# Patient Record
Sex: FEMALE | Race: BLACK OR AFRICAN AMERICAN | Employment: UNEMPLOYED | URBAN - NONMETROPOLITAN AREA
[De-identification: names, ages, dates, MRNs, and addresses within clinical notes are randomized per-mention and may not be internally consistent; named-entity substitution may affect disease eponyms.]

---

## 2017-01-05 ENCOUNTER — HOSPITAL ENCOUNTER (OUTPATIENT)
Dept: ULTRASOUND IMAGING | Age: 27
Discharge: HOME | End: 2017-01-05
Payer: COMMERCIAL

## 2017-01-05 ENCOUNTER — OFFICE VISIT (OUTPATIENT)
Dept: OBGYN | Age: 27
End: 2017-01-05
Payer: COMMERCIAL

## 2017-01-05 VITALS
BODY MASS INDEX: 36.58 KG/M2 | HEIGHT: 67 IN | WEIGHT: 233.1 LBS | DIASTOLIC BLOOD PRESSURE: 60 MMHG | SYSTOLIC BLOOD PRESSURE: 120 MMHG

## 2017-01-05 DIAGNOSIS — N83.202 CYSTS OF BOTH OVARIES: Primary | ICD-10-CM

## 2017-01-05 DIAGNOSIS — N83.201 CYSTS OF BOTH OVARIES: ICD-10-CM

## 2017-01-05 DIAGNOSIS — N83.201 CYSTS OF BOTH OVARIES: Primary | ICD-10-CM

## 2017-01-05 DIAGNOSIS — N83.201 BILATERAL OVARIAN CYSTS: ICD-10-CM

## 2017-01-05 DIAGNOSIS — N97.9 INFERTILITY, FEMALE: ICD-10-CM

## 2017-01-05 DIAGNOSIS — N83.202 BILATERAL OVARIAN CYSTS: ICD-10-CM

## 2017-01-05 DIAGNOSIS — N83.202 CYSTS OF BOTH OVARIES: ICD-10-CM

## 2017-01-05 PROCEDURE — 76856 US EXAM PELVIC COMPLETE: CPT

## 2017-01-05 PROCEDURE — 76830 TRANSVAGINAL US NON-OB: CPT

## 2017-01-05 PROCEDURE — 99213 OFFICE O/P EST LOW 20 MIN: CPT | Performed by: NURSE PRACTITIONER

## 2017-01-05 ASSESSMENT — ENCOUNTER SYMPTOMS
SHORTNESS OF BREATH: 0
DIARRHEA: 0
ABDOMINAL PAIN: 0
NAUSEA: 0
TROUBLE SWALLOWING: 0
WHEEZING: 0
APNEA: 0
SORE THROAT: 0
CONSTIPATION: 0

## 2017-01-09 ENCOUNTER — TELEPHONE (OUTPATIENT)
Dept: OBGYN | Age: 27
End: 2017-01-09

## 2017-01-17 ENCOUNTER — TELEPHONE (OUTPATIENT)
Dept: OBGYN | Age: 27
End: 2017-01-17

## 2017-01-23 DIAGNOSIS — N83.202 LEFT OVARIAN CYST: Primary | ICD-10-CM

## 2017-03-13 ENCOUNTER — HOSPITAL ENCOUNTER (EMERGENCY)
Age: 27
Discharge: HOME | End: 2017-03-13
Payer: COMMERCIAL

## 2017-03-13 ENCOUNTER — APPOINTMENT (OUTPATIENT)
Dept: CT IMAGING | Age: 27
End: 2017-03-13
Payer: COMMERCIAL

## 2017-03-13 VITALS
BODY MASS INDEX: 36.1 KG/M2 | HEART RATE: 77 BPM | DIASTOLIC BLOOD PRESSURE: 79 MMHG | TEMPERATURE: 97 F | WEIGHT: 230 LBS | RESPIRATION RATE: 18 BRPM | OXYGEN SATURATION: 99 % | HEIGHT: 67 IN | SYSTOLIC BLOOD PRESSURE: 117 MMHG

## 2017-03-13 DIAGNOSIS — N83.201 CYST OF RIGHT OVARY: Primary | ICD-10-CM

## 2017-03-13 DIAGNOSIS — R82.71 BACTERIA IN URINE: ICD-10-CM

## 2017-03-13 DIAGNOSIS — R07.9 CHEST PAIN, UNSPECIFIED TYPE: ICD-10-CM

## 2017-03-13 LAB
ALBUMIN SERPL-MCNC: 4.7 G/DL (ref 3.5–5.2)
ALP BLD-CCNC: 68 U/L (ref 35–104)
ALT SERPL-CCNC: 25 U/L (ref 5–33)
ANION GAP SERPL CALCULATED.3IONS-SCNC: 21 MMOL/L (ref 7–19)
AST SERPL-CCNC: 22 U/L (ref 5–32)
BACTERIA: ABNORMAL /HPF
BILIRUB SERPL-MCNC: 0.3 MG/DL (ref 0.2–1.2)
BILIRUBIN URINE: NEGATIVE
BLOOD, URINE: NEGATIVE
BUN BLDV-MCNC: 9 MG/DL (ref 6–20)
CALCIUM SERPL-MCNC: 10.1 MG/DL (ref 8.6–10)
CHLORIDE BLD-SCNC: 99 MMOL/L (ref 98–111)
CLARITY: ABNORMAL
CO2: 19 MMOL/L (ref 22–29)
COLOR: YELLOW
CREAT SERPL-MCNC: 0.7 MG/DL (ref 0.5–0.9)
EPITHELIAL CELLS, UA: 19 /HPF (ref 0–5)
GFR NON-AFRICAN AMERICAN: >60
GLOBULIN: 4.1 G/DL
GLUCOSE BLD-MCNC: 140 MG/DL (ref 74–109)
GLUCOSE URINE: NEGATIVE MG/DL
HCG QUALITATIVE: NEGATIVE
HCT VFR BLD CALC: 43.3 % (ref 37–47)
HEMOGLOBIN: 15.5 G/DL (ref 12–16)
HYALINE CASTS: 16 /HPF (ref 0–8)
KETONES, URINE: 15 MG/DL
LEUKOCYTE ESTERASE, URINE: ABNORMAL
LIPASE: 25 U/L (ref 13–60)
MCH RBC QN AUTO: 31 PG (ref 27–31)
MCHC RBC AUTO-ENTMCNC: 35.8 G/DL (ref 33–37)
MCV RBC AUTO: 86.6 FL (ref 81–99)
NITRITE, URINE: NEGATIVE
PDW BLD-RTO: 14.5 % (ref 11.5–14.5)
PERFORMED ON: NORMAL
PH UA: 8
PLATELET # BLD: 371 K/UL (ref 130–400)
PMV BLD AUTO: 10.1 FL (ref 7.4–10.4)
POC TROPONIN I: 0 NG/ML (ref 0–0.08)
POTASSIUM SERPL-SCNC: 3.9 MMOL/L (ref 3.5–5)
PROTEIN UA: 100 MG/DL
RBC # BLD: 5 M/UL (ref 4.2–5.4)
RBC UA: 3 /HPF (ref 0–4)
SODIUM BLD-SCNC: 139 MMOL/L (ref 136–145)
SPECIFIC GRAVITY UA: 1.03
TOTAL PROTEIN: 8.8 G/DL (ref 6.6–8.7)
UROBILINOGEN, URINE: 0.2 E.U./DL
WBC # BLD: 16.1 K/UL (ref 4.8–10.8)
WBC UA: 9 /HPF (ref 0–5)

## 2017-03-13 PROCEDURE — 99283 EMERGENCY DEPT VISIT LOW MDM: CPT | Performed by: NURSE PRACTITIONER

## 2017-03-13 PROCEDURE — 87086 URINE CULTURE/COLONY COUNT: CPT

## 2017-03-13 PROCEDURE — 96374 THER/PROPH/DIAG INJ IV PUSH: CPT

## 2017-03-13 PROCEDURE — 93005 ELECTROCARDIOGRAM TRACING: CPT

## 2017-03-13 PROCEDURE — 84703 CHORIONIC GONADOTROPIN ASSAY: CPT

## 2017-03-13 PROCEDURE — 36415 COLL VENOUS BLD VENIPUNCTURE: CPT

## 2017-03-13 PROCEDURE — 2580000003 HC RX 258: Performed by: NURSE PRACTITIONER

## 2017-03-13 PROCEDURE — 6360000004 HC RX CONTRAST MEDICATION: Performed by: NURSE PRACTITIONER

## 2017-03-13 PROCEDURE — 81001 URINALYSIS AUTO W/SCOPE: CPT

## 2017-03-13 PROCEDURE — 74177 CT ABD & PELVIS W/CONTRAST: CPT

## 2017-03-13 PROCEDURE — 83690 ASSAY OF LIPASE: CPT

## 2017-03-13 PROCEDURE — 6360000002 HC RX W HCPCS: Performed by: NURSE PRACTITIONER

## 2017-03-13 PROCEDURE — 84484 ASSAY OF TROPONIN QUANT: CPT

## 2017-03-13 PROCEDURE — 96375 TX/PRO/DX INJ NEW DRUG ADDON: CPT

## 2017-03-13 PROCEDURE — 85027 COMPLETE CBC AUTOMATED: CPT

## 2017-03-13 PROCEDURE — 80053 COMPREHEN METABOLIC PANEL: CPT

## 2017-03-13 PROCEDURE — 99284 EMERGENCY DEPT VISIT MOD MDM: CPT

## 2017-03-13 RX ORDER — NAPROXEN 500 MG/1
500 TABLET ORAL 2 TIMES DAILY
Qty: 20 TABLET | Refills: 0 | Status: SHIPPED | OUTPATIENT
Start: 2017-03-13 | End: 2017-03-23

## 2017-03-13 RX ORDER — ONDANSETRON 2 MG/ML
4 INJECTION INTRAMUSCULAR; INTRAVENOUS ONCE
Status: COMPLETED | OUTPATIENT
Start: 2017-03-13 | End: 2017-03-13

## 2017-03-13 RX ORDER — HYDROCODONE BITARTRATE AND ACETAMINOPHEN 5; 325 MG/1; MG/1
1 TABLET ORAL EVERY 6 HOURS PRN
Qty: 15 TABLET | Refills: 0 | Status: SHIPPED | OUTPATIENT
Start: 2017-03-13 | End: 2017-04-10 | Stop reason: ALTCHOICE

## 2017-03-13 RX ORDER — CEPHALEXIN 500 MG/1
500 CAPSULE ORAL 3 TIMES DAILY
Qty: 21 CAPSULE | Refills: 0 | Status: SHIPPED | OUTPATIENT
Start: 2017-03-13 | End: 2017-03-20

## 2017-03-13 RX ORDER — 0.9 % SODIUM CHLORIDE 0.9 %
1000 INTRAVENOUS SOLUTION INTRAVENOUS ONCE
Status: COMPLETED | OUTPATIENT
Start: 2017-03-13 | End: 2017-03-13

## 2017-03-13 RX ADMIN — IOVERSOL 90 ML: 741 INJECTION INTRA-ARTERIAL; INTRAVENOUS at 17:25

## 2017-03-13 RX ADMIN — HYDROMORPHONE HYDROCHLORIDE 1 MG: 1 INJECTION, SOLUTION INTRAMUSCULAR; INTRAVENOUS; SUBCUTANEOUS at 16:32

## 2017-03-13 RX ADMIN — ONDANSETRON 4 MG: 2 INJECTION INTRAMUSCULAR; INTRAVENOUS at 16:32

## 2017-03-13 RX ADMIN — SODIUM CHLORIDE 1000 ML: 9 INJECTION, SOLUTION INTRAVENOUS at 16:32

## 2017-03-13 ASSESSMENT — PAIN SCALES - GENERAL
PAINLEVEL_OUTOF10: 10
PAINLEVEL_OUTOF10: 10
PAINLEVEL_OUTOF10: 7

## 2017-03-13 ASSESSMENT — PAIN DESCRIPTION - LOCATION: LOCATION: ABDOMEN;CHEST

## 2017-03-13 ASSESSMENT — ENCOUNTER SYMPTOMS
ALLERGIC/IMMUNOLOGIC NEGATIVE: 1
EYES NEGATIVE: 1
RESPIRATORY NEGATIVE: 1
GASTROINTESTINAL NEGATIVE: 1

## 2017-03-13 ASSESSMENT — PAIN DESCRIPTION - PAIN TYPE: TYPE: ACUTE PAIN

## 2017-03-14 LAB
EKG P AXIS: -10 DEGREES
EKG P-R INTERVAL: 176 MS
EKG Q-T INTERVAL: 440 MS
EKG QRS DURATION: 82 MS
EKG QTC CALCULATION (BAZETT): 426 MS
EKG T AXIS: 31 DEGREES

## 2017-03-15 LAB — URINE CULTURE, ROUTINE: NORMAL

## 2017-04-10 ENCOUNTER — HOSPITAL ENCOUNTER (EMERGENCY)
Age: 27
Discharge: LEFT BEFORE DISCHARGE | End: 2017-04-10
Attending: EMERGENCY MEDICINE
Payer: COMMERCIAL

## 2017-04-10 VITALS
RESPIRATION RATE: 24 BRPM | OXYGEN SATURATION: 98 % | SYSTOLIC BLOOD PRESSURE: 142 MMHG | BODY MASS INDEX: 36.1 KG/M2 | HEART RATE: 86 BPM | DIASTOLIC BLOOD PRESSURE: 86 MMHG | HEIGHT: 67 IN | TEMPERATURE: 98.4 F | WEIGHT: 230 LBS

## 2017-04-10 DIAGNOSIS — R10.9 ABDOMINAL PAIN, UNSPECIFIED LOCATION: Primary | ICD-10-CM

## 2017-04-10 LAB
ALBUMIN SERPL-MCNC: 4.6 G/DL (ref 3.5–5.2)
ALP BLD-CCNC: 68 U/L (ref 35–104)
ALT SERPL-CCNC: 18 U/L (ref 5–33)
AMORPHOUS: ABNORMAL /HPF
ANION GAP SERPL CALCULATED.3IONS-SCNC: 18 MMOL/L (ref 7–19)
AST SERPL-CCNC: 21 U/L (ref 5–32)
BASOPHILS ABSOLUTE: 0.1 K/UL (ref 0–0.2)
BASOPHILS RELATIVE PERCENT: 0.4 % (ref 0–1)
BILIRUB SERPL-MCNC: 0.3 MG/DL (ref 0.2–1.2)
BILIRUBIN URINE: NEGATIVE
BLOOD, URINE: NEGATIVE
BUN BLDV-MCNC: 10 MG/DL (ref 6–20)
CALCIUM SERPL-MCNC: 9.4 MG/DL (ref 8.6–10)
CHLORIDE BLD-SCNC: 102 MMOL/L (ref 98–111)
CLARITY: ABNORMAL
CO2: 18 MMOL/L (ref 22–29)
COLOR: YELLOW
CREAT SERPL-MCNC: 0.6 MG/DL (ref 0.5–0.9)
EOSINOPHILS ABSOLUTE: 0.1 K/UL (ref 0–0.6)
EOSINOPHILS RELATIVE PERCENT: 0.5 % (ref 0–5)
EPITHELIAL CELLS, UA: ABNORMAL /HPF
GFR NON-AFRICAN AMERICAN: >60
GLOBULIN: 3.7 G/DL
GLUCOSE BLD-MCNC: 134 MG/DL (ref 74–109)
GLUCOSE URINE: NEGATIVE MG/DL
HCG(URINE) PREGNANCY TEST: NEGATIVE
HCT VFR BLD CALC: 43.3 % (ref 37–47)
HEMOGLOBIN: 15.7 G/DL (ref 12–16)
KETONES, URINE: 15 MG/DL
LEUKOCYTE ESTERASE, URINE: ABNORMAL
LYMPHOCYTES ABSOLUTE: 3.8 K/UL (ref 1.1–4.5)
LYMPHOCYTES RELATIVE PERCENT: 28.3 % (ref 20–40)
MCH RBC QN AUTO: 31.3 PG (ref 27–31)
MCHC RBC AUTO-ENTMCNC: 36.3 G/DL (ref 33–37)
MCV RBC AUTO: 86.3 FL (ref 81–99)
MONOCYTES ABSOLUTE: 1.1 K/UL (ref 0–0.9)
MONOCYTES RELATIVE PERCENT: 7.9 % (ref 0–10)
NEUTROPHILS ABSOLUTE: 8.3 K/UL (ref 1.5–7.5)
NEUTROPHILS RELATIVE PERCENT: 62.3 % (ref 50–65)
NITRITE, URINE: NEGATIVE
PDW BLD-RTO: 14.1 % (ref 11.5–14.5)
PH UA: 5.5
PLATELET # BLD: 349 K/UL (ref 130–400)
PMV BLD AUTO: 10.8 FL (ref 7.4–10.4)
POTASSIUM SERPL-SCNC: 3.4 MMOL/L (ref 3.5–5)
PROTEIN UA: 30 MG/DL
RBC # BLD: 5.02 M/UL (ref 4.2–5.4)
SODIUM BLD-SCNC: 138 MMOL/L (ref 136–145)
SPECIFIC GRAVITY UA: 1.03
TOTAL PROTEIN: 8.3 G/DL (ref 6.6–8.7)
UROBILINOGEN, URINE: 0.2 E.U./DL
WBC # BLD: 13.4 K/UL (ref 4.8–10.8)
WBC UA: ABNORMAL /HPF (ref 0–5)

## 2017-04-10 PROCEDURE — 6360000002 HC RX W HCPCS

## 2017-04-10 PROCEDURE — 6360000002 HC RX W HCPCS: Performed by: EMERGENCY MEDICINE

## 2017-04-10 PROCEDURE — 80053 COMPREHEN METABOLIC PANEL: CPT

## 2017-04-10 PROCEDURE — 87086 URINE CULTURE/COLONY COUNT: CPT

## 2017-04-10 PROCEDURE — 99282 EMERGENCY DEPT VISIT SF MDM: CPT | Performed by: EMERGENCY MEDICINE

## 2017-04-10 PROCEDURE — 96374 THER/PROPH/DIAG INJ IV PUSH: CPT

## 2017-04-10 PROCEDURE — 81025 URINE PREGNANCY TEST: CPT

## 2017-04-10 PROCEDURE — 81001 URINALYSIS AUTO W/SCOPE: CPT

## 2017-04-10 PROCEDURE — 36415 COLL VENOUS BLD VENIPUNCTURE: CPT

## 2017-04-10 PROCEDURE — 96375 TX/PRO/DX INJ NEW DRUG ADDON: CPT

## 2017-04-10 PROCEDURE — 85025 COMPLETE CBC W/AUTO DIFF WBC: CPT

## 2017-04-10 PROCEDURE — 99283 EMERGENCY DEPT VISIT LOW MDM: CPT

## 2017-04-10 RX ORDER — KETOROLAC TROMETHAMINE 30 MG/ML
30 INJECTION, SOLUTION INTRAMUSCULAR; INTRAVENOUS ONCE
Status: COMPLETED | OUTPATIENT
Start: 2017-04-10 | End: 2017-04-10

## 2017-04-10 RX ORDER — ONDANSETRON 2 MG/ML
INJECTION INTRAMUSCULAR; INTRAVENOUS
Status: COMPLETED
Start: 2017-04-10 | End: 2017-04-10

## 2017-04-10 RX ORDER — KETOROLAC TROMETHAMINE 30 MG/ML
30 INJECTION, SOLUTION INTRAMUSCULAR; INTRAVENOUS ONCE
Status: DISCONTINUED | OUTPATIENT
Start: 2017-04-10 | End: 2017-04-10

## 2017-04-10 RX ORDER — PROMETHAZINE HYDROCHLORIDE 25 MG/ML
25 INJECTION, SOLUTION INTRAMUSCULAR; INTRAVENOUS ONCE
Status: COMPLETED | OUTPATIENT
Start: 2017-04-10 | End: 2017-04-10

## 2017-04-10 RX ADMIN — KETOROLAC TROMETHAMINE 30 MG: 30 INJECTION, SOLUTION INTRAMUSCULAR at 15:54

## 2017-04-10 RX ADMIN — PROMETHAZINE HYDROCHLORIDE 25 MG: 25 INJECTION, SOLUTION INTRAMUSCULAR; INTRAVENOUS at 16:18

## 2017-04-10 RX ADMIN — ONDANSETRON HYDROCHLORIDE: 2 INJECTION, SOLUTION INTRAVENOUS at 15:30

## 2017-04-10 ASSESSMENT — ENCOUNTER SYMPTOMS
VOMITING: 1
EYES NEGATIVE: 1
SORE THROAT: 0
COUGH: 0
FLATUS: 1
HEMATOCHEZIA: 0
SHORTNESS OF BREATH: 0
HEMATEMESIS: 0
RESPIRATORY NEGATIVE: 1
ALLERGIC/IMMUNOLOGIC NEGATIVE: 1
ABDOMINAL PAIN: 1
DIARRHEA: 0
CONSTIPATION: 0
NAUSEA: 1
BELCHING: 0

## 2017-04-10 ASSESSMENT — PAIN DESCRIPTION - FREQUENCY: FREQUENCY: CONTINUOUS

## 2017-04-10 ASSESSMENT — PAIN DESCRIPTION - PAIN TYPE: TYPE: ACUTE PAIN

## 2017-04-10 ASSESSMENT — PAIN DESCRIPTION - LOCATION: LOCATION: ABDOMEN

## 2017-04-10 ASSESSMENT — PAIN SCALES - GENERAL: PAINLEVEL_OUTOF10: 10

## 2017-04-12 ENCOUNTER — APPOINTMENT (OUTPATIENT)
Dept: CT IMAGING | Age: 27
End: 2017-04-12
Payer: COMMERCIAL

## 2017-04-12 ENCOUNTER — HOSPITAL ENCOUNTER (EMERGENCY)
Age: 27
Discharge: HOME | End: 2017-04-12
Attending: EMERGENCY MEDICINE
Payer: COMMERCIAL

## 2017-04-12 ENCOUNTER — APPOINTMENT (OUTPATIENT)
Dept: ULTRASOUND IMAGING | Facility: HOSPITAL | Age: 27
End: 2017-04-12

## 2017-04-12 ENCOUNTER — HOSPITAL ENCOUNTER (EMERGENCY)
Facility: HOSPITAL | Age: 27
Discharge: HOME | End: 2017-04-12
Admitting: FAMILY MEDICINE

## 2017-04-12 VITALS
WEIGHT: 230 LBS | BODY MASS INDEX: 36.1 KG/M2 | RESPIRATION RATE: 20 BRPM | HEIGHT: 67 IN | HEART RATE: 72 BPM | TEMPERATURE: 96.1 F | OXYGEN SATURATION: 95 %

## 2017-04-12 VITALS
HEIGHT: 67 IN | RESPIRATION RATE: 16 BRPM | TEMPERATURE: 97.8 F | SYSTOLIC BLOOD PRESSURE: 136 MMHG | WEIGHT: 230 LBS | OXYGEN SATURATION: 99 % | BODY MASS INDEX: 36.1 KG/M2 | HEART RATE: 79 BPM | DIASTOLIC BLOOD PRESSURE: 88 MMHG

## 2017-04-12 DIAGNOSIS — N30.00 ACUTE CYSTITIS WITHOUT HEMATURIA: ICD-10-CM

## 2017-04-12 DIAGNOSIS — N76.0 BACTERIAL VAGINOSIS: ICD-10-CM

## 2017-04-12 DIAGNOSIS — N83.201 CYST OF RIGHT OVARY: Primary | ICD-10-CM

## 2017-04-12 DIAGNOSIS — B96.89 BACTERIAL VAGINOSIS: ICD-10-CM

## 2017-04-12 LAB
ALBUMIN SERPL-MCNC: 4.3 G/DL (ref 3.5–5)
ALBUMIN SERPL-MCNC: 4.4 G/DL (ref 3.5–5.2)
ALBUMIN/GLOB SERPL: 1 G/DL (ref 1.1–2.5)
ALP BLD-CCNC: 66 U/L (ref 35–104)
ALP SERPL-CCNC: 70 U/L (ref 24–120)
ALT SERPL W P-5'-P-CCNC: 24 U/L (ref 0–54)
ALT SERPL-CCNC: 20 U/L (ref 5–33)
AMPHET+METHAMPHET UR QL: NEGATIVE
AMYLASE SERPL-CCNC: 79 U/L (ref 30–110)
ANION GAP SERPL CALCULATED.3IONS-SCNC: 13 MMOL/L (ref 4–13)
ANION GAP SERPL CALCULATED.3IONS-SCNC: 17 MMOL/L (ref 7–19)
AST SERPL-CCNC: 28 U/L (ref 5–32)
AST SERPL-CCNC: 33 U/L (ref 7–45)
B-HCG UR QL: NEGATIVE
BACTERIA UR QL AUTO: ABNORMAL /HPF
BARBITURATES UR QL SCN: NEGATIVE
BASOPHILS # BLD AUTO: 0.03 10*3/MM3 (ref 0–0.2)
BASOPHILS ABSOLUTE: 0 K/UL (ref 0–0.2)
BASOPHILS NFR BLD AUTO: 0.2 % (ref 0–2)
BASOPHILS RELATIVE PERCENT: 0.3 % (ref 0–1)
BENZODIAZ UR QL SCN: NEGATIVE
BILIRUB SERPL-MCNC: 0.5 MG/DL (ref 0.2–1.2)
BILIRUB SERPL-MCNC: 0.6 MG/DL (ref 0.1–1)
BILIRUB UR QL STRIP: NEGATIVE
BILIRUBIN URINE: NEGATIVE
BLOOD, URINE: NEGATIVE
BUN BLD-MCNC: 11 MG/DL (ref 5–21)
BUN BLDV-MCNC: 11 MG/DL (ref 6–20)
BUN/CREAT SERPL: 14.7 (ref 7–25)
CALCIUM SERPL-MCNC: 9.2 MG/DL (ref 8.6–10)
CALCIUM SPEC-SCNC: 9.4 MG/DL (ref 8.4–10.4)
CANNABINOIDS SERPL QL: NEGATIVE
CHLORIDE BLD-SCNC: 99 MMOL/L (ref 98–111)
CHLORIDE SERPL-SCNC: 100 MMOL/L (ref 98–110)
CLARITY UR: ABNORMAL
CLARITY: ABNORMAL
CLUE CELLS SPEC QL WET PREP: ABNORMAL
CO2 SERPL-SCNC: 26 MMOL/L (ref 24–31)
CO2: 19 MMOL/L (ref 22–29)
COCAINE UR QL: NEGATIVE
COLOR UR: YELLOW
COLOR: YELLOW
CREAT BLD-MCNC: 0.75 MG/DL (ref 0.5–1.4)
CREAT SERPL-MCNC: 0.7 MG/DL (ref 0.5–0.9)
CRP SERPL-MCNC: <0.5 MG/DL (ref 0–0.99)
D-LACTATE SERPL-SCNC: 1.6 MMOL/L (ref 0.5–2)
DEPRECATED RDW RBC AUTO: 43.7 FL (ref 40–54)
EOSINOPHIL # BLD AUTO: 0.03 10*3/MM3 (ref 0–0.7)
EOSINOPHIL NFR BLD AUTO: 0.2 % (ref 0–4)
EOSINOPHILS ABSOLUTE: 0.1 K/UL (ref 0–0.6)
EOSINOPHILS RELATIVE PERCENT: 0.3 % (ref 0–5)
ERYTHROCYTE [DISTWIDTH] IN BLOOD BY AUTOMATED COUNT: 14.2 % (ref 12–15)
GFR NON-AFRICAN AMERICAN: >60
GFR SERPL CREATININE-BSD FRML MDRD: 112 ML/MIN/1.73
GLOBULIN UR ELPH-MCNC: 4.1 GM/DL
GLOBULIN: 3.7 G/DL
GLUCOSE BLD-MCNC: 133 MG/DL (ref 74–109)
GLUCOSE BLD-MCNC: 93 MG/DL (ref 70–100)
GLUCOSE UR STRIP-MCNC: NEGATIVE MG/DL
GLUCOSE URINE: NEGATIVE MG/DL
HCG QUALITATIVE: NEGATIVE
HCT VFR BLD AUTO: 42 % (ref 37–47)
HCT VFR BLD CALC: 43 % (ref 37–47)
HEMOGLOBIN: 15.5 G/DL (ref 12–16)
HGB BLD-MCNC: 15.3 G/DL (ref 12–16)
HGB UR QL STRIP.AUTO: NEGATIVE
HYALINE CASTS UR QL AUTO: ABNORMAL /LPF
HYDATID CYST SPEC WET PREP: ABNORMAL
IMM GRANULOCYTES # BLD: 0.06 10*3/MM3 (ref 0–0.03)
IMM GRANULOCYTES NFR BLD: 0.4 % (ref 0–5)
INTERNAL NEGATIVE CONTROL: NEGATIVE
INTERNAL POSITIVE CONTROL: POSITIVE
KETONES UR QL STRIP: ABNORMAL
KETONES, URINE: NEGATIVE MG/DL
LACTIC ACID: 1.7 MG/DL (ref 0.5–1.9)
LEUKOCYTE ESTERASE UR QL STRIP.AUTO: ABNORMAL
LEUKOCYTE ESTERASE, URINE: ABNORMAL
LIPASE SERPL-CCNC: 67 U/L (ref 23–203)
LIPASE: 22 U/L (ref 13–60)
LYMPHOCYTES # BLD AUTO: 2.92 10*3/MM3 (ref 0.72–4.86)
LYMPHOCYTES ABSOLUTE: 2.8 K/UL (ref 1.1–4.5)
LYMPHOCYTES NFR BLD AUTO: 19.4 % (ref 15–45)
LYMPHOCYTES RELATIVE PERCENT: 18.4 % (ref 20–40)
Lab: NORMAL
MCH RBC QN AUTO: 30.8 PG (ref 28–32)
MCH RBC QN AUTO: 31 PG (ref 27–31)
MCHC RBC AUTO-ENTMCNC: 36 G/DL (ref 33–37)
MCHC RBC AUTO-ENTMCNC: 36.4 G/DL (ref 33–36)
MCV RBC AUTO: 84.5 FL (ref 82–98)
MCV RBC AUTO: 86 FL (ref 81–99)
METHADONE UR QL SCN: NEGATIVE
MONOCYTES # BLD AUTO: 1.2 10*3/MM3 (ref 0.19–1.3)
MONOCYTES ABSOLUTE: 1 K/UL (ref 0–0.9)
MONOCYTES NFR BLD AUTO: 8 % (ref 4–12)
MONOCYTES RELATIVE PERCENT: 6.4 % (ref 0–10)
NEUTROPHILS # BLD AUTO: 10.85 10*3/MM3 (ref 1.87–8.4)
NEUTROPHILS ABSOLUTE: 11.3 K/UL (ref 1.5–7.5)
NEUTROPHILS NFR BLD AUTO: 71.8 % (ref 39–78)
NEUTROPHILS RELATIVE PERCENT: 73.9 % (ref 50–65)
NITRITE UR QL STRIP: NEGATIVE
NITRITE, URINE: NEGATIVE
OPIATES UR QL: POSITIVE
PCP UR QL SCN: NEGATIVE
PDW BLD-RTO: 14.2 % (ref 11.5–14.5)
PH UA: 5.5
PH UR STRIP.AUTO: 5.5 [PH] (ref 5–8)
PLATELET # BLD AUTO: 313 10*3/MM3 (ref 130–400)
PLATELET # BLD: 331 K/UL (ref 130–400)
PMV BLD AUTO: 10.2 FL (ref 6–12)
PMV BLD AUTO: 10.3 FL (ref 7.4–10.4)
POTASSIUM BLD-SCNC: 3.5 MMOL/L (ref 3.5–5.3)
POTASSIUM SERPL-SCNC: 3.5 MMOL/L (ref 3.5–5)
PROT SERPL-MCNC: 8.4 G/DL (ref 6.3–8.7)
PROT UR QL STRIP: ABNORMAL
PROTEIN UA: 30 MG/DL
RBC # BLD AUTO: 4.97 10*6/MM3 (ref 4.2–5.4)
RBC # BLD: 5 M/UL (ref 4.2–5.4)
RBC # UR: ABNORMAL /HPF
RBC UA: ABNORMAL /HPF (ref 0–2)
REF LAB TEST METHOD: ABNORMAL
RENAL EPITHELIAL, UA: ABNORMAL /HPF
SODIUM BLD-SCNC: 135 MMOL/L (ref 136–145)
SODIUM BLD-SCNC: 139 MMOL/L (ref 135–145)
SP GR UR STRIP: >1.03 (ref 1–1.03)
SPECIFIC GRAVITY UA: 1.02
SQUAMOUS #/AREA URNS HPF: ABNORMAL /HPF
T VAGINALIS SPEC QL WET PREP: ABNORMAL
TOTAL PROTEIN: 8.1 G/DL (ref 6.6–8.7)
URINE CULTURE, ROUTINE: NORMAL
UROBILINOGEN UR QL STRIP: ABNORMAL
UROBILINOGEN, URINE: 0.2 E.U./DL
WBC # BLD: 15.3 K/UL (ref 4.8–10.8)
WBC NRBC COR # BLD: 15.09 10*3/MM3 (ref 4.8–10.8)
WBC SPEC QL WET PREP: ABNORMAL
WBC UA: ABNORMAL /HPF (ref 0–5)
WBC UR QL AUTO: ABNORMAL /HPF
YEAST GENITAL QL WET PREP: ABNORMAL

## 2017-04-12 PROCEDURE — 80053 COMPREHEN METABOLIC PANEL: CPT | Performed by: NURSE PRACTITIONER

## 2017-04-12 PROCEDURE — 81001 URINALYSIS AUTO W/SCOPE: CPT | Performed by: NURSE PRACTITIONER

## 2017-04-12 PROCEDURE — 99284 EMERGENCY DEPT VISIT MOD MDM: CPT

## 2017-04-12 PROCEDURE — 85025 COMPLETE CBC W/AUTO DIFF WBC: CPT

## 2017-04-12 PROCEDURE — 87210 SMEAR WET MOUNT SALINE/INK: CPT | Performed by: NURSE PRACTITIONER

## 2017-04-12 PROCEDURE — 87086 URINE CULTURE/COLONY COUNT: CPT | Performed by: NURSE PRACTITIONER

## 2017-04-12 PROCEDURE — 25010000002 CEFTRIAXONE PER 250 MG: Performed by: NURSE PRACTITIONER

## 2017-04-12 PROCEDURE — 25010000002 ONDANSETRON PER 1 MG: Performed by: NURSE PRACTITIONER

## 2017-04-12 PROCEDURE — 76856 US EXAM PELVIC COMPLETE: CPT

## 2017-04-12 PROCEDURE — 80307 DRUG TEST PRSMV CHEM ANLYZR: CPT | Performed by: NURSE PRACTITIONER

## 2017-04-12 PROCEDURE — 82150 ASSAY OF AMYLASE: CPT | Performed by: NURSE PRACTITIONER

## 2017-04-12 PROCEDURE — 86140 C-REACTIVE PROTEIN: CPT | Performed by: NURSE PRACTITIONER

## 2017-04-12 PROCEDURE — 84703 CHORIONIC GONADOTROPIN ASSAY: CPT

## 2017-04-12 PROCEDURE — 96361 HYDRATE IV INFUSION ADD-ON: CPT

## 2017-04-12 PROCEDURE — 83605 ASSAY OF LACTIC ACID: CPT | Performed by: NURSE PRACTITIONER

## 2017-04-12 PROCEDURE — 96374 THER/PROPH/DIAG INJ IV PUSH: CPT

## 2017-04-12 PROCEDURE — 6360000004 HC RX CONTRAST MEDICATION: Performed by: EMERGENCY MEDICINE

## 2017-04-12 PROCEDURE — 87591 N.GONORRHOEAE DNA AMP PROB: CPT | Performed by: NURSE PRACTITIONER

## 2017-04-12 PROCEDURE — 85025 COMPLETE CBC W/AUTO DIFF WBC: CPT | Performed by: NURSE PRACTITIONER

## 2017-04-12 PROCEDURE — 83690 ASSAY OF LIPASE: CPT | Performed by: NURSE PRACTITIONER

## 2017-04-12 PROCEDURE — 83690 ASSAY OF LIPASE: CPT

## 2017-04-12 PROCEDURE — 96372 THER/PROPH/DIAG INJ SC/IM: CPT

## 2017-04-12 PROCEDURE — 99283 EMERGENCY DEPT VISIT LOW MDM: CPT | Performed by: EMERGENCY MEDICINE

## 2017-04-12 PROCEDURE — 36415 COLL VENOUS BLD VENIPUNCTURE: CPT

## 2017-04-12 PROCEDURE — 2580000003 HC RX 258: Performed by: EMERGENCY MEDICINE

## 2017-04-12 PROCEDURE — 96375 TX/PRO/DX INJ NEW DRUG ADDON: CPT

## 2017-04-12 PROCEDURE — 87491 CHLMYD TRACH DNA AMP PROBE: CPT | Performed by: NURSE PRACTITIONER

## 2017-04-12 PROCEDURE — 25010000002 HYDROMORPHONE PER 4 MG: Performed by: NURSE PRACTITIONER

## 2017-04-12 PROCEDURE — 6360000002 HC RX W HCPCS: Performed by: EMERGENCY MEDICINE

## 2017-04-12 PROCEDURE — 80053 COMPREHEN METABOLIC PANEL: CPT

## 2017-04-12 PROCEDURE — 83605 ASSAY OF LACTIC ACID: CPT

## 2017-04-12 PROCEDURE — 74177 CT ABD & PELVIS W/CONTRAST: CPT

## 2017-04-12 PROCEDURE — 81001 URINALYSIS AUTO W/SCOPE: CPT

## 2017-04-12 PROCEDURE — 87086 URINE CULTURE/COLONY COUNT: CPT

## 2017-04-12 RX ORDER — ONDANSETRON 2 MG/ML
4 INJECTION INTRAMUSCULAR; INTRAVENOUS ONCE
Status: COMPLETED | OUTPATIENT
Start: 2017-04-12 | End: 2017-04-12

## 2017-04-12 RX ORDER — CEFTRIAXONE 500 MG/1
250 INJECTION, POWDER, FOR SOLUTION INTRAMUSCULAR; INTRAVENOUS ONCE
Status: COMPLETED | OUTPATIENT
Start: 2017-04-12 | End: 2017-04-12

## 2017-04-12 RX ORDER — AZITHROMYCIN 250 MG/1
1000 TABLET, FILM COATED ORAL ONCE
Status: COMPLETED | OUTPATIENT
Start: 2017-04-12 | End: 2017-04-12

## 2017-04-12 RX ORDER — SODIUM CHLORIDE 0.9 % (FLUSH) 0.9 %
10 SYRINGE (ML) INJECTION AS NEEDED
Status: DISCONTINUED | OUTPATIENT
Start: 2017-04-12 | End: 2017-04-12 | Stop reason: HOSPADM

## 2017-04-12 RX ORDER — HYDROCODONE BITARTRATE AND ACETAMINOPHEN 5; 325 MG/1; MG/1
2 TABLET ORAL EVERY 6 HOURS PRN
Qty: 10 TABLET | Refills: 0 | Status: SHIPPED | OUTPATIENT
Start: 2017-04-12 | End: 2017-04-19

## 2017-04-12 RX ORDER — METRONIDAZOLE 500 MG/1
500 TABLET ORAL 2 TIMES DAILY
Qty: 14 TABLET | Refills: 0 | Status: SHIPPED | OUTPATIENT
Start: 2017-04-12 | End: 2017-04-19

## 2017-04-12 RX ORDER — NITROFURANTOIN 25; 75 MG/1; MG/1
100 CAPSULE ORAL 2 TIMES DAILY
Qty: 14 CAPSULE | Refills: 0 | Status: SHIPPED | OUTPATIENT
Start: 2017-04-12 | End: 2017-04-19

## 2017-04-12 RX ORDER — LIDOCAINE HYDROCHLORIDE 10 MG/ML
0.9 INJECTION, SOLUTION EPIDURAL; INFILTRATION; INTRACAUDAL; PERINEURAL ONCE
Status: DISCONTINUED | OUTPATIENT
Start: 2017-04-12 | End: 2017-04-12 | Stop reason: HOSPADM

## 2017-04-12 RX ORDER — 0.9 % SODIUM CHLORIDE 0.9 %
1000 INTRAVENOUS SOLUTION INTRAVENOUS ONCE
Status: COMPLETED | OUTPATIENT
Start: 2017-04-12 | End: 2017-04-12

## 2017-04-12 RX ADMIN — HYDROMORPHONE HYDROCHLORIDE 1 MG: 1 INJECTION, SOLUTION INTRAMUSCULAR; INTRAVENOUS; SUBCUTANEOUS at 14:17

## 2017-04-12 RX ADMIN — HYDROMORPHONE HYDROCHLORIDE 1 MG: 1 INJECTION, SOLUTION INTRAMUSCULAR; INTRAVENOUS; SUBCUTANEOUS at 04:37

## 2017-04-12 RX ADMIN — AZITHROMYCIN 1000 MG: 250 TABLET, FILM COATED ORAL at 19:27

## 2017-04-12 RX ADMIN — IOVERSOL 90 ML: 741 INJECTION INTRA-ARTERIAL; INTRAVENOUS at 04:37

## 2017-04-12 RX ADMIN — SODIUM CHLORIDE 1000 ML: 9 INJECTION, SOLUTION INTRAVENOUS at 04:37

## 2017-04-12 RX ADMIN — ONDANSETRON 4 MG: 2 INJECTION INTRAMUSCULAR; INTRAVENOUS at 04:14

## 2017-04-12 RX ADMIN — SODIUM CHLORIDE 1000 ML: 9 INJECTION, SOLUTION INTRAVENOUS at 14:14

## 2017-04-12 RX ADMIN — CEFTRIAXONE 250 MG: 500 INJECTION, POWDER, FOR SOLUTION INTRAMUSCULAR; INTRAVENOUS at 19:28

## 2017-04-12 RX ADMIN — ONDANSETRON 4 MG: 2 INJECTION INTRAMUSCULAR; INTRAVENOUS at 14:16

## 2017-04-12 ASSESSMENT — PAIN DESCRIPTION - LOCATION: LOCATION: ABDOMEN

## 2017-04-12 ASSESSMENT — ENCOUNTER SYMPTOMS
VOMITING: 1
CHEST TIGHTNESS: 0
NAUSEA: 1
SHORTNESS OF BREATH: 0
ABDOMINAL PAIN: 1

## 2017-04-12 ASSESSMENT — PAIN SCALES - GENERAL
PAINLEVEL_OUTOF10: 10
PAINLEVEL_OUTOF10: 10

## 2017-04-12 ASSESSMENT — PAIN DESCRIPTION - PAIN TYPE: TYPE: ACUTE PAIN

## 2017-04-12 NOTE — DISCHARGE INSTRUCTIONS
Please f/u with your OBGYN in 1-2 days for a recheck  R/t to the ER as needed  Use medications as prescribed

## 2017-04-12 NOTE — ED PROVIDER NOTES
Subjective   HPI Comments: Is a 27-year-old -American female who presents ER today with complaint of abdominal pain.  Ports that pain began yesterday.  Patient states that she has felt nauseated however has not had any vomiting.  She reports that her last bowel movement was yesterday and this was normal.  Patient denies any fever, vaginal discharge, or vaginal bleeding.  The patient was seen at [facility] ER this morning.  Patient had a CT scan of the abdomen and pelvis which showed a right ovarian cyst.  Patient was discharged home with a prescription for Norco.  She reports that this is not helping her pain.  Patient appears to be uncomfortable in the room she is crying during the examination.  She presents ER today for further evaluation.    Patient is a 27 y.o. female presenting with abdominal pain.   History provided by:  Patient   used: No    Abdominal Pain   Pain location:  Generalized  Pain quality: sharp and stabbing    Pain radiates to:  Does not radiate  Pain severity:  Moderate  Onset quality:  Sudden  Duration:  1 day  Timing:  Constant  Progression:  Worsening  Chronicity:  New  Context: not alcohol use, not awakening from sleep, not diet changes, not eating, not laxative use, not medication withdrawal, not previous surgeries, not recent illness, not recent sexual activity, not recent travel, not retching, not sick contacts, not suspicious food intake and not trauma    Relieved by:  Nothing  Worsened by:  Nothing  Ineffective treatments:  None tried  Associated symptoms: nausea    Associated symptoms: no anorexia, no belching, no chest pain, no chills, no constipation, no cough, no diarrhea, no dysuria, no fatigue, no fever, no flatus, no hematemesis, no hematuria, no melena, no shortness of breath, no sore throat, no vaginal bleeding, no vaginal discharge and no vomiting    Risk factors: no alcohol abuse, no aspirin use, not elderly, has not had multiple surgeries, no NSAID  use, not obese, not pregnant and no recent hospitalization        Review of Systems   Constitutional: Negative for chills, fatigue and fever.   HENT: Negative for sore throat.    Respiratory: Negative for cough and shortness of breath.    Cardiovascular: Negative for chest pain.   Gastrointestinal: Positive for abdominal pain and nausea. Negative for anorexia, constipation, diarrhea, flatus, hematemesis, melena and vomiting.   Genitourinary: Negative for dysuria, hematuria, vaginal bleeding and vaginal discharge.   All other systems reviewed and are negative.      History reviewed. No pertinent past medical history.    No Known Allergies    History reviewed. No pertinent surgical history.    History reviewed. No pertinent family history.    Social History     Social History   • Marital status: Single     Spouse name: N/A   • Number of children: N/A   • Years of education: N/A     Social History Main Topics   • Smoking status: Current Every Day Smoker     Packs/day: 0.50   • Smokeless tobacco: None   • Alcohol use No   • Drug use: No   • Sexual activity: Defer     Other Topics Concern   • None     Social History Narrative           Objective   Physical Exam   Constitutional: She is oriented to person, place, and time. She appears well-developed and well-nourished.   HENT:   Head: Normocephalic and atraumatic.   Eyes: Conjunctivae are normal. Pupils are equal, round, and reactive to light.   Neck: Normal range of motion. Neck supple.   Cardiovascular: Normal rate, regular rhythm and normal heart sounds.    Pulmonary/Chest: Effort normal and breath sounds normal.   Abdominal: Soft. Normal appearance and bowel sounds are normal. There is generalized tenderness.   Genitourinary: No erythema, tenderness or bleeding in the vagina. No foreign body in the vagina. No signs of injury around the vagina. Vaginal discharge found.       Musculoskeletal: Normal range of motion.   Neurological: She is alert and oriented to person,  place, and time.   Skin: Skin is warm and dry.   Psychiatric: She has a normal mood and affect.   Nursing note and vitals reviewed.      Procedures         ED Course  ED Course   Comment By Time   Ultrasound and labs reviewed at this time. Patient blood cell count is 15.  Her urine shows trace amount leuk esterase, 0-2 RBCs, 13-20 WBCs, 3+ bacteria, 21-30 squamous epithelial cells, this time I have reevaluated the patient she is resting comfortably in the room denies any pain at this time.  She is taxing on her cell phone.  I discussed the results with the patient.  The patient reports to me that she has been having some pelvic pain.  I did discuss the patient having a pelvic exam done for wet prep and GC.  She states that she would like to have this done and.  She states that she is sexually active and has had unprotected intercourse with her partner who has slept with other partners recently. Pt is concerned that she could have an STD. DEIDRA Uriarte 04/12 1754   Patient's wet that showed 1+ WBC, 2+ clue cells.  At this time we will go ahead and treat the patient with Rocephin as well as Zithromax.  She reports that her significant other has been having unprotected intercourse with other women and the patient is concerned about possible STD.  We will go ahead and treat her for this.  We will discharge the patient home at this time in stable condition.  Give her prescription for Macrobid to treat her UTI as well as Flagyl to treat the bacterial vaginosis.  Vascular the patient follow-up with her OB/GYN for the ovarian cyst noted on this ultrasound.  At this time the patient will be discharged home in stable condition.  She was given a prescription for [place] to use for pain this morning [facility] from [facility] and I have asked her to use these as directed.  Agitated patient will be discharged home in stable condition.  She is advised to return to the ER if any new or worsening symptoms. Luzma Blevins, DEIDRA  04/12 1850                  [facility]  Number of Diagnoses or Management Options  Acute cystitis without hematuria: new and requires workup  Bacterial vaginosis: new and requires workup  Cyst of right ovary: new and requires workup     Amount and/or Complexity of Data Reviewed  Clinical lab tests: ordered and reviewed  Tests in the radiology section of CPT®: ordered and reviewed  Decide to obtain previous medical records or to obtain history from someone other than the patient: yes  Discuss the patient with other providers: yes    Patient Progress  Patient progress: stable      Final diagnoses:   Cyst of right ovary   Acute cystitis without hematuria   Bacterial vaginosis            Luzma Blevins, APRN  04/12/17 5827

## 2017-04-14 ENCOUNTER — HOSPITAL ENCOUNTER (EMERGENCY)
Facility: HOSPITAL | Age: 27
Discharge: HOME | End: 2017-04-15
Attending: EMERGENCY MEDICINE | Admitting: EMERGENCY MEDICINE

## 2017-04-14 ENCOUNTER — APPOINTMENT (OUTPATIENT)
Dept: ULTRASOUND IMAGING | Facility: HOSPITAL | Age: 27
End: 2017-04-14

## 2017-04-14 DIAGNOSIS — N83.201 CYST OF RIGHT OVARY: Primary | ICD-10-CM

## 2017-04-14 LAB
BACTERIA SPEC AEROBE CULT: ABNORMAL
C TRACH RRNA SPEC DONR QL NAA+PROBE: NEGATIVE
N GONORRHOEA DNA SPEC QL NAA+PROBE: NEGATIVE
URINE CULTURE, ROUTINE: NORMAL

## 2017-04-14 PROCEDURE — 96374 THER/PROPH/DIAG INJ IV PUSH: CPT

## 2017-04-14 PROCEDURE — 36415 COLL VENOUS BLD VENIPUNCTURE: CPT

## 2017-04-14 PROCEDURE — 96361 HYDRATE IV INFUSION ADD-ON: CPT

## 2017-04-14 PROCEDURE — 76830 TRANSVAGINAL US NON-OB: CPT

## 2017-04-14 PROCEDURE — 96375 TX/PRO/DX INJ NEW DRUG ADDON: CPT

## 2017-04-14 PROCEDURE — 99284 EMERGENCY DEPT VISIT MOD MDM: CPT

## 2017-04-14 RX ORDER — KETOROLAC TROMETHAMINE 30 MG/ML
30 INJECTION, SOLUTION INTRAMUSCULAR; INTRAVENOUS ONCE
Status: COMPLETED | OUTPATIENT
Start: 2017-04-14 | End: 2017-04-15

## 2017-04-14 RX ORDER — ONDANSETRON 2 MG/ML
4 INJECTION INTRAMUSCULAR; INTRAVENOUS ONCE
Status: COMPLETED | OUTPATIENT
Start: 2017-04-14 | End: 2017-04-15

## 2017-04-14 RX ORDER — SODIUM CHLORIDE 9 MG/ML
125 INJECTION, SOLUTION INTRAVENOUS CONTINUOUS
Status: DISCONTINUED | OUTPATIENT
Start: 2017-04-14 | End: 2017-04-15 | Stop reason: HOSPADM

## 2017-04-15 ENCOUNTER — APPOINTMENT (OUTPATIENT)
Dept: CT IMAGING | Facility: HOSPITAL | Age: 27
End: 2017-04-15

## 2017-04-15 VITALS
HEIGHT: 67 IN | WEIGHT: 230 LBS | OXYGEN SATURATION: 99 % | TEMPERATURE: 98.7 F | DIASTOLIC BLOOD PRESSURE: 90 MMHG | BODY MASS INDEX: 36.1 KG/M2 | SYSTOLIC BLOOD PRESSURE: 115 MMHG | RESPIRATION RATE: 22 BRPM | HEART RATE: 83 BPM

## 2017-04-15 LAB
ALBUMIN SERPL-MCNC: 4.2 G/DL (ref 3.5–5)
ALBUMIN/GLOB SERPL: 1.1 G/DL (ref 1.1–2.5)
ALP SERPL-CCNC: 70 U/L (ref 24–120)
ALT SERPL W P-5'-P-CCNC: 29 U/L (ref 0–54)
ANION GAP SERPL CALCULATED.3IONS-SCNC: 14 MMOL/L (ref 4–13)
AST SERPL-CCNC: 32 U/L (ref 7–45)
BASOPHILS # BLD AUTO: 0.05 10*3/MM3 (ref 0–0.2)
BASOPHILS NFR BLD AUTO: 0.3 % (ref 0–2)
BILIRUB SERPL-MCNC: 0.5 MG/DL (ref 0.1–1)
BUN BLD-MCNC: 8 MG/DL (ref 5–21)
BUN/CREAT SERPL: 11.6 (ref 7–25)
CALCIUM SPEC-SCNC: 9.1 MG/DL (ref 8.4–10.4)
CHLORIDE SERPL-SCNC: 103 MMOL/L (ref 98–110)
CO2 SERPL-SCNC: 22 MMOL/L (ref 24–31)
CREAT BLD-MCNC: 0.69 MG/DL (ref 0.5–1.4)
DEPRECATED RDW RBC AUTO: 42.7 FL (ref 40–54)
EOSINOPHIL # BLD AUTO: 0.12 10*3/MM3 (ref 0–0.7)
EOSINOPHIL NFR BLD AUTO: 0.8 % (ref 0–4)
ERYTHROCYTE [DISTWIDTH] IN BLOOD BY AUTOMATED COUNT: 14.1 % (ref 12–15)
GFR SERPL CREATININE-BSD FRML MDRD: 124 ML/MIN/1.73
GLOBULIN UR ELPH-MCNC: 3.7 GM/DL
GLUCOSE BLD-MCNC: 132 MG/DL (ref 70–100)
HCT VFR BLD AUTO: 40.3 % (ref 37–47)
HGB BLD-MCNC: 14.9 G/DL (ref 12–16)
IMM GRANULOCYTES # BLD: 0.09 10*3/MM3 (ref 0–0.03)
IMM GRANULOCYTES NFR BLD: 0.6 % (ref 0–5)
LYMPHOCYTES # BLD AUTO: 3.02 10*3/MM3 (ref 0.72–4.86)
LYMPHOCYTES NFR BLD AUTO: 20.6 % (ref 15–45)
MCH RBC QN AUTO: 30.8 PG (ref 28–32)
MCHC RBC AUTO-ENTMCNC: 37 G/DL (ref 33–36)
MCV RBC AUTO: 83.4 FL (ref 82–98)
MONOCYTES # BLD AUTO: 0.93 10*3/MM3 (ref 0.19–1.3)
MONOCYTES NFR BLD AUTO: 6.3 % (ref 4–12)
NEUTROPHILS # BLD AUTO: 10.46 10*3/MM3 (ref 1.87–8.4)
NEUTROPHILS NFR BLD AUTO: 71.4 % (ref 39–78)
NRBC BLD MANUAL-RTO: 0 /100 WBC (ref 0–0)
PLATELET # BLD AUTO: 298 10*3/MM3 (ref 130–400)
PMV BLD AUTO: 10.7 FL (ref 6–12)
POTASSIUM BLD-SCNC: 3.2 MMOL/L (ref 3.5–5.3)
PROT SERPL-MCNC: 7.9 G/DL (ref 6.3–8.7)
RBC # BLD AUTO: 4.83 10*6/MM3 (ref 4.2–5.4)
SODIUM BLD-SCNC: 139 MMOL/L (ref 135–145)
WBC NRBC COR # BLD: 14.67 10*3/MM3 (ref 4.8–10.8)

## 2017-04-15 PROCEDURE — 96361 HYDRATE IV INFUSION ADD-ON: CPT

## 2017-04-15 PROCEDURE — 25010000002 ONDANSETRON PER 1 MG: Performed by: EMERGENCY MEDICINE

## 2017-04-15 PROCEDURE — 25010000002 KETOROLAC TROMETHAMINE PER 15 MG: Performed by: EMERGENCY MEDICINE

## 2017-04-15 PROCEDURE — 0 IOPAMIDOL 61 % SOLUTION: Performed by: EMERGENCY MEDICINE

## 2017-04-15 PROCEDURE — 96374 THER/PROPH/DIAG INJ IV PUSH: CPT

## 2017-04-15 PROCEDURE — 80053 COMPREHEN METABOLIC PANEL: CPT | Performed by: EMERGENCY MEDICINE

## 2017-04-15 PROCEDURE — 74177 CT ABD & PELVIS W/CONTRAST: CPT

## 2017-04-15 PROCEDURE — 85025 COMPLETE CBC W/AUTO DIFF WBC: CPT | Performed by: EMERGENCY MEDICINE

## 2017-04-15 PROCEDURE — 96375 TX/PRO/DX INJ NEW DRUG ADDON: CPT

## 2017-04-15 RX ORDER — ONDANSETRON 4 MG/1
4 TABLET, ORALLY DISINTEGRATING ORAL 4 TIMES DAILY
Qty: 10 TABLET | Refills: 0 | Status: SHIPPED | OUTPATIENT
Start: 2017-04-15 | End: 2018-08-13

## 2017-04-15 RX ORDER — ONDANSETRON 2 MG/ML
4 INJECTION INTRAMUSCULAR; INTRAVENOUS ONCE
Status: DISCONTINUED | OUTPATIENT
Start: 2017-04-15 | End: 2017-04-15 | Stop reason: HOSPADM

## 2017-04-15 RX ADMIN — SODIUM CHLORIDE 125 ML/HR: 9 INJECTION, SOLUTION INTRAVENOUS at 00:06

## 2017-04-15 RX ADMIN — ONDANSETRON 4 MG: 2 INJECTION INTRAMUSCULAR; INTRAVENOUS at 00:06

## 2017-04-15 RX ADMIN — IOPAMIDOL 110 ML: 612 INJECTION, SOLUTION INTRAVENOUS at 03:47

## 2017-04-15 RX ADMIN — KETOROLAC TROMETHAMINE 30 MG: 30 INJECTION, SOLUTION INTRAMUSCULAR at 00:06

## 2017-04-15 NOTE — ED NOTES
DR GONZALEZ TOLD PATIENT IF SHE HAD A RIDE HOME SHE WOULD GIVE HER SOMETHING ELSE FOR PAIN,  PATIENT CALLING FOR A RIDE.      India Singh RN  04/15/17 3795

## 2017-04-15 NOTE — DISCHARGE INSTRUCTIONS
I recommend that you follow-up with Dr. Verde as soon as possible.  Please take the medications that you received at her last ER visit.  Physician transient help any nausea that you might be experiencing.  Of course if you are unable to follow-up with Dr. Verde please feel free to return to the emergency room at any time.

## 2017-04-15 NOTE — ED NOTES
PT AMBULATORY TO THE RESTROOM AT THIS TIME WITH FRIEND.  ADVISED PATIENT THAT SHE NEEDED TO STAY ON THE STRETCHER      Sonja Ji RN  04/14/17 8094

## 2017-04-15 NOTE — ED NOTES
"UPON DISCHARGE PATIENT UPSET BECAUSE SHE WAS BEING SENT HOME WITH A CYST ON HER OVARY PATIENT STATES \"THE DOCTOR TOLD ME THIS WAS SERIOUS THAT I NEEDED THIS REMOVED, SO WHY AM I NOT GETTING ANYTHING FOR PAIN, YA'LL AIN'T DONE NOTHING\"  I EXPLAINED SHE WOULD NEED TO FOLLOW UP WITH HER OB/GYN CONCERNING THE CYST THAT IT WAS NOT AN EMERGENCY SURGERY AND THAT WE HAD GIVEN HER TORADOL FOR PAIN AND DR GONZALEZ HAD OFFERED TO GIVE HER SOMETHING ELSE IF SHE HAD A RIDE HOME.  PATIENT CONTINUED TO BE UPSET TRYING TO PULL HER IV OUT.  DR GONZALEZ AWARE OF THE SITUATION, NO NEW ORDERS, PATIENT TO BE DISCHARGED HOME.      India Singh, RN  04/15/17 0500    "

## 2017-04-15 NOTE — ED NOTES
"FRIEND OF PATIENT ADVISED ME THAT \"THE PATIENT NEEDED OXYGEN OR SOMETHING\"  WENT BACK TO THE TRIAGE STRETCHER.  FOUND THE PATIENT LYING ON THE FLOOR.  TOLD THE PATIENT THAT SHE NEEDED TO RETURN TO THE STRETCHER.  ASSISTED PATIENT BACK UP INTO THE BED, ASSESSED HER VITALS AT THIS TIME.  PULSE WAS 75 BPM, PULSE % ON RA.  ADVISED CHARGE NURSE      Sonja Ji RN  04/14/17 9607    "

## 2017-04-15 NOTE — ED PROVIDER NOTES
Subjective  abdominal pain  History of Present Illness  Ms. Ibarra is a 27-year-old white female who comes in today with chief complaint of abdominal pain.  She was just seen here is ago and that at [facility] a day before that.  His had a CT scan as well as an ultrasound performed.  The ultrasound does reveal evidence of an 5-6 cm ovarian cyst.  The patient states that her pain is so severe she cannot needed.  When I arrived at the bedside she is rolling around on the bed stating that she is very uncomfortable.  The fevers chills diffuse arthralgias or myalgias.  She denies any lightheadedness blurred vision or double vision.  Review of Systems   Gastrointestinal: Positive for abdominal pain and nausea.       Past Medical History:   Diagnosis Date   • Ovarian cyst        No Known Allergies    History reviewed. No pertinent surgical history.    History reviewed. No pertinent family history.    Social History     Social History   • Marital status: Single     Spouse name: N/A   • Number of children: N/A   • Years of education: N/A     Social History Main Topics   • Smoking status: Current Every Day Smoker     Packs/day: 0.50   • Smokeless tobacco: None   • Alcohol use No   • Drug use: No   • Sexual activity: Defer     Other Topics Concern   • None     Social History Narrative   • None           Objective   Physical Exam   Constitutional: She is oriented to person, place, and time. She appears well-developed and well-nourished.   HENT:   Head: Normocephalic and atraumatic.   Right Ear: External ear normal.   Left Ear: External ear normal.   Nose: Nose normal.   Mouth/Throat: Oropharynx is clear and moist.   Eyes: Conjunctivae and EOM are normal. Pupils are equal, round, and reactive to light. Right eye exhibits no discharge. Left eye exhibits no discharge.   Neck: Normal range of motion. Neck supple. No thyromegaly present.   Cardiovascular: Normal rate, regular rhythm, normal heart sounds and intact distal pulses.  Exam  reveals no friction rub.    No murmur heard.  Pulmonary/Chest: Effort normal and breath sounds normal. No respiratory distress.   Abdominal: Soft. Bowel sounds are normal. She exhibits no distension and no mass. There is tenderness. There is no rebound and no guarding. No hernia.   Severe tenderness to palpation   Musculoskeletal: Normal range of motion. She exhibits no edema or deformity.   Neurological: She is alert and oriented to person, place, and time. She has normal reflexes. No cranial nerve deficit.   Skin: Skin is warm and dry. No rash noted.   Psychiatric: Judgment normal.   Nursing note and vitals reviewed.      Procedures         ED Course  ED Course   Comment By Time   Spoken with Mrs. Stacey Davidson.  I was more than willing to give her shot of Dilaudid however she does not have a ride or anyone to come up and get her to drive her home.  She became very irate and decided that she wanted to leave and became abusive to the staff.  She has subsequently been discharged with recommendations that she continue the antibiotics and pain medication that she was given at her last visit.  I did give her a prescription for Zofran. Tegan Jain MD 04/15 5421   Her ultrasound and CT are essentially the same as they were prior.  I have encouraged her to go and see Dr. Verde. Tegan Jain MD 04/15 5311                  MDM  Number of Diagnoses or Management Options  Cyst of right ovary: established and worsening     Amount and/or Complexity of Data Reviewed  Clinical lab tests: ordered and reviewed  Tests in the radiology section of CPT®: ordered and reviewed    Risk of Complications, Morbidity, and/or Mortality  Presenting problems: moderate  Diagnostic procedures: moderate  Management options: moderate        Final diagnoses:   Cyst of right ovary            Tegan Jain MD  04/15/17 6428

## 2017-04-15 NOTE — ED NOTES
PATIENT WALKING DOWN COOL PER SELF, STATES SHE IS GOING TO THE BATHROOM, GAIT STEADY.      India Singh RN  04/15/17 6098

## 2017-04-15 NOTE — ED NOTES
PATIENT STATES SHE IS STILL HURTING, DR GONZALEZ AWARE, NO NEW ORDERS AT THIS TIME.      India Singh, RN  04/15/17 023

## 2017-06-07 ENCOUNTER — OFFICE VISIT (OUTPATIENT)
Dept: OBGYN | Age: 27
End: 2017-06-07
Payer: COMMERCIAL

## 2017-06-07 VITALS
SYSTOLIC BLOOD PRESSURE: 140 MMHG | TEMPERATURE: 98.3 F | HEIGHT: 67 IN | WEIGHT: 232 LBS | BODY MASS INDEX: 36.41 KG/M2 | HEART RATE: 75 BPM | DIASTOLIC BLOOD PRESSURE: 74 MMHG

## 2017-06-07 DIAGNOSIS — R35.0 INCREASED URINARY FREQUENCY: ICD-10-CM

## 2017-06-07 DIAGNOSIS — N91.2 AMENORRHEA: Primary | ICD-10-CM

## 2017-06-07 DIAGNOSIS — R30.0 BURNING WITH URINATION: ICD-10-CM

## 2017-06-07 LAB
APPEARANCE FLUID: CLEAR
BILIRUBIN, POC: NEGATIVE
BLOOD URINE, POC: NEGATIVE
CLARITY, POC: CLEAR
COLOR, POC: YELLOW
CONTROL: NORMAL
GLUCOSE URINE, POC: NEGATIVE
KETONES, POC: NEGATIVE
LEUKOCYTE EST, POC: NEGATIVE
NITRITE, POC: NEGATIVE
PH, POC: 7
PREGNANCY TEST URINE, POC: NEGATIVE
PROTEIN, POC: NEGATIVE
SPECIFIC GRAVITY, POC: 1.01
UROBILINOGEN, POC: 0.2

## 2017-06-07 PROCEDURE — 99213 OFFICE O/P EST LOW 20 MIN: CPT | Performed by: OBSTETRICS & GYNECOLOGY

## 2017-06-07 PROCEDURE — 81025 URINE PREGNANCY TEST: CPT | Performed by: OBSTETRICS & GYNECOLOGY

## 2017-06-07 RX ORDER — PHENAZOPYRIDINE HYDROCHLORIDE 100 MG/1
100 TABLET, FILM COATED ORAL 3 TIMES DAILY PRN
Qty: 9 TABLET | Refills: 0 | Status: SHIPPED | OUTPATIENT
Start: 2017-06-07 | End: 2017-06-10

## 2017-06-13 ASSESSMENT — ENCOUNTER SYMPTOMS
GASTROINTESTINAL NEGATIVE: 1
RESPIRATORY NEGATIVE: 1
EYES NEGATIVE: 1
ALLERGIC/IMMUNOLOGIC NEGATIVE: 1

## 2017-07-28 ENCOUNTER — HOSPITAL ENCOUNTER (EMERGENCY)
Facility: HOSPITAL | Age: 27
Discharge: HOME | End: 2017-07-28
Admitting: EMERGENCY MEDICINE

## 2017-07-28 VITALS
TEMPERATURE: 97.5 F | DIASTOLIC BLOOD PRESSURE: 86 MMHG | HEART RATE: 90 BPM | SYSTOLIC BLOOD PRESSURE: 136 MMHG | BODY MASS INDEX: 34.41 KG/M2 | OXYGEN SATURATION: 98 % | HEIGHT: 67 IN | RESPIRATION RATE: 19 BRPM | WEIGHT: 219.25 LBS

## 2017-07-28 DIAGNOSIS — L73.2 HIDRADENITIS SUPPURATIVA: Primary | ICD-10-CM

## 2017-07-28 PROCEDURE — 99282 EMERGENCY DEPT VISIT SF MDM: CPT

## 2017-07-28 RX ORDER — CLINDAMYCIN HYDROCHLORIDE 300 MG/1
300 CAPSULE ORAL 4 TIMES DAILY
Qty: 40 CAPSULE | Refills: 0 | Status: SHIPPED | OUTPATIENT
Start: 2017-07-28 | End: 2017-08-07

## 2017-07-28 RX ORDER — OXYCODONE AND ACETAMINOPHEN 7.5; 325 MG/1; MG/1
1 TABLET ORAL EVERY 4 HOURS PRN
Qty: 12 TABLET | Refills: 0 | Status: SHIPPED | OUTPATIENT
Start: 2017-07-28 | End: 2018-08-13

## 2017-07-30 ENCOUNTER — HOSPITAL ENCOUNTER (EMERGENCY)
Facility: HOSPITAL | Age: 27
Discharge: HOME | End: 2017-07-30
Admitting: FAMILY MEDICINE

## 2017-07-30 VITALS
RESPIRATION RATE: 18 BRPM | TEMPERATURE: 98.3 F | HEIGHT: 67 IN | SYSTOLIC BLOOD PRESSURE: 126 MMHG | OXYGEN SATURATION: 100 % | DIASTOLIC BLOOD PRESSURE: 70 MMHG | WEIGHT: 219.6 LBS | HEART RATE: 78 BPM | BODY MASS INDEX: 34.47 KG/M2

## 2017-07-30 DIAGNOSIS — L02.419 AXILLARY ABSCESS: Primary | ICD-10-CM

## 2017-07-30 PROCEDURE — 99282 EMERGENCY DEPT VISIT SF MDM: CPT

## 2017-07-30 RX ORDER — HYDROCODONE BITARTRATE AND ACETAMINOPHEN 7.5; 325 MG/1; MG/1
1 TABLET ORAL EVERY 4 HOURS PRN
Qty: 18 TABLET | Refills: 0 | Status: SHIPPED | OUTPATIENT
Start: 2017-07-30 | End: 2018-08-13

## 2017-09-06 ENCOUNTER — HOSPITAL ENCOUNTER (EMERGENCY)
Facility: HOSPITAL | Age: 27
Discharge: HOME | End: 2017-09-06
Attending: EMERGENCY MEDICINE | Admitting: EMERGENCY MEDICINE

## 2017-09-06 VITALS
BODY MASS INDEX: 33.74 KG/M2 | HEIGHT: 67 IN | OXYGEN SATURATION: 99 % | SYSTOLIC BLOOD PRESSURE: 149 MMHG | RESPIRATION RATE: 20 BRPM | DIASTOLIC BLOOD PRESSURE: 88 MMHG | TEMPERATURE: 98.6 F | HEART RATE: 88 BPM | WEIGHT: 215 LBS

## 2017-09-06 DIAGNOSIS — L73.2 HIDRADENITIS AXILLARIS: Primary | ICD-10-CM

## 2017-09-06 PROCEDURE — 99282 EMERGENCY DEPT VISIT SF MDM: CPT

## 2017-09-06 RX ORDER — DOXYCYCLINE HYCLATE 100 MG/1
100 TABLET, DELAYED RELEASE ORAL 2 TIMES DAILY
Qty: 20 TABLET | Refills: 0 | Status: SHIPPED | OUTPATIENT
Start: 2017-09-06 | End: 2018-08-13

## 2017-09-06 NOTE — ED NOTES
"UPON DISCHARGE PT BECAME LOUD AND ARGUMENTATIVE WITH NURSE STATING \"WHAT IS HE GONNA DO ABOUT MY PAIN? IBUPROFEN DOES NOT WORK!\" NURSE INFORMED PT TO SPEAK WITH THE PHARMACIST WHEN SHE HAS HER ABX FILLED IN REGARDS TO PAIN REMEDIES. PT STATED \"I DON'T EVEN NEED ABX! I HAVE SOME ALREADY AT HOME! I'M JUST GONNA HAVE TO GO TO [facility]!\" PT REFUSED TO TAKE PRESCRIPTION FOR ABX WITH HER. NOTIFIED DR. ACEVEDO.      Vinita Hall RN  09/06/17 3850    "

## 2017-09-06 NOTE — ED PROVIDER NOTES
Subjective   Patient is a 27 y.o. female presenting with abscess.   Abscess   Location:  Shoulder/arm  Shoulder/arm abscess location:  R axilla and L axilla  Abscess quality: induration and painful    Abscess quality: not draining, no fluctuance, no redness, no warmth and not weeping    Red streaking: no    Progression:  Worsening  Pain details:     Quality:  Dull    Severity:  Mild    Timing:  Intermittent    Progression:  Unchanged  Chronicity:  Chronic  Context: not diabetes, not injected drug use and not skin injury    Relieved by:  Nothing  Worsened by:  Nothing  Ineffective treatments:  None tried  Associated symptoms: no anorexia, no fatigue, no headaches, no nausea and no vomiting    Risk factors: prior abscess    Risk factors: no hx of MRSA        Review of Systems   Constitutional: Negative.  Negative for fatigue.   HENT: Negative.    Eyes: Negative.    Respiratory: Negative.    Cardiovascular: Negative.    Gastrointestinal: Negative.  Negative for anorexia, nausea and vomiting.   Musculoskeletal: Negative.  Negative for back pain and neck pain.   Skin: Negative.    Neurological: Negative.  Negative for headaches.   All other systems reviewed and are negative.      Past Medical History:   Diagnosis Date   • Ovarian cyst        No Known Allergies    No past surgical history on file.    No family history on file.    Social History     Social History   • Marital status: Single     Spouse name: N/A   • Number of children: N/A   • Years of education: N/A     Social History Main Topics   • Smoking status: Current Every Day Smoker     Packs/day: 0.50   • Smokeless tobacco: Not on file   • Alcohol use No   • Drug use: No   • Sexual activity: Defer     Other Topics Concern   • Not on file     Social History Narrative           Objective   Physical Exam   Constitutional: She is oriented to person, place, and time. She appears well-developed and well-nourished.   HENT:   Head: Normocephalic and atraumatic.   Eyes:  Conjunctivae and EOM are normal. Pupils are equal, round, and reactive to light.   Neck: Normal range of motion. Neck supple.   Cardiovascular: Normal rate, regular rhythm, normal heart sounds and intact distal pulses.    Pulmonary/Chest: Effort normal and breath sounds normal.   Abdominal: Soft. Bowel sounds are normal.   Musculoskeletal: Normal range of motion.   Bilateral axillary hidradenitis no evidence of any obvious fluctuant abscess   Neurological: She is alert and oriented to person, place, and time. She has normal reflexes.   Skin: Skin is warm and dry.   Psychiatric: She has a normal mood and affect.   Nursing note and vitals reviewed.      Procedures         ED Course  ED Course   Comment By Time   Case discussed the patient is a follow-up with general  surgeon for evaluation of these multiple antibiotics for now Ignacio Gill MD 09/06 0930                  [facility]    Final diagnoses:   Hidradenitis axillaris            Ignacio Gill MD  09/06/17 6249

## 2017-09-08 ENCOUNTER — OFFICE VISIT (OUTPATIENT)
Dept: OBGYN | Age: 27
End: 2017-09-08
Payer: COMMERCIAL

## 2017-09-08 VITALS
BODY MASS INDEX: 36.73 KG/M2 | WEIGHT: 234 LBS | TEMPERATURE: 98.7 F | SYSTOLIC BLOOD PRESSURE: 139 MMHG | DIASTOLIC BLOOD PRESSURE: 83 MMHG | HEIGHT: 67 IN | HEART RATE: 84 BPM

## 2017-09-08 DIAGNOSIS — R10.2 PELVIC PAIN IN FEMALE: Primary | ICD-10-CM

## 2017-09-08 DIAGNOSIS — Z87.42 HISTORY OF OVARIAN CYST: ICD-10-CM

## 2017-09-08 PROCEDURE — 99213 OFFICE O/P EST LOW 20 MIN: CPT | Performed by: NURSE PRACTITIONER

## 2017-09-08 ASSESSMENT — ENCOUNTER SYMPTOMS
BLOOD IN STOOL: 0
COLOR CHANGE: 0
TROUBLE SWALLOWING: 0
CONSTIPATION: 0
DIARRHEA: 0
SHORTNESS OF BREATH: 0
COUGH: 0
BACK PAIN: 0

## 2017-09-12 ENCOUNTER — HOSPITAL ENCOUNTER (OUTPATIENT)
Dept: ULTRASOUND IMAGING | Age: 27
Discharge: HOME | End: 2017-09-12
Payer: COMMERCIAL

## 2017-09-12 DIAGNOSIS — R10.2 PELVIC PAIN IN FEMALE: ICD-10-CM

## 2017-09-12 PROCEDURE — 76830 TRANSVAGINAL US NON-OB: CPT

## 2017-09-13 ENCOUNTER — HOSPITAL ENCOUNTER (EMERGENCY)
Facility: HOSPITAL | Age: 27
Discharge: HOME | End: 2017-09-13
Attending: FAMILY MEDICINE | Admitting: FAMILY MEDICINE

## 2017-09-13 VITALS
OXYGEN SATURATION: 97 % | RESPIRATION RATE: 16 BRPM | DIASTOLIC BLOOD PRESSURE: 89 MMHG | BODY MASS INDEX: 35.16 KG/M2 | HEIGHT: 67 IN | TEMPERATURE: 97.9 F | WEIGHT: 224 LBS | HEART RATE: 88 BPM | SYSTOLIC BLOOD PRESSURE: 141 MMHG

## 2017-09-13 DIAGNOSIS — K08.89 PAIN, DENTAL: Primary | ICD-10-CM

## 2017-09-13 PROCEDURE — 99282 EMERGENCY DEPT VISIT SF MDM: CPT

## 2017-09-13 RX ORDER — PROMETHAZINE HYDROCHLORIDE 25 MG/1
25 TABLET ORAL EVERY 6 HOURS PRN
Qty: 10 TABLET | Refills: 0 | Status: SHIPPED | OUTPATIENT
Start: 2017-09-13 | End: 2018-08-13

## 2017-09-13 RX ORDER — NAPROXEN SODIUM 550 MG/1
550 TABLET ORAL 2 TIMES DAILY WITH MEALS
Qty: 10 TABLET | Refills: 0 | Status: SHIPPED | OUTPATIENT
Start: 2017-09-13 | End: 2018-08-13

## 2017-09-13 RX ORDER — PENICILLIN V POTASSIUM 500 MG/1
500 TABLET ORAL 4 TIMES DAILY
Qty: 28 TABLET | Refills: 0 | Status: SHIPPED | OUTPATIENT
Start: 2017-09-13 | End: 2017-09-20

## 2017-09-13 NOTE — ED PROVIDER NOTES
Subjective   Patient is a 27 y.o. female presenting with tooth pain.   Dental Pain   Location:  Lower  Quality:  Dull  Severity:  Moderate  Onset quality:  Gradual  Duration:  3 days  Timing:  Constant  Progression:  Worsening  Chronicity:  Recurrent  Relieved by:  Nothing  Worsened by:  Cold food/drink, hot food/drink, touching and pressure  Ineffective treatments:  NSAIDs  Associated symptoms: facial pain    Associated symptoms: no difficulty swallowing        Review of Systems    Past Medical History:   Diagnosis Date   • Ovarian cyst        No Known Allergies    History reviewed. No pertinent surgical history.    History reviewed. No pertinent family history.    Social History     Social History   • Marital status: Single     Spouse name: N/A   • Number of children: N/A   • Years of education: N/A     Social History Main Topics   • Smoking status: Current Every Day Smoker     Packs/day: 0.50   • Smokeless tobacco: None   • Alcohol use No   • Drug use: No   • Sexual activity: Defer     Other Topics Concern   • None     Social History Narrative           Objective   Physical Exam   Constitutional: She is oriented to person, place, and time. She appears well-developed and well-nourished.   HENT:   Head: Normocephalic.   Nose: Nose normal.   Mouth/Throat: Oropharynx is clear and moist. No trismus in the jaw. Dental caries present.       Eyes: Conjunctivae and EOM are normal. Pupils are equal, round, and reactive to light.   Neck: Normal range of motion. Neck supple. No JVD present. No thyromegaly present.   Cardiovascular: Normal rate, regular rhythm, normal heart sounds and intact distal pulses.    Pulmonary/Chest: Effort normal and breath sounds normal.   Abdominal: Soft. Bowel sounds are normal. She exhibits no distension and no mass. There is no tenderness. There is no rebound and no guarding.   Musculoskeletal: Normal range of motion.   Lymphadenopathy:     She has no cervical adenopathy.   Neurological: She is  alert and oriented to person, place, and time.   Skin: Skin is warm and dry. No rash noted. No erythema.   Psychiatric: She has a normal mood and affect. Her behavior is normal. Judgment and thought content normal.   Nursing note and vitals reviewed.      Procedures         ED Course  ED Course                  MDM  Number of Diagnoses or Management Options  Pain, dental: minor     Amount and/or Complexity of Data Reviewed  Review and summarize past medical records: yes    Risk of Complications, Morbidity, and/or Mortality  Presenting problems: minimal  Management options: minimal    Patient Progress  Patient progress: stable      Final diagnoses:   Pain, dental            Chad Mitchell MD  09/13/17 0348

## 2017-09-18 ENCOUNTER — TELEPHONE (OUTPATIENT)
Dept: OBGYN | Age: 27
End: 2017-09-18

## 2017-09-28 ENCOUNTER — HOSPITAL ENCOUNTER (EMERGENCY)
Age: 27
Discharge: HOME | End: 2017-09-28
Payer: COMMERCIAL

## 2017-09-28 VITALS
RESPIRATION RATE: 16 BRPM | HEIGHT: 67 IN | SYSTOLIC BLOOD PRESSURE: 135 MMHG | DIASTOLIC BLOOD PRESSURE: 78 MMHG | HEART RATE: 70 BPM | BODY MASS INDEX: 35.31 KG/M2 | TEMPERATURE: 97.8 F | OXYGEN SATURATION: 97 % | WEIGHT: 225 LBS

## 2017-09-28 DIAGNOSIS — L73.2 HIDRADENITIS SUPPURATIVA: Primary | ICD-10-CM

## 2017-09-28 DIAGNOSIS — N93.9 ABNORMAL UTERINE BLEEDING (AUB): ICD-10-CM

## 2017-09-28 LAB
BACTERIA: NEGATIVE /HPF
BASOPHILS ABSOLUTE: 0.1 K/UL (ref 0–0.2)
BASOPHILS RELATIVE PERCENT: 0.5 % (ref 0–1)
BILIRUBIN URINE: NEGATIVE
BLOOD, URINE: ABNORMAL
CLARITY: CLEAR
COLOR: ABNORMAL
EOSINOPHILS ABSOLUTE: 0.2 K/UL (ref 0–0.6)
EOSINOPHILS RELATIVE PERCENT: 2.2 % (ref 0–5)
EPITHELIAL CELLS, UA: 4 /HPF (ref 0–5)
GLUCOSE URINE: NEGATIVE MG/DL
HCG(URINE) PREGNANCY TEST: NEGATIVE
HCT VFR BLD CALC: 37.3 % (ref 37–47)
HEMOGLOBIN: 13 G/DL (ref 12–16)
HYALINE CASTS: 5 /HPF (ref 0–8)
KETONES, URINE: ABNORMAL MG/DL
LEUKOCYTE ESTERASE, URINE: NEGATIVE
LYMPHOCYTES ABSOLUTE: 3.7 K/UL (ref 1.1–4.5)
LYMPHOCYTES RELATIVE PERCENT: 36.4 % (ref 20–40)
MCH RBC QN AUTO: 31 PG (ref 27–31)
MCHC RBC AUTO-ENTMCNC: 34.9 G/DL (ref 33–37)
MCV RBC AUTO: 89 FL (ref 81–99)
MONOCYTES ABSOLUTE: 0.7 K/UL (ref 0–0.9)
MONOCYTES RELATIVE PERCENT: 6.6 % (ref 0–10)
NEUTROPHILS ABSOLUTE: 5.4 K/UL (ref 1.5–7.5)
NEUTROPHILS RELATIVE PERCENT: 53.8 % (ref 50–65)
NITRITE, URINE: NEGATIVE
PDW BLD-RTO: 14 % (ref 11.5–14.5)
PH UA: 6
PLATELET # BLD: 280 K/UL (ref 130–400)
PMV BLD AUTO: 9.4 FL (ref 9.4–12.3)
PROTEIN UA: NEGATIVE MG/DL
RBC # BLD: 4.19 M/UL (ref 4.2–5.4)
RBC UA: 172 /HPF (ref 0–4)
SPECIFIC GRAVITY UA: 1.03
T4 FREE: 1.3 NG/DL (ref 0.9–1.7)
TSH SERPL DL<=0.05 MIU/L-ACNC: 1.78 UIU/ML (ref 0.27–4.2)
UROBILINOGEN, URINE: 0.2 E.U./DL
WBC # BLD: 10.1 K/UL (ref 4.8–10.8)
WBC UA: 2 /HPF (ref 0–5)

## 2017-09-28 PROCEDURE — 99282 EMERGENCY DEPT VISIT SF MDM: CPT | Performed by: NURSE PRACTITIONER

## 2017-09-28 PROCEDURE — 36415 COLL VENOUS BLD VENIPUNCTURE: CPT

## 2017-09-28 PROCEDURE — 84439 ASSAY OF FREE THYROXINE: CPT

## 2017-09-28 PROCEDURE — 99282 EMERGENCY DEPT VISIT SF MDM: CPT

## 2017-09-28 PROCEDURE — 81025 URINE PREGNANCY TEST: CPT

## 2017-09-28 PROCEDURE — 81001 URINALYSIS AUTO W/SCOPE: CPT

## 2017-09-28 PROCEDURE — 85025 COMPLETE CBC W/AUTO DIFF WBC: CPT

## 2017-09-28 PROCEDURE — 84443 ASSAY THYROID STIM HORMONE: CPT

## 2017-09-28 RX ORDER — DOXYCYCLINE HYCLATE 100 MG
100 TABLET ORAL 2 TIMES DAILY
Qty: 20 TABLET | Refills: 0 | Status: SHIPPED | OUTPATIENT
Start: 2017-09-28 | End: 2017-10-08

## 2017-09-28 RX ORDER — CLINDAMYCIN PHOSPHATE 10 MG/G
GEL TOPICAL
Qty: 75 ML | Refills: 0 | Status: SHIPPED | OUTPATIENT
Start: 2017-09-28 | End: 2017-10-05

## 2017-09-28 ASSESSMENT — ENCOUNTER SYMPTOMS
SORE THROAT: 0
SHORTNESS OF BREATH: 0
COUGH: 0
ABDOMINAL PAIN: 0
NAUSEA: 0
DIARRHEA: 0
TROUBLE SWALLOWING: 0
SINUS PRESSURE: 0
RHINORRHEA: 0
VOMITING: 0
CONSTIPATION: 0

## 2017-09-28 NOTE — ED NOTES
Patient is alert and oriented x3  Respirations not labored  No apparent distress noted  Skin is WNL  Pt monitored in place  Pt co abscesses under both arms and vaginal bleeding 2 weeks after her period.      Martir Al RN  09/28/17 6317

## 2017-09-28 NOTE — ED AVS SNAPSHOT
Pneumococcal Vaccine - Pneumovax for adults aged 19-64 years with: chronic heart disease, chronic lung disease, diabetes mellitus, alcoholism, chronic liver disease, or cigarette smoking. (1 of 1 - PPSV23) 1/16/2009    Yearly Flu Vaccine (1) 9/1/2017    Pap Smear 2/10/2019                 Care Plan Once You Return Home    This section includes instructions you will need to follow once you leave the hospital.  Your care team will discuss these with you, so you and those caring for you know how to best care for your health needs at home. This section may also include educational information about certain health topics that may be of help to you. Important Information if you smoke or are exposed to smoking       SMOKING: QUIT SMOKING. THIS IS THE MOST IMPORTANT ACTION YOU CAN TAKE TO IMPROVE YOUR CURRENT AND FUTURE HEALTH. Call the [facility] at [place] NOW ([phone])    Smoking harms nonsmokers. When nonsmokers are around people who smoke, they absorb nicotine, carbon monoxide, and other ingredients of tobacco smoke. DO NOT SMOKE AROUND CHILDREN     Children exposed to secondhand smoke are at an increased risk of:  Sudden Infant Death Syndrome (SIDS), acute respiratory infections, inflammation of the middle ear, and severe asthma. Over a longer time, it causes heart disease and lung cancer. There is no safe level of exposure to secondhand smoke. Important information for a smoker       SMOKING: QUIT SMOKING. THIS IS THE MOST IMPORTANT ACTION YOU CAN TAKE TO IMPROVE YOUR CURRENT AND FUTURE HEALTH. [facility] ([phone])    Smoking harms nonsmokers. When nonsmokers are around people who smoke, they absorb nicotine, carbon monoxide, and other ingredients of tobacco smoke.      DO NOT SMOKE AROUND CHILDREN     Children exposed to secondhand smoke are at an increased risk of:  Sudden Infant Death Syndrome (SIDS), acute respiratory infections, inflammation of the middle ear, and severe asthma. Over a longer time, it causes heart disease and lung cancer. There is no safe level of exposure to secondhand smoke. MyChart Signup     Our records indicate that you have declined MyChart signup. View your information online  ? Review your current list of  medications, immunization, and allergies. ? Review your future test results online . ? Review your discharge instructions provided by your caregivers at discharge    Certain functionality such as prescription refills, scheduling appointments or sending messages to your provider are not activated if your provider does not use judo in his/her office    For questions regarding your MyChart account call [phone]. If you have a clinical question, please call your doctor's office. The information on all pages of the After Visit Summary has been reviewed with me, the patient and/or responsible adult, by my health care provider(s). I had the opportunity to ask questions regarding this information. I understand I should dispose of my armband safely at home to protect my health information. A complete copy of the After Visit Summary has been given to me, the patient and/or responsible adult.          Patient Signature/Responsible Adult: ___________________________________    Nurse Signature: ___________________________________  Resident/MLP Signature: ___________________________________  Attending Signature: ___________________________________    Date:____________Time:____________

## 2017-09-28 NOTE — ED PROVIDER NOTES
[address] [facility] EMERGENCY DEPT  eMERGENCY dEPARTMENT eNCOUnter      Pt Name: Nirmala Ariza  MRN: [id number]  [place] 1990  Date of evaluation: 9/28/2017  Provider: Andreas Boas, APRN    CHIEF COMPLAINT       Chief Complaint   Patient presents with    Abscess     under R arm    Female  Problem     period stopped 2 weeks ago, pt began bleeding today again. HISTORY OF PRESENT ILLNESS   (Location/Symptom, Timing/Onset, Context/Setting, Quality, Duration, Modifying Factors, Severity)  Note limiting factors. Nirmala Ariza is a 32 y.o. female who presents to the emergency department with complaints of bilateral axilla abscesses. Pt states they have been there for over a month. She has had to have them drained several times. She states that they are getting tender to touch. She denies fevers. Also, pt had menses 2 weeks ago, she has started vaginal bleeding again. She states she has hx of ovarian cysts. HPI    Nursing Notes were reviewed. REVIEW OF SYSTEMS    (2-9 systems for level 4, 10 or more for level 5)     Review of Systems   Constitutional: Negative for activity change, appetite change, fatigue, fever and unexpected weight change. HENT: Negative for congestion, hearing loss, rhinorrhea, sinus pressure, sore throat and trouble swallowing. Eyes: Negative for visual disturbance. Respiratory: Negative for cough and shortness of breath. Cardiovascular: Negative for chest pain, palpitations and leg swelling. Gastrointestinal: Negative for abdominal pain, constipation, diarrhea, nausea and vomiting. Endocrine: Negative for cold intolerance and heat intolerance. Genitourinary: Positive for vaginal bleeding. Negative for flank pain, menstrual problem, pelvic pain, urgency and vaginal discharge. Musculoskeletal: Negative for arthralgias. Skin: Negative for rash. Bilateral axilla painful knots   Neurological: Negative for headaches.    Psychiatric/Behavioral: Negative for dysphoric mood and sleep disturbance. The patient is not nervous/anxious. A complete review of systems was performed and is negative except as noted above in the HPI. PAST MEDICAL HISTORY     Past Medical History:   Diagnosis Date    Ovarian cyst          SURGICAL HISTORY     History reviewed. No pertinent surgical history. CURRENT MEDICATIONS       Previous Medications    No medications on file       ALLERGIES     Review of patient's allergies indicates no known allergies. FAMILY HISTORY       Family History   Problem Relation Age of Onset    Diabetes Mother     High Blood Pressure Mother     Diabetes Maternal Grandmother           SOCIAL HISTORY       Social History     Social History    Marital status: Single     Spouse name: N/A    Number of children: N/A    Years of education: N/A     Social History Main Topics    Smoking status: Current Every Day Smoker     Packs/day: 0.50     Years: 10.00     Types: Cigarettes    Smokeless tobacco: Never Used      Comment: weaning because trying to get pregnant    Alcohol use 0.0 oz/week     0 Standard drinks or equivalent per week      Comment: occasional    Drug use: No    Sexual activity: Yes     Partners: Male     Other Topics Concern    None     Social History Narrative       SCREENINGS             PHYSICAL EXAM    (up to 7 for level 4, 8 or more for level 5)   ED Triage Vitals   BP Temp Temp Source Pulse Resp SpO2 Height Weight   09/28/17 0138 09/28/17 0138 09/28/17 0138 09/28/17 0138 09/28/17 0138 09/28/17 0138 09/28/17 0138 09/28/17 0138   135/78 97.8 °F (36.6 °C) Oral 70 16 97 % 5' 7\" (1.702 m) 225 lb (102.1 kg)       Physical Exam   Constitutional: She is oriented to person, place, and time. She appears well-developed and well-nourished. HENT:   Head: Normocephalic and atraumatic. Neck: Normal range of motion. Neck supple. Cardiovascular: Normal rate, regular rhythm, normal heart sounds and intact distal pulses. Going to treat her with topical clindamycin and doxycycline have her follow up with her primary care provider for possible surgical referral.  Patient has history of ovarian cysts. She had an ultrasound done earlier this month which showed a left ovarian cyst. ObGyn discussed with patient possible treatment with birth control to prevent the cyst. Patient declined at this time. I'm going to discharge her home to follow-up with her ObGyn to discuss her abnormal uterine bleeding. CONSULTS:  None    PROCEDURES:  Unless otherwise noted below, none     Procedures    FINAL IMPRESSION      1. Hidradenitis suppurativa    2. Abnormal uterine bleeding (AUB)          DISPOSITION/PLAN   DISPOSITION     PATIENT REFERRED TO:  Maria E Mason MD  [id number] [address] [address]  [phone]            DISCHARGE MEDICATIONS:  New Prescriptions    CLINDAMYCIN (CLEOCIN-T) 1 % GEL    Apply topically 2 times daily.     DOXYCYCLINE HYCLATE (VIBRA-TABS) 100 MG TABLET    Take 1 tablet by mouth 2 times daily for 10 days          (Please note that portions of this note were completed with a voice recognition program.  Efforts were made to edit the dictations but occasionally words are mis-transcribed.)    ZACKERY Alvarado (electronically signed)  Attending Emergency Physician           ZACKERY Alvarado  09/28/17 9919

## 2017-11-15 ENCOUNTER — HOSPITAL ENCOUNTER (EMERGENCY)
Facility: HOSPITAL | Age: 27
Discharge: HOME | End: 2017-11-15
Admitting: EMERGENCY MEDICINE

## 2017-11-15 VITALS
SYSTOLIC BLOOD PRESSURE: 159 MMHG | DIASTOLIC BLOOD PRESSURE: 86 MMHG | RESPIRATION RATE: 16 BRPM | HEIGHT: 67 IN | TEMPERATURE: 97.2 F | HEART RATE: 76 BPM | BODY MASS INDEX: 37.67 KG/M2 | WEIGHT: 240 LBS | OXYGEN SATURATION: 100 %

## 2017-11-15 DIAGNOSIS — L02.91 ABSCESS: Primary | ICD-10-CM

## 2017-11-15 PROCEDURE — 99282 EMERGENCY DEPT VISIT SF MDM: CPT

## 2017-11-15 RX ORDER — NAPROXEN 500 MG/1
500 TABLET ORAL 2 TIMES DAILY PRN
Qty: 10 TABLET | Refills: 0 | Status: SHIPPED | OUTPATIENT
Start: 2017-11-15 | End: 2017-11-20

## 2017-11-15 RX ORDER — SULFAMETHOXAZOLE AND TRIMETHOPRIM 800; 160 MG/1; MG/1
1 TABLET ORAL 2 TIMES DAILY
Qty: 14 TABLET | Refills: 0 | Status: SHIPPED | OUTPATIENT
Start: 2017-11-15 | End: 2017-11-22

## 2017-11-15 RX ORDER — HYDROCODONE BITARTRATE AND ACETAMINOPHEN 5; 325 MG/1; MG/1
1 TABLET ORAL EVERY 6 HOURS PRN
Qty: 12 TABLET | Refills: 0 | Status: SHIPPED | OUTPATIENT
Start: 2017-11-15 | End: 2017-11-15 | Stop reason: HOSPADM

## 2017-11-15 NOTE — ED PROVIDER NOTES
Subjective   HPI Comments: Patient is a 27-year-old -American female presents ER today with complaint of bilateral abscesses under each axilla.  The patient reports this is been occurring for over one month.  She previously followed up with Dr. Yeung for this.  States she was placed on antibiotics and was told that he was not ready to be I&D just.  Patient states that she never followed up.  She states that for the past 3-4 days and Return.  She has not taking anything for this.  She has not seen her primary care provider for this.  She is up-to-date on her tetanus vaccination.  She presents here today for further evaluation.    Patient is a 27 y.o. female presenting with upper extremity pain.   History provided by:  Patient   used: No    Upper Extremity Issue   Location:  Arm  Arm location:  L arm and R arm  Pain details:     Quality:  Aching and dull    Radiates to:  Does not radiate    Severity:  Mild    Onset quality:  Sudden    Duration:  4 days    Timing:  Constant    Progression:  Worsening  Dislocation: no    Foreign body present:  No foreign bodies  Tetanus status:  Up to date  Prior injury to area:  No  Relieved by:  Nothing  Worsened by:  Nothing  Ineffective treatments:  None tried  Associated symptoms: swelling    Associated symptoms: no back pain, no decreased range of motion, no fatigue, no fever, no muscle weakness, no neck pain, no numbness, no stiffness and no tingling    Risk factors: no concern for non-accidental trauma, no known bone disorder, no frequent fractures and no recent illness        Review of Systems   Constitutional: Negative for fatigue and fever.   Musculoskeletal: Negative for back pain, neck pain and stiffness.   Skin: Positive for wound.   All other systems reviewed and are negative.      Past Medical History:   Diagnosis Date   • Ovarian cyst        No Known Allergies    History reviewed. No pertinent surgical history.    History reviewed. No  pertinent family history.    Social History     Social History   • Marital status: Single     Spouse name: N/A   • Number of children: N/A   • Years of education: N/A     Social History Main Topics   • Smoking status: Current Every Day Smoker     Packs/day: 0.50   • Smokeless tobacco: Never Used   • Alcohol use No   • Drug use: No   • Sexual activity: Defer     Other Topics Concern   • None     Social History Narrative   • None           Objective   Physical Exam   Constitutional: She is oriented to person, place, and time. She appears well-developed and well-nourished.   HENT:   Head: Normocephalic and atraumatic.   Cardiovascular: Normal rate.    Pulmonary/Chest: Effort normal.   Neurological: She is alert and oriented to person, place, and time.   Skin: Skin is warm and dry.        Psychiatric: She has a normal mood and affect.   Nursing note and vitals reviewed.      Procedures         ED Course  ED Course   Comment By Time   Alejandro report # [id number] reviewed; no suspicious activity noted Luzma Blevins, APRN 11/15 1415   Patient denies any abscesses were not ready to be I&D get.  At this time we will place her on some Bactrim.  I will give her short course of Norco.  Advised patient to use warm compresses to the area for 10-15 minutes 4 times daily.  Advised to please follow-up with her surgeon, Dr. Yeung, for further evaluation.  Patient will be discharged home at this time in stable condition and asked return to the ER for new or worsening symptoms. Luzma Blevins, APRN 11/15 1419   I have also advised the pt to avoid shaving and using deodorant to affected areas Luzma Blevins, APRN 11/15 1421                  MDM  Number of Diagnoses or Management Options  Abscess: new and requires workup  Patient Progress  Patient progress: stable      Final diagnoses:   Abscess            Luzma Blevins, APRN  11/15/17 1421

## 2017-11-15 NOTE — DISCHARGE INSTRUCTIONS
R/t to the ER as needed  Please f/u with surgery for further evaluation in 1-2 days  Use warm compresses to affected area for 10-15 mins 4x daily

## 2017-12-21 ENCOUNTER — HOSPITAL ENCOUNTER (EMERGENCY)
Facility: HOSPITAL | Age: 27
Discharge: HOME | End: 2017-12-21
Admitting: EMERGENCY MEDICINE

## 2017-12-21 VITALS
BODY MASS INDEX: 37.04 KG/M2 | HEART RATE: 84 BPM | SYSTOLIC BLOOD PRESSURE: 139 MMHG | HEIGHT: 67 IN | RESPIRATION RATE: 18 BRPM | DIASTOLIC BLOOD PRESSURE: 90 MMHG | OXYGEN SATURATION: 95 % | TEMPERATURE: 98.6 F | WEIGHT: 236 LBS

## 2017-12-21 DIAGNOSIS — L02.91 ABSCESS: Primary | ICD-10-CM

## 2017-12-21 PROCEDURE — 99282 EMERGENCY DEPT VISIT SF MDM: CPT

## 2017-12-21 RX ORDER — HYDROCODONE BITARTRATE AND ACETAMINOPHEN 5; 325 MG/1; MG/1
1 TABLET ORAL EVERY 4 HOURS PRN
Qty: 12 TABLET | Refills: 0 | Status: SHIPPED | OUTPATIENT
Start: 2017-12-21 | End: 2017-12-30

## 2017-12-21 RX ORDER — SULFAMETHOXAZOLE AND TRIMETHOPRIM 800; 160 MG/1; MG/1
1 TABLET ORAL 2 TIMES DAILY
Qty: 20 TABLET | Refills: 0 | Status: SHIPPED | OUTPATIENT
Start: 2017-12-21 | End: 2017-12-30

## 2017-12-21 NOTE — DISCHARGE INSTRUCTIONS
Warm moist heat to the area; avoid poking or squeezing of the area; return in 2 days for possible I&D if abscess fluctuant

## 2017-12-22 NOTE — ED PROVIDER NOTES
Subjective   Patient is a 27 y.o. female presenting with abscess.   Abscess   Location:  Shoulder/arm  Shoulder/arm abscess location:  L axilla and R axilla  Size:  1 cm  Abscess quality: induration, painful and redness    Abscess quality: not draining and no fluctuance    Red streaking: no    Progression:  Worsening  Chronicity:  Recurrent  Context: not diabetes, not immunosuppression, not injected drug use, not insect bite/sting and not skin injury    Relieved by:  None tried  Worsened by:  Nothing  Associated symptoms: no fatigue, no fever, no headaches, no nausea and no vomiting    Risk factors: prior abscess    Risk factors: no hx of MRSA        Review of Systems   Constitutional: Negative for appetite change, chills, fatigue and fever.   HENT: Negative for congestion and sore throat.    Respiratory: Negative for chest tightness and shortness of breath.    Cardiovascular: Negative for palpitations.   Gastrointestinal: Negative for abdominal distention, abdominal pain, nausea and vomiting.   Genitourinary: Negative for difficulty urinating and dysuria.   Musculoskeletal: Negative for back pain, joint swelling, neck pain and neck stiffness.   Skin: Negative for rash.        Positive for abscess   Neurological: Negative for dizziness and headaches.   All other systems reviewed and are negative.      Past Medical History:   Diagnosis Date   • Ovarian cyst        No Known Allergies    No past surgical history on file.    No family history on file.    Social History     Social History   • Marital status: Single     Spouse name: N/A   • Number of children: N/A   • Years of education: N/A     Social History Main Topics   • Smoking status: Current Every Day Smoker     Packs/day: 0.50   • Smokeless tobacco: Never Used   • Alcohol use No   • Drug use: No   • Sexual activity: Defer     Other Topics Concern   • Not on file     Social History Narrative   • No narrative on file       Prior to Admission medications   "  Medication Sig Start Date End Date Taking? Authorizing Provider   doxycycline (DORYX) 100 MG enteric coated tablet Take 1 tablet by mouth 2 (Two) Times a Day. 9/6/17   Ignacio Gill MD   HYDROcodone-acetaminophen (NORCO) 5-325 MG per tablet Take 1 tablet by mouth Every 4 (Four) Hours As Needed for Moderate Pain . 12/21/17   DEIDRA Medina   HYDROcodone-acetaminophen (NORCO) 7.5-325 MG per tablet Take 1 tablet by mouth Every 4 (Four) Hours As Needed for moderate pain (4-6). 11/4/16   DEIDRA Medina   HYDROcodone-acetaminophen (NORCO) 7.5-325 MG per tablet Take 1 tablet by mouth Every 4 (Four) Hours As Needed for Moderate Pain (4-6). 7/30/17   Sabi Spaulding DO   naproxen sodium (ANAPROX) 550 MG tablet Take 1 tablet by mouth 2 (Two) Times a Day With Meals. 9/13/17   Chad Mitchell MD   ondansetron ODT (ZOFRAN-ODT) 4 MG disintegrating tablet Take 1 tablet by mouth 4 (Four) Times a Day. 4/15/17   Tegan Jain MD   oxyCODONE-acetaminophen (PERCOCET) 7.5-325 MG per tablet Take 1 tablet by mouth Every 4 (Four) Hours As Needed for Moderate Pain (4-6). 7/28/17   DEIDRA Rodriges   promethazine (PHENERGAN) 25 MG tablet Take 1 tablet by mouth Every 6 (Six) Hours As Needed for Nausea or Vomiting for up to 10 doses. 9/13/17   Chad Mitchell MD   sulfamethoxazole-trimethoprim (BACTRIM DS,SEPTRA DS) 800-160 MG per tablet Take 1 tablet by mouth 2 (Two) Times a Day for 10 days. 12/21/17 12/31/17  DEIDRA Medina       Medications - No data to display    /90 (BP Location: Right arm, Patient Position: Sitting)  Pulse 84  Temp 98.6 °F (37 °C) (Oral)   Resp 18  Ht 170.2 cm (67\")  Wt 107 kg (236 lb)  SpO2 95%  BMI 36.96 kg/m2      Objective   Physical Exam   Constitutional: She is oriented to person, place, and time. She appears well-developed and well-nourished.   HENT:   Head: Normocephalic and atraumatic.   Right Ear: External ear normal.   Left Ear: External " ear normal.   Nose: Nose normal.   Mouth/Throat: Oropharynx is clear and moist.   Eyes: Conjunctivae and EOM are normal. Pupils are equal, round, and reactive to light.   Neck: Normal range of motion. Neck supple.   Cardiovascular: Normal rate, regular rhythm, normal heart sounds and intact distal pulses.    Pulmonary/Chest: Effort normal and breath sounds normal.   Abdominal: Soft. Bowel sounds are normal.   Musculoskeletal: Normal range of motion.   Neurological: She is alert and oriented to person, place, and time.   Skin: Skin is warm and dry.   1 cm indurated abscess to the right axilla; painful to touch; no fluctuance, drainage, or surrounding cellulitis noted; no streaking of skin noted  1 cm indurated abscess noted to the left axilla; painful to touch; no fluctuance, drainage, or surrounding cellulitis; no streaking of skin noted   Psychiatric: She has a normal mood and affect. Her behavior is normal. Judgment and thought content normal.   Nursing note and vitals reviewed.      Procedures         Lab Results (last 24 hours)     ** No results found for the last 24 hours. **          No orders to display         ED Course  ED Course          MDM  Number of Diagnoses or Management Options  Abscess: new and does not require workup     Amount and/or Complexity of Data Reviewed  Discuss the patient with other providers: yes    Risk of Complications, Morbidity, and/or Mortality  Presenting problems: low  Diagnostic procedures: low  Management options: low    Patient Progress  Patient progress: improved      Final diagnoses:   Abscess          Renetta Valadez, APRN  12/21/17 [id number]

## 2017-12-30 ENCOUNTER — HOSPITAL ENCOUNTER (EMERGENCY)
Facility: HOSPITAL | Age: 27
Discharge: HOME | End: 2017-12-30
Admitting: EMERGENCY MEDICINE

## 2017-12-30 VITALS
BODY MASS INDEX: 37.67 KG/M2 | HEART RATE: 75 BPM | HEIGHT: 67 IN | TEMPERATURE: 98.5 F | OXYGEN SATURATION: 93 % | WEIGHT: 240 LBS | SYSTOLIC BLOOD PRESSURE: 155 MMHG | RESPIRATION RATE: 18 BRPM | DIASTOLIC BLOOD PRESSURE: 87 MMHG

## 2017-12-30 DIAGNOSIS — L02.411 ABSCESS OF AXILLA, RIGHT: Primary | ICD-10-CM

## 2017-12-30 PROCEDURE — 99283 EMERGENCY DEPT VISIT LOW MDM: CPT

## 2017-12-30 RX ORDER — SULFAMETHOXAZOLE AND TRIMETHOPRIM 800; 160 MG/1; MG/1
1 TABLET ORAL 2 TIMES DAILY
Qty: 20 TABLET | Refills: 0 | Status: SHIPPED | OUTPATIENT
Start: 2017-12-30 | End: 2018-01-09

## 2017-12-30 RX ORDER — LIDOCAINE HYDROCHLORIDE AND EPINEPHRINE 10; 10 MG/ML; UG/ML
20 INJECTION, SOLUTION INFILTRATION; PERINEURAL ONCE
Status: COMPLETED | OUTPATIENT
Start: 2017-12-30 | End: 2017-12-30

## 2017-12-30 RX ORDER — CLINDAMYCIN PHOSPHATE 150 MG/ML
600 INJECTION, SOLUTION INTRAVENOUS ONCE
Status: DISCONTINUED | OUTPATIENT
Start: 2017-12-30 | End: 2017-12-30 | Stop reason: HOSPADM

## 2017-12-30 RX ORDER — HYDROCODONE BITARTRATE AND ACETAMINOPHEN 5; 325 MG/1; MG/1
1 TABLET ORAL EVERY 4 HOURS PRN
Qty: 12 TABLET | Refills: 0 | Status: SHIPPED | OUTPATIENT
Start: 2017-12-30 | End: 2018-08-13

## 2017-12-30 RX ADMIN — LIDOCAINE HYDROCHLORIDE,EPINEPHRINE BITARTRATE 20 ML: 10; .01 INJECTION, SOLUTION INFILTRATION; PERINEURAL at 12:06

## 2018-04-12 ENCOUNTER — OFFICE VISIT (OUTPATIENT)
Dept: OBGYN | Age: 28
End: 2018-04-12
Payer: COMMERCIAL

## 2018-04-12 ENCOUNTER — HOSPITAL ENCOUNTER (OUTPATIENT)
Dept: ULTRASOUND IMAGING | Age: 28
Discharge: HOME | End: 2018-04-12
Payer: COMMERCIAL

## 2018-04-12 VITALS
HEIGHT: 67 IN | HEART RATE: 90 BPM | WEIGHT: 236 LBS | SYSTOLIC BLOOD PRESSURE: 137 MMHG | BODY MASS INDEX: 37.04 KG/M2 | DIASTOLIC BLOOD PRESSURE: 83 MMHG

## 2018-04-12 DIAGNOSIS — R10.2 PELVIC PAIN IN FEMALE: ICD-10-CM

## 2018-04-12 DIAGNOSIS — N92.6 IRREGULAR MENSES: ICD-10-CM

## 2018-04-12 DIAGNOSIS — R10.2 PELVIC PAIN IN FEMALE: Primary | ICD-10-CM

## 2018-04-12 PROCEDURE — 99213 OFFICE O/P EST LOW 20 MIN: CPT | Performed by: NURSE PRACTITIONER

## 2018-04-12 PROCEDURE — 76830 TRANSVAGINAL US NON-OB: CPT

## 2018-04-12 RX ORDER — METRONIDAZOLE 500 MG/1
500 TABLET ORAL 2 TIMES DAILY
Qty: 14 TABLET | Refills: 0 | Status: SHIPPED | OUTPATIENT
Start: 2018-04-12 | End: 2018-04-19

## 2018-04-12 RX ORDER — MEGESTROL ACETATE 40 MG/1
40 TABLET ORAL 2 TIMES DAILY
Qty: 60 TABLET | Refills: 0 | Status: SHIPPED | OUTPATIENT
Start: 2018-04-12 | End: 2018-05-24 | Stop reason: ALTCHOICE

## 2018-04-12 RX ORDER — DOXYCYCLINE HYCLATE 100 MG
100 TABLET ORAL 2 TIMES DAILY
Qty: 20 TABLET | Refills: 0 | Status: SHIPPED | OUTPATIENT
Start: 2018-04-12 | End: 2018-04-22

## 2018-04-12 ASSESSMENT — ENCOUNTER SYMPTOMS
RESPIRATORY NEGATIVE: 1
GASTROINTESTINAL NEGATIVE: 1
EYES NEGATIVE: 1
CONSTIPATION: 0
ALLERGIC/IMMUNOLOGIC NEGATIVE: 1
DIARRHEA: 0

## 2018-04-13 DIAGNOSIS — N92.6 IRREGULAR MENSES: ICD-10-CM

## 2018-04-16 DIAGNOSIS — A74.9 CHLAMYDIA: Primary | ICD-10-CM

## 2018-04-16 RX ORDER — AZITHROMYCIN 500 MG/1
500 TABLET, FILM COATED ORAL DAILY
Qty: 2 TABLET | Refills: 0 | Status: SHIPPED | OUTPATIENT
Start: 2018-04-16 | End: 2018-04-17

## 2018-04-19 ENCOUNTER — OFFICE VISIT (OUTPATIENT)
Dept: OBGYN | Age: 28
End: 2018-04-19
Payer: COMMERCIAL

## 2018-04-19 ENCOUNTER — TELEPHONE (OUTPATIENT)
Dept: OBGYN | Age: 28
End: 2018-04-19

## 2018-04-19 VITALS
WEIGHT: 239 LBS | HEIGHT: 67 IN | HEART RATE: 79 BPM | SYSTOLIC BLOOD PRESSURE: 134 MMHG | BODY MASS INDEX: 37.51 KG/M2 | DIASTOLIC BLOOD PRESSURE: 84 MMHG

## 2018-04-19 DIAGNOSIS — N70.93 LEFT PYOSALPINX: Primary | ICD-10-CM

## 2018-04-19 DIAGNOSIS — A74.9 CHLAMYDIA: ICD-10-CM

## 2018-04-19 PROCEDURE — 99213 OFFICE O/P EST LOW 20 MIN: CPT | Performed by: NURSE PRACTITIONER

## 2018-04-19 ASSESSMENT — ENCOUNTER SYMPTOMS
DIARRHEA: 0
GASTROINTESTINAL NEGATIVE: 1
EYES NEGATIVE: 1
CONSTIPATION: 0
RESPIRATORY NEGATIVE: 1
ALLERGIC/IMMUNOLOGIC NEGATIVE: 1

## 2018-04-20 ENCOUNTER — TELEPHONE (OUTPATIENT)
Dept: OBGYN | Age: 28
End: 2018-04-20

## 2018-04-27 ENCOUNTER — APPOINTMENT (OUTPATIENT)
Dept: ULTRASOUND IMAGING | Facility: HOSPITAL | Age: 28
End: 2018-04-27

## 2018-04-27 ENCOUNTER — APPOINTMENT (OUTPATIENT)
Dept: CT IMAGING | Facility: HOSPITAL | Age: 28
End: 2018-04-27

## 2018-04-27 ENCOUNTER — HOSPITAL ENCOUNTER (EMERGENCY)
Facility: HOSPITAL | Age: 28
Discharge: HOME | End: 2018-04-27
Admitting: EMERGENCY MEDICINE

## 2018-04-27 VITALS
BODY MASS INDEX: 37.98 KG/M2 | WEIGHT: 242 LBS | OXYGEN SATURATION: 99 % | RESPIRATION RATE: 15 BRPM | SYSTOLIC BLOOD PRESSURE: 122 MMHG | TEMPERATURE: 97.7 F | HEIGHT: 67 IN | DIASTOLIC BLOOD PRESSURE: 88 MMHG | HEART RATE: 68 BPM

## 2018-04-27 DIAGNOSIS — N39.0 ACUTE UTI: ICD-10-CM

## 2018-04-27 DIAGNOSIS — N83.202 CYST OF LEFT OVARY: Primary | ICD-10-CM

## 2018-04-27 LAB
ALBUMIN SERPL-MCNC: 4.4 G/DL (ref 3.5–5)
ALBUMIN/GLOB SERPL: 1.2 G/DL (ref 1.1–2.5)
ALP SERPL-CCNC: 58 U/L (ref 24–120)
ALT SERPL W P-5'-P-CCNC: 33 U/L (ref 0–54)
AMYLASE SERPL-CCNC: 93 U/L (ref 30–110)
ANION GAP SERPL CALCULATED.3IONS-SCNC: 11 MMOL/L (ref 4–13)
APTT PPP: 35.9 SECONDS (ref 24.1–34.8)
AST SERPL-CCNC: 26 U/L (ref 7–45)
BACTERIA UR QL AUTO: ABNORMAL /HPF
BASOPHILS # BLD AUTO: 0.06 10*3/MM3 (ref 0–0.2)
BASOPHILS NFR BLD AUTO: 0.7 % (ref 0–2)
BILIRUB SERPL-MCNC: 0.3 MG/DL (ref 0.1–1)
BILIRUB UR QL STRIP: NEGATIVE
BUN BLD-MCNC: 12 MG/DL (ref 5–21)
BUN/CREAT SERPL: 18.2 (ref 7–25)
CALCIUM SPEC-SCNC: 9.5 MG/DL (ref 8.4–10.4)
CHLORIDE SERPL-SCNC: 104 MMOL/L (ref 98–110)
CLARITY UR: CLEAR
CO2 SERPL-SCNC: 25 MMOL/L (ref 24–31)
COLOR UR: YELLOW
CREAT BLD-MCNC: 0.66 MG/DL (ref 0.5–1.4)
DEPRECATED RDW RBC AUTO: 43 FL (ref 40–54)
EOSINOPHIL # BLD AUTO: 0.13 10*3/MM3 (ref 0–0.7)
EOSINOPHIL NFR BLD AUTO: 1.6 % (ref 0–4)
ERYTHROCYTE [DISTWIDTH] IN BLOOD BY AUTOMATED COUNT: 13.8 % (ref 12–15)
GFR SERPL CREATININE-BSD FRML MDRD: 129 ML/MIN/1.73
GLOBULIN UR ELPH-MCNC: 3.6 GM/DL
GLUCOSE BLD-MCNC: 112 MG/DL (ref 70–100)
GLUCOSE UR STRIP-MCNC: NEGATIVE MG/DL
HCG SERPL QL: NEGATIVE
HCT VFR BLD AUTO: 41.9 % (ref 37–47)
HGB BLD-MCNC: 14.8 G/DL (ref 12–16)
HGB UR QL STRIP.AUTO: ABNORMAL
HOLD SPECIMEN: NORMAL
HOLD SPECIMEN: NORMAL
HYALINE CASTS UR QL AUTO: ABNORMAL /LPF
IMM GRANULOCYTES # BLD: 0.04 10*3/MM3 (ref 0–0.03)
IMM GRANULOCYTES NFR BLD: 0.5 % (ref 0–5)
INR PPP: 1.02 (ref 0.91–1.09)
KETONES UR QL STRIP: NEGATIVE
LEUKOCYTE ESTERASE UR QL STRIP.AUTO: ABNORMAL
LIPASE SERPL-CCNC: 106 U/L (ref 23–203)
LYMPHOCYTES # BLD AUTO: 2.38 10*3/MM3 (ref 0.72–4.86)
LYMPHOCYTES NFR BLD AUTO: 29.7 % (ref 15–45)
MCH RBC QN AUTO: 30 PG (ref 28–32)
MCHC RBC AUTO-ENTMCNC: 35.3 G/DL (ref 33–36)
MCV RBC AUTO: 85 FL (ref 82–98)
MONOCYTES # BLD AUTO: 0.59 10*3/MM3 (ref 0.19–1.3)
MONOCYTES NFR BLD AUTO: 7.4 % (ref 4–12)
NEUTROPHILS # BLD AUTO: 4.81 10*3/MM3 (ref 1.87–8.4)
NEUTROPHILS NFR BLD AUTO: 60.1 % (ref 39–78)
NITRITE UR QL STRIP: NEGATIVE
NRBC BLD MANUAL-RTO: 0 /100 WBC (ref 0–0)
PH UR STRIP.AUTO: 6 [PH] (ref 5–8)
PLATELET # BLD AUTO: 325 10*3/MM3 (ref 130–400)
PMV BLD AUTO: 10 FL (ref 6–12)
POTASSIUM BLD-SCNC: 4.6 MMOL/L (ref 3.5–5.3)
PROT SERPL-MCNC: 8 G/DL (ref 6.3–8.7)
PROT UR QL STRIP: NEGATIVE
PROTHROMBIN TIME: 13.7 SECONDS (ref 11.9–14.6)
RBC # BLD AUTO: 4.93 10*6/MM3 (ref 4.2–5.4)
RBC # UR: ABNORMAL /HPF
REF LAB TEST METHOD: ABNORMAL
SODIUM BLD-SCNC: 140 MMOL/L (ref 135–145)
SP GR UR STRIP: 1.02 (ref 1–1.03)
SQUAMOUS #/AREA URNS HPF: ABNORMAL /HPF
UROBILINOGEN UR QL STRIP: ABNORMAL
WBC NRBC COR # BLD: 8.01 10*3/MM3 (ref 4.8–10.8)
WBC UR QL AUTO: ABNORMAL /HPF
WHOLE BLOOD HOLD SPECIMEN: NORMAL
WHOLE BLOOD HOLD SPECIMEN: NORMAL

## 2018-04-27 PROCEDURE — 25010000002 ONDANSETRON PER 1 MG: Performed by: NURSE PRACTITIONER

## 2018-04-27 PROCEDURE — 96375 TX/PRO/DX INJ NEW DRUG ADDON: CPT

## 2018-04-27 PROCEDURE — 87086 URINE CULTURE/COLONY COUNT: CPT | Performed by: NURSE PRACTITIONER

## 2018-04-27 PROCEDURE — 74177 CT ABD & PELVIS W/CONTRAST: CPT

## 2018-04-27 PROCEDURE — 82150 ASSAY OF AMYLASE: CPT | Performed by: NURSE PRACTITIONER

## 2018-04-27 PROCEDURE — 81001 URINALYSIS AUTO W/SCOPE: CPT | Performed by: NURSE PRACTITIONER

## 2018-04-27 PROCEDURE — 83690 ASSAY OF LIPASE: CPT | Performed by: NURSE PRACTITIONER

## 2018-04-27 PROCEDURE — 25010000002 CEFTRIAXONE PER 250 MG: Performed by: NURSE PRACTITIONER

## 2018-04-27 PROCEDURE — 80053 COMPREHEN METABOLIC PANEL: CPT | Performed by: NURSE PRACTITIONER

## 2018-04-27 PROCEDURE — 0 IOPAMIDOL PER 1 ML: Performed by: NURSE PRACTITIONER

## 2018-04-27 PROCEDURE — 85730 THROMBOPLASTIN TIME PARTIAL: CPT | Performed by: NURSE PRACTITIONER

## 2018-04-27 PROCEDURE — 99283 EMERGENCY DEPT VISIT LOW MDM: CPT

## 2018-04-27 PROCEDURE — 96374 THER/PROPH/DIAG INJ IV PUSH: CPT

## 2018-04-27 PROCEDURE — 84703 CHORIONIC GONADOTROPIN ASSAY: CPT | Performed by: NURSE PRACTITIONER

## 2018-04-27 PROCEDURE — 85025 COMPLETE CBC W/AUTO DIFF WBC: CPT | Performed by: NURSE PRACTITIONER

## 2018-04-27 PROCEDURE — 76856 US EXAM PELVIC COMPLETE: CPT

## 2018-04-27 PROCEDURE — 96361 HYDRATE IV INFUSION ADD-ON: CPT

## 2018-04-27 PROCEDURE — 85610 PROTHROMBIN TIME: CPT | Performed by: NURSE PRACTITIONER

## 2018-04-27 PROCEDURE — 96376 TX/PRO/DX INJ SAME DRUG ADON: CPT

## 2018-04-27 RX ORDER — TRAMADOL HYDROCHLORIDE 50 MG/1
50 TABLET ORAL EVERY 6 HOURS PRN
Qty: 15 TABLET | Refills: 0 | Status: SHIPPED | OUTPATIENT
Start: 2018-04-27 | End: 2018-08-13

## 2018-04-27 RX ORDER — ONDANSETRON 2 MG/ML
4 INJECTION INTRAMUSCULAR; INTRAVENOUS ONCE
Status: COMPLETED | OUTPATIENT
Start: 2018-04-27 | End: 2018-04-27

## 2018-04-27 RX ORDER — MEPERIDINE HYDROCHLORIDE 25 MG/ML
25 INJECTION INTRAMUSCULAR; INTRAVENOUS; SUBCUTANEOUS ONCE
Status: COMPLETED | OUTPATIENT
Start: 2018-04-27 | End: 2018-04-27

## 2018-04-27 RX ORDER — CEFDINIR 300 MG/1
300 CAPSULE ORAL 2 TIMES DAILY
Qty: 20 CAPSULE | Refills: 0 | Status: SHIPPED | OUTPATIENT
Start: 2018-04-27 | End: 2018-05-07

## 2018-04-27 RX ADMIN — IOPAMIDOL 100 ML: 755 INJECTION, SOLUTION INTRAVENOUS at 11:42

## 2018-04-27 RX ADMIN — ONDANSETRON 4 MG: 2 INJECTION, SOLUTION INTRAMUSCULAR; INTRAVENOUS at 10:10

## 2018-04-27 RX ADMIN — ONDANSETRON 4 MG: 2 INJECTION, SOLUTION INTRAMUSCULAR; INTRAVENOUS at 12:48

## 2018-04-27 RX ADMIN — SODIUM CHLORIDE 1000 ML: 9 INJECTION, SOLUTION INTRAVENOUS at 10:10

## 2018-04-27 RX ADMIN — MEPERIDINE HYDROCHLORIDE 25 MG: 25 INJECTION INTRAMUSCULAR; INTRAVENOUS; SUBCUTANEOUS at 10:10

## 2018-04-27 RX ADMIN — MEPERIDINE HYDROCHLORIDE 25 MG: 25 INJECTION INTRAMUSCULAR; INTRAVENOUS; SUBCUTANEOUS at 12:48

## 2018-04-27 RX ADMIN — CEFTRIAXONE SODIUM 1 G: 1 INJECTION, POWDER, FOR SOLUTION INTRAMUSCULAR; INTRAVENOUS at 12:10

## 2018-04-29 ENCOUNTER — HOSPITAL ENCOUNTER (EMERGENCY)
Facility: HOSPITAL | Age: 28
Discharge: HOME | End: 2018-04-29
Admitting: EMERGENCY MEDICINE

## 2018-04-29 VITALS
TEMPERATURE: 97.8 F | HEART RATE: 78 BPM | SYSTOLIC BLOOD PRESSURE: 134 MMHG | OXYGEN SATURATION: 99 % | RESPIRATION RATE: 16 BRPM | DIASTOLIC BLOOD PRESSURE: 77 MMHG | WEIGHT: 243 LBS | HEIGHT: 67 IN | BODY MASS INDEX: 38.14 KG/M2

## 2018-04-29 DIAGNOSIS — L73.2 HIDRADENITIS SUPPURATIVA: Primary | ICD-10-CM

## 2018-04-29 LAB
BACTERIA SPEC AEROBE CULT: ABNORMAL
BACTERIA SPEC AEROBE CULT: ABNORMAL

## 2018-04-29 PROCEDURE — 99282 EMERGENCY DEPT VISIT SF MDM: CPT

## 2018-04-30 ENCOUNTER — TELEPHONE (OUTPATIENT)
Dept: OBGYN | Age: 28
End: 2018-04-30

## 2018-04-30 NOTE — TELEPHONE ENCOUNTER
Pt needs dante for the phys and pap she missed today with Dr Marizol Sepulveda. I offered her the next [facility] which was 6-11-18. She stated she could not wait that long! Please call the patient to advise on the next date and time she can get in .

## 2018-05-24 ENCOUNTER — APPOINTMENT (OUTPATIENT)
Dept: CT IMAGING | Age: 28
End: 2018-05-24
Payer: COMMERCIAL

## 2018-05-24 ENCOUNTER — HOSPITAL ENCOUNTER (EMERGENCY)
Age: 28
Discharge: HOME | End: 2018-05-24
Attending: EMERGENCY MEDICINE
Payer: COMMERCIAL

## 2018-05-24 ENCOUNTER — APPOINTMENT (OUTPATIENT)
Dept: GENERAL RADIOLOGY | Age: 28
End: 2018-05-24
Payer: COMMERCIAL

## 2018-05-24 VITALS
HEIGHT: 67 IN | HEART RATE: 79 BPM | SYSTOLIC BLOOD PRESSURE: 105 MMHG | BODY MASS INDEX: 36.1 KG/M2 | WEIGHT: 230 LBS | RESPIRATION RATE: 16 BRPM | DIASTOLIC BLOOD PRESSURE: 68 MMHG | OXYGEN SATURATION: 94 % | TEMPERATURE: 98.6 F

## 2018-05-24 DIAGNOSIS — R10.9 ACUTE ABDOMINAL PAIN: Primary | ICD-10-CM

## 2018-05-24 DIAGNOSIS — R07.89 ACUTE CHEST WALL PAIN: ICD-10-CM

## 2018-05-24 DIAGNOSIS — K52.9 COLITIS: ICD-10-CM

## 2018-05-24 LAB
ALBUMIN SERPL-MCNC: 4.2 G/DL (ref 3.5–5.2)
ALP BLD-CCNC: 60 U/L (ref 35–104)
ALT SERPL-CCNC: 11 U/L (ref 5–33)
ANION GAP SERPL CALCULATED.3IONS-SCNC: 12 MMOL/L (ref 7–19)
AST SERPL-CCNC: 14 U/L (ref 5–32)
BASOPHILS ABSOLUTE: 0.1 K/UL (ref 0–0.2)
BASOPHILS RELATIVE PERCENT: 0.6 % (ref 0–1)
BILIRUB SERPL-MCNC: 0.4 MG/DL (ref 0.2–1.2)
BUN BLDV-MCNC: 10 MG/DL (ref 6–20)
CALCIUM SERPL-MCNC: 9.1 MG/DL (ref 8.6–10)
CHLORIDE BLD-SCNC: 102 MMOL/L (ref 98–111)
CO2: 25 MMOL/L (ref 22–29)
CREAT SERPL-MCNC: 0.5 MG/DL (ref 0.5–0.9)
D DIMER: 0.69 UG/ML FEU (ref 0–0.48)
EOSINOPHILS ABSOLUTE: 0.1 K/UL (ref 0–0.6)
EOSINOPHILS RELATIVE PERCENT: 1.7 % (ref 0–5)
GFR NON-AFRICAN AMERICAN: >60
GLUCOSE BLD-MCNC: 83 MG/DL (ref 74–109)
HCG QUALITATIVE: NEGATIVE
HCT VFR BLD CALC: 39.6 % (ref 37–47)
HEMOGLOBIN: 14 G/DL (ref 12–16)
LIPASE: 19 U/L (ref 13–60)
LYMPHOCYTES ABSOLUTE: 2.5 K/UL (ref 1.1–4.5)
LYMPHOCYTES RELATIVE PERCENT: 30.3 % (ref 20–40)
MCH RBC QN AUTO: 30.4 PG (ref 27–31)
MCHC RBC AUTO-ENTMCNC: 35.4 G/DL (ref 33–37)
MCV RBC AUTO: 86.1 FL (ref 81–99)
MONOCYTES ABSOLUTE: 0.7 K/UL (ref 0–0.9)
MONOCYTES RELATIVE PERCENT: 8.7 % (ref 0–10)
NEUTROPHILS ABSOLUTE: 4.9 K/UL (ref 1.5–7.5)
NEUTROPHILS RELATIVE PERCENT: 58.5 % (ref 50–65)
PDW BLD-RTO: 13.8 % (ref 11.5–14.5)
PLATELET # BLD: 274 K/UL (ref 130–400)
PMV BLD AUTO: 10 FL (ref 9.4–12.3)
POTASSIUM SERPL-SCNC: 3.7 MMOL/L (ref 3.5–5)
RBC # BLD: 4.6 M/UL (ref 4.2–5.4)
SODIUM BLD-SCNC: 139 MMOL/L (ref 136–145)
TOTAL PROTEIN: 7.8 G/DL (ref 6.6–8.7)
TROPONIN: <0.01 NG/ML (ref 0–0.03)
WBC # BLD: 8.3 K/UL (ref 4.8–10.8)

## 2018-05-24 PROCEDURE — 6360000002 HC RX W HCPCS: Performed by: EMERGENCY MEDICINE

## 2018-05-24 PROCEDURE — 85025 COMPLETE CBC W/AUTO DIFF WBC: CPT

## 2018-05-24 PROCEDURE — 71275 CT ANGIOGRAPHY CHEST: CPT

## 2018-05-24 PROCEDURE — 83690 ASSAY OF LIPASE: CPT

## 2018-05-24 PROCEDURE — 6360000004 HC RX CONTRAST MEDICATION: Performed by: EMERGENCY MEDICINE

## 2018-05-24 PROCEDURE — 96374 THER/PROPH/DIAG INJ IV PUSH: CPT

## 2018-05-24 PROCEDURE — 99284 EMERGENCY DEPT VISIT MOD MDM: CPT

## 2018-05-24 PROCEDURE — 85379 FIBRIN DEGRADATION QUANT: CPT

## 2018-05-24 PROCEDURE — 6370000000 HC RX 637 (ALT 250 FOR IP): Performed by: EMERGENCY MEDICINE

## 2018-05-24 PROCEDURE — 80053 COMPREHEN METABOLIC PANEL: CPT

## 2018-05-24 PROCEDURE — 84484 ASSAY OF TROPONIN QUANT: CPT

## 2018-05-24 PROCEDURE — 84703 CHORIONIC GONADOTROPIN ASSAY: CPT

## 2018-05-24 PROCEDURE — 99284 EMERGENCY DEPT VISIT MOD MDM: CPT | Performed by: EMERGENCY MEDICINE

## 2018-05-24 PROCEDURE — 36415 COLL VENOUS BLD VENIPUNCTURE: CPT

## 2018-05-24 PROCEDURE — 71045 X-RAY EXAM CHEST 1 VIEW: CPT

## 2018-05-24 PROCEDURE — 93005 ELECTROCARDIOGRAM TRACING: CPT

## 2018-05-24 RX ORDER — METRONIDAZOLE 500 MG/1
500 TABLET ORAL 2 TIMES DAILY
Qty: 14 TABLET | Refills: 0 | Status: SHIPPED | OUTPATIENT
Start: 2018-05-24 | End: 2018-05-31

## 2018-05-24 RX ORDER — HYDROCODONE BITARTRATE AND ACETAMINOPHEN 5; 325 MG/1; MG/1
1 TABLET ORAL EVERY 6 HOURS PRN
Qty: 10 TABLET | Refills: 0 | Status: SHIPPED | OUTPATIENT
Start: 2018-05-24 | End: 2018-05-27

## 2018-05-24 RX ORDER — HYDROCODONE BITARTRATE AND ACETAMINOPHEN 5; 325 MG/1; MG/1
1 TABLET ORAL ONCE
Status: COMPLETED | OUTPATIENT
Start: 2018-05-24 | End: 2018-05-24

## 2018-05-24 RX ORDER — KETOROLAC TROMETHAMINE 30 MG/ML
30 INJECTION, SOLUTION INTRAMUSCULAR; INTRAVENOUS ONCE
Status: COMPLETED | OUTPATIENT
Start: 2018-05-24 | End: 2018-05-24

## 2018-05-24 RX ORDER — CIPROFLOXACIN 500 MG/1
500 TABLET, FILM COATED ORAL 2 TIMES DAILY
Qty: 14 TABLET | Refills: 0 | Status: SHIPPED | OUTPATIENT
Start: 2018-05-24 | End: 2018-05-31

## 2018-05-24 RX ADMIN — KETOROLAC TROMETHAMINE 30 MG: 30 INJECTION, SOLUTION INTRAMUSCULAR; INTRAVENOUS at 16:40

## 2018-05-24 RX ADMIN — IOPAMIDOL 90 ML: 755 INJECTION, SOLUTION INTRAVENOUS at 16:20

## 2018-05-24 RX ADMIN — HYDROCODONE BITARTRATE AND ACETAMINOPHEN 1 TABLET: 5; 325 TABLET ORAL at 15:24

## 2018-05-24 ASSESSMENT — ENCOUNTER SYMPTOMS
COUGH: 0
VOMITING: 1
ABDOMINAL PAIN: 1
NAUSEA: 1
SHORTNESS OF BREATH: 1
SORE THROAT: 0
RHINORRHEA: 0
BACK PAIN: 0
DIARRHEA: 0

## 2018-05-24 ASSESSMENT — PAIN DESCRIPTION - PAIN TYPE: TYPE: ACUTE PAIN

## 2018-05-24 ASSESSMENT — PAIN DESCRIPTION - LOCATION: LOCATION: ABDOMEN

## 2018-05-24 ASSESSMENT — PAIN SCALES - GENERAL
PAINLEVEL_OUTOF10: 10

## 2018-05-24 ASSESSMENT — PAIN DESCRIPTION - ORIENTATION: ORIENTATION: LEFT;UPPER

## 2018-08-13 ENCOUNTER — OFFICE VISIT (OUTPATIENT)
Dept: OBSTETRICS AND GYNECOLOGY | Facility: CLINIC | Age: 28
End: 2018-08-13

## 2018-08-13 VITALS
TEMPERATURE: 98.3 F | SYSTOLIC BLOOD PRESSURE: 110 MMHG | HEIGHT: 67 IN | BODY MASS INDEX: 38.89 KG/M2 | RESPIRATION RATE: 18 BRPM | DIASTOLIC BLOOD PRESSURE: 70 MMHG | WEIGHT: 247.8 LBS | HEART RATE: 75 BPM

## 2018-08-13 DIAGNOSIS — A59.9 TRICHOMONIASIS: ICD-10-CM

## 2018-08-13 DIAGNOSIS — R10.2 PELVIC PAIN: Primary | ICD-10-CM

## 2018-08-13 DIAGNOSIS — Z11.3 ROUTINE SCREENING FOR STI (SEXUALLY TRANSMITTED INFECTION): ICD-10-CM

## 2018-08-13 LAB
BACTERIA UR QL AUTO: ABNORMAL /HPF
BILIRUB UR QL STRIP: NEGATIVE
CLARITY UR: ABNORMAL
COLOR UR: YELLOW
GLUCOSE UR STRIP-MCNC: NEGATIVE MG/DL
HGB UR QL STRIP.AUTO: NEGATIVE
HYALINE CASTS UR QL AUTO: ABNORMAL /LPF
KETONES UR QL STRIP: NEGATIVE
LEUKOCYTE ESTERASE UR QL STRIP.AUTO: ABNORMAL
NITRITE UR QL STRIP: NEGATIVE
PH UR STRIP.AUTO: 7 [PH] (ref 5.5–8)
PROT UR QL STRIP: ABNORMAL
RBC # UR: ABNORMAL /HPF
REF LAB TEST METHOD: ABNORMAL
SP GR UR STRIP: 1.02 (ref 1–1.03)
SQUAMOUS #/AREA URNS HPF: ABNORMAL /HPF
TRICHOMONAS #/AREA URNS HPF: ABNORMAL /HPF
UROBILINOGEN UR QL STRIP: ABNORMAL
WBC UR QL AUTO: ABNORMAL /HPF

## 2018-08-13 PROCEDURE — 81001 URINALYSIS AUTO W/SCOPE: CPT | Performed by: NURSE PRACTITIONER

## 2018-08-13 PROCEDURE — 87591 N.GONORRHOEAE DNA AMP PROB: CPT | Performed by: NURSE PRACTITIONER

## 2018-08-13 PROCEDURE — 99213 OFFICE O/P EST LOW 20 MIN: CPT | Performed by: NURSE PRACTITIONER

## 2018-08-13 PROCEDURE — 87210 SMEAR WET MOUNT SALINE/INK: CPT | Performed by: NURSE PRACTITIONER

## 2018-08-13 PROCEDURE — 87661 TRICHOMONAS VAGINALIS AMPLIF: CPT | Performed by: NURSE PRACTITIONER

## 2018-08-13 PROCEDURE — 87086 URINE CULTURE/COLONY COUNT: CPT | Performed by: NURSE PRACTITIONER

## 2018-08-13 PROCEDURE — 87491 CHLMYD TRACH DNA AMP PROBE: CPT | Performed by: NURSE PRACTITIONER

## 2018-08-13 RX ORDER — METRONIDAZOLE 500 MG/1
500 TABLET ORAL 2 TIMES DAILY
Qty: 14 TABLET | Refills: 0 | Status: SHIPPED | OUTPATIENT
Start: 2018-08-13 | End: 2018-08-20

## 2018-08-13 NOTE — PROGRESS NOTES
Subjective   Cherie Ibarra is a 28 y.o. female.     History of Present Illness   Pt presents with complaints of pelvic pain and low back pain x 2 weeks. Described as sharp, stabbing all the way across the pelvis and back. Rates pain 8/10. Denies vaginal discharge, itching, burning, odor. Denies urinary frequency, urgency, dysuria.     Patient's last menstrual period was 07/23/2018 (approximate). Periods are 7 days, reg, moderate to heavy flow, with moderate cramping, nausea and headaches. Pt has female sexual partners. Partner present at visit. PMH of ovarian cysts, PID with pyosalpinx, and chronic pelvic pain. There appears to be over 20 outpatient and ER visits since 2014 for these concerns.     The following portions of the patient's history were reviewed and updated as appropriate: allergies, current medications, past family history, past medical history, past social history, past surgical history and problem list.    Review of Systems   Constitutional: Negative.  Negative for appetite change, chills, diaphoresis, fatigue and fever.   Respiratory: Negative.    Cardiovascular: Negative.    Gastrointestinal: Positive for diarrhea (mild, occasional) and nausea. Negative for abdominal pain, blood in stool, constipation and vomiting.   Genitourinary: Positive for pelvic pain. Negative for urinary incontinence, decreased urine volume, difficulty urinating, dyspareunia, dysuria, flank pain, frequency, genital sores, hematuria, menstrual problem, urgency, vaginal bleeding, vaginal discharge and vaginal pain.   Musculoskeletal: Positive for back pain (low).   Neurological: Negative for dizziness, syncope and light-headedness.       Objective    Vitals:    08/13/18 1414   BP: 110/70   Pulse: 75   Resp: 18   Temp: 98.3 °F (36.8 °C)     1    08/13/18  1414   Weight: 112 kg (247 lb 12.8 oz)     Body mass index is 38.81 kg/m².    Physical Exam   Constitutional: She is oriented to person, place, and time. She appears  well-developed and well-nourished.   Cardiovascular: Normal rate, regular rhythm and normal heart sounds.    Pulmonary/Chest: Effort normal and breath sounds normal.   Abdominal: Soft. Bowel sounds are normal. She exhibits no distension. There is tenderness in the right lower quadrant, suprapubic area and left lower quadrant. There is no rigidity, no guarding and no CVA tenderness.   Genitourinary: There is no rash, tenderness or lesion on the right labia. There is no rash, tenderness or lesion on the left labia. Uterus is tender. Cervix exhibits discharge (yellow). Cervix does not exhibit motion tenderness, friability, lesion, polyp or eversion. Right adnexum displays tenderness. Right adnexum displays no mass and no fullness. Left adnexum displays tenderness. Left adnexum displays no mass and no fullness. There is erythema (mild) and tenderness in the vagina. No bleeding in the vagina. No foreign body in the vagina. Vaginal discharge (copious yellow, milky discharge, wet prep obtained) found.   Genitourinary Comments: Wet prep: positive for WBC and trichomonads TNTC, no yeast buds, hyphae or clue cells. Evaluated by AMY Gutierrez   Neurological: She is alert and oriented to person, place, and time.   Psychiatric: Her behavior is normal. She exhibits a depressed mood.   Vitals reviewed.    Brief Urine Lab Results  (Last result in the past 365 days)      Color   Clarity   Blood   Leuk Est   Nitrite   Protein   CREAT   Urine HCG        08/13/18 1429 Yellow Cloudy(A) Negative Small (1+)(A) Negative Trace(A)              RBC, UA None Seen /HPF 0-2   3-5   0-2   3-5R      WBC, UA None Seen /HPF 13-20   6-12   13-20   6-12R      Bacteria, UA None Seen /HPF 1+   1+   3+   1+R      Squamous Epithelial Cells, UA None Seen, 0-2 /HPF 7-12   13-20   21-30       Hyaline Casts, UA None Seen /LPF None Seen  None Seen  None Seen      Trichomonas, UA None Seen /HPF Small/1+         Methodology  Manual Light Microscopy   Manual Light Microscopy  Manual Light Microscopy         Assessment/Plan   Cherie was seen today for pelvic pain and back pain.    Diagnoses and all orders for this visit:    Pelvic pain  -     Urinalysis With Culture If Indicated - Urine, Clean Catch  -     Urinalysis, Microscopic Only - Urine, Clean Catch; Future  -     Urinalysis, Microscopic Only - Urine, Clean Catch  -     Urine Culture - Urine,; Future  -     Urine Culture - Urine,  -     Chlamydia trachomatis, Neisseria gonorrhoeae, Trichomonas vaginalis, PCR - Swab, Urine, Clean Catch    Trichomoniasis  -     metroNIDAZOLE (FLAGYL) 500 MG tablet; Take 1 tablet by mouth 2 (Two) Times a Day for 7 days. No alcohol up to 48 hours after last dose    Routine screening for STI (sexually transmitted infection)  -     Chlamydia trachomatis, Neisseria gonorrhoeae, Trichomonas vaginalis, PCR - Swab, Urine, Clean Catch      UA and wet prep positive for trichomoniasis. I discussed results with pt and partner that is present at visit, Therese Lebron. Treat with flagyl 500mg BID x 7 days. Instructed no alcohol up to 48 hours after last dose. Complete therapy must be taken to clear infection.  Therese agrees to treatment today too. I sent both scripts to [facility]. I recommend G/C testing off urine despite pt declining at beginning of visit. She voices agreement. I will call with G/C results and Rx PRN. Encouraged safe sex practices. If sexually active with males at any time, must use contraception.     If pain persists after treatment RTC. If high fever or worsening pain, proceed to ER.

## 2018-08-14 LAB
C TRACH RRNA CVX QL NAA+PROBE: NEGATIVE
N GONORRHOEA RRNA SPEC QL NAA+PROBE: NEGATIVE
T VAGINALIS DNA VAG QL PROBE+SIG AMP: POSITIVE

## 2018-08-15 LAB — BACTERIA SPEC AEROBE CULT: NORMAL

## 2018-08-22 ENCOUNTER — HOSPITAL ENCOUNTER (EMERGENCY)
Facility: HOSPITAL | Age: 28
Discharge: HOME | End: 2018-08-22
Admitting: EMERGENCY MEDICINE

## 2018-08-22 VITALS
RESPIRATION RATE: 18 BRPM | SYSTOLIC BLOOD PRESSURE: 146 MMHG | WEIGHT: 242 LBS | OXYGEN SATURATION: 97 % | BODY MASS INDEX: 37.98 KG/M2 | HEART RATE: 97 BPM | HEIGHT: 67 IN | DIASTOLIC BLOOD PRESSURE: 90 MMHG | TEMPERATURE: 98.6 F

## 2018-08-22 DIAGNOSIS — L73.2 HIDRADENITIS AXILLARIS: Primary | ICD-10-CM

## 2018-08-22 PROCEDURE — 99283 EMERGENCY DEPT VISIT LOW MDM: CPT

## 2018-08-22 RX ORDER — CLINDAMYCIN HYDROCHLORIDE 300 MG/1
300 CAPSULE ORAL 4 TIMES DAILY
Qty: 40 CAPSULE | Refills: 0 | Status: SHIPPED | OUTPATIENT
Start: 2018-08-22 | End: 2018-09-01

## 2018-08-22 RX ORDER — TRAMADOL HYDROCHLORIDE 50 MG/1
50 TABLET ORAL EVERY 6 HOURS PRN
Qty: 15 TABLET | Refills: 0 | OUTPATIENT
Start: 2018-08-22 | End: 2018-12-16

## 2018-08-24 ENCOUNTER — TELEPHONE (OUTPATIENT)
Dept: OBSTETRICS AND GYNECOLOGY | Facility: CLINIC | Age: 28
End: 2018-08-24

## 2018-08-24 NOTE — TELEPHONE ENCOUNTER
Patient was not reachable by phone so I mailed a letter asking her to call our office. She called 08/24/2018 and informed of STI results.

## 2018-08-27 ENCOUNTER — APPOINTMENT (OUTPATIENT)
Dept: GENERAL RADIOLOGY | Facility: HOSPITAL | Age: 28
End: 2018-08-27

## 2018-08-27 ENCOUNTER — HOSPITAL ENCOUNTER (EMERGENCY)
Facility: HOSPITAL | Age: 28
Discharge: HOME | End: 2018-08-27
Attending: EMERGENCY MEDICINE | Admitting: EMERGENCY MEDICINE

## 2018-08-27 VITALS
BODY MASS INDEX: 37.67 KG/M2 | RESPIRATION RATE: 18 BRPM | WEIGHT: 240 LBS | TEMPERATURE: 97.1 F | OXYGEN SATURATION: 94 % | SYSTOLIC BLOOD PRESSURE: 117 MMHG | HEART RATE: 71 BPM | DIASTOLIC BLOOD PRESSURE: 69 MMHG | HEIGHT: 67 IN

## 2018-08-27 DIAGNOSIS — J40 BRONCHITIS: Primary | ICD-10-CM

## 2018-08-27 LAB
ALBUMIN SERPL-MCNC: 4.2 G/DL (ref 3.5–5)
ALBUMIN/GLOB SERPL: 1.3 G/DL (ref 1.1–2.5)
ALP SERPL-CCNC: 50 U/L (ref 24–120)
ALT SERPL W P-5'-P-CCNC: 37 U/L (ref 0–54)
AMPHET+METHAMPHET UR QL: NEGATIVE
ANION GAP SERPL CALCULATED.3IONS-SCNC: 7 MMOL/L (ref 4–13)
AST SERPL-CCNC: 43 U/L (ref 7–45)
B-HCG UR QL: NEGATIVE
BACTERIA UR QL AUTO: ABNORMAL /HPF
BARBITURATES UR QL SCN: NEGATIVE
BASOPHILS # BLD MANUAL: 0.12 10*3/MM3 (ref 0–0.2)
BASOPHILS NFR BLD AUTO: 1 % (ref 0–2)
BENZODIAZ UR QL SCN: NEGATIVE
BILIRUB SERPL-MCNC: 0.4 MG/DL (ref 0.1–1)
BILIRUB UR QL STRIP: NEGATIVE
BUN BLD-MCNC: 11 MG/DL (ref 5–21)
BUN/CREAT SERPL: 14.1 (ref 7–25)
CALCIUM SPEC-SCNC: 8.8 MG/DL (ref 8.4–10.4)
CANNABINOIDS SERPL QL: NEGATIVE
CHLORIDE SERPL-SCNC: 106 MMOL/L (ref 98–110)
CK SERPL-CCNC: 235 U/L (ref 0–203)
CLARITY UR: ABNORMAL
CO2 SERPL-SCNC: 29 MMOL/L (ref 24–31)
COCAINE UR QL: NEGATIVE
COLOR UR: ABNORMAL
CREAT BLD-MCNC: 0.78 MG/DL (ref 0.5–1.4)
DEPRECATED RDW RBC AUTO: 42.6 FL (ref 40–54)
ERYTHROCYTE [DISTWIDTH] IN BLOOD BY AUTOMATED COUNT: 13.7 % (ref 12–15)
GFR SERPL CREATININE-BSD FRML MDRD: 107 ML/MIN/1.73
GIANT PLATELETS: ABNORMAL
GLOBULIN UR ELPH-MCNC: 3.2 GM/DL
GLUCOSE BLD-MCNC: 104 MG/DL (ref 70–100)
GLUCOSE UR STRIP-MCNC: NEGATIVE MG/DL
HCT VFR BLD AUTO: 38.3 % (ref 37–47)
HGB BLD-MCNC: 14 G/DL (ref 12–16)
HGB UR QL STRIP.AUTO: NEGATIVE
HYALINE CASTS UR QL AUTO: ABNORMAL /LPF
KETONES UR QL STRIP: ABNORMAL
LEUKOCYTE ESTERASE UR QL STRIP.AUTO: NEGATIVE
LIPASE SERPL-CCNC: 101 U/L (ref 23–203)
LYMPHOCYTES # BLD MANUAL: 3.57 10*3/MM3 (ref 0.72–4.86)
LYMPHOCYTES NFR BLD MANUAL: 30.3 % (ref 15–45)
LYMPHOCYTES NFR BLD MANUAL: 6.1 % (ref 4–12)
MCH RBC QN AUTO: 31.3 PG (ref 28–32)
MCHC RBC AUTO-ENTMCNC: 36.6 G/DL (ref 33–36)
MCV RBC AUTO: 85.5 FL (ref 82–98)
METHADONE UR QL SCN: NEGATIVE
MONOCYTES # BLD AUTO: 0.72 10*3/MM3 (ref 0.19–1.3)
MUCOUS THREADS URNS QL MICRO: ABNORMAL /HPF
NEUTROPHILS # BLD AUTO: 6.3 10*3/MM3 (ref 1.87–8.4)
NEUTROPHILS NFR BLD MANUAL: 53.5 % (ref 39–78)
NITRITE UR QL STRIP: NEGATIVE
NT-PROBNP SERPL-MCNC: 42.5 PG/ML (ref 0–450)
OPIATES UR QL: NEGATIVE
PCP UR QL SCN: NEGATIVE
PH UR STRIP.AUTO: 5.5 [PH] (ref 5–8)
PLATELET # BLD AUTO: 323 10*3/MM3 (ref 130–400)
PMV BLD AUTO: 9.8 FL (ref 6–12)
POTASSIUM BLD-SCNC: 4.1 MMOL/L (ref 3.5–5.3)
PROT SERPL-MCNC: 7.4 G/DL (ref 6.3–8.7)
PROT UR QL STRIP: ABNORMAL
RBC # BLD AUTO: 4.48 10*6/MM3 (ref 4.2–5.4)
RBC # UR: ABNORMAL /HPF
REF LAB TEST METHOD: ABNORMAL
SMUDGE CELLS BLD QL SMEAR: ABNORMAL
SODIUM BLD-SCNC: 142 MMOL/L (ref 135–145)
SP GR UR STRIP: >1.03 (ref 1–1.03)
SQUAMOUS #/AREA URNS HPF: ABNORMAL /HPF
TARGETS BLD QL SMEAR: ABNORMAL
TROPONIN I SERPL-MCNC: 0.04 NG/ML (ref 0–0.03)
TROPONIN I SERPL-MCNC: <0.012 NG/ML (ref 0–0.03)
UROBILINOGEN UR QL STRIP: ABNORMAL
VARIANT LYMPHS NFR BLD MANUAL: 9.1 % (ref 0–5)
WBC NRBC COR # BLD: 11.78 10*3/MM3 (ref 4.8–10.8)
WBC UR QL AUTO: ABNORMAL /HPF

## 2018-08-27 PROCEDURE — 80307 DRUG TEST PRSMV CHEM ANLYZR: CPT | Performed by: EMERGENCY MEDICINE

## 2018-08-27 PROCEDURE — 94640 AIRWAY INHALATION TREATMENT: CPT

## 2018-08-27 PROCEDURE — 85007 BL SMEAR W/DIFF WBC COUNT: CPT | Performed by: EMERGENCY MEDICINE

## 2018-08-27 PROCEDURE — 71046 X-RAY EXAM CHEST 2 VIEWS: CPT

## 2018-08-27 PROCEDURE — 25010000002 ONDANSETRON PER 1 MG: Performed by: EMERGENCY MEDICINE

## 2018-08-27 PROCEDURE — 82550 ASSAY OF CK (CPK): CPT | Performed by: EMERGENCY MEDICINE

## 2018-08-27 PROCEDURE — 96374 THER/PROPH/DIAG INJ IV PUSH: CPT

## 2018-08-27 PROCEDURE — 81001 URINALYSIS AUTO W/SCOPE: CPT | Performed by: EMERGENCY MEDICINE

## 2018-08-27 PROCEDURE — 96375 TX/PRO/DX INJ NEW DRUG ADDON: CPT

## 2018-08-27 PROCEDURE — 84484 ASSAY OF TROPONIN QUANT: CPT | Performed by: EMERGENCY MEDICINE

## 2018-08-27 PROCEDURE — 80053 COMPREHEN METABOLIC PANEL: CPT | Performed by: EMERGENCY MEDICINE

## 2018-08-27 PROCEDURE — 99284 EMERGENCY DEPT VISIT MOD MDM: CPT

## 2018-08-27 PROCEDURE — 25010000002 METHYLPREDNISOLONE PER 125 MG: Performed by: EMERGENCY MEDICINE

## 2018-08-27 PROCEDURE — 83690 ASSAY OF LIPASE: CPT | Performed by: EMERGENCY MEDICINE

## 2018-08-27 PROCEDURE — 93010 ELECTROCARDIOGRAM REPORT: CPT | Performed by: INTERNAL MEDICINE

## 2018-08-27 PROCEDURE — 93005 ELECTROCARDIOGRAM TRACING: CPT | Performed by: EMERGENCY MEDICINE

## 2018-08-27 PROCEDURE — 81025 URINE PREGNANCY TEST: CPT | Performed by: EMERGENCY MEDICINE

## 2018-08-27 PROCEDURE — 85025 COMPLETE CBC W/AUTO DIFF WBC: CPT | Performed by: EMERGENCY MEDICINE

## 2018-08-27 PROCEDURE — 83880 ASSAY OF NATRIURETIC PEPTIDE: CPT | Performed by: EMERGENCY MEDICINE

## 2018-08-27 RX ORDER — OXYCODONE HYDROCHLORIDE AND ACETAMINOPHEN 5; 325 MG/1; MG/1
1 TABLET ORAL ONCE
Status: COMPLETED | OUTPATIENT
Start: 2018-08-27 | End: 2018-08-27

## 2018-08-27 RX ORDER — ALBUTEROL SULFATE 90 UG/1
2 AEROSOL, METERED RESPIRATORY (INHALATION) EVERY 4 HOURS PRN
Qty: 1 INHALER | Refills: 0 | Status: SHIPPED | OUTPATIENT
Start: 2018-08-27 | End: 2019-08-10

## 2018-08-27 RX ORDER — CYCLOBENZAPRINE HCL 10 MG
10 TABLET ORAL 2 TIMES DAILY PRN
Qty: 15 TABLET | Refills: 0 | OUTPATIENT
Start: 2018-08-27 | End: 2018-12-16

## 2018-08-27 RX ORDER — BENZONATATE 100 MG/1
100 CAPSULE ORAL 3 TIMES DAILY PRN
Qty: 15 CAPSULE | Refills: 0 | OUTPATIENT
Start: 2018-08-27 | End: 2018-12-16

## 2018-08-27 RX ORDER — ALBUTEROL SULFATE 2.5 MG/3ML
2.5 SOLUTION RESPIRATORY (INHALATION) ONCE
Status: COMPLETED | OUTPATIENT
Start: 2018-08-27 | End: 2018-08-27

## 2018-08-27 RX ORDER — METHYLPREDNISOLONE SODIUM SUCCINATE 125 MG/2ML
125 INJECTION, POWDER, LYOPHILIZED, FOR SOLUTION INTRAMUSCULAR; INTRAVENOUS ONCE
Status: COMPLETED | OUTPATIENT
Start: 2018-08-27 | End: 2018-08-27

## 2018-08-27 RX ORDER — PREDNISONE 50 MG/1
50 TABLET ORAL DAILY
Qty: 5 TABLET | Refills: 0 | OUTPATIENT
Start: 2018-08-27 | End: 2018-12-16

## 2018-08-27 RX ORDER — BENZONATATE 100 MG/1
200 CAPSULE ORAL 3 TIMES DAILY PRN
Status: DISCONTINUED | OUTPATIENT
Start: 2018-08-27 | End: 2018-08-27 | Stop reason: HOSPADM

## 2018-08-27 RX ORDER — ONDANSETRON HCL IN 0.9 % NACL 8 MG/50 ML
8 INTRAVENOUS SOLUTION, PIGGYBACK (ML) INTRAVENOUS ONCE
Status: COMPLETED | OUTPATIENT
Start: 2018-08-27 | End: 2018-08-27

## 2018-08-27 RX ORDER — ALBUTEROL SULFATE 90 UG/1
2 AEROSOL, METERED RESPIRATORY (INHALATION) ONCE
Status: DISCONTINUED | OUTPATIENT
Start: 2018-08-27 | End: 2018-08-27 | Stop reason: CLARIF

## 2018-08-27 RX ADMIN — ALBUTEROL SULFATE 2.5 MG: 2.5 SOLUTION RESPIRATORY (INHALATION) at 05:57

## 2018-08-27 RX ADMIN — BENZONATATE 200 MG: 100 CAPSULE, LIQUID FILLED ORAL at 05:35

## 2018-08-27 RX ADMIN — OXYCODONE HYDROCHLORIDE AND ACETAMINOPHEN 1 TABLET: 5; 325 TABLET ORAL at 04:58

## 2018-08-27 RX ADMIN — ONDANSETRON 8 MG: 2 INJECTION INTRAMUSCULAR; INTRAVENOUS at 04:00

## 2018-08-27 RX ADMIN — METHYLPREDNISOLONE SODIUM SUCCINATE 125 MG: 125 INJECTION, POWDER, FOR SOLUTION INTRAMUSCULAR; INTRAVENOUS at 05:34

## 2018-08-27 RX ADMIN — SODIUM CHLORIDE 1000 ML: 9 INJECTION, SOLUTION INTRAVENOUS at 03:58

## 2018-09-04 ENCOUNTER — APPOINTMENT (OUTPATIENT)
Dept: GENERAL RADIOLOGY | Facility: HOSPITAL | Age: 28
End: 2018-09-04

## 2018-09-04 ENCOUNTER — HOSPITAL ENCOUNTER (EMERGENCY)
Age: 28
Discharge: HOME | End: 2018-09-04
Payer: COMMERCIAL

## 2018-09-04 ENCOUNTER — APPOINTMENT (OUTPATIENT)
Dept: GENERAL RADIOLOGY | Age: 28
End: 2018-09-04
Payer: COMMERCIAL

## 2018-09-04 ENCOUNTER — HOSPITAL ENCOUNTER (EMERGENCY)
Facility: HOSPITAL | Age: 28
Discharge: HOME | End: 2018-09-04
Admitting: EMERGENCY MEDICINE

## 2018-09-04 VITALS
DIASTOLIC BLOOD PRESSURE: 92 MMHG | RESPIRATION RATE: 16 BRPM | BODY MASS INDEX: 37.67 KG/M2 | HEART RATE: 99 BPM | TEMPERATURE: 96.7 F | HEIGHT: 67 IN | SYSTOLIC BLOOD PRESSURE: 147 MMHG | OXYGEN SATURATION: 96 % | WEIGHT: 240 LBS

## 2018-09-04 VITALS
WEIGHT: 240 LBS | TEMPERATURE: 98.5 F | HEIGHT: 67 IN | SYSTOLIC BLOOD PRESSURE: 150 MMHG | DIASTOLIC BLOOD PRESSURE: 94 MMHG | RESPIRATION RATE: 16 BRPM | HEART RATE: 82 BPM | OXYGEN SATURATION: 98 % | BODY MASS INDEX: 37.67 KG/M2

## 2018-09-04 DIAGNOSIS — S93.401A SPRAIN OF RIGHT ANKLE, UNSPECIFIED LIGAMENT, INITIAL ENCOUNTER: Primary | ICD-10-CM

## 2018-09-04 DIAGNOSIS — S99.921A INJURY OF RIGHT FOOT, INITIAL ENCOUNTER: ICD-10-CM

## 2018-09-04 DIAGNOSIS — L73.2 HIDRADENITIS AXILLARIS: ICD-10-CM

## 2018-09-04 PROCEDURE — 6360000002 HC RX W HCPCS: Performed by: PHYSICIAN ASSISTANT

## 2018-09-04 PROCEDURE — 73610 X-RAY EXAM OF ANKLE: CPT

## 2018-09-04 PROCEDURE — 96372 THER/PROPH/DIAG INJ SC/IM: CPT

## 2018-09-04 PROCEDURE — 99283 EMERGENCY DEPT VISIT LOW MDM: CPT

## 2018-09-04 PROCEDURE — 73630 X-RAY EXAM OF FOOT: CPT

## 2018-09-04 PROCEDURE — 99283 EMERGENCY DEPT VISIT LOW MDM: CPT | Performed by: PHYSICIAN ASSISTANT

## 2018-09-04 RX ORDER — KETOROLAC TROMETHAMINE 30 MG/ML
30 INJECTION, SOLUTION INTRAMUSCULAR; INTRAVENOUS ONCE
Status: COMPLETED | OUTPATIENT
Start: 2018-09-04 | End: 2018-09-04

## 2018-09-04 RX ORDER — ONDANSETRON 4 MG/1
4 TABLET, ORALLY DISINTEGRATING ORAL ONCE
Status: COMPLETED | OUTPATIENT
Start: 2018-09-04 | End: 2018-09-04

## 2018-09-04 RX ORDER — ONDANSETRON 4 MG/1
TABLET, ORALLY DISINTEGRATING ORAL
Status: DISCONTINUED
Start: 2018-09-04 | End: 2018-09-04 | Stop reason: HOSPADM

## 2018-09-04 RX ORDER — ACETAMINOPHEN AND CODEINE PHOSPHATE 300; 30 MG/1; MG/1
1 TABLET ORAL ONCE
Status: COMPLETED | OUTPATIENT
Start: 2018-09-04 | End: 2018-09-04

## 2018-09-04 RX ORDER — ACETAMINOPHEN AND CODEINE PHOSPHATE 300; 30 MG/1; MG/1
1 TABLET ORAL EVERY 4 HOURS PRN
Qty: 12 TABLET | Refills: 0 | OUTPATIENT
Start: 2018-09-04 | End: 2018-12-16

## 2018-09-04 RX ORDER — ONDANSETRON 4 MG/1
4 TABLET, ORALLY DISINTEGRATING ORAL ONCE
Status: DISCONTINUED | OUTPATIENT
Start: 2018-09-04 | End: 2018-09-04 | Stop reason: HOSPADM

## 2018-09-04 RX ADMIN — ONDANSETRON 4 MG: 4 TABLET, ORALLY DISINTEGRATING ORAL at 18:43

## 2018-09-04 RX ADMIN — KETOROLAC TROMETHAMINE 30 MG: 30 INJECTION, SOLUTION INTRAMUSCULAR at 15:32

## 2018-09-04 RX ADMIN — ACETAMINOPHEN AND CODEINE PHOSPHATE 1 TABLET: 300; 30 TABLET ORAL at 18:43

## 2018-09-04 ASSESSMENT — ENCOUNTER SYMPTOMS
WHEEZING: 0
ABDOMINAL PAIN: 0
ABDOMINAL DISTENTION: 0
CONSTIPATION: 0
SHORTNESS OF BREATH: 0
APNEA: 0
VOMITING: 0
EYE PAIN: 0
NAUSEA: 0
COUGH: 0
SINUS PRESSURE: 0
RHINORRHEA: 0
SORE THROAT: 0
CHEST TIGHTNESS: 0
DIARRHEA: 0

## 2018-09-04 ASSESSMENT — PAIN SCALES - GENERAL
PAINLEVEL_OUTOF10: 10
PAINLEVEL_OUTOF10: 10

## 2018-09-04 NOTE — ED PROVIDER NOTES
eMERGENCY dEPARTMENT eNCOUnter      Pt Name: Perry Stewart  MRN: [id number]  [place] 1990  Date of evaluation: 9/4/2018  Provider: Dayo Draper [facility]       Chief Complaint   Patient presents with    Abscess     under right axilla    Ankle Pain     right ankle after missing a step         HISTORY OF PRESENT ILLNESS  (Location/Symptom, Timing/Onset, Context/Setting, Quality, Duration, Modifying Factors, Severity.)   Perry Stewart is a 29 y.o. female who presents to the emergency department with swelling in her right axilla. Patient was recently treated with antibiotics for an abscess to her right axilla. States she continues to have swelling. Denies any fever at home. She also twisted her right ankle after missing a step. She did not hit her head or lose consciousness at any point. She has not taken anything for pain. Nursing Notes were reviewed and I agree. REVIEW OF SYSTEMS    (2-9 systems for level 4, 10 or more for level 5)     Review of Systems   Constitutional: Negative for activity change, chills, fatigue and fever. HENT: Negative for ear pain, hearing loss, postnasal drip, rhinorrhea, sinus pressure and sore throat. Eyes: Negative for pain and visual disturbance. Respiratory: Negative for apnea, cough, chest tightness, shortness of breath and wheezing. Cardiovascular: Negative for chest pain. Gastrointestinal: Negative for abdominal distention, abdominal pain, constipation, diarrhea, nausea and vomiting. Genitourinary: Negative for difficulty urinating, dysuria, flank pain and hematuria. Musculoskeletal: Positive for arthralgias. Negative for joint swelling. Skin: Positive for wound. Negative for rash. Neurological: Negative for dizziness, syncope, light-headedness and headaches. Psychiatric/Behavioral: Negative for agitation and confusion. Except as noted above the remainder of the review of systems was reviewed and negative. PAST MEDICAL HISTORY     Past Medical History:   Diagnosis Date    Ovarian cyst          SURGICAL HISTORY     History reviewed. No pertinent surgical history. CURRENT MEDICATIONS       There are no discharge medications for this patient. ALLERGIES     Tylenol [acetaminophen]    FAMILY HISTORY       Family History   Problem Relation Age of Onset    Diabetes Mother     High Blood Pressure Mother     Diabetes Maternal Grandmother           SOCIAL HISTORY       Social History     Social History    Marital status: Single     Spouse name: N/A    Number of children: N/A    Years of education: N/A     Social History Main Topics    Smoking status: Current Every Day Smoker     Packs/day: 0.50     Years: 10.00     Types: Cigarettes    Smokeless tobacco: Never Used    Alcohol use No    Drug use: No    Sexual activity: Yes     Partners: Male     Other Topics Concern    None     Social History Narrative    None       SCREENINGS           PHYSICAL EXAM    (up to 7 for level 4, 8 or more for level 5)     ED Triage Vitals [09/04/18 1429]   BP Temp Temp Source Pulse Resp SpO2 Height Weight   (!) 147/92 96.7 °F (35.9 °C) Temporal 99 16 96 % 5' 7\" (1.702 m) 240 lb (108.9 kg)       Physical Exam   Constitutional: She is oriented to person, place, and time. She appears well-developed and well-nourished. No distress. HENT:   Head: Normocephalic and atraumatic. Cardiovascular: Normal rate, regular rhythm and normal heart sounds. Exam reveals no gallop and no friction rub. No murmur heard. Pulmonary/Chest: Effort normal and breath sounds normal. No respiratory distress. She has no wheezes. She has no rales. Abdominal: Soft. Bowel sounds are normal. She exhibits no distension. There is no tenderness. There is no rebound and no guarding. Musculoskeletal:        Feet:    Neurological: She is alert and oriented to person, place, and time. Skin: Skin is warm and dry. No rash noted.  She is not diaphoretic. Psychiatric: She has a normal mood and affect. Nursing note and vitals reviewed. DIAGNOSTIC RESULTS     RADIOLOGY:   Non-plain film images such as CT, Ultrasound and MRI are read by the radiologist.   Interpretation per the Radiologist below, if available at the time of this note:    XR ANKLE RIGHT (MIN 3 VIEWS)   Final Result   Negative right ankle. Signed by Dr Patrizia Isidro on 9/4/2018 3:09 PM          LABS:  Labs Reviewed - No data to display    All other labs were within normal range or not returned as of this dictation. EMERGENCY DEPARTMENT COURSE and DIFFERENTIAL DIAGNOSIS/MDM:   Vitals:    Vitals:    09/04/18 1429   BP: (!) 147/92   Pulse: 99   Resp: 16   Temp: 96.7 °F (35.9 °C)   TempSrc: Temporal   SpO2: 96%   Weight: 240 lb (108.9 kg)   Height: 5' 7\" (1.702 m)           MDM    PROCEDURES:    Procedures      FINAL IMPRESSION      1. Sprain of right ankle, unspecified ligament, initial encounter    2. Hidradenitis axillaris          DISPOSITION/PLAN   DISPOSITION Decision To Discharge 09/04/2018 03:58:05 PM      Patient was told that if symptoms worsen or new symptoms develop they are to return to the emergency department immediately. Patient was educated on diagnosis and treatment plan. All of patient's questions were answered, and the patient understands the discharge plan. I do not feel the patient has a life-threatening condition at this time. Patient is to be discharged. PATIENT REFERRED TO:  [address] [place] [id number] [phone]  Schedule an appointment as soon as possible for a visit       Ivania Ramirez MD  [address] [place] [address]  [phone]    Schedule an appointment as soon as possible for a visit         DISCHARGE MEDICATIONS:  There are no discharge medications for this patient.       (Please note that portions of this note were completed with a voice recognition program.  Efforts were made to edit the dictations but occasionally words are mis-transcribed.)    MARC [place]  09/04/18 4415

## 2018-09-04 NOTE — ED PROVIDER NOTES
"Subjective     Fall     Patient is a 28-year-old female with chief complaint of right ankle and foot pain status post fall.  She describes that she had fallen off a porch about 2 feet high yesterday.  She is confident that she inverted her right ankle and felt immediate pain and swelling.  She denies any other injury.  She went to [facility] ER today and completed an ankle x-ray.  She was placed in an Ace wrap and provided crutches.  She was given a shot of Toradol and prescription for tramadol.  Patient reports a history of allergic reaction to ibuprofen which  \"causes me to swell.\"  She describes feeling lightheaded and nauseated after she received tramadol so she wanted to come to this ER for second opinion and different pain medication.  She also reports she is allergic to tylenol which \"makes me nauseated.\"  She admits it is not a true allergy but this often occurs when she does not eat prior to taking the medication.    Review of Systems   Constitutional: Positive for activity change.   HENT: Negative.    Eyes: Negative.    Respiratory: Negative.    Cardiovascular: Negative.    Gastrointestinal: Negative.    Genitourinary: Negative.    Musculoskeletal: Negative.    Skin: Negative.    Neurological: Negative.    Psychiatric/Behavioral: Negative.    All other systems reviewed and are negative.      Past Medical History:   Diagnosis Date   • Abscess of axilla    • Depression    • Hypertension    • Ovarian cyst        Allergies   Allergen Reactions   • Ibuprofen Swelling       No past surgical history on file.    No family history on file.    Social History     Social History   • Marital status: Single     Social History Main Topics   • Smoking status: Current Every Day Smoker     Packs/day: 0.50     Years: 12.00   • Smokeless tobacco: Never Used   • Alcohol use No   • Drug use: No   • Sexual activity: Defer     Other Topics Concern   • Not on file     Social History Narrative    single       Prior to Admission " "medications    Medication Sig Start Date End Date Taking? Authorizing Provider   albuterol (PROVENTIL HFA;VENTOLIN HFA) 108 (90 Base) MCG/ACT inhaler Inhale 2 puffs Every 4 (Four) Hours As Needed for Wheezing. 8/27/18   Trip Key MD   benzonatate (TESSALON) 100 MG capsule Take 1 capsule by mouth 3 (Three) Times a Day As Needed for Cough. 8/27/18   Trip Key MD   cyclobenzaprine (FLEXERIL) 10 MG tablet Take 1 tablet by mouth 2 (Two) Times a Day As Needed for Muscle Spasms. 8/27/18   Trip Key MD   predniSONE (DELTASONE) 50 MG tablet Take 1 tablet by mouth Daily. 8/27/18   Trip Key MD   traMADol (ULTRAM) 50 MG tablet Take 1 tablet by mouth Every 6 (Six) Hours As Needed for Moderate Pain . 8/22/18   Maria Teresa Mathews APRN       Medications   ondansetron ODT (ZOFRAN-ODT) disintegrating tablet 4 mg (4 mg Oral Given 9/4/18 1843)   acetaminophen-codeine (TYLENOL #3) 300-30 MG per tablet 1 tablet (1 tablet Oral Given 9/4/18 1843)       /94   Pulse 82   Temp 98.5 °F (36.9 °C)   Resp 16   Ht 170.2 cm (67\")   Wt 109 kg (240 lb)   LMP 08/17/2018 (Exact Date)   SpO2 98%   BMI 37.59 kg/m²       Objective   Physical Exam   Constitutional: She is oriented to person, place, and time. She appears well-developed and well-nourished.  Non-toxic appearance. No distress.   HENT:   Head: Normocephalic and atraumatic.   Right Ear: External ear normal.   Left Ear: External ear normal.   Nose: Nose normal.   Mouth/Throat: Uvula is midline, oropharynx is clear and moist and mucous membranes are normal.   Eyes: Pupils are equal, round, and reactive to light. Conjunctivae, EOM and lids are normal. Lids are everted and swept, no foreign bodies found.   Neck: Trachea normal, normal range of motion, full passive range of motion without pain and phonation normal. Neck supple. Normal carotid pulses and no JVD present. Carotid bruit is not present. No neck rigidity.   Cardiovascular: " Normal rate, regular rhythm, normal heart sounds, intact distal pulses and normal pulses.    Pulmonary/Chest: Effort normal and breath sounds normal. No stridor. No apnea and no tachypnea. No respiratory distress.   Abdominal: Normal appearance and normal aorta.   Musculoskeletal: She exhibits tenderness.        Right hip: Normal.        Left hip: Normal.        Right knee: Normal.        Left knee: Normal.        Right ankle: She exhibits decreased range of motion. She exhibits no swelling, no ecchymosis, no deformity, no laceration and normal pulse. Tenderness. Lateral malleolus, head of 5th metatarsal and proximal fibula tenderness found. Achilles tendon normal. Achilles tendon exhibits no pain, no defect and normal Marr's test results.        Left ankle: Normal.   Neurological: She is alert and oriented to person, place, and time. She has normal strength. No cranial nerve deficit or sensory deficit. Gait normal. GCS eye subscore is 4. GCS verbal subscore is 5. GCS motor subscore is 6.   Skin: Skin is warm, dry and intact. Capillary refill takes less than 2 seconds. She is not diaphoretic. No pallor.   Psychiatric: She has a normal mood and affect. Her speech is normal and behavior is normal.   Nursing note and vitals reviewed.      Procedures         Lab Results (last 24 hours)     ** No results found for the last 24 hours. **          Xr Chest 2 View    Result Date: 8/27/2018  Narrative: XR CHEST 2 VW- 8/27/2018 4:48 AM CDT  HISTORY: cp   COMPARISON: Exam dated 5/4/2013.  FINDINGS: Upright frontal and lateral radiographs of the chest were obtained.  The lungs are clear. The cardiomediastinal silhouette and pulmonary vascularity are within normal limits. The osseous structures and surrounding soft tissues demonstrate no acute abnormality.      Impression: 1. No radiographic evidence of acute cardiopulmonary process.   This report was finalized on 08/27/2018 07:01 by Dr Clya Paulson, .    Xr Ankle 3+ View  Right    Result Date: 9/4/2018  Narrative: History: 28-year-old with lateral pain after fall.  Reference: None.  FINDINGS: 3 views of the right ankle.  No fracture or dislocation of the right ankle. Tibiotalar alignment is normal. Talar dome is normal. No soft tissue injury is seen. Calcaneus appears intact.      Impression: Negative right ankle. This report was finalized on 09/04/2018 19:10 by Dr Joe Aguila, .    Xr Foot 3+ View Right    Result Date: 9/4/2018  Narrative: History: 28-year-old with lateral pain after fall.  Reference: None.  FINDINGS: 3 views of the right foot.  No fracture or dislocation of the right foot. Tarsometatarsal alignment is normal. No periosteal reaction. Base of the fifth metatarsal is intact. Hallux sesamoids are intact. No soft tissue injury.      Impression: No acute fracture/dislocation of the right foot.  This report was finalized on 09/04/2018 19:11 by Dr Joe Aguila, .      ED Course        Patient has been very impatient demanding while here in the ER.  I have repetitively informed her politely and professionally on plan of care with x-rays, educating her how to use the crutches, and possibly switching her to a Knee scooter for comfort.  She initially insisted that she is allergic to both tylenol and Ibuprofen and cannot tolerate tramadol prescribed from [facility].  I informed her we do not have any other medications we can give her for ankle pain control since she supposedly allergic to all 3 classes of medications.  The patient is willing to try tylenol with food.  That was given to her and she did not have an allergic reaction.  Therefore, I can prescribe her a Tylenol-based pain medication for ankle sprain.  She has been educated if her pain persists, to follow-up with the primary care provider and/or orthopedics surgeon for further evaluation.  Patient will be discharged in stable condition.  She does not present with a medical emergency.  MDM    Final diagnoses:   Sprain of  right ankle, unspecified ligament, initial encounter   Injury of right foot, initial encounter          Abdulkadir Spangler PA  09/04/18 1920

## 2018-09-04 NOTE — ED NOTES
Nausea after injection. Pt declined zofran. Pt refused to take discharge paperwork. Used crutches to leave department. No distress noted. Breathing unlabored.       Vipin French RN  09/04/18 4814

## 2018-09-04 NOTE — ED NOTES
Pt in rp cursing on phone/ pt walked to rp.  Pt carried her crutches.     Neha Christie RN  09/04/18 5261

## 2018-09-05 NOTE — DISCHARGE INSTRUCTIONS
Ankle Sprain  An ankle sprain is a stretch or tear in one of the tough tissues (ligaments) in your ankle.  Follow these instructions at home:  · Rest your ankle.  · Take over-the-counter and prescription medicines only as told by your doctor.  · For 2-3 days, keep your ankle higher than the level of your heart (elevated) as much as possible.  · If directed, put ice on the area:  ? Put ice in a plastic bag.  ? Place a towel between your skin and the bag.  ? Leave the ice on for 20 minutes, 2-3 times a day.  · If you were given a brace:  ? Wear it as told.  ? Take it off to shower or bathe.  ? Try not to move your ankle much, but wiggle your toes from time to time. This helps to prevent swelling.  · If you were given an elastic bandage (dressing):  ? Take it off when you shower or bathe.  ? Try not to move your ankle much, but wiggle your toes from time to time. This helps to prevent swelling.  ? Adjust the bandage to make it more comfortable if it feels too tight.  ? Loosen the bandage if you lose feeling in your foot, your foot tingles, or your foot gets cold and blue.  · If you have crutches, use them as told by your doctor. Continue to use them until you can walk without feeling pain in your ankle.  Contact a doctor if:  · Your bruises or swelling are quickly getting worse.  · Your pain does not get better after you take medicine.  Get help right away if:  · You cannot feel your toes or foot.  · Your toes or your foot looks blue.  · You have very bad pain that gets worse.  This information is not intended to replace advice given to you by your health care provider. Make sure you discuss any questions you have with your health care provider.  Document Released: 06/05/2009 Document Revised: 05/25/2017 Document Reviewed: 07/19/2016  Elsevier Interactive Patient Education © 2018 [employer]

## 2018-09-30 ENCOUNTER — APPOINTMENT (OUTPATIENT)
Dept: GENERAL RADIOLOGY | Facility: HOSPITAL | Age: 28
End: 2018-09-30

## 2018-09-30 ENCOUNTER — HOSPITAL ENCOUNTER (EMERGENCY)
Facility: HOSPITAL | Age: 28
Discharge: HOME | End: 2018-09-30
Admitting: EMERGENCY MEDICINE

## 2018-09-30 ENCOUNTER — APPOINTMENT (OUTPATIENT)
Dept: CT IMAGING | Facility: HOSPITAL | Age: 28
End: 2018-09-30

## 2018-09-30 VITALS
HEIGHT: 67 IN | RESPIRATION RATE: 16 BRPM | BODY MASS INDEX: 38.11 KG/M2 | HEART RATE: 67 BPM | OXYGEN SATURATION: 100 % | DIASTOLIC BLOOD PRESSURE: 94 MMHG | TEMPERATURE: 98.7 F | WEIGHT: 242.8 LBS | SYSTOLIC BLOOD PRESSURE: 149 MMHG

## 2018-09-30 DIAGNOSIS — M54.6 ACUTE THORACIC BACK PAIN, UNSPECIFIED BACK PAIN LATERALITY: ICD-10-CM

## 2018-09-30 DIAGNOSIS — S09.90XA INJURY OF HEAD, INITIAL ENCOUNTER: Primary | ICD-10-CM

## 2018-09-30 DIAGNOSIS — H60.01 ABSCESS OF RIGHT EARLOBE: ICD-10-CM

## 2018-09-30 LAB
B-HCG UR QL: NEGATIVE
INTERNAL NEGATIVE CONTROL: NEGATIVE
INTERNAL POSITIVE CONTROL: POSITIVE
Lab: NORMAL

## 2018-09-30 PROCEDURE — 81025 URINE PREGNANCY TEST: CPT | Performed by: NURSE PRACTITIONER

## 2018-09-30 PROCEDURE — 72125 CT NECK SPINE W/O DYE: CPT

## 2018-09-30 PROCEDURE — 99283 EMERGENCY DEPT VISIT LOW MDM: CPT

## 2018-09-30 PROCEDURE — 72072 X-RAY EXAM THORAC SPINE 3VWS: CPT

## 2018-09-30 PROCEDURE — 70450 CT HEAD/BRAIN W/O DYE: CPT

## 2018-09-30 RX ORDER — CIPROFLOXACIN 500 MG/1
500 TABLET, FILM COATED ORAL 2 TIMES DAILY
Qty: 14 TABLET | Refills: 0 | Status: SHIPPED | OUTPATIENT
Start: 2018-09-30 | End: 2018-10-07

## 2018-09-30 RX ORDER — KETOROLAC TROMETHAMINE 10 MG/1
10 TABLET, FILM COATED ORAL EVERY 6 HOURS PRN
Qty: 12 TABLET | Refills: 0 | Status: SHIPPED | OUTPATIENT
Start: 2018-09-30 | End: 2018-10-03

## 2018-10-20 ENCOUNTER — HOSPITAL ENCOUNTER (EMERGENCY)
Facility: HOSPITAL | Age: 28
Discharge: HOME | End: 2018-10-20
Admitting: EMERGENCY MEDICINE

## 2018-10-20 VITALS
DIASTOLIC BLOOD PRESSURE: 86 MMHG | HEIGHT: 67 IN | SYSTOLIC BLOOD PRESSURE: 139 MMHG | HEART RATE: 68 BPM | OXYGEN SATURATION: 98 % | BODY MASS INDEX: 38.67 KG/M2 | WEIGHT: 246.4 LBS | TEMPERATURE: 97.5 F | RESPIRATION RATE: 18 BRPM

## 2018-10-20 DIAGNOSIS — B34.9 VIRAL SYNDROME: Primary | ICD-10-CM

## 2018-10-20 LAB
ALBUMIN SERPL-MCNC: 4.4 G/DL (ref 3.5–5)
ALBUMIN/GLOB SERPL: 1.3 G/DL (ref 1.1–2.5)
ALP SERPL-CCNC: 57 U/L (ref 24–120)
ALT SERPL W P-5'-P-CCNC: 23 U/L (ref 0–54)
ANION GAP SERPL CALCULATED.3IONS-SCNC: 12 MMOL/L (ref 4–13)
AST SERPL-CCNC: 27 U/L (ref 7–45)
B-HCG UR QL: NEGATIVE
BASOPHILS # BLD AUTO: 0.01 10*3/MM3 (ref 0–0.2)
BASOPHILS NFR BLD AUTO: 0.2 % (ref 0–2)
BILIRUB SERPL-MCNC: 0.3 MG/DL (ref 0.1–1)
BILIRUB UR QL STRIP: NEGATIVE
BUN BLD-MCNC: 10 MG/DL (ref 5–21)
BUN/CREAT SERPL: 15.9 (ref 7–25)
CALCIUM SPEC-SCNC: 9.3 MG/DL (ref 8.4–10.4)
CHLORIDE SERPL-SCNC: 104 MMOL/L (ref 98–110)
CLARITY UR: CLEAR
CO2 SERPL-SCNC: 27 MMOL/L (ref 24–31)
COLOR UR: YELLOW
CREAT BLD-MCNC: 0.63 MG/DL (ref 0.5–1.4)
DEPRECATED RDW RBC AUTO: 41.7 FL (ref 40–54)
EOSINOPHIL # BLD AUTO: 0.15 10*3/MM3 (ref 0–0.7)
EOSINOPHIL NFR BLD AUTO: 2.4 % (ref 0–4)
ERYTHROCYTE [DISTWIDTH] IN BLOOD BY AUTOMATED COUNT: 13.6 % (ref 12–15)
GFR SERPL CREATININE-BSD FRML MDRD: 136 ML/MIN/1.73
GLOBULIN UR ELPH-MCNC: 3.5 GM/DL
GLUCOSE BLD-MCNC: 92 MG/DL (ref 70–100)
GLUCOSE UR STRIP-MCNC: NEGATIVE MG/DL
HCT VFR BLD AUTO: 41.4 % (ref 37–47)
HGB BLD-MCNC: 14.8 G/DL (ref 12–16)
HGB UR QL STRIP.AUTO: NEGATIVE
IMM GRANULOCYTES # BLD: 0.02 10*3/MM3 (ref 0–0.03)
IMM GRANULOCYTES NFR BLD: 0.3 % (ref 0–5)
INTERNAL NEGATIVE CONTROL: NEGATIVE
INTERNAL POSITIVE CONTROL: POSITIVE
KETONES UR QL STRIP: NEGATIVE
LEUKOCYTE ESTERASE UR QL STRIP.AUTO: NEGATIVE
LIPASE SERPL-CCNC: 103 U/L (ref 23–203)
LYMPHOCYTES # BLD AUTO: 1.2 10*3/MM3 (ref 0.72–4.86)
LYMPHOCYTES NFR BLD AUTO: 19.2 % (ref 15–45)
Lab: NORMAL
MCH RBC QN AUTO: 30.2 PG (ref 28–32)
MCHC RBC AUTO-ENTMCNC: 35.7 G/DL (ref 33–36)
MCV RBC AUTO: 84.5 FL (ref 82–98)
MONOCYTES # BLD AUTO: 0.41 10*3/MM3 (ref 0.19–1.3)
MONOCYTES NFR BLD AUTO: 6.6 % (ref 4–12)
NEUTROPHILS # BLD AUTO: 4.46 10*3/MM3 (ref 1.87–8.4)
NEUTROPHILS NFR BLD AUTO: 71.3 % (ref 39–78)
NITRITE UR QL STRIP: NEGATIVE
NRBC BLD MANUAL-RTO: 0 /100 WBC (ref 0–0)
PH UR STRIP.AUTO: 8 [PH] (ref 5–8)
PLATELET # BLD AUTO: 320 10*3/MM3 (ref 130–400)
PMV BLD AUTO: 9.9 FL (ref 6–12)
POTASSIUM BLD-SCNC: 4.2 MMOL/L (ref 3.5–5.3)
PROT SERPL-MCNC: 7.9 G/DL (ref 6.3–8.7)
PROT UR QL STRIP: NEGATIVE
RBC # BLD AUTO: 4.9 10*6/MM3 (ref 4.2–5.4)
SODIUM BLD-SCNC: 143 MMOL/L (ref 135–145)
SP GR UR STRIP: 1.02 (ref 1–1.03)
UROBILINOGEN UR QL STRIP: NORMAL
WBC NRBC COR # BLD: 6.25 10*3/MM3 (ref 4.8–10.8)

## 2018-10-20 PROCEDURE — 96374 THER/PROPH/DIAG INJ IV PUSH: CPT

## 2018-10-20 PROCEDURE — 25010000002 ONDANSETRON PER 1 MG: Performed by: NURSE PRACTITIONER

## 2018-10-20 PROCEDURE — 81025 URINE PREGNANCY TEST: CPT | Performed by: NURSE PRACTITIONER

## 2018-10-20 PROCEDURE — 36415 COLL VENOUS BLD VENIPUNCTURE: CPT | Performed by: NURSE PRACTITIONER

## 2018-10-20 PROCEDURE — 83690 ASSAY OF LIPASE: CPT | Performed by: NURSE PRACTITIONER

## 2018-10-20 PROCEDURE — 80053 COMPREHEN METABOLIC PANEL: CPT | Performed by: NURSE PRACTITIONER

## 2018-10-20 PROCEDURE — 99283 EMERGENCY DEPT VISIT LOW MDM: CPT

## 2018-10-20 PROCEDURE — 85025 COMPLETE CBC W/AUTO DIFF WBC: CPT | Performed by: NURSE PRACTITIONER

## 2018-10-20 PROCEDURE — 81003 URINALYSIS AUTO W/O SCOPE: CPT | Performed by: NURSE PRACTITIONER

## 2018-10-20 RX ORDER — ONDANSETRON 2 MG/ML
4 INJECTION INTRAMUSCULAR; INTRAVENOUS ONCE
Status: COMPLETED | OUTPATIENT
Start: 2018-10-20 | End: 2018-10-20

## 2018-10-20 RX ORDER — ONDANSETRON 4 MG/1
4 TABLET, ORALLY DISINTEGRATING ORAL EVERY 6 HOURS PRN
Qty: 12 TABLET | Refills: 0 | OUTPATIENT
Start: 2018-10-20 | End: 2018-12-16

## 2018-10-20 RX ADMIN — ONDANSETRON HYDROCHLORIDE 4 MG: 2 INJECTION, SOLUTION INTRAMUSCULAR; INTRAVENOUS at 11:12

## 2018-10-20 RX ADMIN — SODIUM CHLORIDE 500 ML: 9 INJECTION, SOLUTION INTRAVENOUS at 11:11

## 2018-10-20 NOTE — ED PROVIDER NOTES
Subjective     Illness   Severity:  Mild  Onset quality:  Gradual  Chronicity:  New  Context:  Patient reports vomiting with mild diarrhea and abdominal discomfort  Associated symptoms: diarrhea, nausea and vomiting    Associated symptoms: no abdominal pain, no congestion, no cough, no fever, no myalgias and no rash        Review of Systems   Constitutional: Negative for fever.   HENT: Negative for congestion.    Respiratory: Negative for cough.    Gastrointestinal: Positive for diarrhea, nausea and vomiting. Negative for abdominal pain.   Genitourinary: Negative for dysuria.   Musculoskeletal: Negative for arthralgias, back pain, gait problem, joint swelling, myalgias, neck pain and neck stiffness.   Skin: Negative for color change, pallor, rash and wound.       Past Medical History:   Diagnosis Date   • Abscess of axilla    • Depression    • Hypertension    • Ovarian cyst        Allergies   Allergen Reactions   • Tramadol Rash   • Tylenol [Acetaminophen] Rash       No past surgical history on file.    No family history on file.    Social History     Social History   • Marital status: Single     Social History Main Topics   • Smoking status: Current Every Day Smoker     Packs/day: 0.50     Years: 12.00   • Smokeless tobacco: Never Used   • Alcohol use No   • Drug use: No   • Sexual activity: Defer     Other Topics Concern   • Not on file     Social History Narrative    single           Objective   Physical Exam   Constitutional: She is oriented to person, place, and time. She appears well-developed and well-nourished.   HENT:   Head: Normocephalic.   Right Ear: External ear normal.   Left Ear: External ear normal.   Nose: Nose normal.   Eyes: Pupils are equal, round, and reactive to light. Conjunctivae are normal.   Neck: Normal range of motion. Neck supple.   Cardiovascular: Normal rate, regular rhythm, normal heart sounds and intact distal pulses.    Pulmonary/Chest: Effort normal and breath sounds normal.    Abdominal: Soft. Bowel sounds are normal. She exhibits no distension and no mass. There is no tenderness. There is no rebound and no guarding. No hernia.   Musculoskeletal: Normal range of motion.   Neurological: She is alert and oriented to person, place, and time.   Skin: Skin is warm and dry. Capillary refill takes less than 2 seconds.   Psychiatric: She has a normal mood and affect. Her behavior is normal. Judgment and thought content normal.   Nursing note and vitals reviewed.      Procedures           ED Course labs are all unremarkable. Patient reports improvement of sxs. She has had no vomiting while in the ER and tolerated PO challenge. We have seen numerous cases of GI viral syndrome. Explained patient will need to return with worsening sxs for further evaluation and f/u with pcp. Patient expressed understanding. Patient has been up walking around in the ER and her room in no distress. She is stable for discharge at this time.                   MDM  Number of Diagnoses or Management Options  Viral syndrome: new and requires workup     Amount and/or Complexity of Data Reviewed  Clinical lab tests: ordered and reviewed  Discuss the patient with other providers: yes    Risk of Complications, Morbidity, and/or Mortality  Presenting problems: moderate  Diagnostic procedures: moderate  Management options: moderate    Patient Progress  Patient progress: improved        Final diagnoses:   Viral syndrome            Renetta Valadez, APRN  10/20/18 9479

## 2018-12-05 ENCOUNTER — HOSPITAL ENCOUNTER (EMERGENCY)
Age: 28
Discharge: HOME | End: 2018-12-05
Payer: COMMERCIAL

## 2018-12-05 VITALS
WEIGHT: 230 LBS | BODY MASS INDEX: 36.1 KG/M2 | OXYGEN SATURATION: 98 % | SYSTOLIC BLOOD PRESSURE: 119 MMHG | TEMPERATURE: 98 F | DIASTOLIC BLOOD PRESSURE: 80 MMHG | HEART RATE: 71 BPM | RESPIRATION RATE: 16 BRPM | HEIGHT: 67 IN

## 2018-12-05 DIAGNOSIS — L02.419 AXILLARY ABSCESS: Primary | ICD-10-CM

## 2018-12-05 PROCEDURE — 96372 THER/PROPH/DIAG INJ SC/IM: CPT

## 2018-12-05 PROCEDURE — 6360000002 HC RX W HCPCS: Performed by: NURSE PRACTITIONER

## 2018-12-05 PROCEDURE — 10060 I&D ABSCESS SIMPLE/SINGLE: CPT

## 2018-12-05 PROCEDURE — 10061 I&D ABSCESS COMP/MULTIPLE: CPT | Performed by: NURSE PRACTITIONER

## 2018-12-05 PROCEDURE — 99283 EMERGENCY DEPT VISIT LOW MDM: CPT | Performed by: NURSE PRACTITIONER

## 2018-12-05 PROCEDURE — 99282 EMERGENCY DEPT VISIT SF MDM: CPT

## 2018-12-05 RX ORDER — SULFAMETHOXAZOLE AND TRIMETHOPRIM 800; 160 MG/1; MG/1
1 TABLET ORAL 2 TIMES DAILY
Qty: 20 TABLET | Refills: 0 | Status: SHIPPED | OUTPATIENT
Start: 2018-12-05 | End: 2018-12-15

## 2018-12-05 RX ORDER — PROMETHAZINE HYDROCHLORIDE 25 MG/ML
25 INJECTION, SOLUTION INTRAMUSCULAR; INTRAVENOUS ONCE
Status: COMPLETED | OUTPATIENT
Start: 2018-12-05 | End: 2018-12-05

## 2018-12-05 RX ORDER — HYDROMORPHONE HCL 110MG/55ML
1 PATIENT CONTROLLED ANALGESIA SYRINGE INTRAVENOUS ONCE
Status: COMPLETED | OUTPATIENT
Start: 2018-12-05 | End: 2018-12-05

## 2018-12-05 RX ORDER — LIDOCAINE HYDROCHLORIDE 10 MG/ML
5 INJECTION, SOLUTION EPIDURAL; INFILTRATION; INTRACAUDAL; PERINEURAL ONCE
Status: DISCONTINUED | OUTPATIENT
Start: 2018-12-05 | End: 2018-12-05 | Stop reason: HOSPADM

## 2018-12-05 RX ORDER — HYDROCODONE BITARTRATE AND ACETAMINOPHEN 5; 325 MG/1; MG/1
1 TABLET ORAL EVERY 6 HOURS PRN
Qty: 12 TABLET | Refills: 0 | Status: SHIPPED | OUTPATIENT
Start: 2018-12-05 | End: 2018-12-08

## 2018-12-05 RX ADMIN — HYDROMORPHONE HYDROCHLORIDE 1 MG: 2 INJECTION, SOLUTION INTRAMUSCULAR; INTRAVENOUS; SUBCUTANEOUS at 16:11

## 2018-12-05 RX ADMIN — PROMETHAZINE HYDROCHLORIDE 25 MG: 25 INJECTION INTRAMUSCULAR; INTRAVENOUS at 16:11

## 2018-12-05 ASSESSMENT — PAIN SCALES - GENERAL
PAINLEVEL_OUTOF10: 10
PAINLEVEL_OUTOF10: 10

## 2018-12-05 ASSESSMENT — ENCOUNTER SYMPTOMS: VOMITING: 0

## 2018-12-05 NOTE — ED PROVIDER NOTES
119/80   Pulse: 72 71   Resp: 16 16   Temp: 97.7 °F (36.5 °C) 98 °F (36.7 °C)   TempSrc: Temporal    SpO2: 99% 98%   Weight: 230 lb (104.3 kg)    Height: 5' 7\" (1.702 m)        MDM      CONSULTS:  None    PROCEDURES:  Unless otherwise notedbelow, none     Incision/Drainage  Date/Time: 12/5/2018 5:37 PM  Performed by: Malcom Funez  Authorized by: Malcom Funez     Consent:     Consent obtained:  Verbal    Consent given by:  Patient    Risks discussed:  Incomplete drainage  Location:     Type:  Abscess    Size:  2cm  Pre-procedure details:     Skin preparation:  Betadine  Anesthesia (see MAR for exact dosages): Anesthesia method:  Local infiltration    Local anesthetic:  Lidocaine 1% w/o epi  Procedure type:     Complexity:  Simple  Procedure details:     Incision types:  Stab incision    Incision depth:  Subcutaneous    Scalpel blade:  11    Wound management:  Probed and deloculated    Drainage:  Purulent    Drainage amount: Moderate    Wound treatment:  Drain placed    Packing materials:  1/4 in gauze  Post-procedure details:     Patient tolerance of procedure: Tolerated with difficulty        FINAL IMPRESSION     1. Axillary abscess          DISPOSITION/PLAN   DISPOSITION        PATIENT REFERRED TO:  [address]. [facility] [id number]  [phone]  Schedule an appointment as soon as possible for a visit in 2 days  For wound re-check      DISCHARGE MEDICATIONS:    Attestation: The Prescription Monitoring Report for this patient was reviewed today. ([id number]) ZACKERY Conley)  Documentation: No signs of potential drug abuse or diversion identified. ZACKERY Conley)  Discharge Medication List as of 12/5/2018  5:53 PM           Medication List      START taking these medications    HYDROcodone-acetaminophen 5-325 MG per tablet  Commonly known as:  NORCO  Take 1 tablet by mouth every 6 hours as needed for Pain for up to 3 days. Intended supply: 3 days.  Take lowest dose possible to manage pain.      sulfamethoxazole-trimethoprim 800-160 MG per tablet  Commonly known as:  BACTRIM DS  Take 1 tablet by mouth 2 times daily for 10 days           Where to Get Your Medications      You can get these medications from any pharmacy    Bring a paper prescription for each of these medications  · HYDROcodone-acetaminophen 5-325 MG per tablet  · sulfamethoxazole-trimethoprim 800-160 MG per tablet         (Pleasenote that portions of this note were completed with a voice recognition program.  Efforts were made to edit the dictations but occasionally words are mis-transcribed.)              Dusty Womack, APRN  12/05/18 4329

## 2018-12-08 ENCOUNTER — HOSPITAL ENCOUNTER (EMERGENCY)
Facility: HOSPITAL | Age: 28
Discharge: HOME | End: 2018-12-08
Admitting: EMERGENCY MEDICINE

## 2018-12-08 ENCOUNTER — APPOINTMENT (OUTPATIENT)
Dept: CT IMAGING | Facility: HOSPITAL | Age: 28
End: 2018-12-08

## 2018-12-08 VITALS
HEART RATE: 70 BPM | WEIGHT: 239 LBS | RESPIRATION RATE: 18 BRPM | OXYGEN SATURATION: 99 % | SYSTOLIC BLOOD PRESSURE: 126 MMHG | BODY MASS INDEX: 37.51 KG/M2 | TEMPERATURE: 97.9 F | DIASTOLIC BLOOD PRESSURE: 63 MMHG | HEIGHT: 67 IN

## 2018-12-08 DIAGNOSIS — L02.419 AXILLARY ABSCESS: ICD-10-CM

## 2018-12-08 DIAGNOSIS — R10.31 RLQ ABDOMINAL PAIN: ICD-10-CM

## 2018-12-08 DIAGNOSIS — L05.91 PILONIDAL CYST: Primary | ICD-10-CM

## 2018-12-08 LAB
ALBUMIN SERPL-MCNC: 4.4 G/DL (ref 3.5–5)
ALBUMIN/GLOB SERPL: 1 G/DL (ref 1.1–2.5)
ALP SERPL-CCNC: 72 U/L (ref 24–120)
ALT SERPL W P-5'-P-CCNC: 23 U/L (ref 0–54)
ANION GAP SERPL CALCULATED.3IONS-SCNC: 15 MMOL/L (ref 4–13)
AST SERPL-CCNC: 38 U/L (ref 7–45)
B-HCG UR QL: NEGATIVE
BACTERIA UR QL AUTO: ABNORMAL /HPF
BASOPHILS # BLD AUTO: 0.06 10*3/MM3 (ref 0–0.2)
BASOPHILS NFR BLD AUTO: 0.8 % (ref 0–2)
BILIRUB SERPL-MCNC: 0.4 MG/DL (ref 0.1–1)
BILIRUB UR QL STRIP: NEGATIVE
BUN BLD-MCNC: 12 MG/DL (ref 5–21)
BUN/CREAT SERPL: 15.8 (ref 7–25)
CALCIUM SPEC-SCNC: 9.2 MG/DL (ref 8.4–10.4)
CHLORIDE SERPL-SCNC: 104 MMOL/L (ref 98–110)
CLARITY UR: ABNORMAL
CO2 SERPL-SCNC: 24 MMOL/L (ref 24–31)
COLOR UR: ABNORMAL
CREAT BLD-MCNC: 0.76 MG/DL (ref 0.5–1.4)
DEPRECATED RDW RBC AUTO: 40.3 FL (ref 40–54)
EOSINOPHIL # BLD AUTO: 0.17 10*3/MM3 (ref 0–0.7)
EOSINOPHIL NFR BLD AUTO: 2.2 % (ref 0–4)
ERYTHROCYTE [DISTWIDTH] IN BLOOD BY AUTOMATED COUNT: 13.2 % (ref 12–15)
GFR SERPL CREATININE-BSD FRML MDRD: 110 ML/MIN/1.73
GLOBULIN UR ELPH-MCNC: 4.2 GM/DL
GLUCOSE BLD-MCNC: 91 MG/DL (ref 70–100)
GLUCOSE UR STRIP-MCNC: NEGATIVE MG/DL
HCT VFR BLD AUTO: 40.6 % (ref 37–47)
HGB BLD-MCNC: 14.6 G/DL (ref 12–16)
HGB UR QL STRIP.AUTO: ABNORMAL
HYALINE CASTS UR QL AUTO: ABNORMAL /LPF
IMM GRANULOCYTES # BLD: 0.03 10*3/MM3 (ref 0–0.03)
IMM GRANULOCYTES NFR BLD: 0.4 % (ref 0–5)
INTERNAL NEGATIVE CONTROL: NEGATIVE
INTERNAL POSITIVE CONTROL: POSITIVE
KETONES UR QL STRIP: ABNORMAL
LEUKOCYTE ESTERASE UR QL STRIP.AUTO: ABNORMAL
LIPASE SERPL-CCNC: 41 U/L (ref 23–203)
LYMPHOCYTES # BLD AUTO: 1.89 10*3/MM3 (ref 0.72–4.86)
LYMPHOCYTES NFR BLD AUTO: 24.1 % (ref 15–45)
Lab: NORMAL
MCH RBC QN AUTO: 29.9 PG (ref 28–32)
MCHC RBC AUTO-ENTMCNC: 36 G/DL (ref 33–36)
MCV RBC AUTO: 83 FL (ref 82–98)
MONOCYTES # BLD AUTO: 0.74 10*3/MM3 (ref 0.19–1.3)
MONOCYTES NFR BLD AUTO: 9.4 % (ref 4–12)
NEUTROPHILS # BLD AUTO: 4.96 10*3/MM3 (ref 1.87–8.4)
NEUTROPHILS NFR BLD AUTO: 63.1 % (ref 39–78)
NITRITE UR QL STRIP: NEGATIVE
NRBC BLD MANUAL-RTO: 0 /100 WBC (ref 0–0)
PH UR STRIP.AUTO: 6 [PH] (ref 5–8)
PLATELET # BLD AUTO: 319 10*3/MM3 (ref 130–400)
PMV BLD AUTO: 9.9 FL (ref 6–12)
POTASSIUM BLD-SCNC: 4.3 MMOL/L (ref 3.5–5.3)
PROT SERPL-MCNC: 8.6 G/DL (ref 6.3–8.7)
PROT UR QL STRIP: ABNORMAL
RBC # BLD AUTO: 4.89 10*6/MM3 (ref 4.2–5.4)
RBC # UR: ABNORMAL /HPF
REF LAB TEST METHOD: ABNORMAL
SODIUM BLD-SCNC: 143 MMOL/L (ref 135–145)
SP GR UR STRIP: >1.03 (ref 1–1.03)
SQUAMOUS #/AREA URNS HPF: ABNORMAL /HPF
UROBILINOGEN UR QL STRIP: ABNORMAL
WBC NRBC COR # BLD: 7.85 10*3/MM3 (ref 4.8–10.8)
WBC UR QL AUTO: ABNORMAL /HPF

## 2018-12-08 PROCEDURE — 87070 CULTURE OTHR SPECIMN AEROBIC: CPT | Performed by: PHYSICIAN ASSISTANT

## 2018-12-08 PROCEDURE — 87077 CULTURE AEROBIC IDENTIFY: CPT | Performed by: PHYSICIAN ASSISTANT

## 2018-12-08 PROCEDURE — 25010000002 MORPHINE PER 10 MG: Performed by: EMERGENCY MEDICINE

## 2018-12-08 PROCEDURE — 87185 SC STD ENZYME DETCJ PER NZM: CPT | Performed by: PHYSICIAN ASSISTANT

## 2018-12-08 PROCEDURE — 87147 CULTURE TYPE IMMUNOLOGIC: CPT | Performed by: PHYSICIAN ASSISTANT

## 2018-12-08 PROCEDURE — 81001 URINALYSIS AUTO W/SCOPE: CPT | Performed by: PHYSICIAN ASSISTANT

## 2018-12-08 PROCEDURE — 83690 ASSAY OF LIPASE: CPT | Performed by: PHYSICIAN ASSISTANT

## 2018-12-08 PROCEDURE — 85025 COMPLETE CBC W/AUTO DIFF WBC: CPT | Performed by: PHYSICIAN ASSISTANT

## 2018-12-08 PROCEDURE — 96375 TX/PRO/DX INJ NEW DRUG ADDON: CPT

## 2018-12-08 PROCEDURE — 25010000002 ONDANSETRON PER 1 MG: Performed by: EMERGENCY MEDICINE

## 2018-12-08 PROCEDURE — 80053 COMPREHEN METABOLIC PANEL: CPT | Performed by: PHYSICIAN ASSISTANT

## 2018-12-08 PROCEDURE — 96365 THER/PROPH/DIAG IV INF INIT: CPT

## 2018-12-08 PROCEDURE — 87186 SC STD MICRODIL/AGAR DIL: CPT | Performed by: PHYSICIAN ASSISTANT

## 2018-12-08 PROCEDURE — 87040 BLOOD CULTURE FOR BACTERIA: CPT | Performed by: PHYSICIAN ASSISTANT

## 2018-12-08 PROCEDURE — 25010000002 IOPAMIDOL 61 % SOLUTION: Performed by: PHYSICIAN ASSISTANT

## 2018-12-08 PROCEDURE — 74177 CT ABD & PELVIS W/CONTRAST: CPT

## 2018-12-08 PROCEDURE — 81025 URINE PREGNANCY TEST: CPT | Performed by: PHYSICIAN ASSISTANT

## 2018-12-08 PROCEDURE — 99284 EMERGENCY DEPT VISIT MOD MDM: CPT

## 2018-12-08 PROCEDURE — 87205 SMEAR GRAM STAIN: CPT | Performed by: PHYSICIAN ASSISTANT

## 2018-12-08 RX ORDER — CLINDAMYCIN PHOSPHATE 600 MG/50ML
600 INJECTION INTRAVENOUS ONCE
Status: COMPLETED | OUTPATIENT
Start: 2018-12-08 | End: 2018-12-08

## 2018-12-08 RX ORDER — ONDANSETRON 2 MG/ML
4 INJECTION INTRAMUSCULAR; INTRAVENOUS ONCE
Status: COMPLETED | OUTPATIENT
Start: 2018-12-08 | End: 2018-12-08

## 2018-12-08 RX ORDER — OXYCODONE HYDROCHLORIDE 15 MG/1
15 TABLET ORAL EVERY 8 HOURS PRN
Qty: 12 TABLET | Refills: 0 | OUTPATIENT
Start: 2018-12-08 | End: 2018-12-16

## 2018-12-08 RX ORDER — ONDANSETRON 4 MG/1
4 TABLET, FILM COATED ORAL EVERY 8 HOURS PRN
Qty: 12 TABLET | Refills: 0 | OUTPATIENT
Start: 2018-12-08 | End: 2018-12-16

## 2018-12-08 RX ADMIN — MORPHINE SULFATE 4 MG: 4 INJECTION INTRAVENOUS at 15:54

## 2018-12-08 RX ADMIN — SODIUM CHLORIDE 1000 ML: 9 INJECTION, SOLUTION INTRAVENOUS at 15:50

## 2018-12-08 RX ADMIN — IOPAMIDOL 150 ML: 612 INJECTION, SOLUTION INTRAVENOUS at 16:36

## 2018-12-08 RX ADMIN — CLINDAMYCIN IN 5 PERCENT DEXTROSE 600 MG: 12 INJECTION, SOLUTION INTRAVENOUS at 16:57

## 2018-12-08 RX ADMIN — ONDANSETRON HYDROCHLORIDE 4 MG: 2 INJECTION, SOLUTION INTRAMUSCULAR; INTRAVENOUS at 15:53

## 2018-12-09 NOTE — ED NOTES
Pt updated that labs are back and ct has been read.  Pt resting quietly.     Neah Christie RN  12/08/18 4377

## 2018-12-09 NOTE — ED PROVIDER NOTES
Subjective   History of Present Illness  28-year-old female presents with chief complaint of right axillary abscess that had been trained 5 days ago and is still causing pain, right lower quadrant abdominal pain and pain in her sacral region.  The patient reports all symptoms have been present for one week and progressively gotten worse.  Denies fevers chills nausea vomiting or diarrhea.  Denies pregnancy.  Review of Systems   All other systems reviewed and are negative.      Past Medical History:   Diagnosis Date   • Abscess of axilla    • Depression    • Hypertension    • Ovarian cyst        Allergies   Allergen Reactions   • Tramadol Rash   • Tylenol [Acetaminophen] Rash       History reviewed. No pertinent surgical history.    History reviewed. No pertinent family history.    Social History     Socioeconomic History   • Marital status: Single     Spouse name: Not on file   • Number of children: Not on file   • Years of education: Not on file   • Highest education level: Not on file   Tobacco Use   • Smoking status: Current Every Day Smoker     Packs/day: 0.50     Years: 12.00     Pack years: 6.00   • Smokeless tobacco: Never Used   Substance and Sexual Activity   • Alcohol use: No   • Drug use: No   • Sexual activity: Defer   Social History Narrative    single           Objective   Physical Exam   Constitutional: She is oriented to person, place, and time. She appears well-developed.   HENT:   Head: Normocephalic and atraumatic.   Eyes: EOM are normal. Pupils are equal, round, and reactive to light.   Cardiovascular: Normal rate and regular rhythm.   Pulmonary/Chest: Effort normal and breath sounds normal.   Abdominal: Soft. Normal appearance. There is tenderness in the right lower quadrant.   Neurological: She is alert and oriented to person, place, and time.   Skin: Skin is warm and dry.   Right axilla abscess, routine healing    Purulent drainage without notable edema from pilonidal region   Psychiatric: She  has a normal mood and affect. Her behavior is normal.   Nursing note and vitals reviewed.      Procedures           ED Course      CT Abdomen Pelvis With Contrast   Final Result   1. Tiny suspected pilonidal cyst below the level of the tip of the   coccyx. There is mild adjacent inflammation. No evidence of stranding   within the ischial rectal fossa. No evidence of soft tissue gas or   air-fluid level.   2. 2 cm left ovarian cyst.   3. The appendix is normal in appearance. There is no evidence of   mechanical obstruction or focal bowel wall thickening.   4. The soft tissue organs within the upper abdomen are unremarkable..            This report was finalized on 12/08/2018 17:03 by Dr. Deniz Caal MD.        Labs Reviewed   COMPREHENSIVE METABOLIC PANEL - Abnormal; Notable for the following components:       Result Value    A/G Ratio 1.0 (*)     Anion Gap 15.0 (*)     All other components within normal limits   URINALYSIS W/ CULTURE IF INDICATED - Abnormal; Notable for the following components:    Color, UA Orange (*)     Appearance, UA Cloudy (*)     Specific Gravity, UA >1.030 (*)     Ketones, UA 40 mg/dL (2+) (*)     Blood, UA Large (3+) (*)     Protein, UA Trace (*)     Leuk Esterase, UA Trace (*)     All other components within normal limits    Narrative:     Dipstick results may be inaccurate due to color interference.   URINALYSIS, MICROSCOPIC ONLY - Abnormal; Notable for the following components:    RBC, UA Too Numerous to Count (*)     WBC, UA 3-5 (*)     Squamous Epithelial Cells, UA 3-6 (*)     All other components within normal limits   LIPASE - Normal   CBC WITH AUTO DIFFERENTIAL - Normal   POCT PEFORM URINE PREGNANCY - Normal   BLOOD CULTURE   BLOOD CULTURE   WOUND CULTURE   WOUND CULTURE   CBC AND DIFFERENTIAL    Narrative:     The following orders were created for panel order CBC & Differential.  Procedure                               Abnormality         Status                     ---------                                -----------         ------                     CBC Auto Differential[[id number]]        Normal              Final result                 Please view results for these tests on the individual orders.                 MDM  Number of Diagnoses or Management Options  Axillary abscess: new and requires workup  Pilonidal cyst: new and requires workup  RLQ abdominal pain: new and requires workup  Diagnosis management comments: Pilonidal cyst, will continue abxs and general surgery referral        Amount and/or Complexity of Data Reviewed  Clinical lab tests: reviewed and ordered  Tests in the radiology section of CPT®: reviewed and ordered  Tests in the medicine section of CPT®: ordered and reviewed    Risk of Complications, Morbidity, and/or Mortality  Presenting problems: moderate  Diagnostic procedures: moderate  Management options: moderate    Patient Progress  Patient progress: stable        Final diagnoses:   Pilonidal cyst   Axillary abscess   RLQ abdominal pain            Karl Quintanilla PA-C  12/09/18 [id number]

## 2018-12-11 LAB
B-LACTAMASE USUAL SUSC ISLT: POSITIVE
BACTERIA SPEC AEROBE CULT: ABNORMAL
GRAM STN SPEC: ABNORMAL

## 2018-12-12 ENCOUNTER — TELEPHONE (OUTPATIENT)
Dept: EMERGENCY DEPT | Facility: HOSPITAL | Age: 28
End: 2018-12-12

## 2018-12-12 NOTE — TELEPHONE ENCOUNTER
----- Message from ARASELI Mar sent at 12/11/2018  2:45 PM CST -----  Pt apparently was on antibiotics and advised to continue them. dont know what she was on (cant find in chart). She did receive clindamycin IV. She was advised to followup with general surgeon as well.  Please call and update on test results, see what antibiotic she was on, and to continue followup with the general surgeon.

## 2018-12-13 ENCOUNTER — TELEPHONE (OUTPATIENT)
Dept: EMERGENCY DEPT | Facility: HOSPITAL | Age: 28
End: 2018-12-13

## 2018-12-13 LAB
BACTERIA SPEC AEROBE CULT: NORMAL
BACTERIA SPEC AEROBE CULT: NORMAL

## 2018-12-16 ENCOUNTER — APPOINTMENT (OUTPATIENT)
Dept: GENERAL RADIOLOGY | Facility: HOSPITAL | Age: 28
End: 2018-12-16

## 2018-12-16 ENCOUNTER — HOSPITAL ENCOUNTER (EMERGENCY)
Facility: HOSPITAL | Age: 28
Discharge: HOME | End: 2018-12-16
Admitting: EMERGENCY MEDICINE

## 2018-12-16 VITALS
BODY MASS INDEX: 36.91 KG/M2 | DIASTOLIC BLOOD PRESSURE: 76 MMHG | WEIGHT: 235.2 LBS | TEMPERATURE: 97.2 F | HEIGHT: 67 IN | SYSTOLIC BLOOD PRESSURE: 129 MMHG | HEART RATE: 86 BPM | RESPIRATION RATE: 17 BRPM | OXYGEN SATURATION: 99 %

## 2018-12-16 DIAGNOSIS — M25.561 ACUTE PAIN OF RIGHT KNEE: ICD-10-CM

## 2018-12-16 DIAGNOSIS — M79.641 PAIN OF RIGHT HAND: ICD-10-CM

## 2018-12-16 DIAGNOSIS — M25.511 ACUTE PAIN OF RIGHT SHOULDER: ICD-10-CM
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
## 2018-12-16 PROCEDURE — 81025 URINE PREGNANCY TEST: CPT | Performed by: NURSE PRACTITIONER

## 2018-12-16 PROCEDURE — 73560 X-RAY EXAM OF KNEE 1 OR 2: CPT

## 2018-12-16 PROCEDURE — 25010000002 KETOROLAC TROMETHAMINE PER 15 MG: Performed by: NURSE PRACTITIONER

## 2018-12-16 PROCEDURE — 99283 EMERGENCY DEPT VISIT LOW MDM: CPT

## 2018-12-16 PROCEDURE — 96372 THER/PROPH/DIAG INJ SC/IM: CPT

## 2018-12-16 PROCEDURE — 73120 X-RAY EXAM OF HAND: CPT

## 2018-12-16 PROCEDURE — 73030 X-RAY EXAM OF SHOULDER: CPT

## 2018-12-16 RX ORDER — CYCLOBENZAPRINE HCL 10 MG
10 TABLET ORAL 2 TIMES DAILY PRN
Qty: 20 TABLET | Refills: 0 | Status: SHIPPED | OUTPATIENT
Start: 2018-12-16 | End: 2018-12-26

## 2018-12-16 RX ORDER — NAPROXEN 500 MG/1
500 TABLET ORAL 2 TIMES DAILY WITH MEALS
Qty: 20 TABLET | Refills: 0 | Status: SHIPPED | OUTPATIENT
Start: 2018-12-16 | End: 2018-12-26

## 2018-12-16 RX ORDER — KETOROLAC TROMETHAMINE 30 MG/ML
60 INJECTION, SOLUTION INTRAMUSCULAR; INTRAVENOUS ONCE
Status: COMPLETED | OUTPATIENT
Start: 2018-12-16 | End: 2018-12-16

## 2018-12-16 RX ADMIN — KETOROLAC TROMETHAMINE 60 MG: 30 INJECTION, SOLUTION INTRAMUSCULAR at 20:03

## 2018-12-17 NOTE — ED PROVIDER NOTES
Subjective   28 yof who fell off her porch 2 days ago.  Is c/o R shoulder, hand and R knee pain.            Review of Systems   Constitutional: Negative for activity change, appetite change, fatigue and fever.   HENT: Negative for congestion, ear pain, facial swelling and sore throat.    Eyes: Negative for discharge and visual disturbance.   Respiratory: Negative for apnea, chest tightness, shortness of breath, wheezing and stridor.    Cardiovascular: Negative for chest pain and palpitations.   Gastrointestinal: Negative for abdominal distention, abdominal pain, diarrhea, nausea and vomiting.   Genitourinary: Negative for difficulty urinating and dysuria.   Musculoskeletal: Negative for arthralgias and myalgias.   Skin: Negative for rash and wound.   Neurological: Negative for dizziness and seizures.   Psychiatric/Behavioral: Negative for agitation and confusion.       Past Medical History:   Diagnosis Date   • Abscess of axilla    • Depression    • Hypertension    • Ovarian cyst        Allergies   Allergen Reactions   • Tramadol Rash   • Tylenol [Acetaminophen] Rash       History reviewed. No pertinent surgical history.    History reviewed. No pertinent family history.    Social History     Socioeconomic History   • Marital status: Single     Spouse name: Not on file   • Number of children: Not on file   • Years of education: Not on file   • Highest education level: Not on file   Tobacco Use   • Smoking status: Current Every Day Smoker     Packs/day: 0.50     Years: 12.00     Pack years: 6.00   • Smokeless tobacco: Never Used   Substance and Sexual Activity   • Alcohol use: No   • Drug use: No   • Sexual activity: Defer   Social History Narrative    single           Objective   Physical Exam   Constitutional: She is oriented to person, place, and time. She appears well-developed.   HENT:   Head: Normocephalic.   Eyes: EOM are normal. Pupils are equal, round, and reactive to light.   Neck: Normal range of motion.  "Neck supple.   Cardiovascular: Normal rate and regular rhythm.   No murmur heard.  Pulmonary/Chest: Effort normal and breath sounds normal.   Abdominal: Soft. Bowel sounds are normal.   Musculoskeletal: Normal range of motion.        Right shoulder: She exhibits pain. She exhibits normal range of motion, no tenderness, no bony tenderness, no swelling, no deformity, no spasm and normal pulse.        Right knee: She exhibits normal range of motion, no swelling, no effusion, no deformity, no laceration and no erythema. No tenderness found. No medial joint line and no lateral joint line tenderness noted.        Right hand: She exhibits normal range of motion, no tenderness, no bony tenderness, normal capillary refill, no deformity, no laceration and no swelling.   +PMS of RUE and RLE present   Neurological: She is alert and oriented to person, place, and time.   Skin: Skin is warm and dry.   Psychiatric: She has a normal mood and affect.       Procedures           ED Course        No current facility-administered medications for this encounter.     Current Outpatient Medications:   •  albuterol (PROVENTIL HFA;VENTOLIN HFA) 108 (90 Base) MCG/ACT inhaler, Inhale 2 puffs Every 4 (Four) Hours As Needed for Wheezing., Disp: 1 inhaler, Rfl: 0  •  cyclobenzaprine (FLEXERIL) 10 MG tablet, Take 1 tablet by mouth 2 (Two) Times a Day As Needed for Muscle Spasms for up to 10 days., Disp: 20 tablet, Rfl: 0  •  naproxen (NAPROSYN) 500 MG tablet, Take 1 tablet by mouth 2 (Two) Times a Day With Meals for 10 days., Disp: 20 tablet, Rfl: 0    Vital signs:  /76 (BP Location: Right arm, Patient Position: Standing)   Pulse 86   Temp 97.2 °F (36.2 °C) (Temporal)   Resp 17   Ht 170.2 cm (67\")   Wt 107 kg (235 lb 3.2 oz)   SpO2 99%   BMI 36.84 kg/m²        ED LAB RESULTS:   Lab Results (last 24 hours)     Procedure Component Value Units Date/Time    POCT Pregnancy, Urine [[id number]] Collected:  12/16/18 1838    Specimen:  Urine " Updated:  12/16/18 1839     HCG, Urine, QL Negative     Lot Number \[id number]\     Internal Positive Control Positive     Internal Negative Control Negative             IMAGING RESULTS  XR Hand 2 View Right   ED Interpretation   See results below      Final Result   No acute bony abnormality appreciated.   This report was finalized on 12/16/2018 19:02 by Dr. Joesph Mendoza MD.      XR Shoulder 2+ View Right   ED Interpretation   See results below      Final Result   No acute bony abnormality appreciated.   This report was finalized on 12/16/2018 18:59 by Dr. Joesph Mendoza MD.      XR Knee 1 or 2 View Right   ED Interpretation   See results below      Final Result   No acute bony abnormality.   This report was finalized on 12/16/2018 19:02 by Dr. Joesph Mendoza MD.                             MDM  Number of Diagnoses or Management Options  Acute pain of right knee: minor  Acute pain of right shoulder: minor  Fall, initial encounter: minor  Pain of right hand: minor     Amount and/or Complexity of Data Reviewed  Tests in the radiology section of CPT®: ordered and reviewed  Independent visualization of images, tracings, or specimens: yes    Risk of Complications, Morbidity, and/or Mortality  Presenting problems: minimal  Diagnostic procedures: minimal  Management options: minimal          Final diagnoses:   Fall, initial encounter   Pain of right hand   Acute pain of right shoulder   Acute pain of right knee            Shoulders, Heath Gonsales, DEIDRA  12/16/18 2020

## 2018-12-17 NOTE — DISCHARGE INSTRUCTIONS
Increase fluids. Medication as ordered.  Follow up with PCP tomorrow. Return to ED if condition does not improve or worsens

## 2018-12-19 ENCOUNTER — HOSPITAL ENCOUNTER (EMERGENCY)
Age: 28
Discharge: HOME | End: 2018-12-19
Payer: COMMERCIAL

## 2018-12-19 VITALS
DIASTOLIC BLOOD PRESSURE: 84 MMHG | RESPIRATION RATE: 20 BRPM | TEMPERATURE: 97.7 F | BODY MASS INDEX: 36.81 KG/M2 | SYSTOLIC BLOOD PRESSURE: 135 MMHG | WEIGHT: 235 LBS | HEART RATE: 73 BPM | OXYGEN SATURATION: 98 %

## 2018-12-19 DIAGNOSIS — L73.2 HIDRADENITIS: Primary | ICD-10-CM

## 2018-12-19 PROCEDURE — 87205 SMEAR GRAM STAIN: CPT

## 2018-12-19 PROCEDURE — 87186 SC STD MICRODIL/AGAR DIL: CPT

## 2018-12-19 PROCEDURE — 10061 I&D ABSCESS COMP/MULTIPLE: CPT | Performed by: NURSE PRACTITIONER

## 2018-12-19 PROCEDURE — 87077 CULTURE AEROBIC IDENTIFY: CPT

## 2018-12-19 PROCEDURE — 6360000002 HC RX W HCPCS: Performed by: PHYSICIAN ASSISTANT

## 2018-12-19 PROCEDURE — 99282 EMERGENCY DEPT VISIT SF MDM: CPT

## 2018-12-19 PROCEDURE — 87070 CULTURE OTHR SPECIMN AEROBIC: CPT

## 2018-12-19 PROCEDURE — 99283 EMERGENCY DEPT VISIT LOW MDM: CPT | Performed by: PHYSICIAN ASSISTANT

## 2018-12-19 PROCEDURE — 96372 THER/PROPH/DIAG INJ SC/IM: CPT

## 2018-12-19 PROCEDURE — 10060 I&D ABSCESS SIMPLE/SINGLE: CPT

## 2018-12-19 RX ORDER — SULFAMETHOXAZOLE AND TRIMETHOPRIM 800; 160 MG/1; MG/1
1 TABLET ORAL 2 TIMES DAILY
Qty: 20 TABLET | Refills: 0 | Status: SHIPPED | OUTPATIENT
Start: 2018-12-19 | End: 2018-12-29

## 2018-12-19 RX ORDER — HYDROCODONE BITARTRATE AND ACETAMINOPHEN 5; 325 MG/1; MG/1
1 TABLET ORAL EVERY 6 HOURS PRN
Qty: 10 TABLET | Refills: 0 | Status: SHIPPED | OUTPATIENT
Start: 2018-12-19 | End: 2018-12-22

## 2018-12-19 RX ORDER — HYDROCODONE BITARTRATE AND ACETAMINOPHEN 5; 325 MG/1; MG/1
1 TABLET ORAL EVERY 6 HOURS PRN
Qty: 10 TABLET | Refills: 0 | Status: SHIPPED | OUTPATIENT
Start: 2018-12-19 | End: 2018-12-19 | Stop reason: CLARIF

## 2018-12-19 RX ORDER — DEXAMETHASONE SODIUM PHOSPHATE 4 MG/ML
4 INJECTION, SOLUTION INTRA-ARTICULAR; INTRALESIONAL; INTRAMUSCULAR; INTRAVENOUS; SOFT TISSUE ONCE
Status: COMPLETED | OUTPATIENT
Start: 2018-12-19 | End: 2018-12-19

## 2018-12-19 RX ORDER — KETOROLAC TROMETHAMINE 30 MG/ML
30 INJECTION, SOLUTION INTRAMUSCULAR; INTRAVENOUS ONCE
Status: COMPLETED | OUTPATIENT
Start: 2018-12-19 | End: 2018-12-19

## 2018-12-19 RX ADMIN — KETOROLAC TROMETHAMINE 30 MG: 30 INJECTION, SOLUTION INTRAMUSCULAR at 21:57

## 2018-12-19 RX ADMIN — DEXAMETHASONE SODIUM PHOSPHATE 4 MG: 4 INJECTION, SOLUTION INTRAMUSCULAR; INTRAVENOUS at 21:57

## 2018-12-19 ASSESSMENT — ENCOUNTER SYMPTOMS
APNEA: 0
NAUSEA: 0
EYE PAIN: 0
SORE THROAT: 0
COUGH: 0
EYE DISCHARGE: 0
RHINORRHEA: 0
ABDOMINAL DISTENTION: 0
ABDOMINAL PAIN: 0
BACK PAIN: 0
PHOTOPHOBIA: 0
SHORTNESS OF BREATH: 0

## 2018-12-19 ASSESSMENT — PAIN DESCRIPTION - LOCATION: LOCATION: ARM

## 2018-12-19 ASSESSMENT — PAIN SCALES - GENERAL
PAINLEVEL_OUTOF10: 10
PAINLEVEL_OUTOF10: 10

## 2018-12-19 ASSESSMENT — PAIN DESCRIPTION - ORIENTATION: ORIENTATION: RIGHT

## 2018-12-23 LAB
GRAM STAIN RESULT: ABNORMAL
ORGANISM: ABNORMAL
WOUND/ABSCESS: ABNORMAL
WOUND/ABSCESS: ABNORMAL

## 2019-01-03 ENCOUNTER — OFFICE VISIT (OUTPATIENT)
Dept: SURGERY | Age: 29
End: 2019-01-03
Payer: COMMERCIAL

## 2019-01-03 VITALS
OXYGEN SATURATION: 98 % | WEIGHT: 235 LBS | BODY MASS INDEX: 36.88 KG/M2 | HEIGHT: 67 IN | HEART RATE: 85 BPM | TEMPERATURE: 97.9 F

## 2019-01-03 DIAGNOSIS — L73.2 HIDRADENITIS SUPPURATIVA OF RIGHT AXILLA: ICD-10-CM

## 2019-01-03 PROCEDURE — 99202 OFFICE O/P NEW SF 15 MIN: CPT | Performed by: SURGERY

## 2019-01-03 RX ORDER — DOXYCYCLINE HYCLATE 100 MG
100 TABLET ORAL 2 TIMES DAILY
Qty: 20 TABLET | Refills: 0 | Status: SHIPPED | OUTPATIENT
Start: 2019-01-03 | End: 2019-01-13

## 2019-01-03 ASSESSMENT — ENCOUNTER SYMPTOMS
NAUSEA: 0
COLOR CHANGE: 0
ABDOMINAL DISTENTION: 0
ABDOMINAL PAIN: 0
SORE THROAT: 0
DIARRHEA: 0
COUGH: 0
SHORTNESS OF BREATH: 0
EYE REDNESS: 0
CONSTIPATION: 0
VOMITING: 0
EYE PAIN: 0
CHEST TIGHTNESS: 0
BACK PAIN: 0

## 2019-01-04 ENCOUNTER — TELEPHONE (OUTPATIENT)
Dept: SURGERY | Age: 29
End: 2019-01-04

## 2019-01-07 ENCOUNTER — TELEPHONE (OUTPATIENT)
Dept: SURGERY | Age: 29
End: 2019-01-07

## 2019-01-08 ENCOUNTER — APPOINTMENT (OUTPATIENT)
Dept: GENERAL RADIOLOGY | Facility: HOSPITAL | Age: 29
End: 2019-01-08

## 2019-01-08 ENCOUNTER — APPOINTMENT (OUTPATIENT)
Dept: CT IMAGING | Facility: HOSPITAL | Age: 29
End: 2019-01-08

## 2019-01-08 ENCOUNTER — HOSPITAL ENCOUNTER (EMERGENCY)
Facility: HOSPITAL | Age: 29
Discharge: HOME | End: 2019-01-08
Admitting: EMERGENCY MEDICINE

## 2019-01-08 ENCOUNTER — TELEPHONE (OUTPATIENT)
Dept: SURGERY | Age: 29
End: 2019-01-08

## 2019-01-08 VITALS
TEMPERATURE: 97.3 F | WEIGHT: 230 LBS | SYSTOLIC BLOOD PRESSURE: 156 MMHG | RESPIRATION RATE: 20 BRPM | OXYGEN SATURATION: 100 % | HEIGHT: 67 IN | DIASTOLIC BLOOD PRESSURE: 93 MMHG | HEART RATE: 89 BPM | BODY MASS INDEX: 36.1 KG/M2

## 2019-01-08 DIAGNOSIS — N39.0 ACUTE UTI: Primary | ICD-10-CM

## 2019-01-08 DIAGNOSIS — R11.2 NON-INTRACTABLE VOMITING WITH NAUSEA, UNSPECIFIED VOMITING TYPE: ICD-10-CM

## 2019-01-08 DIAGNOSIS — R19.7 DIARRHEA, UNSPECIFIED TYPE: ICD-10-CM

## 2019-01-08 LAB
ALBUMIN SERPL-MCNC: 4.7 G/DL (ref 3.5–5)
ALBUMIN/GLOB SERPL: 1.2 G/DL (ref 1.1–2.5)
ALP SERPL-CCNC: 85 U/L (ref 24–120)
ALT SERPL W P-5'-P-CCNC: 24 U/L (ref 0–54)
AMPHET+METHAMPHET UR QL: NEGATIVE
AMYLASE SERPL-CCNC: 75 U/L (ref 30–110)
ANION GAP SERPL CALCULATED.3IONS-SCNC: 14 MMOL/L (ref 4–13)
AST SERPL-CCNC: 32 U/L (ref 7–45)
BACTERIA UR QL AUTO: ABNORMAL /HPF
BARBITURATES UR QL SCN: NEGATIVE
BASOPHILS # BLD AUTO: 0.04 10*3/MM3 (ref 0–0.2)
BASOPHILS NFR BLD AUTO: 0.5 % (ref 0–2)
BENZODIAZ UR QL SCN: POSITIVE
BILIRUB SERPL-MCNC: 0.4 MG/DL (ref 0.1–1)
BILIRUB UR QL STRIP: ABNORMAL
BUN BLD-MCNC: 9 MG/DL (ref 5–21)
BUN/CREAT SERPL: 12.9 (ref 7–25)
CALCIUM SPEC-SCNC: 10 MG/DL (ref 8.4–10.4)
CANNABINOIDS SERPL QL: POSITIVE
CHLORIDE SERPL-SCNC: 105 MMOL/L (ref 98–110)
CLARITY UR: ABNORMAL
CO2 SERPL-SCNC: 20 MMOL/L (ref 24–31)
COCAINE UR QL: NEGATIVE
COLOR UR: ABNORMAL
CREAT BLD-MCNC: 0.7 MG/DL (ref 0.5–1.4)
DEPRECATED RDW RBC AUTO: 40.8 FL (ref 40–54)
EOSINOPHIL # BLD AUTO: 0.1 10*3/MM3 (ref 0–0.7)
EOSINOPHIL NFR BLD AUTO: 1.2 % (ref 0–4)
ERYTHROCYTE [DISTWIDTH] IN BLOOD BY AUTOMATED COUNT: 13.9 % (ref 12–15)
GFR SERPL CREATININE-BSD FRML MDRD: 121 ML/MIN/1.73
GLOBULIN UR ELPH-MCNC: 4 GM/DL
GLUCOSE BLD-MCNC: 107 MG/DL (ref 70–100)
GLUCOSE UR STRIP-MCNC: NEGATIVE MG/DL
HCG SERPL QL: NEGATIVE
HCT VFR BLD AUTO: 40.8 % (ref 37–47)
HGB BLD-MCNC: 14.9 G/DL (ref 12–16)
HGB UR QL STRIP.AUTO: ABNORMAL
HOLD SPECIMEN: NORMAL
HOLD SPECIMEN: NORMAL
HYALINE CASTS UR QL AUTO: ABNORMAL /LPF
IMM GRANULOCYTES # BLD AUTO: 0.05 10*3/MM3 (ref 0–0.03)
IMM GRANULOCYTES NFR BLD AUTO: 0.6 % (ref 0–5)
KETONES UR QL STRIP: ABNORMAL
LEUKOCYTE ESTERASE UR QL STRIP.AUTO: ABNORMAL
LIPASE SERPL-CCNC: 48 U/L (ref 23–203)
LYMPHOCYTES # BLD AUTO: 2.76 10*3/MM3 (ref 0.72–4.86)
LYMPHOCYTES NFR BLD AUTO: 33.8 % (ref 15–45)
MCH RBC QN AUTO: 29.8 PG (ref 28–32)
MCHC RBC AUTO-ENTMCNC: 36.5 G/DL (ref 33–36)
MCV RBC AUTO: 81.6 FL (ref 82–98)
METHADONE UR QL SCN: NEGATIVE
MONOCYTES # BLD AUTO: 0.54 10*3/MM3 (ref 0.19–1.3)
MONOCYTES NFR BLD AUTO: 6.6 % (ref 4–12)
MUCOUS THREADS URNS QL MICRO: ABNORMAL /HPF
NEUTROPHILS # BLD AUTO: 4.68 10*3/MM3 (ref 1.87–8.4)
NEUTROPHILS NFR BLD AUTO: 57.3 % (ref 39–78)
NITRITE UR QL STRIP: NEGATIVE
NRBC BLD AUTO-RTO: 0 /100 WBC (ref 0–0)
OPIATES UR QL: NEGATIVE
PCP UR QL SCN: NEGATIVE
PH UR STRIP.AUTO: 5.5 [PH] (ref 5–8)
PLATELET # BLD AUTO: 382 10*3/MM3 (ref 130–400)
PMV BLD AUTO: 10.6 FL (ref 6–12)
POTASSIUM BLD-SCNC: 4.2 MMOL/L (ref 3.5–5.3)
PROT SERPL-MCNC: 8.7 G/DL (ref 6.3–8.7)
PROT UR QL STRIP: ABNORMAL
RBC # BLD AUTO: 5 10*6/MM3 (ref 4.2–5.4)
RBC # UR: ABNORMAL /HPF
REF LAB TEST METHOD: ABNORMAL
SODIUM BLD-SCNC: 139 MMOL/L (ref 135–145)
SP GR UR STRIP: >1.03 (ref 1–1.03)
SQUAMOUS #/AREA URNS HPF: ABNORMAL /HPF
UROBILINOGEN UR QL STRIP: ABNORMAL
WBC NRBC COR # BLD: 8.17 10*3/MM3 (ref 4.8–10.8)
WBC UR QL AUTO: ABNORMAL /HPF
WHOLE BLOOD HOLD SPECIMEN: NORMAL
WHOLE BLOOD HOLD SPECIMEN: NORMAL

## 2019-01-08 PROCEDURE — 80307 DRUG TEST PRSMV CHEM ANLYZR: CPT | Performed by: NURSE PRACTITIONER

## 2019-01-08 PROCEDURE — 74177 CT ABD & PELVIS W/CONTRAST: CPT

## 2019-01-08 PROCEDURE — 25010000002 PROCHLORPERAZINE EDISYLATE PER 10 MG: Performed by: NURSE PRACTITIONER

## 2019-01-08 PROCEDURE — 25010000002 DIPHENHYDRAMINE PER 50 MG: Performed by: NURSE PRACTITIONER

## 2019-01-08 PROCEDURE — 25010000002 PROMETHAZINE PER 50 MG: Performed by: NURSE PRACTITIONER

## 2019-01-08 PROCEDURE — 96374 THER/PROPH/DIAG INJ IV PUSH: CPT

## 2019-01-08 PROCEDURE — 74022 RADEX COMPL AQT ABD SERIES: CPT

## 2019-01-08 PROCEDURE — 80053 COMPREHEN METABOLIC PANEL: CPT | Performed by: NURSE PRACTITIONER

## 2019-01-08 PROCEDURE — 99284 EMERGENCY DEPT VISIT MOD MDM: CPT

## 2019-01-08 PROCEDURE — 25010000002 IOPAMIDOL 61 % SOLUTION: Performed by: NURSE PRACTITIONER

## 2019-01-08 PROCEDURE — 25010000002 ONDANSETRON PER 1 MG: Performed by: NURSE PRACTITIONER

## 2019-01-08 PROCEDURE — 96375 TX/PRO/DX INJ NEW DRUG ADDON: CPT

## 2019-01-08 PROCEDURE — 93010 ELECTROCARDIOGRAM REPORT: CPT | Performed by: INTERNAL MEDICINE

## 2019-01-08 PROCEDURE — 82150 ASSAY OF AMYLASE: CPT | Performed by: NURSE PRACTITIONER

## 2019-01-08 PROCEDURE — 93005 ELECTROCARDIOGRAM TRACING: CPT

## 2019-01-08 PROCEDURE — 25010000002 CEFTRIAXONE PER 250 MG: Performed by: NURSE PRACTITIONER

## 2019-01-08 PROCEDURE — 96361 HYDRATE IV INFUSION ADD-ON: CPT

## 2019-01-08 PROCEDURE — 83690 ASSAY OF LIPASE: CPT | Performed by: NURSE PRACTITIONER

## 2019-01-08 PROCEDURE — 81001 URINALYSIS AUTO W/SCOPE: CPT | Performed by: NURSE PRACTITIONER

## 2019-01-08 PROCEDURE — 84703 CHORIONIC GONADOTROPIN ASSAY: CPT | Performed by: NURSE PRACTITIONER

## 2019-01-08 PROCEDURE — 85025 COMPLETE CBC W/AUTO DIFF WBC: CPT | Performed by: NURSE PRACTITIONER

## 2019-01-08 PROCEDURE — 87086 URINE CULTURE/COLONY COUNT: CPT | Performed by: NURSE PRACTITIONER

## 2019-01-08 RX ORDER — CEFDINIR 300 MG/1
300 CAPSULE ORAL 2 TIMES DAILY
Qty: 20 CAPSULE | Refills: 0 | OUTPATIENT
Start: 2019-01-08 | End: 2019-01-18 | Stop reason: HOSPADM

## 2019-01-08 RX ORDER — MEPERIDINE HYDROCHLORIDE 25 MG/ML
25 INJECTION INTRAMUSCULAR; INTRAVENOUS; SUBCUTANEOUS ONCE
Status: COMPLETED | OUTPATIENT
Start: 2019-01-08 | End: 2019-01-08

## 2019-01-08 RX ORDER — ONDANSETRON 2 MG/ML
4 INJECTION INTRAMUSCULAR; INTRAVENOUS ONCE
Status: COMPLETED | OUTPATIENT
Start: 2019-01-08 | End: 2019-01-08

## 2019-01-08 RX ORDER — DIPHENHYDRAMINE HYDROCHLORIDE 50 MG/ML
25 INJECTION INTRAMUSCULAR; INTRAVENOUS ONCE
Status: COMPLETED | OUTPATIENT
Start: 2019-01-08 | End: 2019-01-08

## 2019-01-08 RX ORDER — FAMOTIDINE 10 MG/ML
20 INJECTION, SOLUTION INTRAVENOUS ONCE
Status: COMPLETED | OUTPATIENT
Start: 2019-01-08 | End: 2019-01-08

## 2019-01-08 RX ORDER — ONDANSETRON 4 MG/1
4 TABLET, ORALLY DISINTEGRATING ORAL EVERY 6 HOURS PRN
Qty: 10 TABLET | Refills: 0 | Status: SHIPPED | OUTPATIENT
Start: 2019-01-08 | End: 2019-08-10

## 2019-01-08 RX ORDER — PROMETHAZINE HYDROCHLORIDE 25 MG/ML
12.5 INJECTION, SOLUTION INTRAMUSCULAR; INTRAVENOUS ONCE
Status: COMPLETED | OUTPATIENT
Start: 2019-01-08 | End: 2019-01-08

## 2019-01-08 RX ADMIN — CEFTRIAXONE SODIUM 1 G: 1 INJECTION, POWDER, FOR SOLUTION INTRAMUSCULAR; INTRAVENOUS at 16:06

## 2019-01-08 RX ADMIN — PROMETHAZINE HYDROCHLORIDE 12.5 MG: 25 INJECTION INTRAMUSCULAR; INTRAVENOUS at 16:42

## 2019-01-08 RX ADMIN — MEPERIDINE HYDROCHLORIDE 25 MG: 25 INJECTION INTRAMUSCULAR; INTRAVENOUS; SUBCUTANEOUS at 17:08

## 2019-01-08 RX ADMIN — SODIUM CHLORIDE 1000 ML: 9 INJECTION, SOLUTION INTRAVENOUS at 15:05

## 2019-01-08 RX ADMIN — DIPHENHYDRAMINE HYDROCHLORIDE 25 MG: 50 INJECTION INTRAMUSCULAR; INTRAVENOUS at 16:41

## 2019-01-08 RX ADMIN — IOPAMIDOL 137 ML: 612 INJECTION, SOLUTION INTRAVENOUS at 17:41

## 2019-01-08 RX ADMIN — PROCHLORPERAZINE EDISYLATE 10 MG: 5 INJECTION INTRAMUSCULAR; INTRAVENOUS at 17:56

## 2019-01-08 RX ADMIN — FAMOTIDINE 20 MG: 10 INJECTION, SOLUTION INTRAVENOUS at 15:04

## 2019-01-08 RX ADMIN — ONDANSETRON 4 MG: 2 SOLUTION INTRAMUSCULAR; INTRAVENOUS at 15:04

## 2019-01-08 NOTE — ED PROVIDER NOTES
Subjective   Pt is 29 yo black female presents with nausea, vomiting, diarrhea, and abdominal cramping since last night. She states that the last time she vomited was about 30 min ago. Pt also states that she is very anxious and started having chest pressure and pain after she started crying. She denies cough, sore throat, fever, or chills. She denies vaginal discharge.         History provided by:  Patient   used: No        Review of Systems   Constitutional: Negative.    HENT: Negative.    Eyes: Negative.    Respiratory: Negative.    Cardiovascular: Negative.    Gastrointestinal:        Pt is 29 yo black female presents with nausea, vomiting, diarrhea, and abdominal cramping since last night. She states that the last time she vomited was about 30 min ago. Pt also states that she is very anxious and started having chest pressure and pain after she started crying. She denies cough, sore throat, fever, or chills. She denies vaginal discharge.      Endocrine: Negative.    Genitourinary: Negative.    Musculoskeletal: Negative.    Skin: Negative.    Allergic/Immunologic: Negative.    Neurological: Negative.    Hematological: Negative.    Psychiatric/Behavioral: Negative.    All other systems reviewed and are negative.      Past Medical History:   Diagnosis Date   • Abscess of axilla    • Depression    • Hypertension    • Ovarian cyst        Allergies   Allergen Reactions   • Tramadol Rash   • Tylenol [Acetaminophen] Rash       History reviewed. No pertinent surgical history.    History reviewed. No pertinent family history.    Social History     Socioeconomic History   • Marital status: Single     Spouse name: Not on file   • Number of children: Not on file   • Years of education: Not on file   • Highest education level: Not on file   Tobacco Use   • Smoking status: Current Every Day Smoker     Packs/day: 0.50     Years: 12.00     Pack years: 6.00   • Smokeless tobacco: Never Used   Substance and  "Sexual Activity   • Alcohol use: No   • Drug use: No   • Sexual activity: Defer   Social History Narrative    single       Prior to Admission medications    Medication Sig Start Date End Date Taking? Authorizing Provider   albuterol (PROVENTIL HFA;VENTOLIN HFA) 108 (90 Base) MCG/ACT inhaler Inhale 2 puffs Every 4 (Four) Hours As Needed for Wheezing. 8/27/18  Yes Trip Key MD       /93   Pulse 89   Temp 97.3 °F (36.3 °C)   Resp 20   Ht 170.2 cm (67\")   Wt 104 kg (230 lb)   SpO2 100%   BMI 36.02 kg/m²     Objective   Physical Exam   Constitutional: She is oriented to person, place, and time. She appears well-developed and well-nourished.   Very anxious   HENT:   Head: Normocephalic and atraumatic.   Eyes: Conjunctivae and EOM are normal. Pupils are equal, round, and reactive to light.   Neck: Normal range of motion. Neck supple. No tracheal deviation present. No thyromegaly present.   Cardiovascular: Normal rate, regular rhythm, normal heart sounds and intact distal pulses.   Pulmonary/Chest: Effort normal and breath sounds normal. No respiratory distress. She has no wheezes. She has no rales. She exhibits no tenderness.   Abdominal: Soft. Bowel sounds are normal.   Mild tenderness to lower abd. No guarding or rebound. bs x 4 quads.    Musculoskeletal: Normal range of motion.   Neurological: She is alert and oriented to person, place, and time. She has normal reflexes. No cranial nerve deficit.   Skin: Skin is warm and dry.   Psychiatric: She has a normal mood and affect. Her behavior is normal. Judgment and thought content normal.   Nursing note and vitals reviewed.      Procedures         Lab Results (last 24 hours)     Procedure Component Value Units Date/Time    Urinalysis With Culture If Indicated - Urine, Clean Catch [[id number]]  (Abnormal) Collected:  01/08/19 1457    Specimen:  Urine, Clean Catch Updated:  01/08/19 1531     Color, UA Dark Yellow     Appearance, UA Cloudy     pH, UA 5.5 "     Specific Gravity, UA >1.030     Glucose, UA Negative     Ketones, UA Trace     Bilirubin, UA Small (1+)     Blood, UA Large (3+)     Protein, UA 30 mg/dL (1+)     Leuk Esterase, UA Trace     Nitrite, UA Negative     Urobilinogen, UA 1.0 E.U./dL    Urinalysis, Microscopic Only - Urine, Clean Catch [[id number]]  (Abnormal) Collected:  01/08/19 1457    Specimen:  Urine, Clean Catch Updated:  01/08/19 1539     RBC, UA 13-20 /HPF      WBC, UA 21-30 /HPF      Bacteria, UA 1+ /HPF      Squamous Epithelial Cells, UA Too Numerous to Count /HPF      Hyaline Casts, UA 7-12 /LPF      Mucus, UA Moderate/2+ /HPF      Methodology Manual Light Microscopy    Urine Culture - Urine, Urine, Clean Catch [[id number]] Collected:  01/08/19 1457    Specimen:  Urine, Clean Catch Updated:  01/08/19 1539    Urine Drug Screen - Urine, Clean Catch [[id number]]  (Abnormal) Collected:  01/08/19 1457    Specimen:  Urine, Clean Catch Updated:  01/08/19 1709     Amphetamine Screen, Urine Negative     Barbiturates Screen, Urine Negative     Benzodiazepine Screen, Urine Positive     Cocaine Screen, Urine Negative     Methadone Screen, Urine Negative     Opiate Screen Negative     Phencyclidine (PCP), Urine Negative     THC, Screen, Urine Positive    Narrative:       Negative Thresholds For Drugs Screened in Urine:    Amphetamines          500 ng/ml  Barbiturates          200 ng/ml  Benzodiazepines       200 ng/ml  Cocaine               150 ng/ml  Methadone             150 ng/ml  Opiates               300 ng/ml  Phencyclidine         25 ng/ml  THC                      50 ng/ml    The normal value for all drugs tested is negative. This report includes final unconfirmed screening results.  A positive result by this assay can be, at your request, sent to the Reference Lab for confirmation by gas chromatography. Unconfirmed results must not be used for non-medical purposes, such as employment or legal testing. Clinical consideration should be applied to  any drug of abuse test result, particularly when unconfirmed results are used.    CBC & Differential [[id number]] Collected:  01/08/19 1503    Specimen:  Blood Updated:  01/08/19 1558    Narrative:       The following orders were created for panel order CBC & Differential.  Procedure                               Abnormality         Status                     ---------                               -----------         ------                     CBC Auto Differential[[id number]]        Abnormal            Final result                 Please view results for these tests on the individual orders.    Comprehensive Metabolic Panel [[id number]]  (Abnormal) Collected:  01/08/19 1503    Specimen:  Blood Updated:  01/08/19 1526     Glucose 107 mg/dL      BUN 9 mg/dL      Creatinine 0.70 mg/dL      Sodium 139 mmol/L      Potassium 4.2 mmol/L      Chloride 105 mmol/L      CO2 20.0 mmol/L      Calcium 10.0 mg/dL      Total Protein 8.7 g/dL      Albumin 4.70 g/dL      ALT (SGPT) 24 U/L      AST (SGOT) 32 U/L      Alkaline Phosphatase 85 U/L      Total Bilirubin 0.4 mg/dL      eGFR  African Amer 121 mL/min/1.73      Globulin 4.0 gm/dL      A/G Ratio 1.2 g/dL      BUN/Creatinine Ratio 12.9     Anion Gap 14.0 mmol/L     Lipase [[id number]]  (Normal) Collected:  01/08/19 1503    Specimen:  Blood Updated:  01/08/19 1526     Lipase 48 U/L     Amylase [[id number]]  (Normal) Collected:  01/08/19 1503    Specimen:  Blood Updated:  01/08/19 1526     Amylase 75 U/L     CBC Auto Differential [[id number]]  (Abnormal) Collected:  01/08/19 1503    Specimen:  Blood Updated:  01/08/19 1558     WBC 8.17 10*3/mm3      RBC 5.00 10*6/mm3      Hemoglobin 14.9 g/dL      Hematocrit 40.8 %      MCV 81.6 fL      MCH 29.8 pg      MCHC 36.5 g/dL      RDW 13.9 %      RDW-SD 40.8 fl      MPV 10.6 fL      Platelets 382 10*3/mm3      Neutrophil % 57.3 %      Lymphocyte % 33.8 %      Monocyte % 6.6 %      Eosinophil % 1.2 %      Basophil % 0.5 %      Immature Grans %  "0.6 %      Neutrophils, Absolute 4.68 10*3/mm3      Lymphocytes, Absolute 2.76 10*3/mm3      Monocytes, Absolute 0.54 10*3/mm3      Eosinophils, Absolute 0.10 10*3/mm3      Basophils, Absolute 0.04 10*3/mm3      Immature Grans, Absolute 0.05 10*3/mm3      nRBC 0.0 /100 WBC     hCG, Serum, Qualitative [[id number]]  (Normal) Collected:  01/08/19 1504    Specimen:  Blood Updated:  01/08/19 1525     HCG Qualitative Negative          CT Abdomen Pelvis With Contrast   ED Interpretation   1. No acute findings.   2. There is a 2.7 cm dominant right ovarian follicle.       The full report of this exam was immediately signed and available to the   emergency room. The patient is currently in the emergency room.   This report was finalized on 01/08/2019 18:02 by Dr. Tucker Lambert MD.      Final Result   1. No acute findings.   2. There is a 2.7 cm dominant right ovarian follicle.       The full report of this exam was immediately signed and available to the   emergency room. The patient is currently in the emergency room.   This report was finalized on 01/08/2019 18:02 by Dr. Tucker Lambert MD.      XR Abdomen 2 View With Chest 1 View   Final Result   1. No acute abnormality is seen within the chest or abdomen.   This report was finalized on 01/08/2019 15:51 by Dr. Shubham Mireles MD.          ED Course  ED Course as of Jan 08 2038   Tue Jan 08, 2019   1621 Reeval pt- states that she is feeling better. No further vomiting. Color improved. Vitals stable. Pt will be discharged home soon in stable condition to follow up with pcp this week. Advised to return before if symptoms worsen. Advised to increase water intake and decrease soda intake  [CW]   1631 Went to check on pt again and she is pacing around the room \" i'm just so nervous. I feel nauseated\" phenergan ordered   [CW]   1655 Pt crying and stating that she is having abd pain. Pt states that she wants to leave. Encouraged pt to stay and to obtain ct scan of abd/pelvis because " her pain is worse. Pt has agreed. She has called her mother at this time as well   [CW]   1759 Pt is still having nausea with vomiting- have ordered compazine for nausea at this time   [CW]   1821 Reviewed results with pt. States that she is feeling better at this time. Nausea and vomiting better at this time. Pt is drowsy from medication. Pt will be discharged home soon in stable condition with mother. Advised to follow up with pcp this week - return before if symptoms worsen   [CW]      ED Course User Index  [CW] Maria Teresa Mathews, DEIDRA          MDM  Number of Diagnoses or Management Options  Acute UTI: minor  Diarrhea, unspecified type: minor  Non-intractable vomiting with nausea, unspecified vomiting type: minor     Amount and/or Complexity of Data Reviewed  Clinical lab tests: ordered and reviewed  Tests in the radiology section of CPT®: ordered and reviewed  Independent visualization of images, tracings, or specimens: yes    Patient Progress  Patient progress: stable      Final diagnoses:   Acute UTI   Non-intractable vomiting with nausea, unspecified vomiting type   Diarrhea, unspecified type          Maria Teresa Mathews APRN  01/08/19 3853

## 2019-01-08 NOTE — DISCHARGE INSTRUCTIONS
Return to ER if symptoms worsen   Increase water intake, decrease soda intake      Antibiotic Medicine, Adult  Antibiotic medicines treat infections caused by a type of germ called bacteria. They work by killing the bacteria that make you sick.  When do I need to take antibiotics?  You often need these medicines to treat bacterial infections, such as:  · A urinary tract infection (UTI).  · Strep throat.  · Meningitis. This affects the spinal cord and brain.  · A bad lung infection.    You may start the medicines while your doctor waits for tests to come back. When the tests come back, your doctor may change or stop your medicine.  When are antibiotics not needed?  You do not need these medicines for most common illnesses, such as:  · A cold.  · The flu.  · A sore throat.    Antibiotics are not always needed for all infections caused by bacteria. Do not ask for these medicines, or take them, when they are not needed.  What are the risks of taking antibiotics?  Most antibiotics can cause an infection called Clostridium difficile.This causes watery poop (diarrhea). Let your doctor know right away if:  · You have watery poop while taking an antibiotic.  · You have watery poop after you stop taking an antibiotic. The illness can happen weeks after you stop the medicine.    You also have a risk of getting an infection in the future that antibiotics cannot treat (antibiotic-resistant infection). This type of infection can be dangerous.  What else should I know about taking antibiotics?  · You need to take the entire prescription.  ? Take the medicine for as long as told by your doctor.  ? Do not stop taking it even if you start to feel better.  · Try not to miss any doses. If you miss a dose, call your doctor.  · Birth control pills may not work. If you take birth control pills:  ? Keep on taking them.  ? Use a second form of birth control, such as a condom. Do this for as long as told by your doctor.  · Ask your  doctor:  ? How long to wait in between doses.  ? If you should take the medicine with food.  ? If there is anything you should stay away from while taking the antibiotic, such as:  ? Food.  ? Drinks.  ? Medicines.  ? If there are any side effects you should watch for.  · Only take the medicines that your doctor told you to take. Do not take medicines that were given to someone else.  · Drink a large glass of water with the medicine.  · Ask the pharmacist for a tool to measure the medicine, such as:  ? A syringe.  ? A cup.  ? A spoon.  · Throw away any extra medicine.  Contact a doctor if:  · You get worse.  · You have new joint pain or muscle aches after starting the medicine.  · You have side effects from the medicine, such as:  ? Stomach pain.  ? Watery poop.  ? Feeling sick to your stomach (nausea).  Get help right away if:  · You have signs of a very bad allergic reaction. If this happens, stop taking the medicine right away. Signs may include:  ? Hives. These are raised, itchy, red bumps on the skin.  ? Skin rash.  ? Trouble breathing.  ? Wheezing.  ? Swelling.  ? Feeling dizzy.  ? Throwing up (vomiting).  · Your pee (urine) is dark, or is the color of blood.  · Your skin turns yellow.  · You bruise easily.  · You bleed easily.  · You have very bad watery poop and cramps in your belly.  · You have a very bad headache.  Summary  · Antibiotics are often used to treat infections caused by bacteria.  · Only take these medicines when needed.  · Let your doctor know if you have watery poop while taking an antibiotic.  · You need to take the entire prescription.  This information is not intended to replace advice given to you by your health care provider. Make sure you discuss any questions you have with your health care provider.  Document Released: 09/26/2009 Document Revised: 12/20/2017 Document Reviewed: 12/20/2017  [employer] Interactive Patient Education © 2017 [employer]      Diarrhea, Adult  Diarrhea is  frequent loose and watery bowel movements. Diarrhea can make you feel weak and cause you to become dehydrated. Dehydration can make you tired and thirsty, cause you to have a dry mouth, and decrease how often you urinate. Diarrhea typically lasts 2-3 days. However, it can last longer if it is a sign of something more serious. It is important to treat your diarrhea as told by your health care provider.  Follow these instructions at home:  Eating and drinking    Follow these recommendations as told by your health care provider:  · Take an oral rehydration solution (ORS). This is a drink that is sold at pharmacies and retail stores.  · Drink clear fluids, such as water, ice chips, diluted fruit juice, and low-calorie sports drinks.  · Eat bland, easy-to-digest foods in small amounts as you are able. These foods include bananas, applesauce, rice, lean meats, toast, and crackers.  · Avoid drinking fluids that contain a lot of sugar or caffeine, such as energy drinks, sports drinks, and soda.  · Avoid alcohol.  · Avoid spicy or fatty foods.    General instructions  · Drink enough fluid to keep your urine clear or pale yellow.  · Wash your hands often. If soap and water are not available, use hand .  · Make sure that all people in your household wash their hands well and often.  · Take over-the-counter and prescription medicines only as told by your health care provider.  · Rest at home while you recover.  · Watch your condition for any changes.  · Take a warm bath to relieve any burning or pain from frequent diarrhea episodes.  · Keep all follow-up visits as told by your health care provider. This is important.  Contact a health care provider if:  · You have a fever.  · Your diarrhea gets worse.  · You have new symptoms.  · You cannot keep fluids down.  · You feel light-headed or dizzy.  · You have a headache  · You have muscle cramps.  Get help right away if:  · You have chest pain.  · You feel extremely weak or  you faint.  · You have bloody or black stools or stools that look like tar.  · You have severe pain, cramping, or bloating in your abdomen.  · You have trouble breathing or you are breathing very quickly.  · Your heart is beating very quickly.  · Your skin feels cold and clammy.  · You feel confused.  · You have signs of dehydration, such as:  ? Dark urine, very little urine, or no urine.  ? Cracked lips.  ? Dry mouth.  ? Sunken eyes.  ? Sleepiness.  ? Weakness.  This information is not intended to replace advice given to you by your health care provider. Make sure you discuss any questions you have with your health care provider.  Document Released: 12/08/2003 Document Revised: 04/27/2017 Document Reviewed: 08/23/2016  [employer] Interactive Patient Education © 2018 [employer]      Diarrhea, Adult  Diarrhea is when you have loose and water poop (stool) often. Diarrhea can make you feel weak and cause you to get dehydrated. Dehydration can make you tired and thirsty, make you have a dry mouth, and make it so you pee (urinate) less often. Diarrhea often lasts 2-3 days. However, it can last longer if it is a sign of something more serious. It is important to treat your diarrhea as told by your doctor.  Follow these instructions at home:  Eating and drinking    Follow these recommendations as told by your doctor:  · Take an oral rehydration solution (ORS). This is a drink that is sold at pharmacies and stores.  · Drink clear fluids, such as:  ? Water.  ? Ice chips.  ? Diluted fruit juice.  ? Low-calorie sports drinks.  · Eat bland, easy-to-digest foods in small amounts as you are able. These foods include:  ? Bananas.  ? Applesauce.  ? Rice.  ? Low-fat (lean) meats.  ? Toast.  ? Crackers.  · Avoid drinking fluids that have a lot of sugar or caffeine in them.  · Avoid alcohol.  · Avoid spicy or fatty foods.    General instructions    · Drink enough fluid to keep your pee (urine) clear or pale yellow.  · Wash your hands  often. If you cannot use soap and water, use hand .  · Make sure that all people in your home wash their hands well and often.  · Take over-the-counter and prescription medicines only as told by your doctor.  · Rest at home while you get better.  · Watch your condition for any changes.  · Take a warm bath to help with any burning or pain from having diarrhea.  · Keep all follow-up visits as told by your doctor. This is important.  Contact a doctor if:  · You have a fever.  · Your diarrhea gets worse.  · You have new symptoms.  · You cannot keep fluids down.  · You feel light-headed or dizzy.  · You have a headache.  · You have muscle cramps.  Get help right away if:  · You have chest pain.  · You feel very weak or you pass out (faint).  · You have bloody or black poop or poop that look like tar.  · You have very bad pain, cramping, or bloating in your belly (abdomen).  · You have trouble breathing or you are breathing very quickly.  · Your heart is beating very quickly.  · Your skin feels cold and clammy.  · You feel confused.  · You have signs of dehydration, such as:  ? Dark pee, hardly any pee, or no pee.  ? Cracked lips.  ? Dry mouth.  ? Sunken eyes.  ? Sleepiness.  ? Weakness.  This information is not intended to replace advice given to you by your health care provider. Make sure you discuss any questions you have with your health care provider.  Document Released: 06/05/2009 Document Revised: 07/07/2017 Document Reviewed: 08/23/2016  [employer] Interactive Patient Education © 2018 [employer]      Food Choices to Help Relieve Diarrhea, Adult  When you have diarrhea, the foods you eat and your eating habits are very important. Choosing the right foods and drinks can help:  · Relieve diarrhea.  · Replace lost fluids and nutrients.  · Prevent dehydration.    What general guidelines should I follow?  Relieving diarrhea  · Choose foods with less than 2 g or .07 oz. of fiber per serving.  · Limit fats to  less than 8 tsp (38 g or 1.34 oz.) a day.  · Avoid the following:  ? Foods and beverages sweetened with high-fructose corn syrup, honey, or sugar alcohols such as xylitol, sorbitol, and mannitol.  ? Foods that contain a lot of fat or sugar.  ? Fried, greasy, or spicy foods.  ? High-fiber grains, breads, and cereals.  ? Raw fruits and vegetables.  · Eat foods that are rich in probiotics. These foods include dairy products such as yogurt and fermented milk products. They help increase healthy bacteria in the stomach and intestines (gastrointestinal tract, or GI tract).  · If you have lactose intolerance, avoid dairy products. These may make your diarrhea worse.  · Take medicine to help stop diarrhea (antidiarrheal medicine) only as told by your health care provider.  Replacing nutrients  · Eat small meals or snacks every 3-4 hours.  · Eat bland foods, such as white rice, toast, or baked potato, until your diarrhea starts to get better. Gradually reintroduce nutrient-rich foods as tolerated or as told by your health care provider. This includes:  ? Well-cooked protein foods.  ? Peeled, seeded, and soft-cooked fruits and vegetables.  ? Low-fat dairy products.  · Take vitamin and mineral supplements as told by your health care provider.  Preventing dehydration    · Start by sipping water or a special solution to prevent dehydration (oral rehydration solution, ORS). Urine that is clear or pale yellow means that you are getting enough fluid.  · Try to drink at least 8-10 cups of fluid each day to help replace lost fluids.  · You may add other liquids in addition to water, such as clear juice or decaffeinated sports drinks, as tolerated or as told by your health care provider.  · Avoid drinks with caffeine, such as coffee, tea, or soft drinks.  · Avoid alcohol.  What foods are recommended?  The items listed may not be a complete list. Talk with your health care provider about what dietary choices are best for  you.  Grains  White rice. White, Pashto, or winston breads (fresh or toasted), including plain rolls, buns, or bagels. White pasta. Saltine, soda, or marcello crackers. Pretzels. Low-fiber cereal. Cooked cereals made with water (such as cornmeal, farina, or cream cereals). Plain muffins. Matzo. [place] toast. Zwieback.  Vegetables  Potatoes (without the skin). Most well-cooked and canned vegetables without skins or seeds. Tender lettuce.  Fruits  Apple sauce. Fruits canned in juice. Cooked apricots, cherries, grapefruit, peaches, pears, or plums. Fresh bananas and cantaloupe.  Meats and other protein foods  Baked or boiled chicken. Eggs. Tofu. Fish. Seafood. Smooth nut butters. Ground or well-cooked tender beef, ham, veal, lamb, pork, or poultry.  Dairy  Plain yogurt, kefir, and unsweetened liquid yogurt. Lactose-free milk, buttermilk, skim milk, or soy milk. Low-fat or nonfat hard cheese.  Beverages  Water. Low-calorie sports drinks. Fruit juices without pulp. Strained tomato and vegetable juices. Decaffeinated teas. Sugar-free beverages not sweetened with sugar alcohols. Oral rehydration solutions, if approved by your health care provider.  Seasoning and other foods  Bouillon, broth, or soups made from recommended foods.  What foods are not recommended?  The items listed may not be a complete list. Talk with your health care provider about what dietary choices are best for you.  Grains  Whole grain, whole wheat, bran, or rye breads, rolls, pastas, and crackers. Wild or brown rice. Whole grain or bran cereals. Barley. Oats and oatmeal. Corn tortillas or taco shells. Granola. Popcorn.  Vegetables  Raw vegetables. Fried vegetables. Cabbage, broccoli, [place] sprouts, artichokes, baked beans, beet greens, corn, kale, legumes, peas, sweet potatoes, and yams. Potato skins. Cooked spinach and cabbage.  Fruits  Dried fruit, including raisins and dates. Raw fruits. Stewed or dried prunes. Canned fruits with syrup.  Meat and  other protein foods  Fried or fatty meats. Deli meats. Philip nut butters. Nuts and seeds. Beans and lentils. Urias. Hot dogs. Sausage.  Dairy  High-fat cheeses. Whole milk, chocolate milk, and beverages made with milk, such as milk shakes. Half-and-half. Cream. sour cream. Ice cream.  Beverages  Caffeinated beverages (such as coffee, tea, soda, or energy drinks). Alcoholic beverages. Fruit juices with pulp. Prune juice. Soft drinks sweetened with high-fructose corn syrup or sugar alcohols. High-calorie sports drinks.  Fats and oils  Butter. Cream sauces. Margarine. Salad oils. Plain salad dressings. Olives. Avocados. Mayonnaise.  Sweets and desserts  Sweet rolls, doughnuts, and sweet breads. Sugar-free desserts sweetened with sugar alcohols such as xylitol and sorbitol.  Seasoning and other foods  Honey. Hot sauce. Chili powder. Gravy. Cream-based or milk-based soups. Pancakes and waffles.  Summary  · When you have diarrhea, the foods you eat and your eating habits are very important.  · Make sure you get at least 8-10 cups of fluid each day, or enough to keep your urine clear or pale yellow.  · Eat bland foods and gradually reintroduce healthy, nutrient-rich foods as tolerated, or as told by your health care provider.  · Avoid high-fiber, fried, greasy, or spicy foods.  This information is not intended to replace advice given to you by your health care provider. Make sure you discuss any questions you have with your health care provider.  Document Released: 03/09/2005 Document Revised: 12/15/2017 Document Reviewed: 12/15/2017  [employer] Interactive Patient Education © 2018 [employer]      Nausea and Vomiting, Adult  Nausea is the feeling that you have an upset stomach or have to vomit. As nausea gets worse, it can lead to vomiting. Vomiting occurs when stomach contents are thrown up and out of the mouth. Vomiting can make you feel weak and cause you to become dehydrated. Dehydration can make you tired and  thirsty, cause you to have a dry mouth, and decrease how often you urinate. Older adults and people with other diseases or a weak immune system are at higher risk for dehydration. It is important to treat your nausea and vomiting as told by your health care provider.  Follow these instructions at home:  Follow instructions from your health care provider about how to care for yourself at home.  Eating and drinking  Follow these recommendations as told by your health care provider:  · Take an oral rehydration solution (ORS). This is a drink that is sold at pharmacies and retail stores.  · Drink clear fluids in small amounts as you are able. Clear fluids include water, ice chips, diluted fruit juice, and low-calorie sports drinks.  · Eat bland, easy-to-digest foods in small amounts as you are able. These foods include bananas, applesauce, rice, lean meats, toast, and crackers.  · Avoid fluids that contain a lot of sugar or caffeine, such as energy drinks, sports drinks, and soda.  · Avoid alcohol.  · Avoid spicy or fatty foods.    General instructions  · Drink enough fluid to keep your urine clear or pale yellow.  · Wash your hands often. If soap and water are not available, use hand .  · Make sure that all people in your household wash their hands well and often.  · Take over-the-counter and prescription medicines only as told by your health care provider.  · Rest at home while you recover.  · Watch your condition for any changes.  · Breathe slowly and deeply when you feel nauseated.  · Keep all follow-up visits as told by your health care provider. This is important.  Contact a health care provider if:  · You have a fever.  · You cannot keep fluids down.  · Your symptoms get worse.  · You have new symptoms.  · Your nausea does not go away after two days.  · You feel light-headed or dizzy.  · You have a headache.  · You have muscle cramps.  Get help right away if:  · You have pain in your chest, neck, arm, or  jaw.  · You feel extremely weak or you faint.  · You have persistent vomiting.  · You see blood in your vomit.  · Your vomit looks like black coffee grounds.  · You have bloody or black stools or stools that look like tar.  · You have a severe headache, a stiff neck, or both.  · You have a rash.  · You have severe pain, cramping, or bloating in your abdomen.  · You have trouble breathing or you are breathing very quickly.  · Your heart is beating very quickly.  · Your skin feels cold and clammy.  · You feel confused.  · You have pain when you urinate.  · You have signs of dehydration, such as:  ? Dark urine, very little urine, or no urine.  ? Cracked lips.  ? Dry mouth.  ? Sunken eyes.  ? Sleepiness.  ? Weakness.  These symptoms may represent a serious problem that is an emergency. Do not wait to see if the symptoms will go away. Get medical help right away. Call your local emergency services (911 in the [place] Do not drive yourself to the hospital.  This information is not intended to replace advice given to you by your health care provider. Make sure you discuss any questions you have with your health care provider.  Document Released: 12/18/2006 Document Revised: 05/22/2017 Document Reviewed: 08/23/2016  [employer] Interactive Patient Education © 2018 [employer]      Urinary Tract Infection, Adult  A urinary tract infection (UTI) is an infection of any part of the urinary tract, which includes the kidneys, ureters, bladder, and urethra. These organs make, store, and get rid of urine in the body. UTI can be a bladder infection (cystitis) or kidney infection (pyelonephritis).  What are the causes?  This infection may be caused by fungi, viruses, or bacteria. Bacteria are the most common cause of UTIs. This condition can also be caused by repeated incomplete emptying of the bladder during urination.  What increases the risk?  This condition is more likely to develop if:  · You ignore your need to urinate or hold  urine for long periods of time.  · You do not empty your bladder completely during urination.  · You wipe back to front after urinating or having a bowel movement, if you are female.  · You are uncircumcised, if you are male.  · You are constipated.  · You have a urinary catheter that stays in place (indwelling).  · You have a weak defense (immune) system.  · You have a medical condition that affects your bowels, kidneys, or bladder.  · You have diabetes.  · You take antibiotic medicines frequently or for long periods of time, and the antibiotics no longer work well against certain types of infections (antibiotic resistance).  · You take medicines that irritate your urinary tract.  · You are exposed to chemicals that irritate your urinary tract.  · You are female.    What are the signs or symptoms?  Symptoms of this condition include:  · Fever.  · Frequent urination or passing small amounts of urine frequently.  · Needing to urinate urgently.  · Pain or burning with urination.  · Urine that smells bad or unusual.  · Cloudy urine.  · Pain in the lower abdomen or back.  · Trouble urinating.  · Blood in the urine.  · Vomiting or being less hungry than normal.  · Diarrhea or abdominal pain.  · Vaginal discharge, if you are female.    How is this diagnosed?  This condition is diagnosed with a medical history and physical exam. You will also need to provide a urine sample to test your urine. Other tests may be done, including:  · Blood tests.  · Sexually transmitted disease (STD) testing.    If you have had more than one UTI, a cystoscopy or imaging studies may be done to determine the cause of the infections.  How is this treated?  Treatment for this condition often includes a combination of two or more of the following:  · Antibiotic medicine.  · Other medicines to treat less common causes of UTI.  · Over-the-counter medicines to treat pain.  · Drinking enough water to stay hydrated.    Follow these instructions at  home:  · Take over-the-counter and prescription medicines only as told by your health care provider.  · If you were prescribed an antibiotic, take it as told by your health care provider. Do not stop taking the antibiotic even if you start to feel better.  · Avoid alcohol, caffeine, tea, and carbonated beverages. They can irritate your bladder.  · Drink enough fluid to keep your urine clear or pale yellow.  · Keep all follow-up visits as told by your health care provider. This is important.  · Make sure to:  ? Empty your bladder often and completely. Do not hold urine for long periods of time.  ? Empty your bladder before and after sex.  ? Wipe from front to back after a bowel movement if you are female. Use each tissue one time when you wipe.  Contact a health care provider if:  · You have back pain.  · You have a fever.  · You feel nauseous or vomit.  · Your symptoms do not get better after 3 days.  · Your symptoms go away and then return.  Get help right away if:  · You have severe back pain or lower abdominal pain.  · You are vomiting and cannot keep down any medicines or water.  This information is not intended to replace advice given to you by your health care provider. Make sure you discuss any questions you have with your health care provider.  Document Released: 09/27/2006 Document Revised: 05/31/2017 Document Reviewed: 11/07/2016  [employer] Interactive Patient Education © 2018 [employer]

## 2019-01-10 ENCOUNTER — APPOINTMENT (OUTPATIENT)
Dept: ULTRASOUND IMAGING | Facility: HOSPITAL | Age: 29
End: 2019-01-10

## 2019-01-10 ENCOUNTER — HOSPITAL ENCOUNTER (EMERGENCY)
Age: 29
Discharge: HOME | End: 2019-01-10
Payer: COMMERCIAL

## 2019-01-10 ENCOUNTER — HOSPITAL ENCOUNTER (EMERGENCY)
Facility: HOSPITAL | Age: 29
Discharge: LEFT BEFORE DISCHARGE | End: 2019-01-10
Admitting: EMERGENCY MEDICINE

## 2019-01-10 ENCOUNTER — APPOINTMENT (OUTPATIENT)
Dept: GENERAL RADIOLOGY | Facility: HOSPITAL | Age: 29
End: 2019-01-10

## 2019-01-10 VITALS
RESPIRATION RATE: 16 BRPM | DIASTOLIC BLOOD PRESSURE: 88 MMHG | TEMPERATURE: 97.8 F | HEART RATE: 70 BPM | WEIGHT: 230 LBS | SYSTOLIC BLOOD PRESSURE: 134 MMHG | BODY MASS INDEX: 36.02 KG/M2 | OXYGEN SATURATION: 98 %

## 2019-01-10 VITALS
TEMPERATURE: 97.5 F | HEART RATE: 72 BPM | BODY MASS INDEX: 36.1 KG/M2 | RESPIRATION RATE: 20 BRPM | DIASTOLIC BLOOD PRESSURE: 97 MMHG | HEIGHT: 67 IN | SYSTOLIC BLOOD PRESSURE: 161 MMHG | WEIGHT: 230 LBS | OXYGEN SATURATION: 100 %

## 2019-01-10 DIAGNOSIS — R10.9 CHRONIC ABDOMINAL PAIN: Primary | ICD-10-CM

## 2019-01-10 DIAGNOSIS — G89.29 CHRONIC ABDOMINAL PAIN: Primary | ICD-10-CM

## 2019-01-10 DIAGNOSIS — Z53.29 LEFT AGAINST MEDICAL ADVICE: ICD-10-CM

## 2019-01-10 DIAGNOSIS — J45.909 ASTHMA, UNSPECIFIED ASTHMA SEVERITY, UNSPECIFIED WHETHER COMPLICATED, UNSPECIFIED WHETHER PERSISTENT: Primary | ICD-10-CM

## 2019-01-10 DIAGNOSIS — R11.0 NAUSEA: ICD-10-CM

## 2019-01-10 LAB
ALBUMIN SERPL-MCNC: 4.8 G/DL (ref 3.5–5)
ALBUMIN/GLOB SERPL: 1.2 G/DL (ref 1.1–2.5)
ALP SERPL-CCNC: 72 U/L (ref 24–120)
ALT SERPL W P-5'-P-CCNC: 24 U/L (ref 0–54)
AMYLASE SERPL-CCNC: 79 U/L (ref 30–110)
ANION GAP SERPL CALCULATED.3IONS-SCNC: 15 MMOL/L (ref 4–13)
AST SERPL-CCNC: 38 U/L (ref 7–45)
BACTERIA SPEC AEROBE CULT: ABNORMAL
BACTERIA UR QL AUTO: ABNORMAL /HPF
BASOPHILS # BLD AUTO: 0.03 10*3/MM3 (ref 0–0.2)
BASOPHILS NFR BLD AUTO: 0.2 % (ref 0–2)
BILIRUB SERPL-MCNC: 0.4 MG/DL (ref 0.1–1)
BILIRUB UR QL STRIP: NEGATIVE
BUN BLD-MCNC: 10 MG/DL (ref 5–21)
BUN/CREAT SERPL: 14.5 (ref 7–25)
CALCIUM SPEC-SCNC: 9.6 MG/DL (ref 8.4–10.4)
CHLORIDE SERPL-SCNC: 101 MMOL/L (ref 98–110)
CLARITY UR: ABNORMAL
CO2 SERPL-SCNC: 26 MMOL/L (ref 24–31)
COLOR UR: YELLOW
CREAT BLD-MCNC: 0.69 MG/DL (ref 0.5–1.4)
DEPRECATED RDW RBC AUTO: 41.3 FL (ref 40–54)
EOSINOPHIL # BLD AUTO: 0.03 10*3/MM3 (ref 0–0.7)
EOSINOPHIL NFR BLD AUTO: 0.2 % (ref 0–4)
ERYTHROCYTE [DISTWIDTH] IN BLOOD BY AUTOMATED COUNT: 14 % (ref 12–15)
GFR SERPL CREATININE-BSD FRML MDRD: 123 ML/MIN/1.73
GLOBULIN UR ELPH-MCNC: 4 GM/DL
GLUCOSE BLD-MCNC: 117 MG/DL (ref 70–100)
GLUCOSE UR STRIP-MCNC: NEGATIVE MG/DL
HCT VFR BLD AUTO: 41.4 % (ref 37–47)
HGB BLD-MCNC: 15.1 G/DL (ref 12–16)
HGB UR QL STRIP.AUTO: ABNORMAL
HYALINE CASTS UR QL AUTO: ABNORMAL /LPF
IMM GRANULOCYTES # BLD AUTO: 0.05 10*3/MM3 (ref 0–0.03)
IMM GRANULOCYTES NFR BLD AUTO: 0.4 % (ref 0–5)
KETONES UR QL STRIP: ABNORMAL
LEUKOCYTE ESTERASE UR QL STRIP.AUTO: ABNORMAL
LIPASE SERPL-CCNC: 115 U/L (ref 23–203)
LYMPHOCYTES # BLD AUTO: 2.77 10*3/MM3 (ref 0.72–4.86)
LYMPHOCYTES NFR BLD AUTO: 21.8 % (ref 15–45)
MCH RBC QN AUTO: 30 PG (ref 28–32)
MCHC RBC AUTO-ENTMCNC: 36.5 G/DL (ref 33–36)
MCV RBC AUTO: 82.3 FL (ref 82–98)
MONOCYTES # BLD AUTO: 0.81 10*3/MM3 (ref 0.19–1.3)
MONOCYTES NFR BLD AUTO: 6.4 % (ref 4–12)
NEUTROPHILS # BLD AUTO: 9.03 10*3/MM3 (ref 1.87–8.4)
NEUTROPHILS NFR BLD AUTO: 71 % (ref 39–78)
NITRITE UR QL STRIP: NEGATIVE
NRBC BLD AUTO-RTO: 0 /100 WBC (ref 0–0)
PH UR STRIP.AUTO: 6 [PH] (ref 5–8)
PLATELET # BLD AUTO: 431 10*3/MM3 (ref 130–400)
PMV BLD AUTO: 10.5 FL (ref 6–12)
POTASSIUM BLD-SCNC: 3.6 MMOL/L (ref 3.5–5.3)
PROT SERPL-MCNC: 8.8 G/DL (ref 6.3–8.7)
PROT UR QL STRIP: ABNORMAL
RBC # BLD AUTO: 5.03 10*6/MM3 (ref 4.2–5.4)
RBC # UR: ABNORMAL /HPF
REF LAB TEST METHOD: ABNORMAL
SODIUM BLD-SCNC: 142 MMOL/L (ref 135–145)
SP GR UR STRIP: 1.02 (ref 1–1.03)
SQUAMOUS #/AREA URNS HPF: ABNORMAL /HPF
TRICHOMONAS #/AREA URNS HPF: ABNORMAL /HPF
UROBILINOGEN UR QL STRIP: ABNORMAL
WBC NRBC COR # BLD: 12.72 10*3/MM3 (ref 4.8–10.8)
WBC UR QL AUTO: ABNORMAL /HPF

## 2019-01-10 PROCEDURE — 96361 HYDRATE IV INFUSION ADD-ON: CPT

## 2019-01-10 PROCEDURE — 83690 ASSAY OF LIPASE: CPT | Performed by: PHYSICIAN ASSISTANT

## 2019-01-10 PROCEDURE — 99283 EMERGENCY DEPT VISIT LOW MDM: CPT | Performed by: NURSE PRACTITIONER

## 2019-01-10 PROCEDURE — 36415 COLL VENOUS BLD VENIPUNCTURE: CPT | Performed by: PHYSICIAN ASSISTANT

## 2019-01-10 PROCEDURE — 82150 ASSAY OF AMYLASE: CPT | Performed by: PHYSICIAN ASSISTANT

## 2019-01-10 PROCEDURE — 76830 TRANSVAGINAL US NON-OB: CPT

## 2019-01-10 PROCEDURE — 25010000002 PROMETHAZINE PER 50 MG: Performed by: PHYSICIAN ASSISTANT

## 2019-01-10 PROCEDURE — 85025 COMPLETE CBC W/AUTO DIFF WBC: CPT | Performed by: PHYSICIAN ASSISTANT

## 2019-01-10 PROCEDURE — 94664 DEMO&/EVAL PT USE INHALER: CPT

## 2019-01-10 PROCEDURE — 96372 THER/PROPH/DIAG INJ SC/IM: CPT

## 2019-01-10 PROCEDURE — 96375 TX/PRO/DX INJ NEW DRUG ADDON: CPT

## 2019-01-10 PROCEDURE — 6360000002 HC RX W HCPCS

## 2019-01-10 PROCEDURE — 94640 AIRWAY INHALATION TREATMENT: CPT

## 2019-01-10 PROCEDURE — 25010000002 KETOROLAC TROMETHAMINE PER 15 MG: Performed by: PHYSICIAN ASSISTANT

## 2019-01-10 PROCEDURE — 80053 COMPREHEN METABOLIC PANEL: CPT | Performed by: PHYSICIAN ASSISTANT

## 2019-01-10 PROCEDURE — 99283 EMERGENCY DEPT VISIT LOW MDM: CPT

## 2019-01-10 PROCEDURE — 25010000002 CEFTRIAXONE PER 250 MG: Performed by: PHYSICIAN ASSISTANT

## 2019-01-10 PROCEDURE — 99284 EMERGENCY DEPT VISIT MOD MDM: CPT

## 2019-01-10 PROCEDURE — 6370000000 HC RX 637 (ALT 250 FOR IP): Performed by: NURSE PRACTITIONER

## 2019-01-10 PROCEDURE — 74019 RADEX ABDOMEN 2 VIEWS: CPT

## 2019-01-10 PROCEDURE — 87086 URINE CULTURE/COLONY COUNT: CPT | Performed by: PHYSICIAN ASSISTANT

## 2019-01-10 PROCEDURE — 81001 URINALYSIS AUTO W/SCOPE: CPT | Performed by: PHYSICIAN ASSISTANT

## 2019-01-10 PROCEDURE — 96374 THER/PROPH/DIAG INJ IV PUSH: CPT

## 2019-01-10 RX ORDER — ALUMINA, MAGNESIA, AND SIMETHICONE 2400; 2400; 240 MG/30ML; MG/30ML; MG/30ML
10 SUSPENSION ORAL ONCE
Status: COMPLETED | OUTPATIENT
Start: 2019-01-10 | End: 2019-01-10

## 2019-01-10 RX ORDER — KETOROLAC TROMETHAMINE 30 MG/ML
30 INJECTION, SOLUTION INTRAMUSCULAR; INTRAVENOUS ONCE
Status: COMPLETED | OUTPATIENT
Start: 2019-01-10 | End: 2019-01-10

## 2019-01-10 RX ORDER — IPRATROPIUM BROMIDE AND ALBUTEROL SULFATE 2.5; .5 MG/3ML; MG/3ML
1 SOLUTION RESPIRATORY (INHALATION) ONCE
Status: COMPLETED | OUTPATIENT
Start: 2019-01-10 | End: 2019-01-10

## 2019-01-10 RX ORDER — PROMETHAZINE HYDROCHLORIDE 25 MG/1
25 TABLET ORAL EVERY 6 HOURS PRN
Qty: 10 TABLET | Refills: 0 | Status: SHIPPED | OUTPATIENT
Start: 2019-01-10 | End: 2019-08-07 | Stop reason: ALTCHOICE

## 2019-01-10 RX ORDER — PROMETHAZINE HYDROCHLORIDE 25 MG/ML
INJECTION, SOLUTION INTRAMUSCULAR; INTRAVENOUS
Status: COMPLETED
Start: 2019-01-10 | End: 2019-01-10

## 2019-01-10 RX ORDER — ALBUTEROL SULFATE 90 UG/1
2 AEROSOL, METERED RESPIRATORY (INHALATION) EVERY 4 HOURS PRN
Qty: 1 INHALER | Refills: 0 | Status: SHIPPED | OUTPATIENT
Start: 2019-01-10 | End: 2019-08-07 | Stop reason: ALTCHOICE

## 2019-01-10 RX ORDER — FAMOTIDINE 10 MG/ML
20 INJECTION, SOLUTION INTRAVENOUS ONCE
Status: COMPLETED | OUTPATIENT
Start: 2019-01-10 | End: 2019-01-10

## 2019-01-10 RX ORDER — AZITHROMYCIN 250 MG/1
1000 TABLET, FILM COATED ORAL ONCE
Status: COMPLETED | OUTPATIENT
Start: 2019-01-10 | End: 2019-01-10

## 2019-01-10 RX ORDER — PROMETHAZINE HYDROCHLORIDE 25 MG/ML
25 INJECTION, SOLUTION INTRAMUSCULAR; INTRAVENOUS ONCE
Status: COMPLETED | OUTPATIENT
Start: 2019-01-10 | End: 2019-01-10

## 2019-01-10 RX ORDER — PROMETHAZINE HYDROCHLORIDE 25 MG/ML
6.25 INJECTION, SOLUTION INTRAMUSCULAR; INTRAVENOUS ONCE
Status: COMPLETED | OUTPATIENT
Start: 2019-01-10 | End: 2019-01-10

## 2019-01-10 RX ADMIN — PROMETHAZINE HYDROCHLORIDE 25 MG: 25 INJECTION, SOLUTION INTRAMUSCULAR; INTRAVENOUS at 12:24

## 2019-01-10 RX ADMIN — ALUMINUM HYDROXIDE, MAGNESIUM HYDROXIDE, AND DIMETHICONE 10 ML: 400; 400; 40 SUSPENSION ORAL at 09:08

## 2019-01-10 RX ADMIN — KETOROLAC TROMETHAMINE 30 MG: 30 INJECTION, SOLUTION INTRAMUSCULAR at 09:40

## 2019-01-10 RX ADMIN — CEFTRIAXONE SODIUM 250 MG: 500 INJECTION, POWDER, FOR SOLUTION INTRAMUSCULAR; INTRAVENOUS at 10:31

## 2019-01-10 RX ADMIN — AZITHROMYCIN 1000 MG: 250 TABLET, FILM COATED ORAL at 10:28

## 2019-01-10 RX ADMIN — PROMETHAZINE HYDROCHLORIDE 6.25 MG: 25 INJECTION INTRAMUSCULAR; INTRAVENOUS at 09:50

## 2019-01-10 RX ADMIN — SODIUM CHLORIDE 1000 ML: 9 INJECTION, SOLUTION INTRAVENOUS at 08:46

## 2019-01-10 RX ADMIN — FAMOTIDINE 20 MG: 10 INJECTION, SOLUTION INTRAVENOUS at 09:08

## 2019-01-10 RX ADMIN — IPRATROPIUM BROMIDE AND ALBUTEROL SULFATE 1 AMPULE: .5; 3 SOLUTION RESPIRATORY (INHALATION) at 12:39

## 2019-01-10 ASSESSMENT — ENCOUNTER SYMPTOMS
SHORTNESS OF BREATH: 1
ABDOMINAL PAIN: 1
VOMITING: 1
NAUSEA: 1
DIARRHEA: 1
CONSTIPATION: 1
WHEEZING: 1

## 2019-01-10 ASSESSMENT — PAIN SCALES - GENERAL: PAINLEVEL_OUTOF10: 10

## 2019-01-10 NOTE — ED PROVIDER NOTES
"Subjective     Abdominal Pain   Associated symptoms: constipation, nausea and vomiting    Associated symptoms: no chills, no cough, no fatigue, no fever, no vaginal bleeding and no vaginal discharge      Patient is a 28-year-old female with chief complaint of recurrent abdominal pain.  The patient is somewhat difficult historian, because I was able to extrapolate that the patient is here for continued abdominal pain.  She describes that she has been experiencing abdominal discomfort off and on for the past several years.  She believes this could be related to eating.  She recalls some evidence of worsening pain after she eats.  She points to the midepigastric region and up into her chest where she experienced the most discomfort.  She has nausea and vomiting associated as well.  She describes her mother had her gallbladder removed.  Her mother given her \"something in a blue bottle that seemed to work.\"     The patient describes an exacerbation that occurred 2 days ago.  She was seen in this ED.  Patient had completed laboratory data completed with the urinalysis and CT scan of her abdomen/ pelvis.  She was diagnosed with acute UTI as well so she was prescribed cefdinir and Zofran.  The patient describes after she takes the antibiotic, her abdominal pain (points to the midepigastric region) seems to worsen.  She believes that she is vomiting more so after taking Zofran.    Patient denies associated fever.  She reports she is urinating whtout any dysuria or other urinary isseus.  She has noticed that she has not a bowel movement in quite some time; she has no idea when her last BM was.  Normally, she has daily bowel movements up to 3 times a day.    Patient denies any vaginal bleeding or discharge.  She currently has a girlfriend.    The patient did state she has an upcoming appointment with a general surgeon for her recurrent abscesses in the right axillary region. She denies drinking ETOH or taking NSAIDs on a " regular basis.    Review of Systems   Constitutional: Positive for activity change and appetite change. Negative for chills, fatigue, fever and unexpected weight change.   HENT: Negative.    Eyes: Negative.    Respiratory: Negative.  Negative for cough.    Cardiovascular: Negative.    Gastrointestinal: Positive for abdominal pain, constipation, nausea and vomiting.   Genitourinary: Negative.  Negative for decreased urine volume, vaginal bleeding, vaginal discharge and vaginal pain.   Musculoskeletal: Negative.    Skin: Negative.    Neurological: Negative.    Psychiatric/Behavioral: Negative.        Past Medical History:   Diagnosis Date   • Abscess of axilla    • Depression    • Hypertension    • Ovarian cyst        Allergies   Allergen Reactions   • Tramadol Rash   • Tylenol [Acetaminophen] Rash       History reviewed. No pertinent surgical history.    History reviewed. No pertinent family history.    Social History     Socioeconomic History   • Marital status: Single     Spouse name: Not on file   • Number of children: Not on file   • Years of education: Not on file   • Highest education level: Not on file   Tobacco Use   • Smoking status: Current Every Day Smoker     Packs/day: 0.50     Years: 12.00     Pack years: 6.00   • Smokeless tobacco: Never Used   Substance and Sexual Activity   • Alcohol use: No   • Drug use: No   • Sexual activity: Defer   Social History Narrative    single       Prior to Admission medications    Medication Sig Start Date End Date Taking? Authorizing Provider   albuterol (PROVENTIL HFA;VENTOLIN HFA) 108 (90 Base) MCG/ACT inhaler Inhale 2 puffs Every 4 (Four) Hours As Needed for Wheezing. 8/27/18   Trip Key MD   cefdinir (OMNICEF) 300 MG capsule Take 1 capsule by mouth 2 (Two) Times a Day for 10 days. 1/8/19 1/18/19  Maria Teresa Mathews APRN   ondansetron ODT (ZOFRAN-ODT) 4 MG disintegrating tablet Take 1 tablet by mouth Every 6 (Six) Hours As Needed for Nausea. 1/8/19    "Maria Teresa Mathews, APRN       Medications   sodium chloride 0.9 % bolus 1,000 mL (0 mL Intravenous Stopped 1/10/19 1116)   aluminum-magnesium hydroxide-simethicone (MAALOX MAX) 400-400-40 MG/5ML suspension 10 mL (10 mL Oral Given 1/10/19 0908)   famotidine (PEPCID) injection 20 mg (20 mg Intravenous Given 1/10/19 0908)   ketorolac (TORADOL) injection 30 mg (30 mg Intravenous Given 1/10/19 0940)   promethazine (PHENERGAN) injection 6.25 mg (6.25 mg Intravenous Given 1/10/19 0950)   cefTRIAXone (ROCEPHIN) 250 mg in lidocaine (XYLOCAINE) 1 % IM only syringe (250 mg Intramuscular Given 1/10/19 1031)   azithromycin (ZITHROMAX) tablet 1,000 mg (1,000 mg Oral Given 1/10/19 1028)       /97   Pulse 72   Temp 97.5 °F (36.4 °C)   Resp 20   Ht 170.2 cm (67\")   Wt 104 kg (230 lb)   SpO2 100%   BMI 36.02 kg/m²       Objective   Physical Exam   Constitutional: She is oriented to person, place, and time. She appears well-developed and well-nourished. No distress.   HENT:   Head: Normocephalic and atraumatic.   Eyes: Conjunctivae and EOM are normal. Pupils are equal, round, and reactive to light.   Neck: Normal range of motion. Neck supple. No tracheal deviation present.   Cardiovascular: Normal rate, regular rhythm, normal heart sounds and intact distal pulses.   No murmur heard.  Pulmonary/Chest: Effort normal and breath sounds normal.   Abdominal: Soft. Bowel sounds are normal. She exhibits no distension and no mass. There is generalized tenderness. There is no rigidity, no rebound, no guarding and no CVA tenderness.   Musculoskeletal: Normal range of motion. She exhibits no edema.   Neurological: She is alert and oriented to person, place, and time. She has normal reflexes.   Skin: Skin is warm and dry. Capillary refill takes less than 2 seconds. She is not diaphoretic.   Psychiatric: She has a normal mood and affect. Her behavior is normal. Judgment and thought content normal.   Nursing note and vitals " reviewed.      Procedures         Lab Results (last 24 hours)     Procedure Component Value Units Date/Time    Urinalysis With Culture If Indicated - Urine, Clean Catch [[id number]]  (Abnormal) Collected:  01/10/19 0841    Specimen:  Urine, Clean Catch Updated:  01/10/19 0916     Color, UA Yellow     Appearance, UA Slightly Cloudy     pH, UA 6.0     Specific Gravity, UA 1.025     Glucose, UA Negative     Ketones, UA 40 mg/dL (2+)     Bilirubin, UA Negative     Blood, UA Trace     Protein, UA 30 mg/dL (1+)     Leuk Esterase, UA Moderate (2+)     Nitrite, UA Negative     Urobilinogen, UA 0.2 E.U./dL    Urine Culture - Urine, Urine, Clean Catch [[id number]] Collected:  01/10/19 0841    Specimen:  Urine, Clean Catch Updated:  01/10/19 0853    Urinalysis, Microscopic Only - Urine, Clean Catch [[id number]]  (Abnormal) Collected:  01/10/19 0841    Specimen:  Urine, Clean Catch Updated:  01/10/19 0910     RBC, UA 0-2 /HPF      WBC, UA 6-12 /HPF      Bacteria, UA 2+ /HPF      Squamous Epithelial Cells, UA 21-30 /HPF      Hyaline Casts, UA None Seen /LPF      Trichomonas, UA Small/1+ /HPF      Methodology Automated Microscopy    CBC & Differential [[id number]] Collected:  01/10/19 0844    Specimen:  Blood Updated:  01/10/19 0900    Narrative:       The following orders were created for panel order CBC & Differential.  Procedure                               Abnormality         Status                     ---------                               -----------         ------                     CBC Auto Differential[[id number]]        Abnormal            Final result                 Please view results for these tests on the individual orders.    Comprehensive Metabolic Panel [[id number]]  (Abnormal) Collected:  01/10/19 0844    Specimen:  Blood Updated:  01/10/19 0911     Glucose 117 mg/dL      BUN 10 mg/dL      Creatinine 0.69 mg/dL      Sodium 142 mmol/L      Potassium 3.6 mmol/L      Chloride 101 mmol/L      CO2 26.0 mmol/L       Calcium 9.6 mg/dL      Total Protein 8.8 g/dL      Albumin 4.80 g/dL      ALT (SGPT) 24 U/L      AST (SGOT) 38 U/L      Alkaline Phosphatase 72 U/L      Total Bilirubin 0.4 mg/dL      eGFR   Amer 123 mL/min/1.73      Globulin 4.0 gm/dL      A/G Ratio 1.2 g/dL      BUN/Creatinine Ratio 14.5     Anion Gap 15.0 mmol/L     Lipase [[id number]]  (Normal) Collected:  01/10/19 0844    Specimen:  Blood Updated:  01/10/19 0911     Lipase 115 U/L     Amylase [[id number]]  (Normal) Collected:  01/10/19 0844    Specimen:  Blood Updated:  01/10/19 0911     Amylase 79 U/L     CBC Auto Differential [[id number]]  (Abnormal) Collected:  01/10/19 0844    Specimen:  Blood Updated:  01/10/19 0900     WBC 12.72 10*3/mm3      RBC 5.03 10*6/mm3      Hemoglobin 15.1 g/dL      Hematocrit 41.4 %      MCV 82.3 fL      MCH 30.0 pg      MCHC 36.5 g/dL      RDW 14.0 %      RDW-SD 41.3 fl      MPV 10.5 fL      Platelets 431 10*3/mm3      Neutrophil % 71.0 %      Lymphocyte % 21.8 %      Monocyte % 6.4 %      Eosinophil % 0.2 %      Basophil % 0.2 %      Immature Grans % 0.4 %      Neutrophils, Absolute 9.03 10*3/mm3      Lymphocytes, Absolute 2.77 10*3/mm3      Monocytes, Absolute 0.81 10*3/mm3      Eosinophils, Absolute 0.03 10*3/mm3      Basophils, Absolute 0.03 10*3/mm3      Immature Grans, Absolute 0.05 10*3/mm3      nRBC 0.0 /100 WBC           Xr Shoulder 2+ View Right    Result Date: 12/16/2018  Narrative: EXAMINATION: XR SHOULDER 2+ VW RIGHT- 12/16/2018 6:53 PM CST  HISTORY: Right shoulder pain after trauma  COMPARISON: None  FINDINGS: The humeral head appears appropriately located in the glenoid fossa without evidence of acute fracture or dislocation. No soft tissue abnormality is identified. No lytic or sclerotic lesions are appreciated.      Impression: No acute bony abnormality appreciated. This report was finalized on 12/16/2018 18:59 by Dr. Joesph Mendoza MD.    Xr Hand 2 View Right    Result Date: 12/16/2018  Narrative:  EXAMINATION: XR HAND 2 VW RIGHT- 12/16/2018 7:01 PM CST  HISTORY: Right hand pain after trauma  COMPARISON: None  FINDINGS: There is normal bony alignment throughout the hand without evidence of acute fracture or dislocation. No radiopaque foreign body is identified. No soft tissue abnormality is seen. An oblique view is not submitted.      Impression: No acute bony abnormality appreciated. This report was finalized on 12/16/2018 19:02 by Dr. Joesph Mendoza MD.    Xr Knee 1 Or 2 View Right    Result Date: 12/16/2018  Narrative: EXAMINATION: XR KNEE 1 OR 2 VW RIGHT- 12/16/2018 7:02 PM CST  HISTORY: Right knee pain after trauma  COMPARISON: None  FINDINGS: There is normal bony alignment at the knee without evidence of acute fracture or dislocation. No joint effusion is identified. No lytic or sclerotic lesions are seen. No radiopaque foreign body is seen.      Impression: No acute bony abnormality. This report was finalized on 12/16/2018 19:02 by Dr. Joesph Mendoza MD.    Xr Abdomen Flat & Upright    Result Date: 1/10/2019  Narrative: EXAMINATION: XR ABDOMEN FLAT AND UPRIGHT-  1/10/2019 8:49 AM CST  HISTORY: abdominal pain.  2 view abdomen series compared with 4/15/2016.  The bones and bowel gas pattern are within normal limits. No obstruction or free air.  Scattered calcified pelvic phleboliths are present.  The lung bases are clear, to the extent visualized.      Impression: 1. No acute abnormality is seen. This report was finalized on 01/10/2019 09:05 by Dr. Shubham Mireles MD.    Xr Abdomen 2 View With Chest 1 View    Result Date: 1/8/2019  Narrative: EXAMINATION: XR ABDOMEN 2 VW W CHEST 1 VW-  1/8/2019 3:40 PM CST  HISTORY: abd pain/ vomiting.  Complete Abdominal Series PCXR and 2-view abdomen study.  The heart and mediastinum are within normal limits.  The lungs are normally expanded with no pneumonia, pneumothorax, or active failure.   Bones and bowel gas pattern are within normal limits. No obstruction or free  air. No abnormal calcification is seen.      Impression: 1. No acute abnormality is seen within the chest or abdomen. This report was finalized on 01/08/2019 15:51 by Dr. Shubham Mireles MD.    Ct Abdomen Pelvis With Contrast    Result Date: 1/8/2019  Narrative: EXAMINATION:  CT ABDOMEN PELVIS W CONTRAST-  1/8/2019 5:34 PM CST  HISTORY: N39.0-Urinary tract infection, site not specified; R11.2-Nausea with vomiting, unspecified; R19.7-Diarrhea, unspecified  TECHNIQUE: Spiral CT was performed of the abdomen and pelvis with contrast. Multiplanar images were reconstructed.  DLP: 449 mGy-cm. Automated dosage control was utilized.  COMPARISON: 12/8/2018.  LUNG BASES: The lung bases are clear.  LIVER AND SPLEEN: There is fatty infiltration of the liver. There are no dense gallstones. The spleen is unremarkable.  PANCREAS: No pancreatic mass or inflammatory change.  KIDNEYS AND ADRENALS: There is some motion artifact. The adrenal glands and kidneys are unremarkable. The urinary bladder is not very well distended.  BOWEL: No oral contrast administered. There is some breathing motion artifact. Small bowel is nondilated. The appendix is normal. No visible acute bowel abnormality.  OTHER: There is a 2.7 cm dominant right ovarian follicle. The left ovary and uterus are unremarkable. No acute bony abnormality is seen.      Impression: 1. No acute findings. 2. There is a 2.7 cm dominant right ovarian follicle.  The full report of this exam was immediately signed and available to the emergency room. The patient is currently in the emergency room. This report was finalized on 01/08/2019 18:02 by Dr. Tucker Lambert MD.      ED Course         Patient has been reassessed on several occasions.  She did have slight improvement with Toradol.  The patient came back from pelvic ultrasound and was crying.  She reports that she needed to return her vehicle to her mom or otherwise she would be in trouble. Risks and benefits have been discussed  about leaving AMA.  Patient voiced understanding and has chosen to leave against medical advice.      Prior to leaving AMA, I did discuss possible differentials of her abdominal pain. She did cosent to a pelvic exam but left AMA before that could be completed or treatment for her trich.  She is aware to followup with her PCP and possibly general surgeon for her chronic upper abdominal pain worst post prandially.     MDM    Final diagnoses:   Chronic abdominal pain   Left against medical advice          Abdulkadir Spangler PA  01/10/19 1145

## 2019-01-12 LAB — BACTERIA SPEC AEROBE CULT: ABNORMAL

## 2019-01-16 ENCOUNTER — HOSPITAL ENCOUNTER (EMERGENCY)
Facility: HOSPITAL | Age: 29
Discharge: LEFT BEFORE DISCHARGE | End: 2019-01-16

## 2019-01-18 ENCOUNTER — APPOINTMENT (OUTPATIENT)
Dept: GENERAL RADIOLOGY | Facility: HOSPITAL | Age: 29
End: 2019-01-18

## 2019-01-18 ENCOUNTER — HOSPITAL ENCOUNTER (EMERGENCY)
Facility: HOSPITAL | Age: 29
Discharge: HOME | End: 2019-01-18
Attending: EMERGENCY MEDICINE | Admitting: EMERGENCY MEDICINE

## 2019-01-18 VITALS
RESPIRATION RATE: 16 BRPM | WEIGHT: 231 LBS | TEMPERATURE: 98.3 F | OXYGEN SATURATION: 97 % | SYSTOLIC BLOOD PRESSURE: 148 MMHG | DIASTOLIC BLOOD PRESSURE: 80 MMHG | BODY MASS INDEX: 36.26 KG/M2 | HEIGHT: 67 IN | HEART RATE: 95 BPM

## 2019-01-18 DIAGNOSIS — S80.02XA CONTUSION OF LEFT KNEE, INITIAL ENCOUNTER: ICD-10-CM

## 2019-01-18 DIAGNOSIS — N39.0 ACUTE UTI (URINARY TRACT INFECTION): ICD-10-CM

## 2019-01-18 DIAGNOSIS — A64 STD (FEMALE): Primary | ICD-10-CM

## 2019-01-18 LAB
AMORPH URATE CRY URNS QL MICRO: ABNORMAL /HPF
B-HCG UR QL: NEGATIVE
BACTERIA UR QL AUTO: ABNORMAL /HPF
BILIRUB UR QL STRIP: NEGATIVE
CLARITY UR: ABNORMAL
CLUE CELLS SPEC QL WET PREP: ABNORMAL
COD CRY URNS QL: ABNORMAL /HPF
COLOR UR: YELLOW
GLUCOSE UR STRIP-MCNC: NEGATIVE MG/DL
HGB UR QL STRIP.AUTO: NEGATIVE
HYALINE CASTS UR QL AUTO: ABNORMAL /LPF
HYDATID CYST SPEC WET PREP: ABNORMAL
INTERNAL NEGATIVE CONTROL: NEGATIVE
INTERNAL POSITIVE CONTROL: POSITIVE
KETONES UR QL STRIP: NEGATIVE
LEUKOCYTE ESTERASE UR QL STRIP.AUTO: ABNORMAL
Lab: NORMAL
NITRITE UR QL STRIP: NEGATIVE
PH UR STRIP.AUTO: 7 [PH] (ref 5–8)
PROT UR QL STRIP: ABNORMAL
RBC # UR: ABNORMAL /HPF
REF LAB TEST METHOD: ABNORMAL
SP GR UR STRIP: 1.02 (ref 1–1.03)
SQUAMOUS #/AREA URNS HPF: ABNORMAL /HPF
T VAGINALIS SPEC QL WET PREP: ABNORMAL
UROBILINOGEN UR QL STRIP: ABNORMAL
WBC SPEC QL WET PREP: ABNORMAL
WBC UR QL AUTO: ABNORMAL /HPF
YEAST GENITAL QL WET PREP: ABNORMAL

## 2019-01-18 PROCEDURE — 87081 CULTURE SCREEN ONLY: CPT | Performed by: EMERGENCY MEDICINE

## 2019-01-18 PROCEDURE — 87491 CHLMYD TRACH DNA AMP PROBE: CPT | Performed by: EMERGENCY MEDICINE

## 2019-01-18 PROCEDURE — 87086 URINE CULTURE/COLONY COUNT: CPT | Performed by: EMERGENCY MEDICINE

## 2019-01-18 PROCEDURE — 99283 EMERGENCY DEPT VISIT LOW MDM: CPT

## 2019-01-18 PROCEDURE — P9612 CATHETERIZE FOR URINE SPEC: HCPCS

## 2019-01-18 PROCEDURE — 87591 N.GONORRHOEAE DNA AMP PROB: CPT | Performed by: EMERGENCY MEDICINE

## 2019-01-18 PROCEDURE — 73562 X-RAY EXAM OF KNEE 3: CPT

## 2019-01-18 PROCEDURE — 81025 URINE PREGNANCY TEST: CPT | Performed by: EMERGENCY MEDICINE

## 2019-01-18 PROCEDURE — 81001 URINALYSIS AUTO W/SCOPE: CPT | Performed by: EMERGENCY MEDICINE

## 2019-01-18 PROCEDURE — 87210 SMEAR WET MOUNT SALINE/INK: CPT | Performed by: EMERGENCY MEDICINE

## 2019-01-18 RX ORDER — AZITHROMYCIN 1 G
1 PACKET (EA) ORAL ONCE
Qty: 1 PACKET | Refills: 0 | Status: SHIPPED | OUTPATIENT
Start: 2019-01-18 | End: 2019-01-18

## 2019-01-18 RX ORDER — DOXYCYCLINE HYCLATE 100 MG/1
100 TABLET, DELAYED RELEASE ORAL 2 TIMES DAILY
Qty: 14 TABLET | Refills: 0 | Status: SHIPPED | OUTPATIENT
Start: 2019-01-18 | End: 2019-08-10

## 2019-01-18 RX ORDER — METRONIDAZOLE 500 MG/1
500 TABLET ORAL 3 TIMES DAILY
Qty: 21 TABLET | Refills: 0 | Status: SHIPPED | OUTPATIENT
Start: 2019-01-18 | End: 2019-08-10

## 2019-01-18 NOTE — DISCHARGE INSTRUCTIONS
Follow up with one of the [facility] physician groups below to setup primary care. If you have trouble following up, please call the [facility] Nurse Line at [phone]    (Dr. Ketan Blair DO, Dr. Mike Callejas DO,  DEIDRA Acosta, and DEIDRA Tsai)  [facility], Primary Care   [address] [address], [address], [place], [place] [id number] [phone]     (Dr. Caro Rosenberg MD, DEIDRA Hanks, and DEIDRA Pierson)  [facility], Primary Care   [facility] [address], [place] [address] [phone]    (Dr. Carlos Baker MD and Dr. Audie Cherry MD)  [facility], Primary Care  [address], [address] [phone]    (Dr. Iglesia Alonso MD)  [facility], Primary Care  [address] [facility], [facility] [place], [address] [phone]  Sexually Transmitted Disease  A sexually transmitted disease (STD) is a disease or infection that may be passed (transmitted) from person to person, usually during sexual activity. This may happen by way of saliva, semen, blood, vaginal mucus, or urine. Common STDs include:  · Gonorrhea.  · Chlamydia.  · Syphilis.  · HIV and AIDS.  · Genital herpes.  · Hepatitis B and C.  · Trichomonas.  · Human papillomavirus (HPV).  · Pubic lice.  · Scabies.  · Mites.  · Bacterial vaginosis.    What are the causes?  An STD may be caused by bacteria, a virus, or parasites. STDs are often transmitted during sexual activity if one person is infected. However, they may also be transmitted through nonsexual means. STDs may be transmitted after:  · Sexual intercourse with an infected person.  · Sharing sex toys with an infected person.  · Sharing needles with an infected person or using unclean piercing or tattoo needles.  · Having intimate contact with the genitals, mouth, or rectal areas of an infected person.  · Exposure to infected fluids  during birth.    What are the signs or symptoms?  Different STDs have different symptoms. Some people may not have any symptoms. If symptoms are present, they may include:  · Painful or bloody urination.  · Pain in the pelvis, abdomen, vagina, anus, throat, or eyes.  · A skin rash, itching, or irritation.  · Growths, ulcerations, blisters, or sores in the genital and anal areas.  · Abnormal vaginal discharge with or without bad odor.  · Penile discharge in men.  · Fever.  · Pain or bleeding during sexual intercourse.  · Swollen glands in the groin area.  · Yellow skin and eyes (jaundice). This is seen with hepatitis.  · Swollen testicles.  · Infertility.  · Sores and blisters in the mouth.    How is this diagnosed?  To make a diagnosis, your health care provider may:  · Take a medical history.  · Perform a physical exam.  · Take a sample of any discharge to examine.  · Swab the throat, cervix, opening to the penis, rectum, or vagina for testing.  · Test a sample of your first morning urine.  · Perform blood tests.  · Perform a Pap test, if this applies.  · Perform a colposcopy.  · Perform a laparoscopy.    How is this treated?  Treatment depends on the STD. Some STDs may be treated but not cured.  · Chlamydia, gonorrhea, trichomonas, and syphilis can be cured with antibiotic medicine.  · Genital herpes, hepatitis, and HIV can be treated, but not cured, with prescribed medicines. The medicines lessen symptoms.  · Genital warts from HPV can be treated with medicine or by freezing, burning (electrocautery), or surgery. Warts may come back.  · HPV cannot be cured with medicine or surgery. However, abnormal areas may be removed from the cervix, vagina, or vulva.  · If your diagnosis is confirmed, your recent sexual partners need treatment. This is true even if they are symptom-free or have a negative culture or evaluation. They should not have sex until their health care providers say it is okay.  · Your health care  provider may test you for infection again 3 months after treatment.    How is this prevented?  Take these steps to reduce your risk of getting an STD:  · Use latex condoms, dental dams, and water-soluble lubricants during sexual activity. Do not use petroleum jelly or oils.  · Avoid having multiple sex partners.  · Do not have sex with someone who has other sex partners.  · Do not have sex with anyone you do not know or who is at high risk for an STD.  · Avoid risky sex practices that can break your skin.  · Do not have sex if you have open sores on your mouth or skin.  · Avoid drinking too much alcohol or taking illegal drugs. Alcohol and drugs can affect your judgment and put you in a vulnerable position.  · Avoid engaging in oral and anal sex acts.  · Get vaccinated for HPV and hepatitis. If you have not received these vaccines in the past, talk to your health care provider about whether one or both might be right for you.  · If you are at risk of being infected with HIV, it is recommended that you take a prescription medicine daily to prevent HIV infection. This is called pre-exposure prophylaxis (PrEP). You are considered at risk if:  ? You are a man who has sex with other men (MSM).  ? You are a heterosexual man or woman and are sexually active with more than one partner.  ? You take drugs by injection.  ? You are sexually active with a partner who has HIV.  · Talk with your health care provider about whether you are at high risk of being infected with HIV. If you choose to begin PrEP, you should first be tested for HIV. You should then be tested every 3 months for as long as you are taking PrEP.    Contact a health care provider if:  · See your health care provider.  · Tell your sexual partner(s). They should be tested and treated for any STDs.  · Do not have sex until your health care provider says it is okay.  Get help right away if:  Contact your health care provider right away if:  · You have severe  abdominal pain.  · You are a man and notice swelling or pain in your testicles.  · You are a woman and notice swelling or pain in your vagina.    This information is not intended to replace advice given to you by your health care provider. Make sure you discuss any questions you have with your health care provider.  Document Released: 03/09/2004 Document Revised: 07/07/2017 Document Reviewed: 07/08/2014  [employer] Interactive Patient Education © 2018 [employer]  Have self and sexual partner evelauated and treated for all stds

## 2019-01-18 NOTE — ED PROVIDER NOTES
Subjective     Fall   Mechanism of injury: fall    Injury location:  Leg  Leg injury location:  L knee  Incident location:  Street  Arrived directly from scene: no    Fall:     Fall occurred:  Walking    Impact surface:  Hard floor  Associated symptoms: no abdominal pain and no nausea    Vaginal Discharge   Quality:  Dark, malodorous, thick and mucoid  Severity:  Moderate  Onset quality:  Gradual  Timing:  Constant  Progression:  Worsening  Chronicity:  Recurrent  Context: after intercourse and at rest    Context: not during bowel movement, not during intercourse, not during urination, not genital trauma and not recent antibiotic use    Relieved by:  Nothing  Worsened by:  Nothing  Ineffective treatments:  OTC medications  Associated symptoms: genital lesions, urinary frequency and vaginal itching    Associated symptoms: no abdominal pain, no dysuria, no nausea, no rash, no urinary hesitancy and no urinary incontinence    Risk factors: new sexual partner, PID, STI, STI exposure and unprotected sex    Risk factors: no endometriosis, no foreign body, no gynecological surgery and no immunosuppression        Review of Systems   Constitutional: Negative.    HENT: Negative.    Eyes: Negative.    Respiratory: Negative.    Cardiovascular: Negative.    Gastrointestinal: Negative for abdominal pain and nausea.   Endocrine: Negative.    Genitourinary: Positive for vaginal discharge. Negative for bladder incontinence, dysuria and hesitancy.   Skin: Negative.    Neurological: Negative.    Hematological: Negative.    All other systems reviewed and are negative.      Past Medical History:   Diagnosis Date   • Abscess of axilla    • Depression    • Hypertension    • Ovarian cyst        Allergies   Allergen Reactions   • Tramadol Rash   • Tylenol [Acetaminophen] Rash       Past Surgical History:   Procedure Laterality Date   • NO PAST SURGERIES         History reviewed. No pertinent family history.    Social History      Socioeconomic History   • Marital status: Single     Spouse name: Not on file   • Number of children: Not on file   • Years of education: Not on file   • Highest education level: Not on file   Tobacco Use   • Smoking status: Current Every Day Smoker     Packs/day: 0.50     Years: 12.00     Pack years: 6.00   • Smokeless tobacco: Never Used   Substance and Sexual Activity   • Alcohol use: No   • Drug use: No   • Sexual activity: Defer   Social History Narrative    single           Objective   Physical Exam   Constitutional: She is oriented to person, place, and time. She appears well-developed and well-nourished.   HENT:   Head: Normocephalic and atraumatic.   Eyes: Conjunctivae and EOM are normal. Pupils are equal, round, and reactive to light.   Neck: Normal range of motion.   Cardiovascular: Normal rate, regular rhythm and normal heart sounds.   Pulmonary/Chest: Effort normal and breath sounds normal.   Abdominal: Soft. Bowel sounds are normal.   Genitourinary: There is tenderness on the right labia. There is no rash, lesion or injury on the right labia. There is tenderness on the left labia. There is no rash, lesion or injury on the left labia. Cervix exhibits motion tenderness. Right adnexum displays no mass and no tenderness. Left adnexum displays no mass and no tenderness. There is tenderness in the vagina. No erythema in the vagina. No foreign body in the vagina. No signs of injury around the vagina. Vaginal discharge found.   Musculoskeletal:        Left knee: She exhibits normal range of motion, no swelling, no effusion, no ecchymosis, no deformity, normal alignment and no LCL laxity. Tenderness found.   Neurological: She is alert and oriented to person, place, and time. She has normal reflexes.   Skin: Skin is warm and dry.   Psychiatric: She has a normal mood and affect.   Nursing note and vitals reviewed.      Procedures           ED Course  ED Course as of Jan 18 0636   Fri Jan 18, 2019   0610 Std  and their complications explained   [TS]      ED Course User Index  [TS] Ignacio Gill MD                  [facility]      Final diagnoses:   STD (female)   Contusion of left knee, initial encounter   Acute UTI (urinary tract infection)            Ignacio Gill MD  01/18/19 0557       Ignacio Gill MD  01/18/19 0615       Ignacio Gill MD  01/18/19 0636

## 2019-01-20 LAB — BACTERIA SPEC AEROBE CULT: ABNORMAL

## 2019-01-21 LAB
BACTERIA SPEC AEROBE CULT: NORMAL
C TRACH RRNA SPEC DONR QL NAA+PROBE: NEGATIVE
N GONORRHOEA DNA SPEC QL NAA+PROBE: NEGATIVE

## 2019-08-07 ENCOUNTER — HOSPITAL ENCOUNTER (EMERGENCY)
Age: 29
Discharge: HOME | End: 2019-08-07
Attending: EMERGENCY MEDICINE
Payer: COMMERCIAL

## 2019-08-07 VITALS
HEIGHT: 67 IN | DIASTOLIC BLOOD PRESSURE: 63 MMHG | OXYGEN SATURATION: 95 % | BODY MASS INDEX: 34.84 KG/M2 | RESPIRATION RATE: 16 BRPM | WEIGHT: 222 LBS | TEMPERATURE: 98.8 F | SYSTOLIC BLOOD PRESSURE: 118 MMHG | HEART RATE: 70 BPM

## 2019-08-07 DIAGNOSIS — R10.30 LOWER ABDOMINAL PAIN: Primary | ICD-10-CM

## 2019-08-07 DIAGNOSIS — R11.0 NAUSEA: ICD-10-CM

## 2019-08-07 LAB
ALBUMIN SERPL-MCNC: 3.9 G/DL (ref 3.5–5.2)
ALP BLD-CCNC: 58 U/L (ref 35–104)
ALT SERPL-CCNC: 10 U/L (ref 5–33)
ANION GAP SERPL CALCULATED.3IONS-SCNC: 9 MMOL/L (ref 7–19)
AST SERPL-CCNC: 17 U/L (ref 5–32)
BACTERIA: ABNORMAL /HPF
BILIRUB SERPL-MCNC: 0.4 MG/DL (ref 0.2–1.2)
BILIRUBIN DIRECT: 0.1 MG/DL (ref 0–0.3)
BILIRUBIN URINE: NEGATIVE
BILIRUBIN, INDIRECT: 0.3 MG/DL (ref 0.1–1)
BLOOD, URINE: NEGATIVE
BUN BLDV-MCNC: 10 MG/DL (ref 6–20)
CALCIUM SERPL-MCNC: 9 MG/DL (ref 8.6–10)
CHLORIDE BLD-SCNC: 103 MMOL/L (ref 98–111)
CLARITY: ABNORMAL
CO2: 23 MMOL/L (ref 22–29)
COLOR: YELLOW
CREAT SERPL-MCNC: 0.6 MG/DL (ref 0.5–0.9)
EPITHELIAL CELLS, UA: ABNORMAL /HPF
GFR NON-AFRICAN AMERICAN: >60
GLUCOSE BLD-MCNC: 99 MG/DL (ref 74–109)
GLUCOSE URINE: NEGATIVE MG/DL
HCG(URINE) PREGNANCY TEST: NEGATIVE
HCT VFR BLD CALC: 39.6 % (ref 37–47)
HEMOGLOBIN: 14.1 G/DL (ref 12–16)
KETONES, URINE: NEGATIVE MG/DL
LEUKOCYTE ESTERASE, URINE: ABNORMAL
LIPASE: 22 U/L (ref 13–60)
MCH RBC QN AUTO: 31.1 PG (ref 27–31)
MCHC RBC AUTO-ENTMCNC: 35.6 G/DL (ref 33–37)
MCV RBC AUTO: 87.4 FL (ref 81–99)
NITRITE, URINE: NEGATIVE
PDW BLD-RTO: 13.2 % (ref 11.5–14.5)
PH UA: 6 (ref 5–8)
PLATELET # BLD: 255 K/UL (ref 130–400)
PMV BLD AUTO: 10 FL (ref 9.4–12.3)
POTASSIUM REFLEX MAGNESIUM: 4.3 MMOL/L (ref 3.5–5)
PROTEIN UA: NEGATIVE MG/DL
RBC # BLD: 4.53 M/UL (ref 4.2–5.4)
RBC UA: ABNORMAL /HPF (ref 0–2)
SODIUM BLD-SCNC: 135 MMOL/L (ref 136–145)
SPECIFIC GRAVITY UA: 1.03 (ref 1–1.03)
TOTAL PROTEIN: 7.7 G/DL (ref 6.6–8.7)
UROBILINOGEN, URINE: 0.2 E.U./DL
WBC # BLD: 7 K/UL (ref 4.8–10.8)
WBC UA: ABNORMAL /HPF (ref 0–5)

## 2019-08-07 PROCEDURE — 80076 HEPATIC FUNCTION PANEL: CPT

## 2019-08-07 PROCEDURE — 6360000002 HC RX W HCPCS: Performed by: EMERGENCY MEDICINE

## 2019-08-07 PROCEDURE — 99283 EMERGENCY DEPT VISIT LOW MDM: CPT

## 2019-08-07 PROCEDURE — 85027 COMPLETE CBC AUTOMATED: CPT

## 2019-08-07 PROCEDURE — 83690 ASSAY OF LIPASE: CPT

## 2019-08-07 PROCEDURE — 36415 COLL VENOUS BLD VENIPUNCTURE: CPT

## 2019-08-07 PROCEDURE — 80048 BASIC METABOLIC PNL TOTAL CA: CPT

## 2019-08-07 PROCEDURE — 81001 URINALYSIS AUTO W/SCOPE: CPT

## 2019-08-07 PROCEDURE — 84703 CHORIONIC GONADOTROPIN ASSAY: CPT

## 2019-08-07 PROCEDURE — 6370000000 HC RX 637 (ALT 250 FOR IP): Performed by: EMERGENCY MEDICINE

## 2019-08-07 PROCEDURE — 96374 THER/PROPH/DIAG INJ IV PUSH: CPT

## 2019-08-07 PROCEDURE — 99283 EMERGENCY DEPT VISIT LOW MDM: CPT | Performed by: EMERGENCY MEDICINE

## 2019-08-07 RX ORDER — HYOSCYAMINE SULFATE 0.12 MG/1
0.12 TABLET SUBLINGUAL
Qty: 30 EACH | Refills: 0 | Status: SHIPPED | OUTPATIENT
Start: 2019-08-07 | End: 2019-12-04

## 2019-08-07 RX ORDER — ONDANSETRON 4 MG/1
4 TABLET, FILM COATED ORAL EVERY 8 HOURS PRN
Qty: 30 TABLET | Refills: 0 | Status: SHIPPED | OUTPATIENT
Start: 2019-08-07 | End: 2019-12-04 | Stop reason: ALTCHOICE

## 2019-08-07 RX ORDER — ONDANSETRON 2 MG/ML
4 INJECTION INTRAMUSCULAR; INTRAVENOUS ONCE
Status: COMPLETED | OUTPATIENT
Start: 2019-08-07 | End: 2019-08-07

## 2019-08-07 RX ADMIN — HYOSCYAMINE SULFATE 125 MCG: 0.12 TABLET ORAL; SUBLINGUAL at 14:36

## 2019-08-07 RX ADMIN — ONDANSETRON 4 MG: 2 INJECTION INTRAMUSCULAR; INTRAVENOUS at 14:35

## 2019-08-07 ASSESSMENT — ENCOUNTER SYMPTOMS
COUGH: 0
NAUSEA: 1
VOMITING: 0
DIARRHEA: 0
SHORTNESS OF BREATH: 0
VOICE CHANGE: 0
EYE REDNESS: 0
ABDOMINAL PAIN: 1
EYE PAIN: 0
RHINORRHEA: 0

## 2019-08-07 ASSESSMENT — PAIN SCALES - GENERAL: PAINLEVEL_OUTOF10: 9

## 2019-08-07 NOTE — ED PROVIDER NOTES
[facility] EMERGENCY DEPT  EMERGENCY DEPARTMENT ENCOUNTER      Pt Name: Elijah Wild  MRN: [id number]  [place] 1990  Date of evaluation: 8/7/2019  Provider: Mary Jane Martinez MD    CHIEF COMPLAINT       Chief Complaint   Patient presents with    Abdominal Pain         HISTORY OF PRESENT ILLNESS   (Location/Symptom, Timing/Onset,Context/Setting, Quality, Duration, Modifying Factors, Severity)  Note limiting factors. Elijah Wild is a 34 y.o. female who presents to the emergency department for evaluation of generalized abdominal pain, worse in lower abdomen and occasionally radiating to lateral back associated with nausea. States she has had dry heaves but no vomiting. Denies any diarrhea, fevers, or other associated symptoms. No previous abdominal surgeries. Denies any vaginal bleeding, vaginal discharge. No new sexual partners but states it would not be impossible for her to have contracted an STD, but does not believe that she has. HPI    NursingNotes were reviewed. REVIEW OF SYSTEMS    (2-9 systems for level 4, 10 or more for level 5)     Review of Systems   Constitutional: Negative for fatigue and fever. HENT: Negative for congestion, rhinorrhea and voice change. Eyes: Negative for pain and redness. Respiratory: Negative for cough and shortness of breath. Cardiovascular: Negative for chest pain. Gastrointestinal: Positive for abdominal pain and nausea. Negative for diarrhea and vomiting. Endocrine: Negative. Genitourinary: Negative. Musculoskeletal: Negative for arthralgias and gait problem. Skin: Negative for rash and wound. Neurological: Negative for weakness and headaches. Hematological: Negative. Psychiatric/Behavioral: Negative. All other systems reviewed and are negative. A complete review of systems was performed and is negative except as noted above in the HPI.        PAST MEDICAL HISTORY     Past Medical History:   Diagnosis Date    Ovarian

## 2019-08-10 ENCOUNTER — HOSPITAL ENCOUNTER (EMERGENCY)
Facility: HOSPITAL | Age: 29
Discharge: HOME | End: 2019-08-10
Admitting: EMERGENCY MEDICINE

## 2019-08-10 ENCOUNTER — APPOINTMENT (OUTPATIENT)
Dept: GENERAL RADIOLOGY | Facility: HOSPITAL | Age: 29
End: 2019-08-10

## 2019-08-10 VITALS
SYSTOLIC BLOOD PRESSURE: 158 MMHG | HEART RATE: 60 BPM | OXYGEN SATURATION: 100 % | TEMPERATURE: 97.8 F | RESPIRATION RATE: 16 BRPM | BODY MASS INDEX: 35.31 KG/M2 | WEIGHT: 225 LBS | DIASTOLIC BLOOD PRESSURE: 89 MMHG | HEIGHT: 67 IN

## 2019-08-10 VITALS
WEIGHT: 225 LBS | OXYGEN SATURATION: 95 % | HEART RATE: 78 BPM | RESPIRATION RATE: 16 BRPM | TEMPERATURE: 98.5 F | DIASTOLIC BLOOD PRESSURE: 92 MMHG | SYSTOLIC BLOOD PRESSURE: 159 MMHG | BODY MASS INDEX: 35.31 KG/M2 | HEIGHT: 67 IN

## 2019-08-10 DIAGNOSIS — S86.911A STRAIN OF RIGHT KNEE, INITIAL ENCOUNTER: Primary | ICD-10-CM

## 2019-08-10 DIAGNOSIS — S86.911A STRAIN OF KNEE, RIGHT, INITIAL ENCOUNTER: Primary | ICD-10-CM

## 2019-08-10 PROCEDURE — 25010000002 ONDANSETRON PER 1 MG: Performed by: NURSE PRACTITIONER

## 2019-08-10 PROCEDURE — 73562 X-RAY EXAM OF KNEE 3: CPT

## 2019-08-10 PROCEDURE — 96372 THER/PROPH/DIAG INJ SC/IM: CPT

## 2019-08-10 PROCEDURE — 99283 EMERGENCY DEPT VISIT LOW MDM: CPT

## 2019-08-10 RX ORDER — NAPROXEN 500 MG/1
500 TABLET ORAL 2 TIMES DAILY PRN
Qty: 10 TABLET | Refills: 0 | Status: SHIPPED | OUTPATIENT
Start: 2019-08-10 | End: 2019-08-15

## 2019-08-10 RX ORDER — ONDANSETRON 2 MG/ML
4 INJECTION INTRAMUSCULAR; INTRAVENOUS ONCE
Status: COMPLETED | OUTPATIENT
Start: 2019-08-10 | End: 2019-08-10

## 2019-08-10 RX ADMIN — ONDANSETRON 4 MG: 2 INJECTION INTRAMUSCULAR; INTRAVENOUS at 20:46

## 2019-08-10 RX ADMIN — HYDROMORPHONE HYDROCHLORIDE 1 MG: 1 INJECTION, SOLUTION INTRAMUSCULAR; INTRAVENOUS; SUBCUTANEOUS at 20:47

## 2019-08-11 NOTE — ED PROVIDER NOTES
Subjective     Leg Pain   Location:  Knee  Injury: no (denies any known pain; reports pain with bearing weight)    Knee location:  R knee  Pain details:     Quality:  Throbbing    Severity:  Moderate    Timing:  Constant  Chronicity:  New  Worsened by:  Bearing weight  Ineffective treatments:  NSAIDs  Associated symptoms: no decreased ROM and no swelling    Risk factors: no concern for non-accidental trauma        Review of Systems   Constitutional: Negative.    HENT: Negative.    Respiratory: Negative.    Cardiovascular: Negative.    Gastrointestinal: Negative.    Musculoskeletal:        Positive for right knee pain   All other systems reviewed and are negative.      Past Medical History:   Diagnosis Date   • Abscess of axilla    • Depression    • Hypertension    • Ovarian cyst        Allergies   Allergen Reactions   • Tramadol Rash       Past Surgical History:   Procedure Laterality Date   • NO PAST SURGERIES         No family history on file.    Social History     Socioeconomic History   • Marital status: Single     Spouse name: Not on file   • Number of children: Not on file   • Years of education: Not on file   • Highest education level: Not on file   Tobacco Use   • Smoking status: Current Every Day Smoker     Packs/day: 0.50     Years: 12.00     Pack years: 6.00   • Smokeless tobacco: Never Used   Substance and Sexual Activity   • Alcohol use: No   • Drug use: No   • Sexual activity: Defer   Social History Narrative    single           Objective   Physical Exam   Constitutional: She is oriented to person, place, and time. She appears well-developed and well-nourished.   HENT:   Head: Normocephalic and atraumatic.   Nose: Nose normal.   Mouth/Throat: Oropharynx is clear and moist.   Eyes: Conjunctivae and EOM are normal. Pupils are equal, round, and reactive to light.   Neck: Normal range of motion. Neck supple.   Cardiovascular: Normal rate, regular rhythm, normal heart sounds and intact distal pulses.    Pulmonary/Chest: Effort normal and breath sounds normal.   Abdominal: Soft. Bowel sounds are normal.   Musculoskeletal: Normal range of motion. She exhibits tenderness. She exhibits no edema or deformity.   Pain to palpation to the anterior and lateral aspect of the right knee; range of motion intact however with pain, neurovascular intact, no obvious deformity or swelling noted   Neurological: She is alert and oriented to person, place, and time.   Skin: Skin is warm and dry. Capillary refill takes less than 2 seconds.   Psychiatric: She has a normal mood and affect. Her behavior is normal. Judgment and thought content normal.   Nursing note and vitals reviewed.      Procedures           ED Course x-rays were performed earlier today and were negative for acute findings.  Patient was provided a knee brace and instructed to follow-up with orthopedics. We fitted patient for crutches and as instructed earlier will need to f/u with orthopedics for re-evaluation.                   MDM  Number of Diagnoses or Management Options  Strain of knee, right, initial encounter: new and requires workup     Amount and/or Complexity of Data Reviewed  Decide to obtain previous medical records or to obtain history from someone other than the patient: yes    Risk of Complications, Morbidity, and/or Mortality  Presenting problems: low  Diagnostic procedures: low  Management options: low    Patient Progress  Patient progress: improved        Final diagnoses:   Strain of knee, right, initial encounter            Renetta Valadez, APRN  08/10/19 2052

## 2019-12-04 ENCOUNTER — APPOINTMENT (OUTPATIENT)
Dept: CT IMAGING | Age: 29
End: 2019-12-04
Payer: MEDICAID

## 2019-12-04 ENCOUNTER — HOSPITAL ENCOUNTER (EMERGENCY)
Age: 29
Discharge: HOME | End: 2019-12-04
Attending: EMERGENCY MEDICINE
Payer: MEDICAID

## 2019-12-04 ENCOUNTER — APPOINTMENT (OUTPATIENT)
Dept: GENERAL RADIOLOGY | Age: 29
End: 2019-12-04
Payer: MEDICAID

## 2019-12-04 VITALS
OXYGEN SATURATION: 98 % | SYSTOLIC BLOOD PRESSURE: 134 MMHG | DIASTOLIC BLOOD PRESSURE: 84 MMHG | TEMPERATURE: 97.5 F | HEART RATE: 78 BPM | RESPIRATION RATE: 17 BRPM

## 2019-12-04 DIAGNOSIS — J45.21 MILD INTERMITTENT ASTHMA WITH ACUTE EXACERBATION: Primary | ICD-10-CM

## 2019-12-04 DIAGNOSIS — R06.00 DYSPNEA, UNSPECIFIED TYPE: ICD-10-CM

## 2019-12-04 DIAGNOSIS — F19.10 DRUG ABUSE (HCC): ICD-10-CM

## 2019-12-04 DIAGNOSIS — Z72.0 TOBACCO ABUSE: ICD-10-CM

## 2019-12-04 LAB
ALBUMIN SERPL-MCNC: 4.5 G/DL (ref 3.5–5.2)
ALP BLD-CCNC: 64 U/L (ref 35–104)
ALT SERPL-CCNC: 20 U/L (ref 5–33)
AMPHETAMINE SCREEN, URINE: NEGATIVE
ANION GAP SERPL CALCULATED.3IONS-SCNC: 17 MMOL/L (ref 7–19)
AST SERPL-CCNC: 24 U/L (ref 5–32)
BARBITURATE SCREEN URINE: NEGATIVE
BASOPHILS ABSOLUTE: 0.1 K/UL (ref 0–0.2)
BASOPHILS RELATIVE PERCENT: 0.5 % (ref 0–1)
BENZODIAZEPINE SCREEN, URINE: NEGATIVE
BILIRUB SERPL-MCNC: 0.3 MG/DL (ref 0.2–1.2)
BUN BLDV-MCNC: 13 MG/DL (ref 6–20)
CALCIUM SERPL-MCNC: 9.5 MG/DL (ref 8.6–10)
CANNABINOID SCREEN URINE: POSITIVE
CHLORIDE BLD-SCNC: 103 MMOL/L (ref 98–111)
CO2: 20 MMOL/L (ref 22–29)
COCAINE METABOLITE SCREEN URINE: NEGATIVE
CREAT SERPL-MCNC: 0.8 MG/DL (ref 0.5–0.9)
D DIMER: 0.6 UG/ML FEU (ref 0–0.48)
EOSINOPHILS ABSOLUTE: 0.3 K/UL (ref 0–0.6)
EOSINOPHILS RELATIVE PERCENT: 2.3 % (ref 0–5)
GFR NON-AFRICAN AMERICAN: >60
GLUCOSE BLD-MCNC: 120 MG/DL (ref 74–109)
HCG QUALITATIVE: NEGATIVE
HCT VFR BLD CALC: 43.5 % (ref 37–47)
HEMOGLOBIN: 15.8 G/DL (ref 12–16)
IMMATURE GRANULOCYTES #: 0 K/UL
INR BLD: 1.07 (ref 0.88–1.18)
LYMPHOCYTES ABSOLUTE: 4.8 K/UL (ref 1.1–4.5)
LYMPHOCYTES RELATIVE PERCENT: 44.6 % (ref 20–40)
Lab: ABNORMAL
MCH RBC QN AUTO: 31 PG (ref 27–31)
MCHC RBC AUTO-ENTMCNC: 36.3 G/DL (ref 33–37)
MCV RBC AUTO: 85.3 FL (ref 81–99)
MONOCYTES ABSOLUTE: 1.1 K/UL (ref 0–0.9)
MONOCYTES RELATIVE PERCENT: 9.8 % (ref 0–10)
NEUTROPHILS ABSOLUTE: 4.6 K/UL (ref 1.5–7.5)
NEUTROPHILS RELATIVE PERCENT: 42.5 % (ref 50–65)
OPIATE SCREEN URINE: NEGATIVE
PDW BLD-RTO: 13.4 % (ref 11.5–14.5)
PLATELET # BLD: 321 K/UL (ref 130–400)
PMV BLD AUTO: 10.1 FL (ref 9.4–12.3)
POTASSIUM SERPL-SCNC: 3.2 MMOL/L (ref 3.5–5)
PRO-BNP: 24 PG/ML (ref 0–450)
PROTHROMBIN TIME: 13.3 SEC (ref 12–14.6)
RAPID INFLUENZA  B AGN: NEGATIVE
RAPID INFLUENZA A AGN: NEGATIVE
RBC # BLD: 5.1 M/UL (ref 4.2–5.4)
SODIUM BLD-SCNC: 140 MMOL/L (ref 136–145)
TOTAL CK: 507 U/L (ref 26–192)
TOTAL PROTEIN: 8.2 G/DL (ref 6.6–8.7)
TROPONIN: <0.01 NG/ML (ref 0–0.03)
WBC # BLD: 10.7 K/UL (ref 4.8–10.8)

## 2019-12-04 PROCEDURE — 99285 EMERGENCY DEPT VISIT HI MDM: CPT

## 2019-12-04 PROCEDURE — 85025 COMPLETE CBC W/AUTO DIFF WBC: CPT

## 2019-12-04 PROCEDURE — 96374 THER/PROPH/DIAG INJ IV PUSH: CPT

## 2019-12-04 PROCEDURE — 6360000002 HC RX W HCPCS: Performed by: EMERGENCY MEDICINE

## 2019-12-04 PROCEDURE — 85610 PROTHROMBIN TIME: CPT

## 2019-12-04 PROCEDURE — 80307 DRUG TEST PRSMV CHEM ANLYZR: CPT

## 2019-12-04 PROCEDURE — 93005 ELECTROCARDIOGRAM TRACING: CPT | Performed by: EMERGENCY MEDICINE

## 2019-12-04 PROCEDURE — 84703 CHORIONIC GONADOTROPIN ASSAY: CPT

## 2019-12-04 PROCEDURE — 96375 TX/PRO/DX INJ NEW DRUG ADDON: CPT

## 2019-12-04 PROCEDURE — 82550 ASSAY OF CK (CPK): CPT

## 2019-12-04 PROCEDURE — 71275 CT ANGIOGRAPHY CHEST: CPT

## 2019-12-04 PROCEDURE — 6360000004 HC RX CONTRAST MEDICATION: Performed by: EMERGENCY MEDICINE

## 2019-12-04 PROCEDURE — 85379 FIBRIN DEGRADATION QUANT: CPT

## 2019-12-04 PROCEDURE — 6370000000 HC RX 637 (ALT 250 FOR IP): Performed by: EMERGENCY MEDICINE

## 2019-12-04 PROCEDURE — 94640 AIRWAY INHALATION TREATMENT: CPT

## 2019-12-04 PROCEDURE — 80053 COMPREHEN METABOLIC PANEL: CPT

## 2019-12-04 PROCEDURE — 84484 ASSAY OF TROPONIN QUANT: CPT

## 2019-12-04 PROCEDURE — 36415 COLL VENOUS BLD VENIPUNCTURE: CPT

## 2019-12-04 PROCEDURE — 2580000003 HC RX 258: Performed by: EMERGENCY MEDICINE

## 2019-12-04 PROCEDURE — 71045 X-RAY EXAM CHEST 1 VIEW: CPT

## 2019-12-04 PROCEDURE — 83880 ASSAY OF NATRIURETIC PEPTIDE: CPT

## 2019-12-04 PROCEDURE — 87804 INFLUENZA ASSAY W/OPTIC: CPT

## 2019-12-04 RX ORDER — ONDANSETRON 4 MG/1
4 TABLET, ORALLY DISINTEGRATING ORAL EVERY 8 HOURS PRN
Qty: 30 TABLET | Refills: 0 | Status: SHIPPED | OUTPATIENT
Start: 2019-12-04 | End: 2019-12-26 | Stop reason: SDUPTHER

## 2019-12-04 RX ORDER — ALBUTEROL SULFATE 90 UG/1
2 AEROSOL, METERED RESPIRATORY (INHALATION) 4 TIMES DAILY PRN
Qty: 1 INHALER | Refills: 0 | Status: SHIPPED | OUTPATIENT
Start: 2019-12-04 | End: 2021-05-26 | Stop reason: ALTCHOICE

## 2019-12-04 RX ORDER — IPRATROPIUM BROMIDE AND ALBUTEROL SULFATE 2.5; .5 MG/3ML; MG/3ML
1 SOLUTION RESPIRATORY (INHALATION) ONCE
Status: COMPLETED | OUTPATIENT
Start: 2019-12-04 | End: 2019-12-04

## 2019-12-04 RX ORDER — DEXAMETHASONE SODIUM PHOSPHATE 10 MG/ML
4 INJECTION, SOLUTION INTRAMUSCULAR; INTRAVENOUS ONCE
Status: COMPLETED | OUTPATIENT
Start: 2019-12-04 | End: 2019-12-04

## 2019-12-04 RX ORDER — ONDANSETRON 2 MG/ML
4 INJECTION INTRAMUSCULAR; INTRAVENOUS ONCE
Status: COMPLETED | OUTPATIENT
Start: 2019-12-04 | End: 2019-12-04

## 2019-12-04 RX ORDER — METHYLPREDNISOLONE 4 MG/1
TABLET ORAL
Qty: 1 KIT | Refills: 0 | Status: SHIPPED | OUTPATIENT
Start: 2019-12-04 | End: 2019-12-10

## 2019-12-04 RX ORDER — POTASSIUM CHLORIDE 20 MEQ/1
40 TABLET, EXTENDED RELEASE ORAL ONCE
Status: COMPLETED | OUTPATIENT
Start: 2019-12-04 | End: 2019-12-04

## 2019-12-04 RX ORDER — 0.9 % SODIUM CHLORIDE 0.9 %
1000 INTRAVENOUS SOLUTION INTRAVENOUS ONCE
Status: COMPLETED | OUTPATIENT
Start: 2019-12-04 | End: 2019-12-04

## 2019-12-04 RX ADMIN — POTASSIUM CHLORIDE 40 MEQ: 1500 TABLET, EXTENDED RELEASE ORAL at 07:37

## 2019-12-04 RX ADMIN — DEXAMETHASONE SODIUM PHOSPHATE 4 MG: 10 INJECTION, SOLUTION INTRAMUSCULAR; INTRAVENOUS at 08:44

## 2019-12-04 RX ADMIN — IPRATROPIUM BROMIDE AND ALBUTEROL SULFATE 1 AMPULE: .5; 3 SOLUTION RESPIRATORY (INHALATION) at 07:20

## 2019-12-04 RX ADMIN — IOPAMIDOL 90 ML: 755 INJECTION, SOLUTION INTRAVENOUS at 07:54

## 2019-12-04 RX ADMIN — ONDANSETRON 4 MG: 2 INJECTION INTRAMUSCULAR; INTRAVENOUS at 08:44

## 2019-12-04 RX ADMIN — SODIUM CHLORIDE 1000 ML: 9 INJECTION, SOLUTION INTRAVENOUS at 06:44

## 2019-12-04 ASSESSMENT — ENCOUNTER SYMPTOMS
BACK PAIN: 0
SHORTNESS OF BREATH: 1
COUGH: 1
ABDOMINAL PAIN: 0
RHINORRHEA: 1

## 2019-12-04 ASSESSMENT — PAIN DESCRIPTION - LOCATION: LOCATION: CHEST

## 2019-12-04 ASSESSMENT — PAIN SCALES - GENERAL: PAINLEVEL_OUTOF10: 8

## 2019-12-05 LAB
EKG P AXIS: 48 DEGREES
EKG P-R INTERVAL: 154 MS
EKG Q-T INTERVAL: 340 MS
EKG QRS DURATION: 80 MS
EKG QTC CALCULATION (BAZETT): 422 MS
EKG T AXIS: 29 DEGREES

## 2019-12-15 ENCOUNTER — APPOINTMENT (OUTPATIENT)
Dept: GENERAL RADIOLOGY | Age: 29
End: 2019-12-15
Payer: MEDICAID

## 2019-12-15 ENCOUNTER — HOSPITAL ENCOUNTER (EMERGENCY)
Age: 29
Discharge: HOME | End: 2019-12-15
Payer: MEDICAID

## 2019-12-15 VITALS
HEART RATE: 79 BPM | WEIGHT: 220 LBS | SYSTOLIC BLOOD PRESSURE: 121 MMHG | DIASTOLIC BLOOD PRESSURE: 70 MMHG | HEIGHT: 67 IN | RESPIRATION RATE: 18 BRPM | OXYGEN SATURATION: 95 % | BODY MASS INDEX: 34.53 KG/M2 | TEMPERATURE: 97.8 F

## 2019-12-15 DIAGNOSIS — S49.92XA INJURY OF LEFT UPPER EXTREMITY, INITIAL ENCOUNTER: Primary | ICD-10-CM

## 2019-12-15 PROCEDURE — 73030 X-RAY EXAM OF SHOULDER: CPT

## 2019-12-15 PROCEDURE — 99283 EMERGENCY DEPT VISIT LOW MDM: CPT

## 2019-12-15 PROCEDURE — 73060 X-RAY EXAM OF HUMERUS: CPT

## 2019-12-15 PROCEDURE — 73090 X-RAY EXAM OF FOREARM: CPT

## 2019-12-15 ASSESSMENT — PAIN DESCRIPTION - LOCATION: LOCATION: ARM

## 2019-12-15 ASSESSMENT — PAIN DESCRIPTION - ORIENTATION: ORIENTATION: LEFT

## 2019-12-15 ASSESSMENT — PAIN DESCRIPTION - DESCRIPTORS: DESCRIPTORS: SHARP

## 2019-12-15 ASSESSMENT — PAIN SCALES - GENERAL: PAINLEVEL_OUTOF10: 10

## 2019-12-16 ASSESSMENT — ENCOUNTER SYMPTOMS
CONSTIPATION: 0
BACK PAIN: 0
SHORTNESS OF BREATH: 0
ABDOMINAL DISTENTION: 0
COLOR CHANGE: 0
VOMITING: 0
EYE REDNESS: 0
CHEST TIGHTNESS: 0
SORE THROAT: 0
STRIDOR: 0
EYE PAIN: 0
EYE ITCHING: 0
WHEEZING: 0
ABDOMINAL PAIN: 0
DIARRHEA: 0
PHOTOPHOBIA: 0
TROUBLE SWALLOWING: 0
NAUSEA: 0
SINUS PAIN: 0
EYE DISCHARGE: 0
ALLERGIC/IMMUNOLOGIC NEGATIVE: 1
BLOOD IN STOOL: 0

## 2019-12-26 ENCOUNTER — APPOINTMENT (OUTPATIENT)
Dept: GENERAL RADIOLOGY | Age: 29
End: 2019-12-26

## 2019-12-26 ENCOUNTER — APPOINTMENT (OUTPATIENT)
Dept: CT IMAGING | Age: 29
End: 2019-12-26

## 2019-12-26 ENCOUNTER — HOSPITAL ENCOUNTER (EMERGENCY)
Age: 29
Discharge: HOME | End: 2019-12-26

## 2019-12-26 VITALS
BODY MASS INDEX: 34.53 KG/M2 | OXYGEN SATURATION: 95 % | SYSTOLIC BLOOD PRESSURE: 118 MMHG | HEIGHT: 67 IN | TEMPERATURE: 97.2 F | RESPIRATION RATE: 18 BRPM | DIASTOLIC BLOOD PRESSURE: 67 MMHG | HEART RATE: 68 BPM | WEIGHT: 220 LBS

## 2019-12-26 DIAGNOSIS — R10.84 GENERALIZED ABDOMINAL PAIN: Primary | ICD-10-CM

## 2019-12-26 DIAGNOSIS — R07.89 ATYPICAL CHEST PAIN: ICD-10-CM

## 2019-12-26 LAB
ALBUMIN SERPL-MCNC: 4.4 G/DL (ref 3.5–5.2)
ALP BLD-CCNC: 64 U/L (ref 35–104)
ALT SERPL-CCNC: 12 U/L (ref 5–33)
AMPHETAMINE SCREEN, URINE: NEGATIVE
ANION GAP SERPL CALCULATED.3IONS-SCNC: 16 MMOL/L (ref 7–19)
AST SERPL-CCNC: 18 U/L (ref 5–32)
BARBITURATE SCREEN URINE: NEGATIVE
BASOPHILS ABSOLUTE: 0 K/UL (ref 0–0.2)
BASOPHILS RELATIVE PERCENT: 0.5 % (ref 0–1)
BENZODIAZEPINE SCREEN, URINE: NEGATIVE
BILIRUB SERPL-MCNC: <0.2 MG/DL (ref 0.2–1.2)
BILIRUBIN URINE: NEGATIVE
BLOOD, URINE: NEGATIVE
BUN BLDV-MCNC: 10 MG/DL (ref 6–20)
CALCIUM SERPL-MCNC: 8.9 MG/DL (ref 8.6–10)
CANNABINOID SCREEN URINE: POSITIVE
CHLORIDE BLD-SCNC: 103 MMOL/L (ref 98–111)
CLARITY: ABNORMAL
CO2: 20 MMOL/L (ref 22–29)
COCAINE METABOLITE SCREEN URINE: NEGATIVE
COLOR: YELLOW
CREAT SERPL-MCNC: 0.6 MG/DL (ref 0.5–0.9)
D DIMER: 0.55 UG/ML FEU (ref 0–0.48)
EOSINOPHILS ABSOLUTE: 0.1 K/UL (ref 0–0.6)
EOSINOPHILS RELATIVE PERCENT: 0.7 % (ref 0–5)
GFR NON-AFRICAN AMERICAN: >60
GLUCOSE BLD-MCNC: 113 MG/DL (ref 74–109)
GLUCOSE URINE: NEGATIVE MG/DL
HCG QUALITATIVE: NEGATIVE
HCT VFR BLD CALC: 43.1 % (ref 37–47)
HEMOGLOBIN: 15.6 G/DL (ref 12–16)
IMMATURE GRANULOCYTES #: 0.1 K/UL
KETONES, URINE: 15 MG/DL
LEUKOCYTE ESTERASE, URINE: NEGATIVE
LIPASE: 20 U/L (ref 13–60)
LYMPHOCYTES ABSOLUTE: 1.5 K/UL (ref 1.1–4.5)
LYMPHOCYTES RELATIVE PERCENT: 16.8 % (ref 20–40)
Lab: ABNORMAL
MCH RBC QN AUTO: 31 PG (ref 27–31)
MCHC RBC AUTO-ENTMCNC: 36.2 G/DL (ref 33–37)
MCV RBC AUTO: 85.7 FL (ref 81–99)
MONOCYTES ABSOLUTE: 0.6 K/UL (ref 0–0.9)
MONOCYTES RELATIVE PERCENT: 6.7 % (ref 0–10)
NEUTROPHILS ABSOLUTE: 6.6 K/UL (ref 1.5–7.5)
NEUTROPHILS RELATIVE PERCENT: 74.7 % (ref 50–65)
NITRITE, URINE: NEGATIVE
OPIATE SCREEN URINE: POSITIVE
PDW BLD-RTO: 13.7 % (ref 11.5–14.5)
PH UA: 7.5 (ref 5–8)
PLATELET # BLD: 310 K/UL (ref 130–400)
PMV BLD AUTO: 10.1 FL (ref 9.4–12.3)
POTASSIUM REFLEX MAGNESIUM: 4 MMOL/L (ref 3.5–5)
PROTEIN UA: NEGATIVE MG/DL
RAPID INFLUENZA  B AGN: NEGATIVE
RAPID INFLUENZA A AGN: NEGATIVE
RBC # BLD: 5.03 M/UL (ref 4.2–5.4)
SODIUM BLD-SCNC: 139 MMOL/L (ref 136–145)
SPECIFIC GRAVITY UA: 1.04 (ref 1–1.03)
TOTAL PROTEIN: 7.7 G/DL (ref 6.6–8.7)
TROPONIN: <0.01 NG/ML (ref 0–0.03)
URINE REFLEX TO CULTURE: ABNORMAL
UROBILINOGEN, URINE: 0.2 E.U./DL
WBC # BLD: 8.8 K/UL (ref 4.8–10.8)

## 2019-12-26 PROCEDURE — 84484 ASSAY OF TROPONIN QUANT: CPT

## 2019-12-26 PROCEDURE — 84703 CHORIONIC GONADOTROPIN ASSAY: CPT

## 2019-12-26 PROCEDURE — 99999 PR OFFICE/OUTPT VISIT,PROCEDURE ONLY: CPT | Performed by: NURSE PRACTITIONER

## 2019-12-26 PROCEDURE — 74177 CT ABD & PELVIS W/CONTRAST: CPT

## 2019-12-26 PROCEDURE — 83690 ASSAY OF LIPASE: CPT

## 2019-12-26 PROCEDURE — 99285 EMERGENCY DEPT VISIT HI MDM: CPT

## 2019-12-26 PROCEDURE — 85025 COMPLETE CBC W/AUTO DIFF WBC: CPT

## 2019-12-26 PROCEDURE — 71275 CT ANGIOGRAPHY CHEST: CPT

## 2019-12-26 PROCEDURE — 81003 URINALYSIS AUTO W/O SCOPE: CPT

## 2019-12-26 PROCEDURE — 2580000003 HC RX 258: Performed by: NURSE PRACTITIONER

## 2019-12-26 PROCEDURE — 96374 THER/PROPH/DIAG INJ IV PUSH: CPT

## 2019-12-26 PROCEDURE — 85379 FIBRIN DEGRADATION QUANT: CPT

## 2019-12-26 PROCEDURE — 6360000002 HC RX W HCPCS: Performed by: NURSE PRACTITIONER

## 2019-12-26 PROCEDURE — 87804 INFLUENZA ASSAY W/OPTIC: CPT

## 2019-12-26 PROCEDURE — 6360000002 HC RX W HCPCS

## 2019-12-26 PROCEDURE — 36415 COLL VENOUS BLD VENIPUNCTURE: CPT

## 2019-12-26 PROCEDURE — 96375 TX/PRO/DX INJ NEW DRUG ADDON: CPT

## 2019-12-26 PROCEDURE — 71046 X-RAY EXAM CHEST 2 VIEWS: CPT

## 2019-12-26 PROCEDURE — 80307 DRUG TEST PRSMV CHEM ANLYZR: CPT

## 2019-12-26 PROCEDURE — 80053 COMPREHEN METABOLIC PANEL: CPT

## 2019-12-26 PROCEDURE — 93005 ELECTROCARDIOGRAM TRACING: CPT | Performed by: NURSE PRACTITIONER

## 2019-12-26 PROCEDURE — 6360000004 HC RX CONTRAST MEDICATION: Performed by: NURSE PRACTITIONER

## 2019-12-26 RX ORDER — 0.9 % SODIUM CHLORIDE 0.9 %
1000 INTRAVENOUS SOLUTION INTRAVENOUS ONCE
Status: COMPLETED | OUTPATIENT
Start: 2019-12-26 | End: 2019-12-26

## 2019-12-26 RX ORDER — PROMETHAZINE HYDROCHLORIDE 25 MG/ML
12.5 INJECTION, SOLUTION INTRAMUSCULAR; INTRAVENOUS ONCE
Status: COMPLETED | OUTPATIENT
Start: 2019-12-26 | End: 2019-12-26

## 2019-12-26 RX ORDER — MORPHINE SULFATE 4 MG/ML
INJECTION, SOLUTION INTRAMUSCULAR; INTRAVENOUS
Status: DISCONTINUED
Start: 2019-12-26 | End: 2019-12-26 | Stop reason: HOSPADM

## 2019-12-26 RX ORDER — ONDANSETRON 4 MG/1
4 TABLET, ORALLY DISINTEGRATING ORAL EVERY 8 HOURS PRN
Qty: 30 TABLET | Refills: 0 | Status: SHIPPED | OUTPATIENT
Start: 2019-12-26 | End: 2020-01-30

## 2019-12-26 RX ORDER — PROMETHAZINE HYDROCHLORIDE 25 MG/ML
INJECTION, SOLUTION INTRAMUSCULAR; INTRAVENOUS
Status: COMPLETED
Start: 2019-12-26 | End: 2019-12-26

## 2019-12-26 RX ORDER — MORPHINE SULFATE 4 MG/ML
4 INJECTION, SOLUTION INTRAMUSCULAR; INTRAVENOUS ONCE
Status: COMPLETED | OUTPATIENT
Start: 2019-12-26 | End: 2019-12-26

## 2019-12-26 RX ADMIN — PROMETHAZINE HYDROCHLORIDE 12.5 MG: 25 INJECTION, SOLUTION INTRAMUSCULAR; INTRAVENOUS at 15:34

## 2019-12-26 RX ADMIN — IOPAMIDOL 90 ML: 755 INJECTION, SOLUTION INTRAVENOUS at 17:31

## 2019-12-26 RX ADMIN — SODIUM CHLORIDE 1000 ML: 9 INJECTION, SOLUTION INTRAVENOUS at 15:15

## 2019-12-26 RX ADMIN — PROMETHAZINE HYDROCHLORIDE 12.5 MG: 25 INJECTION INTRAMUSCULAR; INTRAVENOUS at 15:34

## 2019-12-26 RX ADMIN — MORPHINE SULFATE 4 MG: 4 INJECTION, SOLUTION INTRAMUSCULAR; INTRAVENOUS at 15:15

## 2019-12-26 ASSESSMENT — PAIN SCALES - GENERAL
PAINLEVEL_OUTOF10: 10
PAINLEVEL_OUTOF10: 10
PAINLEVEL_OUTOF10: 0

## 2019-12-26 ASSESSMENT — PAIN DESCRIPTION - LOCATION: LOCATION: CHEST

## 2019-12-26 ASSESSMENT — ENCOUNTER SYMPTOMS
COUGH: 1
VOMITING: 1
ABDOMINAL PAIN: 1
NAUSEA: 1

## 2019-12-26 ASSESSMENT — PAIN DESCRIPTION - DESCRIPTORS: DESCRIPTORS: STABBING

## 2019-12-27 ENCOUNTER — HOSPITAL ENCOUNTER (EMERGENCY)
Age: 29
Discharge: HOME | End: 2019-12-27
Attending: EMERGENCY MEDICINE
Payer: MEDICAID

## 2019-12-27 VITALS
HEART RATE: 76 BPM | WEIGHT: 220 LBS | TEMPERATURE: 97.8 F | OXYGEN SATURATION: 92 % | BODY MASS INDEX: 34.53 KG/M2 | DIASTOLIC BLOOD PRESSURE: 94 MMHG | HEIGHT: 67 IN | SYSTOLIC BLOOD PRESSURE: 134 MMHG | RESPIRATION RATE: 14 BRPM

## 2019-12-27 DIAGNOSIS — R11.2 NAUSEA AND VOMITING, INTRACTABILITY OF VOMITING NOT SPECIFIED, UNSPECIFIED VOMITING TYPE: Primary | ICD-10-CM

## 2019-12-27 LAB
ALBUMIN SERPL-MCNC: 4.4 G/DL (ref 3.5–5.2)
ALP BLD-CCNC: 63 U/L (ref 35–104)
ALT SERPL-CCNC: 13 U/L (ref 5–33)
ANION GAP SERPL CALCULATED.3IONS-SCNC: 16 MMOL/L (ref 7–19)
AST SERPL-CCNC: 17 U/L (ref 5–32)
BASOPHILS ABSOLUTE: 0 K/UL (ref 0–0.2)
BASOPHILS RELATIVE PERCENT: 0.2 % (ref 0–1)
BILIRUB SERPL-MCNC: 0.4 MG/DL (ref 0.2–1.2)
BUN BLDV-MCNC: 10 MG/DL (ref 6–20)
CALCIUM SERPL-MCNC: 9.4 MG/DL (ref 8.6–10)
CHLORIDE BLD-SCNC: 101 MMOL/L (ref 98–111)
CO2: 19 MMOL/L (ref 22–29)
CREAT SERPL-MCNC: 0.5 MG/DL (ref 0.5–0.9)
EOSINOPHILS ABSOLUTE: 0 K/UL (ref 0–0.6)
EOSINOPHILS RELATIVE PERCENT: 0 % (ref 0–5)
GFR NON-AFRICAN AMERICAN: >60
GLUCOSE BLD-MCNC: 136 MG/DL (ref 74–109)
HCT VFR BLD CALC: 42.4 % (ref 37–47)
HEMOGLOBIN: 15.6 G/DL (ref 12–16)
IMMATURE GRANULOCYTES #: 0.1 K/UL
LYMPHOCYTES ABSOLUTE: 1.1 K/UL (ref 1.1–4.5)
LYMPHOCYTES RELATIVE PERCENT: 10.1 % (ref 20–40)
MCH RBC QN AUTO: 31.3 PG (ref 27–31)
MCHC RBC AUTO-ENTMCNC: 36.8 G/DL (ref 33–37)
MCV RBC AUTO: 85 FL (ref 81–99)
MONOCYTES ABSOLUTE: 0.5 K/UL (ref 0–0.9)
MONOCYTES RELATIVE PERCENT: 4 % (ref 0–10)
NEUTROPHILS ABSOLUTE: 9.6 K/UL (ref 1.5–7.5)
NEUTROPHILS RELATIVE PERCENT: 85.2 % (ref 50–65)
PDW BLD-RTO: 13.5 % (ref 11.5–14.5)
PLATELET # BLD: 322 K/UL (ref 130–400)
PMV BLD AUTO: 10.3 FL (ref 9.4–12.3)
POTASSIUM SERPL-SCNC: 3.8 MMOL/L (ref 3.5–5)
RBC # BLD: 4.99 M/UL (ref 4.2–5.4)
SODIUM BLD-SCNC: 136 MMOL/L (ref 136–145)
TOTAL PROTEIN: 7.9 G/DL (ref 6.6–8.7)
WBC # BLD: 11.3 K/UL (ref 4.8–10.8)

## 2019-12-27 PROCEDURE — 2580000003 HC RX 258: Performed by: EMERGENCY MEDICINE

## 2019-12-27 PROCEDURE — 6360000002 HC RX W HCPCS: Performed by: EMERGENCY MEDICINE

## 2019-12-27 PROCEDURE — 96361 HYDRATE IV INFUSION ADD-ON: CPT

## 2019-12-27 PROCEDURE — 36415 COLL VENOUS BLD VENIPUNCTURE: CPT

## 2019-12-27 PROCEDURE — 85025 COMPLETE CBC W/AUTO DIFF WBC: CPT

## 2019-12-27 PROCEDURE — 80053 COMPREHEN METABOLIC PANEL: CPT

## 2019-12-27 PROCEDURE — 99283 EMERGENCY DEPT VISIT LOW MDM: CPT

## 2019-12-27 PROCEDURE — 96374 THER/PROPH/DIAG INJ IV PUSH: CPT

## 2019-12-27 RX ORDER — SODIUM CHLORIDE, SODIUM LACTATE, POTASSIUM CHLORIDE, AND CALCIUM CHLORIDE .6; .31; .03; .02 G/100ML; G/100ML; G/100ML; G/100ML
1000 INJECTION, SOLUTION INTRAVENOUS ONCE
Status: COMPLETED | OUTPATIENT
Start: 2019-12-27 | End: 2019-12-27

## 2019-12-27 RX ORDER — ONDANSETRON 2 MG/ML
4 INJECTION INTRAMUSCULAR; INTRAVENOUS ONCE
Status: COMPLETED | OUTPATIENT
Start: 2019-12-27 | End: 2019-12-27

## 2019-12-27 RX ADMIN — SODIUM CHLORIDE, POTASSIUM CHLORIDE, SODIUM LACTATE AND CALCIUM CHLORIDE 1000 ML: 600; 310; 30; 20 INJECTION, SOLUTION INTRAVENOUS at 05:39

## 2019-12-27 RX ADMIN — ONDANSETRON 4 MG: 2 INJECTION INTRAMUSCULAR; INTRAVENOUS at 05:39

## 2019-12-27 ASSESSMENT — PAIN SCALES - GENERAL: PAINLEVEL_OUTOF10: 7

## 2019-12-27 ASSESSMENT — PAIN DESCRIPTION - PAIN TYPE: TYPE: ACUTE PAIN

## 2019-12-27 ASSESSMENT — PAIN DESCRIPTION - ORIENTATION: ORIENTATION: RIGHT;LEFT;MID

## 2019-12-27 ASSESSMENT — PAIN DESCRIPTION - DESCRIPTORS: DESCRIPTORS: SHARP

## 2019-12-27 ASSESSMENT — PAIN DESCRIPTION - FREQUENCY: FREQUENCY: INTERMITTENT

## 2019-12-27 ASSESSMENT — PAIN DESCRIPTION - LOCATION: LOCATION: ABDOMEN

## 2019-12-29 LAB
EKG P AXIS: 46 DEGREES
EKG P-R INTERVAL: 180 MS
EKG Q-T INTERVAL: 404 MS
EKG QRS DURATION: 86 MS
EKG QTC CALCULATION (BAZETT): 412 MS
EKG T AXIS: 21 DEGREES

## 2020-01-02 ENCOUNTER — APPOINTMENT (OUTPATIENT)
Dept: GENERAL RADIOLOGY | Facility: HOSPITAL | Age: 30
End: 2020-01-02

## 2020-01-02 ENCOUNTER — HOSPITAL ENCOUNTER (EMERGENCY)
Facility: HOSPITAL | Age: 30
Discharge: HOME | End: 2020-01-02
Attending: INTERNAL MEDICINE | Admitting: INTERNAL MEDICINE

## 2020-01-02 ENCOUNTER — APPOINTMENT (OUTPATIENT)
Dept: CT IMAGING | Facility: HOSPITAL | Age: 30
End: 2020-01-02

## 2020-01-02 VITALS
OXYGEN SATURATION: 96 % | HEART RATE: 84 BPM | WEIGHT: 229.4 LBS | RESPIRATION RATE: 18 BRPM | DIASTOLIC BLOOD PRESSURE: 94 MMHG | SYSTOLIC BLOOD PRESSURE: 160 MMHG | BODY MASS INDEX: 36 KG/M2 | TEMPERATURE: 97.5 F | HEIGHT: 67 IN

## 2020-01-02 DIAGNOSIS — N30.00 ACUTE CYSTITIS WITHOUT HEMATURIA: Primary | ICD-10-CM

## 2020-01-02 LAB
ALBUMIN SERPL-MCNC: 4.2 G/DL (ref 3.5–5.2)
ALBUMIN/GLOB SERPL: 1.3 G/DL
ALP SERPL-CCNC: 53 U/L (ref 39–117)
ALT SERPL W P-5'-P-CCNC: 17 U/L (ref 1–33)
AMORPH URATE CRY URNS QL MICRO: ABNORMAL /HPF
AMPHET+METHAMPHET UR QL: NEGATIVE
AMPHETAMINES UR QL: NEGATIVE
ANION GAP SERPL CALCULATED.3IONS-SCNC: 16 MMOL/L (ref 5–15)
AST SERPL-CCNC: 17 U/L (ref 1–32)
BACTERIA UR QL AUTO: ABNORMAL /HPF
BARBITURATES UR QL SCN: NEGATIVE
BASOPHILS # BLD AUTO: 0.06 10*3/MM3 (ref 0–0.2)
BASOPHILS NFR BLD AUTO: 0.5 % (ref 0–1.5)
BENZODIAZ UR QL SCN: NEGATIVE
BILIRUB SERPL-MCNC: 0.3 MG/DL (ref 0.2–1.2)
BILIRUB UR QL STRIP: NEGATIVE
BUN BLD-MCNC: 10 MG/DL (ref 6–20)
BUN/CREAT SERPL: 14.5 (ref 7–25)
BUPRENORPHINE SERPL-MCNC: NEGATIVE NG/ML
CALCIUM SPEC-SCNC: 8.8 MG/DL (ref 8.6–10.5)
CANNABINOIDS SERPL QL: POSITIVE
CHLORIDE SERPL-SCNC: 103 MMOL/L (ref 98–107)
CLARITY UR: ABNORMAL
CO2 SERPL-SCNC: 20 MMOL/L (ref 22–29)
COCAINE UR QL: NEGATIVE
COLOR UR: YELLOW
CREAT BLD-MCNC: 0.69 MG/DL (ref 0.57–1)
D-LACTATE SERPL-SCNC: 2.4 MMOL/L (ref 0.5–2)
DEPRECATED RDW RBC AUTO: 41.2 FL (ref 37–54)
EOSINOPHIL # BLD AUTO: 0.17 10*3/MM3 (ref 0–0.4)
EOSINOPHIL NFR BLD AUTO: 1.4 % (ref 0.3–6.2)
ERYTHROCYTE [DISTWIDTH] IN BLOOD BY AUTOMATED COUNT: 13.7 % (ref 12.3–15.4)
ETHANOL UR QL: <0.01 %
FLUAV AG NPH QL: NEGATIVE
FLUBV AG NPH QL IA: NEGATIVE
GFR SERPL CREATININE-BSD FRML MDRD: 122 ML/MIN/1.73
GLOBULIN UR ELPH-MCNC: 3.2 GM/DL
GLUCOSE BLD-MCNC: 164 MG/DL (ref 65–99)
GLUCOSE UR STRIP-MCNC: ABNORMAL MG/DL
HCG SERPL QL: NEGATIVE
HCT VFR BLD AUTO: 38.6 % (ref 34–46.6)
HGB BLD-MCNC: 14.4 G/DL (ref 12–15.9)
HGB UR QL STRIP.AUTO: NEGATIVE
HOLD SPECIMEN: NORMAL
HYALINE CASTS UR QL AUTO: ABNORMAL /LPF
IMM GRANULOCYTES # BLD AUTO: 0.05 10*3/MM3 (ref 0–0.05)
IMM GRANULOCYTES NFR BLD AUTO: 0.4 % (ref 0–0.5)
KETONES UR QL STRIP: ABNORMAL
LEUKOCYTE ESTERASE UR QL STRIP.AUTO: ABNORMAL
LIPASE SERPL-CCNC: 22 U/L (ref 13–60)
LYMPHOCYTES # BLD AUTO: 2.23 10*3/MM3 (ref 0.7–3.1)
LYMPHOCYTES NFR BLD AUTO: 18.8 % (ref 19.6–45.3)
MCH RBC QN AUTO: 31 PG (ref 26.6–33)
MCHC RBC AUTO-ENTMCNC: 37.3 G/DL (ref 31.5–35.7)
MCV RBC AUTO: 83 FL (ref 79–97)
METHADONE UR QL SCN: NEGATIVE
MONOCYTES # BLD AUTO: 0.87 10*3/MM3 (ref 0.1–0.9)
MONOCYTES NFR BLD AUTO: 7.3 % (ref 5–12)
NEUTROPHILS # BLD AUTO: 8.51 10*3/MM3 (ref 1.7–7)
NEUTROPHILS NFR BLD AUTO: 71.6 % (ref 42.7–76)
NITRITE UR QL STRIP: NEGATIVE
NRBC BLD AUTO-RTO: 0 /100 WBC (ref 0–0.2)
OPIATES UR QL: NEGATIVE
OXYCODONE UR QL SCN: NEGATIVE
PCP UR QL SCN: NEGATIVE
PH UR STRIP.AUTO: 8.5 [PH] (ref 5–8)
PLATELET # BLD AUTO: 325 10*3/MM3 (ref 140–450)
PMV BLD AUTO: 9.9 FL (ref 6–12)
POTASSIUM BLD-SCNC: 3.4 MMOL/L (ref 3.5–5.2)
PROPOXYPH UR QL: NEGATIVE
PROT SERPL-MCNC: 7.4 G/DL (ref 6–8.5)
PROT UR QL STRIP: NEGATIVE
RBC # BLD AUTO: 4.65 10*6/MM3 (ref 3.77–5.28)
RBC # UR: ABNORMAL /HPF
REF LAB TEST METHOD: ABNORMAL
SODIUM BLD-SCNC: 139 MMOL/L (ref 136–145)
SP GR UR STRIP: 1.02 (ref 1–1.03)
SQUAMOUS #/AREA URNS HPF: ABNORMAL /HPF
TRICYCLICS UR QL SCN: NEGATIVE
UROBILINOGEN UR QL STRIP: ABNORMAL
WBC NRBC COR # BLD: 11.89 10*3/MM3 (ref 3.4–10.8)
WBC UR QL AUTO: ABNORMAL /HPF

## 2020-01-02 PROCEDURE — 83690 ASSAY OF LIPASE: CPT | Performed by: NURSE PRACTITIONER

## 2020-01-02 PROCEDURE — 83605 ASSAY OF LACTIC ACID: CPT | Performed by: NURSE PRACTITIONER

## 2020-01-02 PROCEDURE — 80307 DRUG TEST PRSMV CHEM ANLYZR: CPT | Performed by: NURSE PRACTITIONER

## 2020-01-02 PROCEDURE — 80053 COMPREHEN METABOLIC PANEL: CPT | Performed by: NURSE PRACTITIONER

## 2020-01-02 PROCEDURE — 84703 CHORIONIC GONADOTROPIN ASSAY: CPT | Performed by: NURSE PRACTITIONER

## 2020-01-02 PROCEDURE — 96374 THER/PROPH/DIAG INJ IV PUSH: CPT

## 2020-01-02 PROCEDURE — 87804 INFLUENZA ASSAY W/OPTIC: CPT | Performed by: NURSE PRACTITIONER

## 2020-01-02 PROCEDURE — 25010000002 ONDANSETRON PER 1 MG: Performed by: NURSE PRACTITIONER

## 2020-01-02 PROCEDURE — 96376 TX/PRO/DX INJ SAME DRUG ADON: CPT

## 2020-01-02 PROCEDURE — 81001 URINALYSIS AUTO W/SCOPE: CPT | Performed by: NURSE PRACTITIONER

## 2020-01-02 PROCEDURE — 99284 EMERGENCY DEPT VISIT MOD MDM: CPT

## 2020-01-02 PROCEDURE — 74019 RADEX ABDOMEN 2 VIEWS: CPT

## 2020-01-02 PROCEDURE — 87086 URINE CULTURE/COLONY COUNT: CPT | Performed by: NURSE PRACTITIONER

## 2020-01-02 PROCEDURE — 85025 COMPLETE CBC W/AUTO DIFF WBC: CPT | Performed by: NURSE PRACTITIONER

## 2020-01-02 RX ORDER — CEPHALEXIN 500 MG/1
500 CAPSULE ORAL ONCE
Status: COMPLETED | OUTPATIENT
Start: 2020-01-02 | End: 2020-01-02

## 2020-01-02 RX ORDER — SODIUM CHLORIDE 0.9 % (FLUSH) 0.9 %
10 SYRINGE (ML) INJECTION AS NEEDED
Status: DISCONTINUED | OUTPATIENT
Start: 2020-01-02 | End: 2020-01-02 | Stop reason: HOSPADM

## 2020-01-02 RX ORDER — CEPHALEXIN 500 MG/1
500 CAPSULE ORAL 2 TIMES DAILY
Qty: 10 CAPSULE | Refills: 0 | OUTPATIENT
Start: 2020-01-02 | End: 2020-09-13

## 2020-01-02 RX ORDER — ONDANSETRON 2 MG/ML
4 INJECTION INTRAMUSCULAR; INTRAVENOUS ONCE
Status: COMPLETED | OUTPATIENT
Start: 2020-01-02 | End: 2020-01-02

## 2020-01-02 RX ADMIN — ONDANSETRON HYDROCHLORIDE 4 MG: 2 SOLUTION INTRAMUSCULAR; INTRAVENOUS at 04:12

## 2020-01-02 RX ADMIN — ONDANSETRON HYDROCHLORIDE 4 MG: 2 SOLUTION INTRAMUSCULAR; INTRAVENOUS at 02:31

## 2020-01-02 RX ADMIN — SODIUM CHLORIDE 1000 ML: 9 INJECTION, SOLUTION INTRAVENOUS at 02:31

## 2020-01-02 RX ADMIN — SODIUM CHLORIDE 500 ML: 0.9 INJECTION, SOLUTION INTRAVENOUS at 04:05

## 2020-01-02 RX ADMIN — CEPHALEXIN 500 MG: 500 CAPSULE ORAL at 04:46

## 2020-01-02 NOTE — ED PROVIDER NOTES
Subjective   Patient is a 29-year-old female that presents ER today per EMS with complaint of abdominal pain and vomiting.  Per EMS the patient's roommate found her lying on the floor and called 911.  Upon their arrival the patient was actively vomiting.  The patient would not talk to EMS and so they were therefore not able to get any further information.  When I go to talk with the patient she does not respond to most of my questions.  She does tell me that she is unsure if she passed out.  She points to her epigastric area when I ask her where her pain is at.  She was actively vomiting upon arrival to the ER.  The patient was seen at [facility] on 26 December for similar complaints.  At that time she had complained of chest pain.  She also complained of abdominal pain and vomiting as well.  She had reported that she had had a similar episode of this in the past when she used synthetic drugs.  She had denied any recent use of synthetic drugs to them.  The patient had a CT scan of the abdomen and pelvis that showed a right ovarian cyst, no other acute findings.  CT of the chest was negative.  Again the patient cannot provide me any further details.  She will open her eyes when I talk to her however she does not answer most of my questions. She tells me she is trying to answer the questions and does not provide any further answers.       History provided by:  Patient  History limited by:  Acuity of condition   used: No    Abdominal Pain   Pain location:  Epigastric  Pain quality comment:  Unable to specify  Pain severity:  Unable to specify  Onset quality:  Unable to specify      Review of Systems   Unable to perform ROS: Other   Gastrointestinal: Positive for abdominal pain.       Past Medical History:   Diagnosis Date   • Abscess of axilla    • Depression    • Hypertension    • Ovarian cyst        Allergies   Allergen Reactions   • Tramadol Rash       Past Surgical History:   Procedure  Laterality Date   • NO PAST SURGERIES         No family history on file.    Social History     Socioeconomic History   • Marital status: Single     Spouse name: Not on file   • Number of children: Not on file   • Years of education: Not on file   • Highest education level: Not on file   Tobacco Use   • Smoking status: Current Every Day Smoker     Packs/day: 0.50     Years: 12.00     Pack years: 6.00   • Smokeless tobacco: Never Used   Substance and Sexual Activity   • Alcohol use: No   • Drug use: No   • Sexual activity: Defer   Social History Narrative    single           Objective   Physical Exam   Constitutional: She is oriented to person, place, and time. She appears well-developed and well-nourished.   HENT:   Head: Normocephalic and atraumatic.   Eyes: Pupils are equal, round, and reactive to light. Conjunctivae are normal.   Cardiovascular: Normal rate, regular rhythm and normal heart sounds.   Pulmonary/Chest: Effort normal and breath sounds normal.   Abdominal: Soft. Bowel sounds are normal. There is tenderness in the epigastric area.   Neurological: She is alert and oriented to person, place, and time.   Skin: Skin is warm and dry. Capillary refill takes less than 2 seconds.   Psychiatric: She has a normal mood and affect.   Nursing note and vitals reviewed.      Procedures           ED Course  ED Course as of Jan 02 0522   Thu Jan 02, 2020   0352 Pt case discussed with Dr. Padilla who will assume pt case at this time.     [LF]   0521 Once results returned and reviewed.  Patient's results consistent with urinary tract infection so the patient was started on oral antibiotics and recommend follow-up with primary care physician.    [RW]      ED Course User Index  [LF] Luzma Blevins, APRN  [RW] Derrick Padilla MD                                               [facility]    Final diagnoses:   Acute cystitis without hematuria            Derrick Padilla MD  01/02/20 5590

## 2020-01-02 NOTE — DISCHARGE INSTRUCTIONS
Urinary Tract Infection, Adult    A urinary tract infection (UTI) is an infection of any part of the urinary tract. The urinary tract includes the kidneys, ureters, bladder, and urethra. These organs make, store, and get rid of urine in the body.  Your health care provider may use other names to describe the infection. An upper UTI affects the ureters and kidneys (pyelonephritis). A lower UTI affects the bladder (cystitis) and urethra (urethritis).  What are the causes?  Most urinary tract infections are caused by bacteria in your genital area, around the entrance to your urinary tract (urethra). These bacteria grow and cause inflammation of your urinary tract.  What increases the risk?  You are more likely to develop this condition if:  · You have a urinary catheter that stays in place (indwelling).  · You are not able to control when you urinate or have a bowel movement (you have incontinence).  · You are female and you:  ? Use a spermicide or diaphragm for birth control.  ? Have low estrogen levels.  ? Are pregnant.  · You have certain genes that increase your risk (genetics).  · You are sexually active.  · You take antibiotic medicines.  · You have a condition that causes your flow of urine to slow down, such as:  ? An enlarged prostate, if you are male.  ? Blockage in your urethra (stricture).  ? A kidney stone.  ? A nerve condition that affects your bladder control (neurogenic bladder).  ? Not getting enough to drink, or not urinating often.  · You have certain medical conditions, such as:  ? Diabetes.  ? A weak disease-fighting system (immunesystem).  ? Sickle cell disease.  ? Gout.  ? Spinal cord injury.  What are the signs or symptoms?  Symptoms of this condition include:  · Needing to urinate right away (urgently).  · Frequent urination or passing small amounts of urine frequently.  · Pain or burning with urination.  · Blood in the urine.  · Urine that smells bad or unusual.  · Trouble urinating.  · Cloudy  urine.  · Vaginal discharge, if you are female.  · Pain in the abdomen or the lower back.  You may also have:  · Vomiting or a decreased appetite.  · Confusion.  · Irritability or tiredness.  · A fever.  · Diarrhea.  The first symptom in older adults may be confusion. In some cases, they may not have any symptoms until the infection has worsened.  How is this diagnosed?  This condition is diagnosed based on your medical history and a physical exam. You may also have other tests, including:  · Urine tests.  · Blood tests.  · Tests for sexually transmitted infections (STIs).  If you have had more than one UTI, a cystoscopy or imaging studies may be done to determine the cause of the infections.  How is this treated?  Treatment for this condition includes:  · Antibiotic medicine.  · Over-the-counter medicines to treat discomfort.  · Drinking enough water to stay hydrated.  If you have frequent infections or have other conditions such as a kidney stone, you may need to see a health care provider who specializes in the urinary tract (urologist).  In rare cases, urinary tract infections can cause sepsis. Sepsis is a life-threatening condition that occurs when the body responds to an infection. Sepsis is treated in the hospital with IV antibiotics, fluids, and other medicines.  Follow these instructions at home:    Medicines  · Take over-the-counter and prescription medicines only as told by your health care provider.  · If you were prescribed an antibiotic medicine, take it as told by your health care provider. Do not stop using the antibiotic even if you start to feel better.  General instructions  · Make sure you:  ? Empty your bladder often and completely. Do not hold urine for long periods of time.  ? Empty your bladder after sex.  ? Wipe from front to back after a bowel movement if you are female. Use each tissue one time when you wipe.  · Drink enough fluid to keep your urine pale yellow.  · Keep all follow-up  visits as told by your health care provider. This is important.  Contact a health care provider if:  · Your symptoms do not get better after 1-2 days.  · Your symptoms go away and then return.  Get help right away if you have:  · Severe pain in your back or your lower abdomen.  · A fever.  · Nausea or vomiting.  Summary  · A urinary tract infection (UTI) is an infection of any part of the urinary tract, which includes the kidneys, ureters, bladder, and urethra.  · Most urinary tract infections are caused by bacteria in your genital area, around the entrance to your urinary tract (urethra).  · Treatment for this condition often includes antibiotic medicines.  · If you were prescribed an antibiotic medicine, take it as told by your health care provider. Do not stop using the antibiotic even if you start to feel better.  · Keep all follow-up visits as told by your health care provider. This is important.  This information is not intended to replace advice given to you by your health care provider. Make sure you discuss any questions you have with your health care provider.  Document Released: 09/27/2006 Document Revised: 06/27/2019 Document Reviewed: 06/27/2019  [employer] Patient Education © 2019 [employer]

## 2020-01-03 LAB — BACTERIA SPEC AEROBE CULT: NO GROWTH

## 2020-01-05 ENCOUNTER — HOSPITAL ENCOUNTER (EMERGENCY)
Facility: HOSPITAL | Age: 30
Discharge: LEFT BEFORE DISCHARGE | End: 2020-01-05

## 2020-01-05 VITALS
HEIGHT: 67 IN | RESPIRATION RATE: 14 BRPM | SYSTOLIC BLOOD PRESSURE: 171 MMHG | TEMPERATURE: 97.9 F | DIASTOLIC BLOOD PRESSURE: 114 MMHG | OXYGEN SATURATION: 99 % | WEIGHT: 230 LBS | BODY MASS INDEX: 36.1 KG/M2 | HEART RATE: 54 BPM

## 2020-01-05 NOTE — ED NOTES
Patient left with partner not wanting to be seen now;  Patient seemed mildly upset during assessment due to asking her to speak so I could assess her neuro status.      Stephanie Mohr, RN  01/05/20 5173

## 2020-01-07 ENCOUNTER — HOSPITAL ENCOUNTER (EMERGENCY)
Age: 30
Discharge: HOME | End: 2020-01-07
Attending: EMERGENCY MEDICINE
Payer: COMMERCIAL

## 2020-01-07 VITALS
WEIGHT: 220 LBS | TEMPERATURE: 97.4 F | OXYGEN SATURATION: 92 % | DIASTOLIC BLOOD PRESSURE: 88 MMHG | HEART RATE: 72 BPM | SYSTOLIC BLOOD PRESSURE: 144 MMHG | HEIGHT: 67 IN | RESPIRATION RATE: 16 BRPM | BODY MASS INDEX: 34.53 KG/M2

## 2020-01-07 LAB
ALBUMIN SERPL-MCNC: 4.3 G/DL (ref 3.5–5.2)
ALP BLD-CCNC: 57 U/L (ref 35–104)
ALT SERPL-CCNC: 12 U/L (ref 5–33)
ANION GAP SERPL CALCULATED.3IONS-SCNC: 15 MMOL/L (ref 7–19)
AST SERPL-CCNC: 16 U/L (ref 5–32)
BACTERIA: NEGATIVE /HPF
BASOPHILS ABSOLUTE: 0.1 K/UL (ref 0–0.2)
BASOPHILS RELATIVE PERCENT: 0.6 % (ref 0–1)
BILIRUB SERPL-MCNC: 0.5 MG/DL (ref 0.2–1.2)
BILIRUBIN URINE: NEGATIVE
BLOOD, URINE: ABNORMAL
BUN BLDV-MCNC: 10 MG/DL (ref 6–20)
CALCIUM SERPL-MCNC: 9.5 MG/DL (ref 8.6–10)
CHLORIDE BLD-SCNC: 99 MMOL/L (ref 98–111)
CLARITY: ABNORMAL
CO2: 24 MMOL/L (ref 22–29)
COLOR: YELLOW
CREAT SERPL-MCNC: 0.7 MG/DL (ref 0.5–0.9)
EOSINOPHILS ABSOLUTE: 0.1 K/UL (ref 0–0.6)
EOSINOPHILS RELATIVE PERCENT: 0.4 % (ref 0–5)
EPITHELIAL CELLS, UA: 10 /HPF (ref 0–5)
GFR NON-AFRICAN AMERICAN: >60
GLUCOSE BLD-MCNC: 113 MG/DL (ref 74–109)
GLUCOSE URINE: NEGATIVE MG/DL
HCG(URINE) PREGNANCY TEST: NEGATIVE
HCT VFR BLD CALC: 46 % (ref 37–47)
HEMOGLOBIN: 16.7 G/DL (ref 12–16)
HYALINE CASTS: 10 /HPF (ref 0–8)
IMMATURE GRANULOCYTES #: 0.1 K/UL
KETONES, URINE: 15 MG/DL
LEUKOCYTE ESTERASE, URINE: ABNORMAL
LYMPHOCYTES ABSOLUTE: 2.8 K/UL (ref 1.1–4.5)
LYMPHOCYTES RELATIVE PERCENT: 23.8 % (ref 20–40)
MAGNESIUM: 2 MG/DL (ref 1.6–2.6)
MCH RBC QN AUTO: 31.7 PG (ref 27–31)
MCHC RBC AUTO-ENTMCNC: 36.3 G/DL (ref 33–37)
MCV RBC AUTO: 87.5 FL (ref 81–99)
MONOCYTES ABSOLUTE: 0.8 K/UL (ref 0–0.9)
MONOCYTES RELATIVE PERCENT: 6.6 % (ref 0–10)
NEUTROPHILS ABSOLUTE: 7.9 K/UL (ref 1.5–7.5)
NEUTROPHILS RELATIVE PERCENT: 68.2 % (ref 50–65)
NITRITE, URINE: NEGATIVE
PDW BLD-RTO: 13.5 % (ref 11.5–14.5)
PH UA: 7 (ref 5–8)
PLATELET # BLD: 359 K/UL (ref 130–400)
PMV BLD AUTO: 9.9 FL (ref 9.4–12.3)
POTASSIUM REFLEX MAGNESIUM: 3.5 MMOL/L (ref 3.5–5)
PROTEIN UA: NEGATIVE MG/DL
RBC # BLD: 5.26 M/UL (ref 4.2–5.4)
RBC UA: 141 /HPF (ref 0–4)
SODIUM BLD-SCNC: 138 MMOL/L (ref 136–145)
SPECIFIC GRAVITY UA: 1.02 (ref 1–1.03)
TOTAL PROTEIN: 8 G/DL (ref 6.6–8.7)
UROBILINOGEN, URINE: 1 E.U./DL
WBC # BLD: 11.6 K/UL (ref 4.8–10.8)
WBC UA: 49 /HPF (ref 0–5)

## 2020-01-07 PROCEDURE — 83735 ASSAY OF MAGNESIUM: CPT

## 2020-01-07 PROCEDURE — 2580000003 HC RX 258: Performed by: EMERGENCY MEDICINE

## 2020-01-07 PROCEDURE — 81001 URINALYSIS AUTO W/SCOPE: CPT

## 2020-01-07 PROCEDURE — 99999 PR OFFICE/OUTPT VISIT,PROCEDURE ONLY: CPT | Performed by: EMERGENCY MEDICINE

## 2020-01-07 PROCEDURE — 2500000003 HC RX 250 WO HCPCS: Performed by: EMERGENCY MEDICINE

## 2020-01-07 PROCEDURE — 80053 COMPREHEN METABOLIC PANEL: CPT

## 2020-01-07 PROCEDURE — 96374 THER/PROPH/DIAG INJ IV PUSH: CPT

## 2020-01-07 PROCEDURE — 85025 COMPLETE CBC W/AUTO DIFF WBC: CPT

## 2020-01-07 PROCEDURE — 6360000002 HC RX W HCPCS: Performed by: EMERGENCY MEDICINE

## 2020-01-07 PROCEDURE — 99284 EMERGENCY DEPT VISIT MOD MDM: CPT

## 2020-01-07 PROCEDURE — 36415 COLL VENOUS BLD VENIPUNCTURE: CPT

## 2020-01-07 PROCEDURE — 84703 CHORIONIC GONADOTROPIN ASSAY: CPT

## 2020-01-07 PROCEDURE — 6370000000 HC RX 637 (ALT 250 FOR IP): Performed by: EMERGENCY MEDICINE

## 2020-01-07 PROCEDURE — 96375 TX/PRO/DX INJ NEW DRUG ADDON: CPT

## 2020-01-07 RX ORDER — SUCRALFATE 1 G/1
1 TABLET ORAL 4 TIMES DAILY
Qty: 120 TABLET | Refills: 0 | Status: SHIPPED | OUTPATIENT
Start: 2020-01-07 | End: 2020-01-30

## 2020-01-07 RX ORDER — METOCLOPRAMIDE HYDROCHLORIDE 5 MG/ML
10 INJECTION INTRAMUSCULAR; INTRAVENOUS ONCE
Status: COMPLETED | OUTPATIENT
Start: 2020-01-07 | End: 2020-01-07

## 2020-01-07 RX ORDER — SODIUM CHLORIDE, SODIUM LACTATE, POTASSIUM CHLORIDE, CALCIUM CHLORIDE 600; 310; 30; 20 MG/100ML; MG/100ML; MG/100ML; MG/100ML
1000 INJECTION, SOLUTION INTRAVENOUS ONCE
Status: COMPLETED | OUTPATIENT
Start: 2020-01-07 | End: 2020-01-07

## 2020-01-07 RX ORDER — FAMOTIDINE 20 MG/1
20 TABLET, FILM COATED ORAL 2 TIMES DAILY
Qty: 60 TABLET | Refills: 0 | Status: SHIPPED | OUTPATIENT
Start: 2020-01-07 | End: 2020-01-30

## 2020-01-07 RX ADMIN — HYOSCYAMINE SULFATE 125 MCG: 0.12 TABLET ORAL; SUBLINGUAL at 11:17

## 2020-01-07 RX ADMIN — SODIUM CHLORIDE, POTASSIUM CHLORIDE, SODIUM LACTATE AND CALCIUM CHLORIDE 1000 ML: 600; 310; 30; 20 INJECTION, SOLUTION INTRAVENOUS at 11:17

## 2020-01-07 RX ADMIN — LIDOCAINE HYDROCHLORIDE: 20 SOLUTION ORAL; TOPICAL at 11:17

## 2020-01-07 RX ADMIN — METOCLOPRAMIDE 10 MG: 5 INJECTION, SOLUTION INTRAMUSCULAR; INTRAVENOUS at 11:17

## 2020-01-07 RX ADMIN — FAMOTIDINE 20 MG: 10 INJECTION INTRAVENOUS at 11:17

## 2020-01-07 ASSESSMENT — ENCOUNTER SYMPTOMS
EYE REDNESS: 0
COUGH: 0
VOICE CHANGE: 0
RHINORRHEA: 0
SHORTNESS OF BREATH: 0
EYE PAIN: 0
VOMITING: 1
NAUSEA: 1
ABDOMINAL PAIN: 1
DIARRHEA: 0

## 2020-01-07 NOTE — ED NOTES
ASSESSMENT:  Patient complains of left sided abdominal pain that has been ongoing for 3 weeks. PT ALERT/ORIENTED X4. PUPILS EQUAL/REACTIVE    SKIN:  WARM/DRY PINK CAPILLARY REFILL < 2SECS    CARDIAC:  S1 S2 NOTED     LUNGS: CLEAR UPPER AND LOWER LOBES, RESPIRATIONS EVEN/UNLABORED     ABDOMEN: BOWEL SOUNDS NOTED UPPER AND LOWER QUADRANTS                     SOFT AND NONTENDER. Patient unsure of last bm and states she had emesis this am    EXTREMITIES:  BILATERAL DP AND PT AND NO EDEMA NOTED. NO DISTRESS NOTED. SIDE RAILS UP AND CALL LIGHT IN REACH.        Tanisha Patel RN  01/07/20 1112

## 2020-01-07 NOTE — ED PROVIDER NOTES
Diagnosis Date    Ovarian cyst          SURGICAL HISTORY     History reviewed. No pertinent surgical history.       CURRENT MEDICATIONS       Previous Medications    ALBUTEROL SULFATE  (90 BASE) MCG/ACT INHALER    Inhale 2 puffs into the lungs 4 times daily as needed for Wheezing    ONDANSETRON (ZOFRAN ODT) 4 MG DISINTEGRATING TABLET    Take 1 tablet by mouth every 8 hours as needed for Nausea or Vomiting       ALLERGIES     Other; Tylenol [acetaminophen]; and Tramadol    FAMILY HISTORY       Family History   Problem Relation Age of Onset    Diabetes Mother     High Blood Pressure Mother     Diabetes Maternal Grandmother           SOCIAL HISTORY       Social History     Socioeconomic History    Marital status: Single     Spouse name: None    Number of children: None    Years of education: None    Highest education level: None   Occupational History    None   Social Needs    Financial resource strain: None    Food insecurity:     Worry: None     Inability: None    Transportation needs:     Medical: None     Non-medical: None   Tobacco Use    Smoking status: Current Every Day Smoker     Packs/day: 0.50     Years: 10.00     Pack years: 5.00     Types: Cigarettes    Smokeless tobacco: Never Used   Substance and Sexual Activity    Alcohol use: No     Alcohol/week: 0.0 standard drinks    Drug use: No    Sexual activity: Yes     Partners: Male   Lifestyle    Physical activity:     Days per week: None     Minutes per session: None    Stress: None   Relationships    Social connections:     Talks on phone: None     Gets together: None     Attends Worship service: None     Active member of club or organization: None     Attends meetings of clubs or organizations: None     Relationship status: None    Intimate partner violence:     Fear of current or ex partner: None     Emotionally abused: None     Physically abused: None     Forced sexual activity: None   Other Topics Concern    None   Social History Narrative    None       SCREENINGS             PHYSICAL EXAM    (up to 7 for level 4, 8 or more for level 5)     ED Triage Vitals   BP Temp Temp Source Pulse Resp SpO2 Height Weight   01/07/20 1027 01/07/20 1024 01/07/20 1024 01/07/20 1027 01/07/20 1024 01/07/20 1024 01/07/20 1024 01/07/20 1024   (!) 178/107 97.4 °F (36.3 °C) Oral 84 16 94 % 5' 7\" (1.702 m) 220 lb (99.8 kg)       Physical Exam  Vitals signs and nursing note reviewed. Constitutional:       General: She is not in acute distress. Appearance: Normal appearance. She is well-developed. She is not diaphoretic. HENT:      Head: Normocephalic and atraumatic. Mouth/Throat:      Pharynx: No oropharyngeal exudate. Eyes:      General: No scleral icterus. Pupils: Pupils are equal, round, and reactive to light. Neck:      Musculoskeletal: Normal range of motion. Trachea: No tracheal deviation. Cardiovascular:      Rate and Rhythm: Normal rate. Pulses: Normal pulses. Heart sounds: Normal heart sounds. Pulmonary:      Effort: Pulmonary effort is normal.      Breath sounds: Normal breath sounds. No stridor. No wheezing or rhonchi. Abdominal:      General: There is no distension. Palpations: Abdomen is soft. Abdomen is not rigid. Tenderness: There is tenderness in the left upper quadrant. There is no guarding. Hernia: No hernia is present. Musculoskeletal:         General: No deformity. Skin:     General: Skin is warm and dry. Findings: No rash. Neurological:      Mental Status: She is alert and oriented to person, place, and time. Cranial Nerves: No cranial nerve deficit.       Coordination: Coordination normal.   Psychiatric:         Behavior: Behavior normal.         DIAGNOSTIC RESULTS     EKG: All EKG's are interpreted by the Emergency Department Physician who either signs or Co-signs this chart in the absence of a cardiologist.        RADIOLOGY:   Non-plain film images such as CT, daily          (Please note that portions of this note were completed with a voice recognition program.  Efforts were made to edit the dictations butoccasionally words are mis-transcribed.)    Robert Herman MD (electronically signed)  AttendingEmergency Physician          Robert Menjivar MD  01/07/20 [phone]

## 2020-01-11 ENCOUNTER — HOSPITAL ENCOUNTER (EMERGENCY)
Age: 30
Discharge: HOME | End: 2020-01-11
Attending: EMERGENCY MEDICINE

## 2020-01-11 VITALS
OXYGEN SATURATION: 94 % | HEIGHT: 67 IN | BODY MASS INDEX: 34.53 KG/M2 | WEIGHT: 220 LBS | TEMPERATURE: 98.5 F | RESPIRATION RATE: 18 BRPM | HEART RATE: 80 BPM | SYSTOLIC BLOOD PRESSURE: 158 MMHG | DIASTOLIC BLOOD PRESSURE: 101 MMHG

## 2020-01-11 LAB
ALBUMIN SERPL-MCNC: 4.2 G/DL (ref 3.5–5.2)
ALP BLD-CCNC: 52 U/L (ref 35–104)
ALT SERPL-CCNC: 21 U/L (ref 5–33)
ANION GAP SERPL CALCULATED.3IONS-SCNC: 13 MMOL/L (ref 7–19)
AST SERPL-CCNC: 18 U/L (ref 5–32)
BACTERIA: ABNORMAL /HPF
BASOPHILS ABSOLUTE: 0.1 K/UL (ref 0–0.2)
BASOPHILS RELATIVE PERCENT: 0.6 % (ref 0–1)
BILIRUB SERPL-MCNC: <0.2 MG/DL (ref 0.2–1.2)
BILIRUBIN URINE: NEGATIVE
BLOOD, URINE: ABNORMAL
BUN BLDV-MCNC: 10 MG/DL (ref 6–20)
CALCIUM SERPL-MCNC: 8.9 MG/DL (ref 8.6–10)
CHLORIDE BLD-SCNC: 102 MMOL/L (ref 98–111)
CLARITY: ABNORMAL
CO2: 27 MMOL/L (ref 22–29)
COLOR: YELLOW
CREAT SERPL-MCNC: 0.7 MG/DL (ref 0.5–0.9)
EOSINOPHILS ABSOLUTE: 0.4 K/UL (ref 0–0.6)
EOSINOPHILS RELATIVE PERCENT: 3.4 % (ref 0–5)
EPITHELIAL CELLS, UA: 10 /HPF (ref 0–5)
GFR NON-AFRICAN AMERICAN: >60
GLUCOSE BLD-MCNC: 92 MG/DL (ref 74–109)
GLUCOSE URINE: NEGATIVE MG/DL
HCT VFR BLD CALC: 41.8 % (ref 37–47)
HEMOGLOBIN: 14.8 G/DL (ref 12–16)
HYALINE CASTS: 10 /HPF (ref 0–8)
IMMATURE GRANULOCYTES #: 0.1 K/UL
KETONES, URINE: NEGATIVE MG/DL
LEUKOCYTE ESTERASE, URINE: ABNORMAL
LIPASE: 39 U/L (ref 13–60)
LYMPHOCYTES ABSOLUTE: 2.9 K/UL (ref 1.1–4.5)
LYMPHOCYTES RELATIVE PERCENT: 26.9 % (ref 20–40)
MCH RBC QN AUTO: 31.4 PG (ref 27–31)
MCHC RBC AUTO-ENTMCNC: 35.4 G/DL (ref 33–37)
MCV RBC AUTO: 88.6 FL (ref 81–99)
MONOCYTES ABSOLUTE: 0.9 K/UL (ref 0–0.9)
MONOCYTES RELATIVE PERCENT: 8 % (ref 0–10)
NEUTROPHILS ABSOLUTE: 6.5 K/UL (ref 1.5–7.5)
NEUTROPHILS RELATIVE PERCENT: 60.4 % (ref 50–65)
NITRITE, URINE: NEGATIVE
PDW BLD-RTO: 14.1 % (ref 11.5–14.5)
PH UA: 7.5 (ref 5–8)
PLATELET # BLD: 314 K/UL (ref 130–400)
PMV BLD AUTO: 9.8 FL (ref 9.4–12.3)
POTASSIUM REFLEX MAGNESIUM: 3.8 MMOL/L (ref 3.5–5)
PROTEIN UA: NEGATIVE MG/DL
RBC # BLD: 4.72 M/UL (ref 4.2–5.4)
RBC UA: 3 /HPF (ref 0–4)
SODIUM BLD-SCNC: 142 MMOL/L (ref 136–145)
SPECIFIC GRAVITY UA: 1.02 (ref 1–1.03)
TOTAL PROTEIN: 7.4 G/DL (ref 6.6–8.7)
UROBILINOGEN, URINE: 0.2 E.U./DL
WBC # BLD: 10.8 K/UL (ref 4.8–10.8)
WBC UA: 69 /HPF (ref 0–5)

## 2020-01-11 PROCEDURE — 83690 ASSAY OF LIPASE: CPT

## 2020-01-11 PROCEDURE — 81001 URINALYSIS AUTO W/SCOPE: CPT

## 2020-01-11 PROCEDURE — 99284 EMERGENCY DEPT VISIT MOD MDM: CPT

## 2020-01-11 PROCEDURE — 80053 COMPREHEN METABOLIC PANEL: CPT

## 2020-01-11 PROCEDURE — 2580000003 HC RX 258: Performed by: EMERGENCY MEDICINE

## 2020-01-11 PROCEDURE — 96375 TX/PRO/DX INJ NEW DRUG ADDON: CPT

## 2020-01-11 PROCEDURE — 6360000002 HC RX W HCPCS: Performed by: EMERGENCY MEDICINE

## 2020-01-11 PROCEDURE — 6370000000 HC RX 637 (ALT 250 FOR IP): Performed by: EMERGENCY MEDICINE

## 2020-01-11 PROCEDURE — 96361 HYDRATE IV INFUSION ADD-ON: CPT

## 2020-01-11 PROCEDURE — 99999 PR OFFICE/OUTPT VISIT,PROCEDURE ONLY: CPT | Performed by: EMERGENCY MEDICINE

## 2020-01-11 PROCEDURE — 96374 THER/PROPH/DIAG INJ IV PUSH: CPT

## 2020-01-11 PROCEDURE — 85025 COMPLETE CBC W/AUTO DIFF WBC: CPT

## 2020-01-11 PROCEDURE — 36415 COLL VENOUS BLD VENIPUNCTURE: CPT

## 2020-01-11 RX ORDER — METOCLOPRAMIDE 10 MG/1
10 TABLET ORAL 4 TIMES DAILY
Qty: 20 TABLET | Refills: 0 | Status: SHIPPED | OUTPATIENT
Start: 2020-01-11 | End: 2020-01-30

## 2020-01-11 RX ORDER — HALOPERIDOL 5 MG/ML
5 INJECTION INTRAMUSCULAR ONCE
Status: COMPLETED | OUTPATIENT
Start: 2020-01-11 | End: 2020-01-11

## 2020-01-11 RX ORDER — ONDANSETRON 2 MG/ML
4 INJECTION INTRAMUSCULAR; INTRAVENOUS ONCE
Status: COMPLETED | OUTPATIENT
Start: 2020-01-11 | End: 2020-01-11

## 2020-01-11 RX ORDER — DIPHENHYDRAMINE HYDROCHLORIDE 50 MG/ML
25 INJECTION INTRAMUSCULAR; INTRAVENOUS ONCE
Status: COMPLETED | OUTPATIENT
Start: 2020-01-11 | End: 2020-01-11

## 2020-01-11 RX ORDER — SODIUM CHLORIDE, SODIUM LACTATE, POTASSIUM CHLORIDE, CALCIUM CHLORIDE 600; 310; 30; 20 MG/100ML; MG/100ML; MG/100ML; MG/100ML
1000 INJECTION, SOLUTION INTRAVENOUS ONCE
Status: COMPLETED | OUTPATIENT
Start: 2020-01-11 | End: 2020-01-11

## 2020-01-11 RX ADMIN — ONDANSETRON 4 MG: 2 INJECTION INTRAMUSCULAR; INTRAVENOUS at 18:41

## 2020-01-11 RX ADMIN — LIDOCAINE HYDROCHLORIDE: 20 SOLUTION ORAL; TOPICAL at 18:40

## 2020-01-11 RX ADMIN — DIPHENHYDRAMINE HYDROCHLORIDE 25 MG: 50 INJECTION, SOLUTION INTRAMUSCULAR; INTRAVENOUS at 18:41

## 2020-01-11 RX ADMIN — SODIUM CHLORIDE, POTASSIUM CHLORIDE, SODIUM LACTATE AND CALCIUM CHLORIDE 1000 ML: 600; 310; 30; 20 INJECTION, SOLUTION INTRAVENOUS at 18:56

## 2020-01-11 RX ADMIN — HALOPERIDOL LACTATE 5 MG: 5 INJECTION INTRAMUSCULAR at 18:41

## 2020-01-11 ASSESSMENT — ENCOUNTER SYMPTOMS
NAUSEA: 1
VOICE CHANGE: 0
DIARRHEA: 0
SHORTNESS OF BREATH: 0
RHINORRHEA: 0
EYE PAIN: 0
COUGH: 0
ABDOMINAL PAIN: 1
VOMITING: 1
EYE REDNESS: 0

## 2020-01-11 ASSESSMENT — PAIN DESCRIPTION - PAIN TYPE: TYPE: ACUTE PAIN

## 2020-01-11 ASSESSMENT — PAIN DESCRIPTION - ORIENTATION: ORIENTATION: LEFT

## 2020-01-11 ASSESSMENT — PAIN DESCRIPTION - LOCATION: LOCATION: ABDOMEN

## 2020-01-11 ASSESSMENT — PAIN DESCRIPTION - DESCRIPTORS: DESCRIPTORS: SHARP

## 2020-01-11 ASSESSMENT — PAIN SCALES - GENERAL: PAINLEVEL_OUTOF10: 10

## 2020-01-11 NOTE — ED NOTES
Bed: 03  Expected date:   Expected time:   Means of arrival:   Comments:  PAYTON Astudillo  01/11/20 2217

## 2020-01-12 NOTE — ED PROVIDER NOTES
[facility] EMERGENCY DEPT  EMERGENCY DEPARTMENT ENCOUNTER      Pt Name: Yari Vieyra  MRN: [id number]  [place] 1990  Date of evaluation: 1/11/2020  Provider: Josue Marino MD    CHIEF COMPLAINT       Chief Complaint   Patient presents with    Anxiety     c/o anxiety with possible panic attack at home    Abdominal Pain     c/o rebound tenderness LUQ         HISTORY OF PRESENT ILLNESS   (Location/Symptom, Timing/Onset,Context/Setting, Quality, Duration, Modifying Factors, Severity)  Note limiting factors. Yari Vieyra is a 34 y.o. female who presents to the emergency department with complaint of left upper quadrant pain which is chronic but worsened today. Patient was seen here recently for similar pain and states that she did have some improvement after she went home from here but states that she is still not been able to eat or drink much that she has nausea and vomiting constantly. Today, states the pain got worse. EMS notes that when they arrived to patient's home, there were several family members all standing around yelling and the patient was laying in the floor crying and holding her abdomen. They state that patient's demeanor and appearance completely changed when she was taken out to the ambulance away from the home environment. Patient has had multiple visits here for similar abdominal pain and has had a negative work-up each time. HPI    NursingNotes were reviewed. REVIEW OF SYSTEMS    (2-9 systems for level 4, 10 or more for level 5)     Review of Systems   Constitutional: Negative for fatigue and fever. HENT: Negative for congestion, rhinorrhea and voice change. Eyes: Negative for pain and redness. Respiratory: Negative for cough and shortness of breath. Cardiovascular: Negative for chest pain. Gastrointestinal: Positive for abdominal pain, nausea and vomiting. Negative for diarrhea. Endocrine: Negative. Genitourinary: Negative.     Musculoskeletal: Negative for arthralgias and gait problem. Skin: Negative for rash and wound. Neurological: Negative for weakness and headaches. Hematological: Negative. Psychiatric/Behavioral: Negative. All other systems reviewed and are negative. A complete review of systems was performed and is negative except as noted above in the HPI. PAST MEDICAL HISTORY     Past Medical History:   Diagnosis Date    Ovarian cyst          SURGICAL HISTORY     History reviewed. No pertinent surgical history.       CURRENT MEDICATIONS       Discharge Medication List as of 1/11/2020  7:14 PM      CONTINUE these medications which have NOT CHANGED    Details   famotidine (PEPCID) 20 MG tablet Take 1 tablet by mouth 2 times daily, Disp-60 tablet, R-0Print      sucralfate (CARAFATE) 1 GM tablet Take 1 tablet by mouth 4 times daily, Disp-120 tablet, R-0Print      ondansetron (ZOFRAN ODT) 4 MG disintegrating tablet Take 1 tablet by mouth every 8 hours as needed for Nausea or Vomiting, Disp-30 tablet, R-0Print      albuterol sulfate  (90 Base) MCG/ACT inhaler Inhale 2 puffs into the lungs 4 times daily as needed for Wheezing, Disp-1 Inhaler, R-0Print             ALLERGIES     Other; Tylenol [acetaminophen]; and Tramadol    FAMILY HISTORY       Family History   Problem Relation Age of Onset    Diabetes Mother     High Blood Pressure Mother     Diabetes Maternal Grandmother           SOCIAL HISTORY       Social History     Socioeconomic History    Marital status: Single     Spouse name: None    Number of children: None    Years of education: None    Highest education level: None   Occupational History    None   Social Needs    Financial resource strain: None    Food insecurity:     Worry: None     Inability: None    Transportation needs:     Medical: None     Non-medical: None   Tobacco Use    Smoking status: Current Every Day Smoker     Packs/day: 0.50     Years: 10.00     Pack years: 5.00     Types: Cigarettes    Smokeless tobacco: Never Used   Substance and Sexual Activity    Alcohol use: No     Alcohol/week: 0.0 standard drinks    Drug use: No    Sexual activity: Yes     Partners: Male   Lifestyle    Physical activity:     Days per week: None     Minutes per session: None    Stress: None   Relationships    Social connections:     Talks on phone: None     Gets together: None     Attends Lutheran service: None     Active member of club or organization: None     Attends meetings of clubs or organizations: None     Relationship status: None    Intimate partner violence:     Fear of current or ex partner: None     Emotionally abused: None     Physically abused: None     Forced sexual activity: None   Other Topics Concern    None   Social History Narrative    None       SCREENINGS    [place] Coma Scale  Eye Opening: Spontaneous  Best Verbal Response: Oriented  Best Motor Response: Obeys commands  Rob Coma Scale Score: 15        PHYSICAL EXAM    (up to 7 for level 4, 8 or more for level 5)     ED Triage Vitals   BP Temp Temp Source Pulse Resp SpO2 Height Weight   01/11/20 1755 01/11/20 1759 01/11/20 1759 01/11/20 1755 01/11/20 1755 01/11/20 1755 01/11/20 1753 01/11/20 1753   (!) 158/101 98.5 °F (36.9 °C) Oral 80 18 94 % 5' 7\" (1.702 m) 220 lb (99.8 kg)       Physical Exam  Vitals signs and nursing note reviewed. Constitutional:       General: She is not in acute distress. Appearance: Normal appearance. She is well-developed. She is not diaphoretic. HENT:      Head: Normocephalic and atraumatic. Mouth/Throat:      Pharynx: No oropharyngeal exudate. Eyes:      General: No scleral icterus. Pupils: Pupils are equal, round, and reactive to light. Neck:      Musculoskeletal: Normal range of motion. Trachea: No tracheal deviation. Cardiovascular:      Rate and Rhythm: Normal rate. Pulses: Normal pulses. Heart sounds: Normal heart sounds.    Pulmonary:      Effort: Pulmonary effort is normal.      Breath sounds: Normal breath sounds. No stridor. No wheezing or rhonchi. Abdominal:      General: There is no distension. Palpations: Abdomen is soft. Abdomen is not rigid. Tenderness: There is no tenderness. There is no guarding. Hernia: No hernia is present. Musculoskeletal:         General: No deformity. Skin:     General: Skin is warm and dry. Findings: No rash. Neurological:      Mental Status: She is alert and oriented to person, place, and time. Cranial Nerves: No cranial nerve deficit. Coordination: Coordination normal.   Psychiatric:         Behavior: Behavior normal.         DIAGNOSTIC RESULTS     EKG: All EKG's are interpreted by the Emergency Department Physician who either signs or Co-signs this chart in the absence of a cardiologist.        RADIOLOGY:   Non-plain film images such as CT, Ultrasound and MRI are read by the radiologist. Plainradiographic images are visualized and preliminarily interpreted by the emergency physician with the below findings:        Interpretation per the Radiologist below, if available at the time of this note:    No orders to display         ED BEDSIDE ULTRASOUND:   Performed by ED Physician - none    LABS:  Labs Reviewed   CBC WITH AUTO DIFFERENTIAL - Abnormal; Notable for the following components:       Result Value    MCH 31.4 (*)     All other components within normal limits   URINALYSIS - Abnormal; Notable for the following components:    Clarity, UA TURBID (*)     Blood, Urine SMALL (*)     Leukocyte Esterase, Urine MODERATE (*)     All other components within normal limits   MICROSCOPIC URINALYSIS - Abnormal; Notable for the following components:    Hyaline Casts, UA 10 (*)     WBC, UA 69 (*)     All other components within normal limits   COMPREHENSIVE METABOLIC PANEL W/ REFLEX TO MG FOR LOW K   LIPASE       All other labs were within normal range or not returned as of this dictation.     Medications   lactated

## 2020-01-12 NOTE — ED NOTES
RN has explained Narcotic Policy to patient. Patient understands that they are not allowed to operate a motor vehicle for a minimum of 4 hours after administration. Patient is aware and understands that law enforcement will be contacted if the patient attempts to operate a motor vehicle prior to the stated 4 hours.       Jennifer Cui, RN  01/11/20 2004

## 2020-01-30 ENCOUNTER — HOSPITAL ENCOUNTER (EMERGENCY)
Facility: HOSPITAL | Age: 30
Discharge: HOME | End: 2020-01-31
Admitting: EMERGENCY MEDICINE

## 2020-01-30 ENCOUNTER — APPOINTMENT (OUTPATIENT)
Dept: GENERAL RADIOLOGY | Age: 30
End: 2020-01-30
Payer: COMMERCIAL

## 2020-01-30 ENCOUNTER — HOSPITAL ENCOUNTER (EMERGENCY)
Age: 30
Discharge: HOME | End: 2020-01-30
Payer: COMMERCIAL

## 2020-01-30 VITALS
OXYGEN SATURATION: 95 % | TEMPERATURE: 98 F | RESPIRATION RATE: 20 BRPM | SYSTOLIC BLOOD PRESSURE: 152 MMHG | DIASTOLIC BLOOD PRESSURE: 94 MMHG | BODY MASS INDEX: 34.53 KG/M2 | WEIGHT: 220 LBS | HEART RATE: 82 BPM | HEIGHT: 67 IN

## 2020-01-30 DIAGNOSIS — R10.9 CHRONIC ABDOMINAL PAIN: Primary | ICD-10-CM

## 2020-01-30 DIAGNOSIS — G89.29 CHRONIC ABDOMINAL PAIN: Primary | ICD-10-CM

## 2020-01-30 DIAGNOSIS — F41.9 ANXIETY: ICD-10-CM

## 2020-01-30 LAB
ALBUMIN SERPL-MCNC: 4.2 G/DL (ref 3.5–5.2)
ALP BLD-CCNC: 62 U/L (ref 35–104)
ALT SERPL-CCNC: 24 U/L (ref 5–33)
ANION GAP SERPL CALCULATED.3IONS-SCNC: 12 MMOL/L (ref 7–19)
AST SERPL-CCNC: 21 U/L (ref 5–32)
BASOPHILS ABSOLUTE: 0 K/UL (ref 0–0.2)
BASOPHILS RELATIVE PERCENT: 0.2 % (ref 0–1)
BILIRUB SERPL-MCNC: <0.2 MG/DL (ref 0.2–1.2)
BILIRUBIN URINE: NEGATIVE
BLOOD, URINE: NEGATIVE
BUN BLDV-MCNC: 9 MG/DL (ref 6–20)
CALCIUM SERPL-MCNC: 9.2 MG/DL (ref 8.6–10)
CHLORIDE BLD-SCNC: 101 MMOL/L (ref 98–111)
CLARITY: CLEAR
CO2: 23 MMOL/L (ref 22–29)
COLOR: YELLOW
CREAT SERPL-MCNC: 0.5 MG/DL (ref 0.5–0.9)
EOSINOPHILS ABSOLUTE: 0.1 K/UL (ref 0–0.6)
EOSINOPHILS RELATIVE PERCENT: 0.7 % (ref 0–5)
EPITHELIAL CELLS, UA: 76 /HPF (ref 0–5)
GFR NON-AFRICAN AMERICAN: >60
GLUCOSE BLD-MCNC: 121 MG/DL (ref 74–109)
GLUCOSE URINE: NEGATIVE MG/DL
HCG QUALITATIVE: NEGATIVE
HCT VFR BLD CALC: 43 % (ref 37–47)
HEMOGLOBIN: 15.1 G/DL (ref 12–16)
HYALINE CASTS: 3 /HPF (ref 0–8)
IMMATURE GRANULOCYTES #: 0.1 K/UL
KETONES, URINE: NEGATIVE MG/DL
LEUKOCYTE ESTERASE, URINE: ABNORMAL
LIPASE: 22 U/L (ref 13–60)
LYMPHOCYTES ABSOLUTE: 2.1 K/UL (ref 1.1–4.5)
LYMPHOCYTES RELATIVE PERCENT: 18.5 % (ref 20–40)
MCH RBC QN AUTO: 31 PG (ref 27–31)
MCHC RBC AUTO-ENTMCNC: 35.1 G/DL (ref 33–37)
MCV RBC AUTO: 88.3 FL (ref 81–99)
MONOCYTES ABSOLUTE: 0.6 K/UL (ref 0–0.9)
MONOCYTES RELATIVE PERCENT: 5.1 % (ref 0–10)
NEUTROPHILS ABSOLUTE: 8.4 K/UL (ref 1.5–7.5)
NEUTROPHILS RELATIVE PERCENT: 74.7 % (ref 50–65)
NITRITE, URINE: NEGATIVE
PDW BLD-RTO: 14 % (ref 11.5–14.5)
PH UA: 7.5 (ref 5–8)
PLATELET # BLD: 359 K/UL (ref 130–400)
PMV BLD AUTO: 9.9 FL (ref 9.4–12.3)
POTASSIUM SERPL-SCNC: 4 MMOL/L (ref 3.5–5)
PRO-BNP: 65 PG/ML (ref 0–450)
PROTEIN UA: NEGATIVE MG/DL
RBC # BLD: 4.87 M/UL (ref 4.2–5.4)
RBC UA: 3 /HPF (ref 0–4)
SODIUM BLD-SCNC: 136 MMOL/L (ref 136–145)
SPECIFIC GRAVITY UA: 1.02 (ref 1–1.03)
TOTAL PROTEIN: 7.7 G/DL (ref 6.6–8.7)
TROPONIN: <0.01 NG/ML (ref 0–0.03)
URINE REFLEX TO CULTURE: YES
UROBILINOGEN, URINE: 0.2 E.U./DL
WBC # BLD: 11.2 K/UL (ref 4.8–10.8)
WBC UA: 166 /HPF (ref 0–5)

## 2020-01-30 PROCEDURE — 85025 COMPLETE CBC W/AUTO DIFF WBC: CPT

## 2020-01-30 PROCEDURE — 71046 X-RAY EXAM CHEST 2 VIEWS: CPT

## 2020-01-30 PROCEDURE — 6370000000 HC RX 637 (ALT 250 FOR IP): Performed by: PHYSICIAN ASSISTANT

## 2020-01-30 PROCEDURE — 36415 COLL VENOUS BLD VENIPUNCTURE: CPT

## 2020-01-30 PROCEDURE — 84484 ASSAY OF TROPONIN QUANT: CPT

## 2020-01-30 PROCEDURE — 99283 EMERGENCY DEPT VISIT LOW MDM: CPT

## 2020-01-30 PROCEDURE — 81001 URINALYSIS AUTO W/SCOPE: CPT

## 2020-01-30 PROCEDURE — 99285 EMERGENCY DEPT VISIT HI MDM: CPT

## 2020-01-30 PROCEDURE — 96374 THER/PROPH/DIAG INJ IV PUSH: CPT

## 2020-01-30 PROCEDURE — 93005 ELECTROCARDIOGRAM TRACING: CPT | Performed by: PHYSICIAN ASSISTANT

## 2020-01-30 PROCEDURE — 83690 ASSAY OF LIPASE: CPT

## 2020-01-30 PROCEDURE — 80053 COMPREHEN METABOLIC PANEL: CPT

## 2020-01-30 PROCEDURE — 96375 TX/PRO/DX INJ NEW DRUG ADDON: CPT

## 2020-01-30 PROCEDURE — 6360000002 HC RX W HCPCS: Performed by: PHYSICIAN ASSISTANT

## 2020-01-30 PROCEDURE — 87086 URINE CULTURE/COLONY COUNT: CPT

## 2020-01-30 PROCEDURE — 2580000003 HC RX 258: Performed by: PHYSICIAN ASSISTANT

## 2020-01-30 PROCEDURE — 83880 ASSAY OF NATRIURETIC PEPTIDE: CPT

## 2020-01-30 PROCEDURE — 94640 AIRWAY INHALATION TREATMENT: CPT

## 2020-01-30 PROCEDURE — 84703 CHORIONIC GONADOTROPIN ASSAY: CPT

## 2020-01-30 RX ORDER — 0.9 % SODIUM CHLORIDE 0.9 %
500 INTRAVENOUS SOLUTION INTRAVENOUS ONCE
Status: COMPLETED | OUTPATIENT
Start: 2020-01-30 | End: 2020-01-30

## 2020-01-30 RX ORDER — ONDANSETRON 2 MG/ML
4 INJECTION INTRAMUSCULAR; INTRAVENOUS ONCE
Status: COMPLETED | OUTPATIENT
Start: 2020-01-30 | End: 2020-01-30

## 2020-01-30 RX ORDER — IPRATROPIUM BROMIDE AND ALBUTEROL SULFATE 2.5; .5 MG/3ML; MG/3ML
1 SOLUTION RESPIRATORY (INHALATION) ONCE
Status: COMPLETED | OUTPATIENT
Start: 2020-01-30 | End: 2020-01-30

## 2020-01-30 RX ORDER — PROMETHAZINE HYDROCHLORIDE 25 MG/ML
25 INJECTION, SOLUTION INTRAMUSCULAR; INTRAVENOUS ONCE
Status: DISCONTINUED | OUTPATIENT
Start: 2020-01-30 | End: 2020-01-30 | Stop reason: HOSPADM

## 2020-01-30 RX ADMIN — SODIUM CHLORIDE 500 ML: 9 INJECTION, SOLUTION INTRAVENOUS at 15:54

## 2020-01-30 RX ADMIN — ONDANSETRON 4 MG: 2 INJECTION INTRAMUSCULAR; INTRAVENOUS at 15:54

## 2020-01-30 RX ADMIN — IPRATROPIUM BROMIDE AND ALBUTEROL SULFATE 1 AMPULE: .5; 3 SOLUTION RESPIRATORY (INHALATION) at 16:32

## 2020-01-30 RX ADMIN — LIDOCAINE HYDROCHLORIDE: 20 SOLUTION ORAL; TOPICAL at 16:30

## 2020-01-30 ASSESSMENT — ENCOUNTER SYMPTOMS
DIARRHEA: 0
EYE DISCHARGE: 0
SORE THROAT: 0
SHORTNESS OF BREATH: 0
VOMITING: 1
SHORTNESS OF BREATH: 0
VOMITING: 1
NAUSEA: 1
APNEA: 0
EYE PAIN: 0
PHOTOPHOBIA: 0
COUGH: 0
ABDOMINAL PAIN: 1
BLOOD IN STOOL: 0
BACK PAIN: 0
CONSTIPATION: 0
RHINORRHEA: 0
ABDOMINAL DISTENTION: 0
COLOR CHANGE: 0
NAUSEA: 1
DIARRHEA: 0
ABDOMINAL PAIN: 1

## 2020-01-30 NOTE — ED PROVIDER NOTES
session: None    Stress: None   Relationships    Social connections:     Talks on phone: None     Gets together: None     Attends Mu-ism service: None     Active member of club or organization: None     Attends meetings of clubs or organizations: None     Relationship status: None    Intimate partner violence:     Fear of current or ex partner: None     Emotionally abused: None     Physically abused: None     Forced sexual activity: None   Other Topics Concern    None   Social History Narrative    None       SCREENINGS    [place] Coma Scale  Eye Opening: Spontaneous  Best Verbal Response: Oriented  Best Motor Response: Obeys commands  Rob Coma Scale Score: 15      PHYSICAL EXAM    (up to 7 forlevel 4, 8 or more for level 5)     ED Triage Vitals [01/30/20 1527]   BP Temp Temp src Pulse Resp SpO2 Height Weight   (!) 152/94 98 °F (36.7 °C) -- 82 20 95 % 5' 7\" (1.702 m) 220 lb (99.8 kg)       Physical Exam  Vitals signs and nursing note reviewed. Constitutional:       General: She is not in acute distress. Appearance: She is well-developed. She is not diaphoretic. HENT:      Head: Normocephalic and atraumatic. Right Ear: Tympanic membrane, ear canal and external ear normal.      Left Ear: Tympanic membrane, ear canal and external ear normal.      Nose: Nose normal.      Mouth/Throat:      Mouth: Mucous membranes are moist.      Pharynx: No oropharyngeal exudate. Eyes:      General:         Right eye: No discharge. Left eye: No discharge. Pupils: Pupils are equal, round, and reactive to light. Neck:      Musculoskeletal: Normal range of motion and neck supple. Thyroid: No thyromegaly. Cardiovascular:      Rate and Rhythm: Normal rate and regular rhythm. Pulses: Normal pulses. Heart sounds: Normal heart sounds. No murmur. No friction rub. Pulmonary:      Effort: Pulmonary effort is normal. No respiratory distress. Breath sounds: Normal breath sounds.  No stridor. No wheezing. Abdominal:      General: Bowel sounds are normal. There is no distension. Palpations: Abdomen is soft. There is no mass. Tenderness: There is abdominal tenderness. There is no right CVA tenderness, left CVA tenderness, guarding or rebound. Hernia: No hernia is present. Comments: Epigastric pain. Musculoskeletal: Normal range of motion. Skin:     General: Skin is warm and dry. Capillary Refill: Capillary refill takes less than 2 seconds. Findings: No rash. Neurological:      General: No focal deficit present. Mental Status: She is alert and oriented to person, place, and time. Mental status is at baseline. Cranial Nerves: No cranial nerve deficit. Sensory: No sensory deficit. Coordination: Coordination normal.   Psychiatric:         Mood and Affect: Mood normal.         Behavior: Behavior normal.         Thought Content: Thought content normal.         Judgment: Judgment normal.           DIAGNOSTIC RESULTS     RADIOLOGY:   Non-plain film images such as CT, Ultrasound and MRI are read by the radiologist. Plain radiographic images are visualized and preliminarilyinterpreted by No att. providers found with the below findings:      Interpretation per the Radiologist below, if available at the time of this note:    XR CHEST STANDARD (2 VW)   Final Result   Impression:   1. No acute cardiopulmonary process.    Signed by Dr Bertha Ledesma on 1/30/2020 4:12 PM          LABS:  Labs Reviewed   CBC WITH AUTO DIFFERENTIAL - Abnormal; Notable for the following components:       Result Value    WBC 11.2 (*)     Neutrophils % 74.7 (*)     Lymphocytes % 18.5 (*)     Neutrophils Absolute 8.4 (*)     All other components within normal limits   COMPREHENSIVE METABOLIC PANEL - Abnormal; Notable for the following components:    Glucose 121 (*)     All other components within normal limits   URINE RT REFLEX TO CULTURE - Abnormal; Notable for the following

## 2020-01-31 VITALS
DIASTOLIC BLOOD PRESSURE: 97 MMHG | TEMPERATURE: 97.5 F | HEIGHT: 67 IN | OXYGEN SATURATION: 99 % | HEART RATE: 71 BPM | WEIGHT: 238 LBS | RESPIRATION RATE: 17 BRPM | SYSTOLIC BLOOD PRESSURE: 141 MMHG | BODY MASS INDEX: 37.35 KG/M2

## 2020-01-31 LAB
FLUAV AG NPH QL: NEGATIVE
FLUBV AG NPH QL IA: NEGATIVE

## 2020-01-31 PROCEDURE — 63710000001 PROCHLORPERAZINE MALEATE PER 10 MG: Performed by: PHYSICIAN ASSISTANT

## 2020-01-31 PROCEDURE — 87804 INFLUENZA ASSAY W/OPTIC: CPT | Performed by: PHYSICIAN ASSISTANT

## 2020-01-31 RX ORDER — PROCHLORPERAZINE MALEATE 10 MG
10 TABLET ORAL EVERY 6 HOURS PRN
Qty: 12 TABLET | Refills: 0 | Status: SHIPPED | OUTPATIENT
Start: 2020-01-31 | End: 2020-01-31 | Stop reason: SDUPTHER

## 2020-01-31 RX ORDER — PROCHLORPERAZINE MALEATE 10 MG
10 TABLET ORAL EVERY 6 HOURS PRN
Qty: 12 TABLET | Refills: 0 | Status: SHIPPED | OUTPATIENT
Start: 2020-01-31 | End: 2020-09-14

## 2020-01-31 RX ORDER — PROCHLORPERAZINE MALEATE 10 MG
10 TABLET ORAL ONCE
Status: COMPLETED | OUTPATIENT
Start: 2020-01-31 | End: 2020-01-31

## 2020-01-31 RX ADMIN — PROCHLORPERAZINE MALEATE 10 MG: 10 TABLET ORAL at 00:37

## 2020-02-01 LAB — URINE CULTURE, ROUTINE: NORMAL

## 2020-02-02 LAB
EKG P AXIS: 5 DEGREES
EKG P-R INTERVAL: 172 MS
EKG Q-T INTERVAL: 374 MS
EKG QRS DURATION: 82 MS
EKG QTC CALCULATION (BAZETT): 402 MS
EKG T AXIS: 6 DEGREES

## 2020-02-02 PROCEDURE — 93010 ELECTROCARDIOGRAM REPORT: CPT | Performed by: INTERNAL MEDICINE

## 2020-04-08 ENCOUNTER — HOSPITAL ENCOUNTER (EMERGENCY)
Facility: HOSPITAL | Age: 30
Discharge: HOME | End: 2020-04-08
Attending: EMERGENCY MEDICINE | Admitting: EMERGENCY MEDICINE

## 2020-04-08 ENCOUNTER — APPOINTMENT (OUTPATIENT)
Dept: GENERAL RADIOLOGY | Facility: HOSPITAL | Age: 30
End: 2020-04-08

## 2020-04-08 VITALS
SYSTOLIC BLOOD PRESSURE: 130 MMHG | HEIGHT: 67 IN | RESPIRATION RATE: 20 BRPM | HEART RATE: 62 BPM | WEIGHT: 234 LBS | DIASTOLIC BLOOD PRESSURE: 92 MMHG | OXYGEN SATURATION: 95 % | TEMPERATURE: 98.4 F | BODY MASS INDEX: 36.73 KG/M2

## 2020-04-08 DIAGNOSIS — R07.2 PRECORDIAL PAIN: Primary | ICD-10-CM

## 2020-04-08 DIAGNOSIS — R06.02 SHORTNESS OF BREATH: ICD-10-CM

## 2020-04-08 LAB
ALBUMIN SERPL-MCNC: 4.2 G/DL (ref 3.5–5.2)
ALBUMIN/GLOB SERPL: 1.2 G/DL
ALP SERPL-CCNC: 60 U/L (ref 39–117)
ALT SERPL W P-5'-P-CCNC: 17 U/L (ref 1–33)
ANION GAP SERPL CALCULATED.3IONS-SCNC: 12 MMOL/L (ref 5–15)
AST SERPL-CCNC: 19 U/L (ref 1–32)
B PARAPERT DNA SPEC QL NAA+PROBE: NOT DETECTED
B PERT DNA SPEC QL NAA+PROBE: NOT DETECTED
BASOPHILS # BLD AUTO: 0.07 10*3/MM3 (ref 0–0.2)
BASOPHILS NFR BLD AUTO: 0.8 % (ref 0–1.5)
BILIRUB SERPL-MCNC: 0.2 MG/DL (ref 0.2–1.2)
BUN BLD-MCNC: 11 MG/DL (ref 6–20)
BUN/CREAT SERPL: 17.5 (ref 7–25)
C PNEUM DNA NPH QL NAA+NON-PROBE: NOT DETECTED
CALCIUM SPEC-SCNC: 9.5 MG/DL (ref 8.6–10.5)
CHLORIDE SERPL-SCNC: 102 MMOL/L (ref 98–107)
CO2 SERPL-SCNC: 25 MMOL/L (ref 22–29)
CREAT BLD-MCNC: 0.63 MG/DL (ref 0.57–1)
D DIMER PPP FEU-MCNC: 0.35 MG/L (FEU) (ref 0–0.5)
DEPRECATED RDW RBC AUTO: 41.7 FL (ref 37–54)
EOSINOPHIL # BLD AUTO: 0.21 10*3/MM3 (ref 0–0.4)
EOSINOPHIL NFR BLD AUTO: 2.4 % (ref 0.3–6.2)
ERYTHROCYTE [DISTWIDTH] IN BLOOD BY AUTOMATED COUNT: 13.4 % (ref 12.3–15.4)
FLUAV H1 2009 PAND RNA NPH QL NAA+PROBE: NOT DETECTED
FLUAV H1 HA GENE NPH QL NAA+PROBE: NOT DETECTED
FLUAV H3 RNA NPH QL NAA+PROBE: NOT DETECTED
FLUAV SUBTYP SPEC NAA+PROBE: NOT DETECTED
FLUBV RNA ISLT QL NAA+PROBE: NOT DETECTED
GFR SERPL CREATININE-BSD FRML MDRD: 134 ML/MIN/1.73
GLOBULIN UR ELPH-MCNC: 3.4 GM/DL
GLUCOSE BLD-MCNC: 83 MG/DL (ref 65–99)
HADV DNA SPEC NAA+PROBE: NOT DETECTED
HCG INTACT+B SERPL-ACNC: <0.5 MIU/ML
HCOV 229E RNA SPEC QL NAA+PROBE: NOT DETECTED
HCOV HKU1 RNA SPEC QL NAA+PROBE: NOT DETECTED
HCOV NL63 RNA SPEC QL NAA+PROBE: NOT DETECTED
HCOV OC43 RNA SPEC QL NAA+PROBE: NOT DETECTED
HCT VFR BLD AUTO: 39.8 % (ref 34–46.6)
HGB BLD-MCNC: 14.1 G/DL (ref 12–15.9)
HMPV RNA NPH QL NAA+NON-PROBE: NOT DETECTED
HPIV1 RNA SPEC QL NAA+PROBE: NOT DETECTED
HPIV2 RNA SPEC QL NAA+PROBE: NOT DETECTED
HPIV3 RNA NPH QL NAA+PROBE: NOT DETECTED
HPIV4 P GENE NPH QL NAA+PROBE: NOT DETECTED
IMM GRANULOCYTES # BLD AUTO: 0.05 10*3/MM3 (ref 0–0.05)
IMM GRANULOCYTES NFR BLD AUTO: 0.6 % (ref 0–0.5)
LIPASE SERPL-CCNC: 21 U/L (ref 13–60)
LYMPHOCYTES # BLD AUTO: 3.29 10*3/MM3 (ref 0.7–3.1)
LYMPHOCYTES NFR BLD AUTO: 37.7 % (ref 19.6–45.3)
M PNEUMO IGG SER IA-ACNC: NOT DETECTED
MCH RBC QN AUTO: 30.4 PG (ref 26.6–33)
MCHC RBC AUTO-ENTMCNC: 35.4 G/DL (ref 31.5–35.7)
MCV RBC AUTO: 85.8 FL (ref 79–97)
MONOCYTES # BLD AUTO: 0.71 10*3/MM3 (ref 0.1–0.9)
MONOCYTES NFR BLD AUTO: 8.1 % (ref 5–12)
NEUTROPHILS # BLD AUTO: 4.39 10*3/MM3 (ref 1.7–7)
NEUTROPHILS NFR BLD AUTO: 50.4 % (ref 42.7–76)
NRBC BLD AUTO-RTO: 0 /100 WBC (ref 0–0.2)
NT-PROBNP SERPL-MCNC: 24.6 PG/ML (ref 5–450)
PLATELET # BLD AUTO: 355 10*3/MM3 (ref 140–450)
PMV BLD AUTO: 9.4 FL (ref 6–12)
POTASSIUM BLD-SCNC: 4.1 MMOL/L (ref 3.5–5.2)
PROT SERPL-MCNC: 7.6 G/DL (ref 6–8.5)
RBC # BLD AUTO: 4.64 10*6/MM3 (ref 3.77–5.28)
RHINOVIRUS RNA SPEC NAA+PROBE: NOT DETECTED
RSV RNA NPH QL NAA+NON-PROBE: NOT DETECTED
SODIUM BLD-SCNC: 139 MMOL/L (ref 136–145)
TROPONIN T SERPL-MCNC: <0.01 NG/ML (ref 0–0.03)
WBC NRBC COR # BLD: 8.72 10*3/MM3 (ref 3.4–10.8)

## 2020-04-08 PROCEDURE — 99283 EMERGENCY DEPT VISIT LOW MDM: CPT

## 2020-04-08 PROCEDURE — 84484 ASSAY OF TROPONIN QUANT: CPT | Performed by: EMERGENCY MEDICINE

## 2020-04-08 PROCEDURE — 84702 CHORIONIC GONADOTROPIN TEST: CPT | Performed by: EMERGENCY MEDICINE

## 2020-04-08 PROCEDURE — 80053 COMPREHEN METABOLIC PANEL: CPT | Performed by: EMERGENCY MEDICINE

## 2020-04-08 PROCEDURE — 83880 ASSAY OF NATRIURETIC PEPTIDE: CPT | Performed by: EMERGENCY MEDICINE

## 2020-04-08 PROCEDURE — 85379 FIBRIN DEGRADATION QUANT: CPT | Performed by: EMERGENCY MEDICINE

## 2020-04-08 PROCEDURE — 93010 ELECTROCARDIOGRAM REPORT: CPT | Performed by: INTERNAL MEDICINE

## 2020-04-08 PROCEDURE — U0002 COVID-19 LAB TEST NON-CDC: HCPCS | Performed by: EMERGENCY MEDICINE

## 2020-04-08 PROCEDURE — 85025 COMPLETE CBC W/AUTO DIFF WBC: CPT | Performed by: EMERGENCY MEDICINE

## 2020-04-08 PROCEDURE — 83690 ASSAY OF LIPASE: CPT | Performed by: EMERGENCY MEDICINE

## 2020-04-08 PROCEDURE — 93005 ELECTROCARDIOGRAM TRACING: CPT | Performed by: EMERGENCY MEDICINE

## 2020-04-08 PROCEDURE — 71045 X-RAY EXAM CHEST 1 VIEW: CPT

## 2020-04-08 PROCEDURE — 0100U HC BIOFIRE FILMARRAY RESP PANEL 2: CPT | Performed by: EMERGENCY MEDICINE

## 2020-04-08 RX ORDER — ACETAMINOPHEN 500 MG
1000 TABLET ORAL ONCE
Status: COMPLETED | OUTPATIENT
Start: 2020-04-08 | End: 2020-04-08

## 2020-04-08 RX ADMIN — ACETAMINOPHEN 1000 MG: 500 TABLET, FILM COATED ORAL at 17:32

## 2020-04-10 LAB
REF LAB TEST METHOD: NORMAL
SARS-COV-2 RNA RESP QL NAA+PROBE: NOT DETECTED

## 2020-04-11 ENCOUNTER — TELEPHONE (OUTPATIENT)
Dept: EMERGENCY DEPT | Facility: HOSPITAL | Age: 30
End: 2020-04-11

## 2020-08-12 ENCOUNTER — OFFICE VISIT (OUTPATIENT)
Dept: OBGYN | Age: 30
End: 2020-08-12
Payer: COMMERCIAL

## 2020-08-12 VITALS
HEART RATE: 77 BPM | DIASTOLIC BLOOD PRESSURE: 90 MMHG | HEIGHT: 67 IN | WEIGHT: 259 LBS | BODY MASS INDEX: 40.65 KG/M2 | SYSTOLIC BLOOD PRESSURE: 135 MMHG

## 2020-08-12 PROCEDURE — 99395 PREV VISIT EST AGE 18-39: CPT | Performed by: ADVANCED PRACTICE MIDWIFE

## 2020-08-12 ASSESSMENT — ENCOUNTER SYMPTOMS
ALLERGIC/IMMUNOLOGIC NEGATIVE: 1
EYES NEGATIVE: 1
GASTROINTESTINAL NEGATIVE: 1
RESPIRATORY NEGATIVE: 1

## 2020-08-12 NOTE — PROGRESS NOTES
Pt presents today for pap smear and breast exam.     Last mammogram: never  Last pap smear:  , negative  Contraception:  Has a girlfriend  :  0  Para:  0  AB:  0  Last bone density: never  Last colonoscopy: never  Menarche: doesn't remember  LMP: 2020 approx  Menses: regular

## 2020-08-12 NOTE — LETTER
[address]  [address]  Phone: [phone]  Fax: [phone]    ZACKERY Pagan CNM        August 18, 2020    [address] [facility] [address]      Dear Gomez Harley: The results of your most recent Pap smear and HPV are normal. This means that no cancerous or precancerous cells were seen. We recommend that you come back in 1 year for your next routine Pap smear. If you have any questions or concerns, please don't hesitate to call.     Sincerely,          ZACKERY Pagan CNM

## 2020-08-12 NOTE — PATIENT INSTRUCTIONS
feel your breast tissue before moving on to the next spot. ? Check your entire breast, moving up and down as if following a strip from the collarbone to the bra line, and from the armpit to the ribs. Repeat until you have covered the entire breast.  ? Repeat this procedure for your left breast, using the pads of the 3 middle fingers of your right hand. · To examine your breasts while in the shower:  ? Place one arm over your head and lightly soap your breast on that side. ? Using the pads of your fingers, gently move your hand over your breast (in the strip pattern described above), feeling carefully for any lumps or changes. ? Repeat for the other breast.  · Have your doctor inspect anything you notice to see if you need further testing. Where can you learn more? Go to https://chedgardoeb.American Restaurant Concepts. org and sign in to your Gigzolo account. Enter P148 in the PlaytestCloud box to learn more about \"Breast Self-Exam: Care Instructions. \"     If you do not have an account, please click on the \"Sign Up Now\" link. Current as of: August 22, 2019               Content Version: 12.5  © 5362-1998 Healthwise, Incorporated. Care instructions adapted under license by [facility] ([facility]). If you have questions about a medical condition or this instruction, always ask your healthcare professional. [address] any warranty or liability for your use of this information. Patient Education        Body Mass Index: Care Instructions  Your Care Instructions     Body mass index (BMI) can help you see if your weight is raising your risk for health problems. It uses a formula to compare how much you weigh with how tall you are. · A BMI lower than 18.5 is considered underweight. · A BMI between 18.5 and 24.9 is considered healthy. · A BMI between 25 and 29.9 is considered overweight. A BMI of 30 or higher is considered obese.   If your BMI is in the normal range, it means that you have a doctor about steps you can take to stay healthy or improve your health. You may need to make lifestyle changes to lose weight and stay healthy, such as changing your diet and getting regular exercise. If you have a BMI lower than 18.5  · Your doctor may do other tests to check your risk for health problems. · Talk with your doctor about steps you can take to stay healthy or improve your health. You may need to make lifestyle changes to gain or maintain weight and stay healthy, such as getting more healthy foods in your diet and doing exercises to build muscle. Where can you learn more? Go to https://lux.Intelligent Energy. org and sign in to your Mediatonic Games account. Enter S176 in the Logim Solutions box to learn more about \"Body Mass Index: Care Instructions. \"     If you do not have an account, please click on the \"Sign Up Now\" link. Current as of: December 11, 2019               Content Version: 12.5  © 4686-1105 ReacciÃ³n. Care instructions adapted under license by [facility] ([facility]). If you have questions about a medical condition or this instruction, always ask your healthcare professional. [address] any warranty or liability for your use of this information. Patient Education        A Healthy Lifestyle: Care Instructions  Your Care Instructions     A healthy lifestyle can help you feel good, stay at a healthy weight, and have plenty of energy for both work and play. A healthy lifestyle is something you can share with your whole family. A healthy lifestyle also can lower your risk for serious health problems, such as high blood pressure, heart disease, and diabetes. You can follow a few steps listed below to improve your health and the health of your family. Follow-up care is a key part of your treatment and safety. Be sure to make and go to all appointments, and call your doctor if you are having problems.  It's also a good idea to know your test results and keep a list of the medicines you take. How can you care for yourself at home? · Do not eat too much sugar, fat, or fast foods. You can still have dessert and treats now and then. The goal is moderation. · Start small to improve your eating habits. Pay attention to portion sizes, drink less juice and soda pop, and eat more fruits and vegetables. ? Eat a healthy amount of food. A 3-ounce serving of meat, for example, is about the size of a deck of cards. Fill the rest of your plate with vegetables and whole grains. ? Limit the amount of soda and sports drinks you have every day. Drink more water when you are thirsty. ? Eat at least 5 servings of fruits and vegetables every day. It may seem like a lot, but it is not hard to reach this goal. A serving or helping is 1 piece of fruit, 1 cup of vegetables, or 2 cups of leafy, raw vegetables. Have an apple or some carrot sticks as an afternoon snack instead of a candy bar. Try to have fruits and/or vegetables at every meal.  · Make exercise part of your daily routine. You may want to start with simple activities, such as walking, bicycling, or slow swimming. Try to be active 30 to 60 minutes every day. You do not need to do all 30 to 60 minutes all at once. For example, you can exercise 3 times a day for 10 or 20 minutes. Moderate exercise is safe for most people, but it is always a good idea to talk to your doctor before starting an exercise program.  · Keep moving. Mabelene [place] the lawn, work in the garden, or [place]. Take the stairs instead of the elevator at work. · If you smoke, quit. People who smoke have an increased risk for heart attack, stroke, cancer, and other lung illnesses. Quitting is hard, but there are ways to boost your chance of quitting tobacco for good. ? Use nicotine gum, patches, or lozenges. ? Ask your doctor about stop-smoking programs and medicines. ? Keep trying.   In addition to reducing your risk of diseases in the future, you will notice some benefits soon after you stop using tobacco. If you have shortness of breath or asthma symptoms, they will likely get better within a few weeks after you quit. · Limit how much alcohol you drink. Moderate amounts of alcohol (up to 2 drinks a day for men, 1 drink a day for women) are okay. But drinking too much can lead to liver problems, high blood pressure, and other health problems. Family health  If you have a family, there are many things you can do together to improve your health. · Eat meals together as a family as often as possible. · Eat healthy foods. This includes fruits, vegetables, lean meats and dairy, and whole grains. · Include your family in your fitness plan. Most people think of activities such as jogging or tennis as the way to fitness, but there are many ways you and your family can be more active. Anything that makes you breathe hard and gets your heart pumping is exercise. Here are some tips:  ? Walk to do errands or to take your child to school or the bus.  ? Go for a family bike ride after dinner instead of watching TV. Where can you learn more? Go to https://AdonitpePythagoras Solar.Cloud 66. org and sign in to your Symphony Commerce account. Enter H593 in the [facility] box to learn more about \"A Healthy Lifestyle: Care Instructions. \"     If you do not have an account, please click on the \"Sign Up Now\" link. Current as of: January 31, 2020               Content Version: 12.5  © 2006-2020 Social Trends Media. Care instructions adapted under license by [facility] ([facility]). If you have questions about a medical condition or this instruction, always ask your healthcare professional. [place] [address] any warranty or liability for your use of this information. Patient Education        Well Visit, Ages 25 to 48: Care Instructions  Your Care Instructions     Physical exams can help you stay healthy.  Your doctor has checked your overall health and tests. These tests can spot problems early. For everyone  · Cholesterol. Have the fat (cholesterol) in your blood tested after age 21. Your doctor will tell you how often to have this done based on your age, family history, or other things that can increase your risk for heart disease. · Blood pressure. Have your blood pressure checked during a routine doctor visit. Your doctor will tell you how often to check your blood pressure based on your age, your blood pressure results, and other factors. · Vision. Talk with your doctor about how often to have a glaucoma test.  · Diabetes. Ask your doctor whether you should have tests for diabetes. · Colon cancer. Your risk for colorectal cancer gets higher as you get older. Some experts say that adults should start regular screening at age 48 and stop at age 76. Others say to start before age 48 or continue after age 76. Talk with your doctor about your risk and when to start and stop screening. For women  · Breast exam and mammogram. Talk to your doctor about when you should have a clinical breast exam and a mammogram. Medical experts differ on whether and how often women under 50 should have these tests. Your doctor can help you decide what is right for you. · Cervical cancer screening test and pelvic exam. Begin with a Pap test at age 24. The test often is part of a pelvic exam. Starting at age 27, you may choose to have a Pap test, an HPV test, or both tests at the same time (called co-testing). Talk with your doctor about how often to have testing. · Tests for sexually transmitted infections (STIs). Ask whether you should have tests for STIs. You may be at risk if you have sex with more than one person, especially if your partners do not wear condoms. For men  · Tests for sexually transmitted infections (STIs). Ask whether you should have tests for STIs.  You may be at risk if you have sex with more than one person, especially if you do not wear a condom. · Testicular cancer exam. Ask your doctor whether you should check your testicles regularly. · Prostate exam. Talk to your doctor about whether you should have a blood test (called a PSA test) for prostate cancer. Experts differ on whether and when men should have this test. Some experts suggest it if you are older than 39 and are -American or have a father or brother who got prostate cancer when he was younger than 72. When should you call for help? Watch closely for changes in your health, and be sure to contact your doctor if you have any problems or symptoms that concern you. Where can you learn more? Go to https://Off Track Planetpepiceweb.healthSnapShot GmbH. org and sign in to your SevenLunches account. Enter P072 in the ProtoGeo box to learn more about \"Well Visit, Ages 25 to 48: Care Instructions. \"     If you do not have an account, please click on the \"Sign Up Now\" link. Current as of: August 22, 2019               Content Version: 12.5  © 6699-7358 Healthwise, Incorporated. Care instructions adapted under license by [facility] ([facility]). If you have questions about a medical condition or this instruction, always ask your healthcare professional. [address] any warranty or liability for your use of this information.

## 2020-08-12 NOTE — PROGRESS NOTES
[facility]  CNM Office Note    Nu Marc is a 27 y.o. female who presents today for her medical conditions/ complaints as noted below. Chief Complaint   Patient presents with    Annual Exam     Patient presents today for a pap smear and breast exam.         HPI  Travis Young presents for annual gyn exam. She desires pregnancy. She has no sexual concerns. Last mammogram: never  Last pap smear:  , negative  Contraception:  Has a girlfriend  :  0  Para:  0  AB:  0  Last bone density: never  Last colonoscopy: never  Menarche: doesn't remember  LMP: 2020 approx  Menses: regular  Patient Active Problem List   Diagnosis    Infertility, female, primary    Hidradenitis suppurativa of right axilla       Patient's last menstrual period was 2020 (approximate).     Past Medical History:   Diagnosis Date    Ovarian cyst      History reviewed. No pertinent surgical history. Family History   Problem Relation Age of Onset    Diabetes Mother     High Blood Pressure Mother     Diabetes Maternal Grandmother      Social History     Tobacco Use    Smoking status: Current Every Day Smoker     Packs/day: 0.50     Years: 10.00     Pack years: 5.00     Types: Cigarettes    Smokeless tobacco: Never Used   Substance Use Topics    Alcohol use: No     Alcohol/week: 0.0 standard drinks       Current Outpatient Medications   Medication Sig Dispense Refill    albuterol sulfate  (90 Base) MCG/ACT inhaler Inhale 2 puffs into the lungs 4 times daily as needed for Wheezing 1 Inhaler 0     No current facility-administered medications for this visit. Allergies   Allergen Reactions    Other Anaphylaxis     onions    Tylenol [Acetaminophen]     Tramadol Rash     Vitals:    20 1441   BP: (!) 135/90   Pulse: 77     Body mass index is 40.57 kg/m². Review of Systems   Constitutional: Negative. HENT: Negative. Eyes: Negative. Respiratory: Negative. Thought Content: Thought content normal.         Judgment: Judgment normal.          Diagnosis Orders   1. Encounter for well woman exam with routine gynecological exam  PAP SMEAR   2. Screening for cervical cancer  PAP SMEAR   3. Screening for HPV (human papillomavirus)  Human papillomavirus (HPV) DNA probe thin prep high risk       MEDICATIONS:  No orders of the defined types were placed in this encounter. ORDERS:  No orders of the defined types were placed in this encounter. PLAN:  1. WWE - PAP w HPV collected. SBE reviewed and CBE performed. No annual labs today. Begin tracking periods, ovulation testing, and timed intercourse. Start prenatal vitamin.

## 2020-08-18 LAB
HPV COMMENT: NORMAL
HPV TYPE 16: NOT DETECTED
HPV TYPE 18: NOT DETECTED
HPVOH (OTHER TYPES): NOT DETECTED

## 2020-09-05 ENCOUNTER — HOSPITAL ENCOUNTER (EMERGENCY)
Facility: HOSPITAL | Age: 30
Discharge: HOME | End: 2020-09-05
Admitting: FAMILY MEDICINE

## 2020-09-05 VITALS
TEMPERATURE: 98.4 F | DIASTOLIC BLOOD PRESSURE: 92 MMHG | WEIGHT: 238 LBS | SYSTOLIC BLOOD PRESSURE: 145 MMHG | RESPIRATION RATE: 16 BRPM | OXYGEN SATURATION: 99 % | BODY MASS INDEX: 37.35 KG/M2 | HEART RATE: 66 BPM | HEIGHT: 67 IN

## 2020-09-05 DIAGNOSIS — L02.91 ABSCESS: Primary | ICD-10-CM

## 2020-09-05 PROCEDURE — 90715 TDAP VACCINE 7 YRS/> IM: CPT | Performed by: NURSE PRACTITIONER

## 2020-09-05 PROCEDURE — 90471 IMMUNIZATION ADMIN: CPT | Performed by: NURSE PRACTITIONER

## 2020-09-05 PROCEDURE — 63710000001 ONDANSETRON ODT 4 MG TABLET DISPERSIBLE: Performed by: NURSE PRACTITIONER

## 2020-09-05 PROCEDURE — 99283 EMERGENCY DEPT VISIT LOW MDM: CPT

## 2020-09-05 PROCEDURE — 25010000002 TDAP 5-2.5-18.5 LF-MCG/0.5 SUSPENSION: Performed by: NURSE PRACTITIONER

## 2020-09-05 RX ORDER — OXYCODONE HYDROCHLORIDE AND ACETAMINOPHEN 5; 325 MG/1; MG/1
1 TABLET ORAL ONCE
Status: COMPLETED | OUTPATIENT
Start: 2020-09-05 | End: 2020-09-05

## 2020-09-05 RX ORDER — ONDANSETRON 4 MG/1
4 TABLET, ORALLY DISINTEGRATING ORAL ONCE
Status: COMPLETED | OUTPATIENT
Start: 2020-09-05 | End: 2020-09-05

## 2020-09-05 RX ORDER — NAPROXEN 500 MG/1
500 TABLET ORAL 2 TIMES DAILY PRN
Qty: 10 TABLET | Refills: 0 | Status: SHIPPED | OUTPATIENT
Start: 2020-09-05 | End: 2020-09-10

## 2020-09-05 RX ORDER — CLINDAMYCIN HYDROCHLORIDE 300 MG/1
300 CAPSULE ORAL 3 TIMES DAILY
Qty: 21 CAPSULE | Refills: 0 | OUTPATIENT
Start: 2020-09-05 | End: 2020-09-13

## 2020-09-05 RX ADMIN — ONDANSETRON 4 MG: 4 TABLET, ORALLY DISINTEGRATING ORAL at 09:41

## 2020-09-05 RX ADMIN — OXYCODONE HYDROCHLORIDE AND ACETAMINOPHEN 1 TABLET: 5; 325 TABLET ORAL at 09:41

## 2020-09-05 RX ADMIN — TETANUS TOXOID, REDUCED DIPHTHERIA TOXOID AND ACELLULAR PERTUSSIS VACCINE, ADSORBED 0.5 ML: 5; 2.5; 8; 8; 2.5 SUSPENSION INTRAMUSCULAR at 09:45

## 2020-09-05 NOTE — ED PROVIDER NOTES
Subjective   Patient is a 30-year-old female that presents to the ER today with complaint of abscess to the left axilla area.  Patient states that this is been ongoing for one week now.  The patient states that initially she was supposed to have surgery to the area but she recently was unable to do so.  The patient states that she has not rescheduled the surgery since that time.  The patient is not a diabetic.  She states that she does get frequent abscesses to this area.  The patient has not been on antibiotics recently.  The patient denies fever.  She presents here today for further evaluation.      History provided by:  Patient   used: No    Abscess   Location:  Shoulder/arm  Shoulder/arm abscess location:  L axilla  Size:  Multiple abscesses noted  Abscess quality: fluctuance, painful, redness and warmth    Abscess quality: not draining, no induration, no itching and not weeping    Red streaking: no    Duration:  1 week  Progression:  Worsening  Pain details:     Quality:  Throbbing    Severity:  Mild    Duration:  1 week    Timing:  Constant    Progression:  Worsening  Chronicity:  New  Context: not diabetes, not immunosuppression, not injected drug use, not insect bite/sting and not skin injury    Relieved by:  Nothing  Worsened by:  Nothing  Ineffective treatments:  None tried  Associated symptoms: no anorexia, no fatigue, no fever, no headaches, no nausea and no vomiting    Risk factors: prior abscess    Risk factors: no family hx of MRSA and no hx of MRSA        Review of Systems   Constitutional: Negative for fatigue and fever.   Gastrointestinal: Negative for anorexia, nausea and vomiting.   Neurological: Negative for headaches.   All other systems reviewed and are negative.      Past Medical History:   Diagnosis Date   • Abscess of axilla    • Depression    • Hypertension    • Ovarian cyst        Allergies   Allergen Reactions   • Tramadol Rash       Past Surgical History:   Procedure  Laterality Date   • NO PAST SURGERIES         No family history on file.    Social History     Socioeconomic History   • Marital status: Single     Spouse name: Not on file   • Number of children: Not on file   • Years of education: Not on file   • Highest education level: Not on file   Tobacco Use   • Smoking status: Current Every Day Smoker     Packs/day: 0.50     Years: 12.00     Pack years: 6.00   • Smokeless tobacco: Never Used   Substance and Sexual Activity   • Alcohol use: No   • Drug use: No   • Sexual activity: Defer   Social History Narrative    single           Objective   Physical Exam   Constitutional: She is oriented to person, place, and time. She appears well-developed and well-nourished.   HENT:   Head: Normocephalic and atraumatic.   Eyes: Conjunctivae are normal.   Cardiovascular: Normal rate.   Pulmonary/Chest: Effort normal.   Musculoskeletal:        Arms:  Neurological: She is alert and oriented to person, place, and time.   Skin: Skin is warm and dry. Capillary refill takes less than 2 seconds.   Psychiatric: She has a normal mood and affect.   Nursing note and vitals reviewed.      Procedures           ED Course  ED Course as of Sep 05 0937   Sat Sep 05, 2020   0935 I did offer to I&D the abscess however the patient declines.  She prefers to follow-up as an outpatient with surgery.  I will refer her to Dr. Nascimento with general surgery.  Advised her to call on Tuesday when the office opens.  I will place her on clindamycin and give her a prescription for naproxen for pain.  She is advised to use warm compresses to the area 4 times daily for 10 to 20 minutes.  She is advised return the ER for any new or worsening symptoms.  The patient is not a diabetic.  She is unsure when her last tetanus vaccination is so we will go ahead and update that today.  She advised return the ER for any new or worsening symptoms.    [LF]      ED Course User Index  [LF] Luzma Blevins, APRN                                    No orders to display     Labs Reviewed - No data to display          MDM  Number of Diagnoses or Management Options  Abscess: new and requires workup  Patient Progress  Patient progress: stable      Final diagnoses:   Abscess            Luzma Blevins, APRN  09/05/20 0937

## 2020-09-13 ENCOUNTER — APPOINTMENT (OUTPATIENT)
Dept: CT IMAGING | Facility: HOSPITAL | Age: 30
End: 2020-09-13

## 2020-09-13 ENCOUNTER — APPOINTMENT (OUTPATIENT)
Dept: GENERAL RADIOLOGY | Facility: HOSPITAL | Age: 30
End: 2020-09-13

## 2020-09-13 ENCOUNTER — HOSPITAL ENCOUNTER (EMERGENCY)
Facility: HOSPITAL | Age: 30
Discharge: HOME | End: 2020-09-13
Admitting: EMERGENCY MEDICINE

## 2020-09-13 VITALS
SYSTOLIC BLOOD PRESSURE: 122 MMHG | OXYGEN SATURATION: 100 % | RESPIRATION RATE: 16 BRPM | DIASTOLIC BLOOD PRESSURE: 69 MMHG | BODY MASS INDEX: 36.41 KG/M2 | HEART RATE: 63 BPM | HEIGHT: 67 IN | TEMPERATURE: 98.5 F | WEIGHT: 232 LBS

## 2020-09-13 DIAGNOSIS — K20.90 ESOPHAGITIS: Primary | ICD-10-CM

## 2020-09-13 DIAGNOSIS — L03.112 CELLULITIS OF LEFT AXILLA: ICD-10-CM

## 2020-09-13 DIAGNOSIS — T50.905A ADVERSE EFFECT OF DRUG, INITIAL ENCOUNTER: ICD-10-CM

## 2020-09-13 LAB
ADV 40+41 DNA STL QL NAA+NON-PROBE: NOT DETECTED
ALBUMIN SERPL-MCNC: 4.2 G/DL (ref 3.5–5.2)
ALBUMIN/GLOB SERPL: 1.6 G/DL
ALP SERPL-CCNC: 67 U/L (ref 39–117)
ALT SERPL W P-5'-P-CCNC: 14 U/L (ref 1–33)
AMPHET+METHAMPHET UR QL: NEGATIVE
AMPHETAMINES UR QL: NEGATIVE
ANION GAP SERPL CALCULATED.3IONS-SCNC: 11 MMOL/L (ref 5–15)
AST SERPL-CCNC: 19 U/L (ref 1–32)
ASTRO TYP 1-8 RNA STL QL NAA+NON-PROBE: NOT DETECTED
B-HCG UR QL: NEGATIVE
BACTERIA UR QL AUTO: ABNORMAL /HPF
BARBITURATES UR QL SCN: NEGATIVE
BASOPHILS # BLD AUTO: 0.08 10*3/MM3 (ref 0–0.2)
BASOPHILS NFR BLD AUTO: 0.8 % (ref 0–1.5)
BENZODIAZ UR QL SCN: NEGATIVE
BILIRUB SERPL-MCNC: <0.2 MG/DL (ref 0–1.2)
BILIRUB UR QL STRIP: NEGATIVE
BUN SERPL-MCNC: 13 MG/DL (ref 6–20)
BUN/CREAT SERPL: 21.7 (ref 7–25)
BUPRENORPHINE SERPL-MCNC: NEGATIVE NG/ML
C CAYETANENSIS DNA STL QL NAA+NON-PROBE: NOT DETECTED
C DIFF TOX GENS STL QL NAA+PROBE: NEGATIVE
CALCIUM SPEC-SCNC: 8.9 MG/DL (ref 8.6–10.5)
CAMPY SP DNA.DIARRHEA STL QL NAA+PROBE: NOT DETECTED
CANNABINOIDS SERPL QL: NEGATIVE
CHLORIDE SERPL-SCNC: 105 MMOL/L (ref 98–107)
CLARITY UR: ABNORMAL
CO2 SERPL-SCNC: 23 MMOL/L (ref 22–29)
COCAINE UR QL: NEGATIVE
COLOR UR: YELLOW
CREAT SERPL-MCNC: 0.6 MG/DL (ref 0.57–1)
CRYPTOSP STL CULT: NOT DETECTED
D DIMER PPP FEU-MCNC: 0.56 MG/L (FEU) (ref 0–0.5)
DEPRECATED RDW RBC AUTO: 43.2 FL (ref 37–54)
E COLI DNA SPEC QL NAA+PROBE: NOT DETECTED
E HISTOLYT AG STL-ACNC: NOT DETECTED
EAEC PAA PLAS AGGR+AATA ST NAA+NON-PRB: NOT DETECTED
EC STX1 + STX2 GENES STL NAA+PROBE: NOT DETECTED
EOSINOPHIL # BLD AUTO: 0.41 10*3/MM3 (ref 0–0.4)
EOSINOPHIL NFR BLD AUTO: 4 % (ref 0.3–6.2)
EPEC EAE GENE STL QL NAA+NON-PROBE: NOT DETECTED
ERYTHROCYTE [DISTWIDTH] IN BLOOD BY AUTOMATED COUNT: 14 % (ref 12.3–15.4)
ERYTHROCYTE [SEDIMENTATION RATE] IN BLOOD: 14 MM/HR (ref 0–20)
ETEC LTA+ST1A+ST1B TOX ST NAA+NON-PROBE: NOT DETECTED
ETHANOL UR QL: <0.01 %
G LAMBLIA DNA SPEC QL NAA+PROBE: NOT DETECTED
GFR SERPL CREATININE-BSD FRML MDRD: 142 ML/MIN/1.73
GLOBULIN UR ELPH-MCNC: 2.6 GM/DL
GLUCOSE SERPL-MCNC: 117 MG/DL (ref 65–99)
GLUCOSE UR STRIP-MCNC: NEGATIVE MG/DL
HCT VFR BLD AUTO: 39.9 % (ref 34–46.6)
HGB BLD-MCNC: 14.3 G/DL (ref 12–15.9)
HGB UR QL STRIP.AUTO: ABNORMAL
HOLD SPECIMEN: NORMAL
HOLD SPECIMEN: NORMAL
HYALINE CASTS UR QL AUTO: ABNORMAL /LPF
IMM GRANULOCYTES # BLD AUTO: 0.05 10*3/MM3 (ref 0–0.05)
IMM GRANULOCYTES NFR BLD AUTO: 0.5 % (ref 0–0.5)
INTERNAL NEGATIVE CONTROL: NEGATIVE
INTERNAL POSITIVE CONTROL: POSITIVE
KETONES UR QL STRIP: NEGATIVE
LEUKOCYTE ESTERASE UR QL STRIP.AUTO: ABNORMAL
LIPASE SERPL-CCNC: 38 U/L (ref 13–60)
LYMPHOCYTES # BLD AUTO: 3.39 10*3/MM3 (ref 0.7–3.1)
LYMPHOCYTES NFR BLD AUTO: 33.4 % (ref 19.6–45.3)
Lab: NORMAL
MCH RBC QN AUTO: 30.4 PG (ref 26.6–33)
MCHC RBC AUTO-ENTMCNC: 35.8 G/DL (ref 31.5–35.7)
MCV RBC AUTO: 84.7 FL (ref 79–97)
METHADONE UR QL SCN: NEGATIVE
MONOCYTES # BLD AUTO: 0.6 10*3/MM3 (ref 0.1–0.9)
MONOCYTES NFR BLD AUTO: 5.9 % (ref 5–12)
NEUTROPHILS NFR BLD AUTO: 5.61 10*3/MM3 (ref 1.7–7)
NEUTROPHILS NFR BLD AUTO: 55.4 % (ref 42.7–76)
NITRITE UR QL STRIP: NEGATIVE
NOROVIRUS GI+II RNA STL QL NAA+NON-PROBE: NOT DETECTED
NRBC BLD AUTO-RTO: 0 /100 WBC (ref 0–0.2)
NT-PROBNP SERPL-MCNC: 80.6 PG/ML (ref 0–450)
OPIATES UR QL: NEGATIVE
OXYCODONE UR QL SCN: NEGATIVE
P SHIGELLOIDES DNA STL QL NAA+PROBE: NOT DETECTED
PCP UR QL SCN: NEGATIVE
PH UR STRIP.AUTO: 6 [PH] (ref 5–8)
PLATELET # BLD AUTO: 344 10*3/MM3 (ref 140–450)
PMV BLD AUTO: 9.7 FL (ref 6–12)
POTASSIUM SERPL-SCNC: 4.4 MMOL/L (ref 3.5–5.2)
PROPOXYPH UR QL: NEGATIVE
PROT SERPL-MCNC: 6.8 G/DL (ref 6–8.5)
PROT UR QL STRIP: NEGATIVE
RBC # BLD AUTO: 4.71 10*6/MM3 (ref 3.77–5.28)
RBC # UR: ABNORMAL /HPF
REF LAB TEST METHOD: ABNORMAL
RV RNA STL NAA+PROBE: NOT DETECTED
SALMONELLA DNA SPEC QL NAA+PROBE: NOT DETECTED
SAPO I+II+IV+V RNA STL QL NAA+NON-PROBE: NOT DETECTED
SARS-COV-2 RNA PNL SPEC NAA+PROBE: NOT DETECTED
SHIGELLA SP+EIEC IPAH STL QL NAA+PROBE: NOT DETECTED
SODIUM SERPL-SCNC: 139 MMOL/L (ref 136–145)
SP GR UR STRIP: 1.02 (ref 1–1.03)
SQUAMOUS #/AREA URNS HPF: ABNORMAL /HPF
TRICYCLICS UR QL SCN: NEGATIVE
TROPONIN T SERPL-MCNC: <0.01 NG/ML (ref 0–0.03)
TROPONIN T SERPL-MCNC: <0.01 NG/ML (ref 0–0.03)
UROBILINOGEN UR QL STRIP: ABNORMAL
V CHOLERAE DNA SPEC QL NAA+PROBE: NOT DETECTED
VIBRIO DNA SPEC NAA+PROBE: NOT DETECTED
WBC # BLD AUTO: 10.14 10*3/MM3 (ref 3.4–10.8)
WBC UR QL AUTO: ABNORMAL /HPF
WHOLE BLOOD HOLD SPECIMEN: NORMAL
WHOLE BLOOD HOLD SPECIMEN: NORMAL
YERSINIA STL CULT: NOT DETECTED

## 2020-09-13 PROCEDURE — 25010000002 CEFTRIAXONE PER 250 MG: Performed by: PHYSICIAN ASSISTANT

## 2020-09-13 PROCEDURE — 71045 X-RAY EXAM CHEST 1 VIEW: CPT

## 2020-09-13 PROCEDURE — 81001 URINALYSIS AUTO W/SCOPE: CPT | Performed by: PHYSICIAN ASSISTANT

## 2020-09-13 PROCEDURE — 25010000003 LIDOCAINE 1 % SOLUTION: Performed by: EMERGENCY MEDICINE

## 2020-09-13 PROCEDURE — 99284 EMERGENCY DEPT VISIT MOD MDM: CPT

## 2020-09-13 PROCEDURE — 83690 ASSAY OF LIPASE: CPT | Performed by: PHYSICIAN ASSISTANT

## 2020-09-13 PROCEDURE — 85651 RBC SED RATE NONAUTOMATED: CPT | Performed by: PHYSICIAN ASSISTANT

## 2020-09-13 PROCEDURE — 93005 ELECTROCARDIOGRAM TRACING: CPT | Performed by: PHYSICIAN ASSISTANT

## 2020-09-13 PROCEDURE — 80307 DRUG TEST PRSMV CHEM ANLYZR: CPT | Performed by: PHYSICIAN ASSISTANT

## 2020-09-13 PROCEDURE — 0 IOPAMIDOL PER 1 ML: Performed by: PHYSICIAN ASSISTANT

## 2020-09-13 PROCEDURE — 0097U HC BIOFIRE FILMARRAY GI PANEL: CPT | Performed by: PHYSICIAN ASSISTANT

## 2020-09-13 PROCEDURE — 81025 URINE PREGNANCY TEST: CPT | Performed by: PHYSICIAN ASSISTANT

## 2020-09-13 PROCEDURE — 71275 CT ANGIOGRAPHY CHEST: CPT

## 2020-09-13 PROCEDURE — 85379 FIBRIN DEGRADATION QUANT: CPT | Performed by: PHYSICIAN ASSISTANT

## 2020-09-13 PROCEDURE — 93010 ELECTROCARDIOGRAM REPORT: CPT | Performed by: INTERNAL MEDICINE

## 2020-09-13 PROCEDURE — 96361 HYDRATE IV INFUSION ADD-ON: CPT

## 2020-09-13 PROCEDURE — 87493 C DIFF AMPLIFIED PROBE: CPT | Performed by: PHYSICIAN ASSISTANT

## 2020-09-13 PROCEDURE — 80053 COMPREHEN METABOLIC PANEL: CPT | Performed by: PHYSICIAN ASSISTANT

## 2020-09-13 PROCEDURE — 85025 COMPLETE CBC W/AUTO DIFF WBC: CPT | Performed by: PHYSICIAN ASSISTANT

## 2020-09-13 PROCEDURE — 96376 TX/PRO/DX INJ SAME DRUG ADON: CPT

## 2020-09-13 PROCEDURE — 87635 SARS-COV-2 COVID-19 AMP PRB: CPT | Performed by: PHYSICIAN ASSISTANT

## 2020-09-13 PROCEDURE — 84484 ASSAY OF TROPONIN QUANT: CPT | Performed by: PHYSICIAN ASSISTANT

## 2020-09-13 PROCEDURE — 25010000002 ONDANSETRON PER 1 MG: Performed by: PHYSICIAN ASSISTANT

## 2020-09-13 PROCEDURE — 96375 TX/PRO/DX INJ NEW DRUG ADDON: CPT

## 2020-09-13 PROCEDURE — 96374 THER/PROPH/DIAG INJ IV PUSH: CPT

## 2020-09-13 PROCEDURE — 87086 URINE CULTURE/COLONY COUNT: CPT | Performed by: PHYSICIAN ASSISTANT

## 2020-09-13 PROCEDURE — 83880 ASSAY OF NATRIURETIC PEPTIDE: CPT | Performed by: PHYSICIAN ASSISTANT

## 2020-09-13 RX ORDER — PROMETHAZINE HYDROCHLORIDE 25 MG/1
25 TABLET ORAL EVERY 6 HOURS PRN
Qty: 20 TABLET | Refills: 0 | Status: SHIPPED | OUTPATIENT
Start: 2020-09-13 | End: 2021-01-29 | Stop reason: SDUPTHER

## 2020-09-13 RX ORDER — FAMOTIDINE 10 MG/ML
20 INJECTION, SOLUTION INTRAVENOUS ONCE
Status: COMPLETED | OUTPATIENT
Start: 2020-09-13 | End: 2020-09-13

## 2020-09-13 RX ORDER — ONDANSETRON 4 MG/1
4 TABLET, FILM COATED ORAL EVERY 6 HOURS PRN
Qty: 20 TABLET | Refills: 0 | OUTPATIENT
Start: 2020-09-13 | End: 2021-01-29

## 2020-09-13 RX ORDER — DOXYCYCLINE 100 MG/1
100 CAPSULE ORAL 2 TIMES DAILY
Qty: 20 CAPSULE | Refills: 0 | OUTPATIENT
Start: 2020-09-13 | End: 2021-01-29

## 2020-09-13 RX ORDER — HYDROCODONE BITARTRATE AND ACETAMINOPHEN 7.5; 325 MG/1; MG/1
1 TABLET ORAL EVERY 6 HOURS PRN
Qty: 12 TABLET | Refills: 0 | OUTPATIENT
Start: 2020-09-13 | End: 2021-01-29

## 2020-09-13 RX ORDER — ALUMINA, MAGNESIA, AND SIMETHICONE 2400; 2400; 240 MG/30ML; MG/30ML; MG/30ML
10 SUSPENSION ORAL ONCE
Status: COMPLETED | OUTPATIENT
Start: 2020-09-13 | End: 2020-09-13

## 2020-09-13 RX ORDER — SODIUM CHLORIDE 9 MG/ML
125 INJECTION, SOLUTION INTRAVENOUS CONTINUOUS
Status: DISCONTINUED | OUTPATIENT
Start: 2020-09-13 | End: 2020-09-13 | Stop reason: HOSPADM

## 2020-09-13 RX ORDER — LIDOCAINE HYDROCHLORIDE 10 MG/ML
10 INJECTION, SOLUTION INFILTRATION; PERINEURAL ONCE
Status: COMPLETED | OUTPATIENT
Start: 2020-09-13 | End: 2020-09-13

## 2020-09-13 RX ORDER — ONDANSETRON 2 MG/ML
4 INJECTION INTRAMUSCULAR; INTRAVENOUS ONCE
Status: COMPLETED | OUTPATIENT
Start: 2020-09-13 | End: 2020-09-13

## 2020-09-13 RX ORDER — HYDROCODONE BITARTRATE AND ACETAMINOPHEN 5; 325 MG/1; MG/1
1 TABLET ORAL ONCE
Status: COMPLETED | OUTPATIENT
Start: 2020-09-13 | End: 2020-09-13

## 2020-09-13 RX ADMIN — LIDOCAINE HYDROCHLORIDE 10 ML: 10 INJECTION, SOLUTION INFILTRATION; PERINEURAL at 12:43

## 2020-09-13 RX ADMIN — FAMOTIDINE 20 MG: 10 INJECTION INTRAVENOUS at 08:27

## 2020-09-13 RX ADMIN — HYDROCODONE BITARTRATE AND ACETAMINOPHEN 1 TABLET: 5; 325 TABLET ORAL at 11:48

## 2020-09-13 RX ADMIN — ONDANSETRON HYDROCHLORIDE 4 MG: 2 SOLUTION INTRAMUSCULAR; INTRAVENOUS at 08:27

## 2020-09-13 RX ADMIN — ONDANSETRON HYDROCHLORIDE 4 MG: 2 SOLUTION INTRAMUSCULAR; INTRAVENOUS at 12:47

## 2020-09-13 RX ADMIN — CEFTRIAXONE SODIUM 1 G: 1 INJECTION, POWDER, FOR SOLUTION INTRAMUSCULAR; INTRAVENOUS at 12:43

## 2020-09-13 RX ADMIN — IOPAMIDOL 100 ML: 755 INJECTION, SOLUTION INTRAVENOUS at 09:04

## 2020-09-13 RX ADMIN — SODIUM CHLORIDE 125 ML/HR: 9 INJECTION, SOLUTION INTRAVENOUS at 08:25

## 2020-09-13 RX ADMIN — LIDOCAINE HYDROCHLORIDE: 20 JELLY TOPICAL at 12:14

## 2020-09-13 RX ADMIN — ALUMINUM HYDROXIDE, MAGNESIUM HYDROXIDE, AND DIMETHICONE 10 ML: 400; 400; 40 SUSPENSION ORAL at 11:48

## 2020-09-13 NOTE — ED PROVIDER NOTES
Subjective   History of Present Illness    Patient is a 30-year-old female with chief complaint of chest pain, abdominal pain, vomiting, headache, not feeling well.  The patient describes her symptoms began 2 days ago.  She woke up with severe sharp pain in the middle of her chest.  She describes her pain as a burning sensation coming from the top of her belly all the way to her throat.  She describes the pain lasts for 10 to 20 minutes at a time.  It would go away then immediately come back.  She does have nausea and vomiting.  She cannot quantify having time she has vomited yesterday.  She believes she has vomited at least 10 times.  She denies any blood in her emesis.  In the past 3 days, she has had diarrhea as well.  She also cannot quantify having loose stools she has had.  She denies any blood in her stool.  She is coughing.  She denies any fever.  She is complaining of generalized myalgias and arthralgias.  She does have known chronic abdominal pain but patient believes this is more painful and sharper in intensity.  She does not take any medication for her discomfort.  She reports that there is a family member that presents similarly.  She is concerned she may have coronavirus.    Patient does have a personal history of hypertension, hypercholesterolemia, diabetes and is not medically compliant with taking her oral medications, and smokes.  She denies any personal cardiac abnormalities.  She denies any known premature cardiac disease.    With her chest pain, the patient reports that it is present regardless of what ever she is doing.  She denies it being position related or related to any exertional activity.      The patient describes that she has been taking clindamycin the past several days due to a left axilla abscess.  She has not contacted the surgeon for further intervention.  She had previous abscesses that without any previous surgeries.  She believes is up-to-date with tetanus vaccination.  She  denies any alcohol intake or illicit drug use.  She was also prescribed naproxen which not provide any relief.    Review of Systems   Constitutional: Positive for activity change, appetite change, diaphoresis and fatigue. Negative for fever.   Eyes: Negative.    Respiratory: Positive for cough, chest tightness and shortness of breath. Negative for wheezing and stridor.    Cardiovascular: Positive for chest pain. Negative for leg swelling.   Gastrointestinal: Positive for abdominal pain, diarrhea, nausea and vomiting.   Genitourinary: Negative for decreased urine volume, difficulty urinating, dyspareunia, vaginal bleeding, vaginal discharge and vaginal pain.   Musculoskeletal: Negative.    Skin: Negative.    Neurological: Positive for headaches.   Psychiatric/Behavioral: Negative.    All other systems reviewed and are negative.      Past Medical History:   Diagnosis Date   • Abscess of axilla    • Depression    • Hypertension    • Ovarian cyst        Allergies   Allergen Reactions   • Tramadol Rash       Past Surgical History:   Procedure Laterality Date   • NO PAST SURGERIES         History reviewed. No pertinent family history.    Social History     Socioeconomic History   • Marital status: Single     Spouse name: Not on file   • Number of children: Not on file   • Years of education: Not on file   • Highest education level: Not on file   Tobacco Use   • Smoking status: Current Every Day Smoker     Packs/day: 0.50     Years: 12.00     Pack years: 6.00   • Smokeless tobacco: Never Used   Substance and Sexual Activity   • Alcohol use: No   • Drug use: No   • Sexual activity: Defer   Social History Narrative    single       Prior to Admission medications    Medication Sig Start Date End Date Taking? Authorizing Provider   cephalexin (KEFLEX) 500 MG capsule Take 1 capsule by mouth 2 (Two) Times a Day. 1/2/20  Yes Derrick Padilla MD   clindamycin (CLEOCIN) 300 MG capsule Take 1 capsule by mouth 3 (Three) Times a  "Day for 7 days. 9/5/20 9/13/20 Yes Luzma Blevins, DEIDRA   prochlorperazine (COMPAZINE) 10 MG tablet Take 1 tablet by mouth Every 6 (Six) Hours As Needed for Nausea or Vomiting. 1/31/20  Yes Karl Quintanilla PA-C       Medications   sodium chloride 0.9 % infusion (125 mL/hr Intravenous New Bag 9/13/20 0825)   famotidine (PEPCID) injection 20 mg (20 mg Intravenous Given 9/13/20 0827)   ondansetron (ZOFRAN) injection 4 mg (4 mg Intravenous Given 9/13/20 0827)   iopamidol (ISOVUE-370) 76 % injection 100 mL (100 mL Intravenous Given by Other 9/13/20 0904)   aluminum-magnesium hydroxide-simethicone (MAALOX MAX) 400-400-40 MG/5ML suspension 10 mL (10 mL Oral Given 9/13/20 1148)   HYDROcodone-acetaminophen (NORCO) 5-325 MG per tablet 1 tablet (1 tablet Oral Given 9/13/20 1148)   lidocaine (XYLOCAINE) 1 % injection 10 mL (10 mL Injection Given 9/13/20 1243)   lidocaine (XYLOCAINE) 2 % jelly ( Topical Given 9/13/20 1214)   cefTRIAXone (ROCEPHIN) 1 g/100 mL 0.9% NS (MBP) (0 g Intravenous Stopped 9/13/20 1246)   ondansetron (ZOFRAN) injection 4 mg (4 mg Intravenous Given 9/13/20 1247)       /77   Pulse 60   Temp 98.1 °F (36.7 °C)   Resp 16   Ht 170.2 cm (67\")   Wt 105 kg (232 lb)   LMP 08/10/2020   SpO2 100%   BMI 36.34 kg/m²       Objective   Physical Exam  Vitals signs reviewed.   Constitutional:       General: She is not in acute distress.     Appearance: She is well-developed. She is obese. She is not ill-appearing, toxic-appearing or diaphoretic.   HENT:      Head: Atraumatic.   Eyes:      Extraocular Movements: Extraocular movements intact.      Pupils: Pupils are equal, round, and reactive to light.   Neck:      Musculoskeletal: Normal range of motion and neck supple.   Cardiovascular:      Rate and Rhythm: Normal rate and regular rhythm.      Heart sounds: Normal heart sounds.   Pulmonary:      Effort: Pulmonary effort is normal. No tachypnea, accessory muscle usage or respiratory distress.      " Breath sounds: No stridor. No decreased breath sounds, wheezing, rhonchi or rales.   Chest:      Chest wall: No mass or tenderness.   Abdominal:      General: Bowel sounds are normal.      Palpations: Abdomen is soft.      Tenderness: There is no abdominal tenderness. There is no rebound.   Musculoskeletal: Normal range of motion.      Right lower leg: She exhibits no tenderness.      Left lower leg: She exhibits no tenderness.   Skin:     General: Skin is warm and dry.      Capillary Refill: Capillary refill takes less than 2 seconds.   Neurological:      General: No focal deficit present.      Mental Status: She is oriented to person, place, and time.   Psychiatric:         Mood and Affect: Mood is anxious.         Incision & Drainage    Date/Time: 9/13/2020 1:27 PM  Performed by: Abdulkadir Spangler PA  Authorized by: Abdulkadir Spangler PA     Consent:     Consent obtained:  Verbal    Consent given by:  Patient    Risks discussed:  Bleeding, damage to other organs, infection, pain and incomplete drainage  Location:     Type:  Abscess    Location:  Upper extremity    Upper extremity location: left axilla.  Pre-procedure details:     Skin preparation:  Antiseptic wash and Hibiclens  Anesthesia (see MAR for exact dosages):     Anesthesia method:  Local infiltration    Local anesthetic:  Lidocaine 1% w/o epi  Procedure type:     Complexity:  Simple  Procedure details:     Needle aspiration: yes      Needle size:  18 G    Incision types:  Stab incision and single straight    Incision depth:  Dermal    Scalpel blade:  11    Wound management:  Irrigated with saline    Drainage:  Bloody    Drainage amount:  Moderate    Packing materials:  1/2 in iodoform gauze  Post-procedure details:     Patient tolerance of procedure:  Tolerated well, no immediate complications             Lab Results (last 24 hours)     Procedure Component Value Units Date/Time    Troponin [[id number]]  (Normal) Collected: 09/13/20 0755     Specimen: Blood from Arm, Right Updated: 09/13/20 0824     Troponin T <0.010 ng/mL     Narrative:      Troponin T Reference Range:  <= 0.03 ng/mL-   Negative for AMI  >0.03 ng/mL-     Abnormal for myocardial necrosis.  Clinicians would have to utilize clinical acumen, EKG, Troponin and serial changes to determine if it is an Acute Myocardial Infarction or myocardial injury due to an underlying chronic condition.       Results may be falsely decreased if patient taking Biotin.      BNP [[id number]]  (Normal) Collected: 09/13/20 0755    Specimen: Blood from Arm, Right Updated: 09/13/20 0824     proBNP 80.6 pg/mL     Narrative:      Among patients with dyspnea, NT-proBNP is highly sensitive for the detection of acute congestive heart failure. In addition NT-proBNP of <300 pg/ml effectively rules out acute congestive heart failure with 99% negative predictive value.    Results may be falsely decreased if patient taking Biotin.      CBC Auto Differential [[id number]]  (Abnormal) Collected: 09/13/20 0755    Specimen: Blood from Arm, Right Updated: 09/13/20 0808     WBC 10.14 10*3/mm3      RBC 4.71 10*6/mm3      Hemoglobin 14.3 g/dL      Hematocrit 39.9 %      MCV 84.7 fL      MCH 30.4 pg      MCHC 35.8 g/dL      RDW 14.0 %      RDW-SD 43.2 fl      MPV 9.7 fL      Platelets 344 10*3/mm3      Neutrophil % 55.4 %      Lymphocyte % 33.4 %      Monocyte % 5.9 %      Eosinophil % 4.0 %      Basophil % 0.8 %      Immature Grans % 0.5 %      Neutrophils, Absolute 5.61 10*3/mm3      Lymphocytes, Absolute 3.39 10*3/mm3      Monocytes, Absolute 0.60 10*3/mm3      Eosinophils, Absolute 0.41 10*3/mm3      Basophils, Absolute 0.08 10*3/mm3      Immature Grans, Absolute 0.05 10*3/mm3      nRBC 0.0 /100 WBC     Comprehensive Metabolic Panel [[id number]]  (Abnormal) Collected: 09/13/20 0755    Specimen: Blood from Arm, Right Updated: 09/13/20 0827     Glucose 117 mg/dL      BUN 13 mg/dL      Creatinine 0.60 mg/dL      Sodium 139 mmol/L       Potassium 4.4 mmol/L      Comment: Slight hemolysis detected by analyzer. Results may be affected.        Chloride 105 mmol/L      CO2 23.0 mmol/L      Calcium 8.9 mg/dL      Total Protein 6.8 g/dL      Albumin 4.20 g/dL      ALT (SGPT) 14 U/L      AST (SGOT) 19 U/L      Comment: Slight hemolysis detected by analyzer. Results may be affected.        Alkaline Phosphatase 67 U/L      Total Bilirubin <0.2 mg/dL      eGFR  African Amer 142 mL/min/1.73      Globulin 2.6 gm/dL      A/G Ratio 1.6 g/dL      BUN/Creatinine Ratio 21.7     Anion Gap 11.0 mmol/L     Narrative:      GFR Normal >60  Chronic Kidney Disease <60  Kidney Failure <15      D-dimer, Quantitative [[id number]]  (Abnormal) Collected: 09/13/20 0755    Specimen: Blood from Arm, Right Updated: 09/13/20 0818     D-Dimer, Quantitative 0.56 mg/L (FEU)     Narrative:      Reference Range is 0-0.50 mg/L FEU. However, results <0.50 mg/L FEU tends to rule out DVT or PE. Results >0.50 mg/L FEU are not useful in predicting absence or presence of DVT or PE.      Lipase [[id number]]  (Normal) Collected: 09/13/20 0755    Specimen: Blood from Arm, Right Updated: 09/13/20 0822     Lipase 38 U/L     Sedimentation Rate [[id number]]  (Normal) Collected: 09/13/20 0755    Specimen: Blood from Arm, Right Updated: 09/13/20 0815     Sed Rate 14 mm/hr     Ethanol [[id number]] Collected: 09/13/20 0755    Specimen: Blood from Arm, Right Updated: 09/13/20 0842     Ethanol % <0.010 %     Narrative:      Not for legal purposes. Chain of Custody not followed.     COVID PRE-OP / PRE-PROCEDURE SCREENING ORDER (NO ISOLATION) - Swab, Nasal Cavity [[id number]]  (Normal) Collected: 09/13/20 0801    Specimen: Swab from Nasal Cavity Updated: 09/13/20 0838    Narrative:      The following orders were created for panel order COVID PRE-OP / PRE-PROCEDURE SCREENING ORDER (NO ISOLATION) - Swab, Nasal Cavity.  Procedure                               Abnormality         Status                     ---------                                -----------         ------                     COVID-19,[facility]...[[id number]]  Normal              Final result                 Please view results for these tests on the individual orders.    COVID-19,[facility] IN-HOUSE,Nasal Swab No Transport Media 3-4 HR TAT - Swab, Nasal Cavity [[id number]]  (Normal) Collected: 09/13/20 0801    Specimen: Swab from Nasal Cavity Updated: 09/13/20 0838     COVID19 Not Detected    Narrative:      Fact sheet for providers: https://www.fda.gov/media/697104/download     Fact sheet for patients: https://www.fda.gov/media/226591/download    Urinalysis With Culture If Indicated - Urine, Clean Catch [[id number]]  (Abnormal) Collected: 09/13/20 0829    Specimen: Urine, Clean Catch Updated: 09/13/20 0858     Color, UA Yellow     Appearance, UA Cloudy     pH, UA 6.0     Specific Gravity, UA 1.024     Glucose, UA Negative     Ketones, UA Negative     Bilirubin, UA Negative     Blood, UA Moderate (2+)     Protein, UA Negative     Leuk Esterase, UA Large (3+)     Nitrite, UA Negative     Urobilinogen, UA 1.0 E.U./dL    Urine Drug Screen - Urine, Clean Catch [[id number]]  (Normal) Collected: 09/13/20 0829    Specimen: Urine, Clean Catch Updated: 09/13/20 0847     THC, Screen, Urine Negative     Phencyclidine (PCP), Urine Negative     Cocaine Screen, Urine Negative     Methamphetamine, Ur Negative     Opiate Screen Negative     Amphetamine Screen, Urine Negative     Benzodiazepine Screen, Urine Negative     Tricyclic Antidepressants Screen Negative     Methadone Screen, Urine Negative     Barbiturates Screen, Urine Negative     Oxycodone Screen, Urine Negative     Propoxyphene Screen Negative     Buprenorphine, Screen, Urine Negative    Narrative:      Cutoff For Drugs Screened:    Amphetamines               500 ng/ml  Barbiturates               200 ng/ml  Benzodiazepines            150 ng/ml  Cocaine                    150 ng/ml  Methadone                  200  ng/ml  Opiates                    100 ng/ml  Phencyclidine               25 ng/ml  THC                            50 ng/ml  Methamphetamine            500 ng/ml  Tricyclic Antidepressants  300 ng/ml  Oxycodone                  100 ng/ml  Propoxyphene               300 ng/ml  Buprenorphine               10 ng/ml    The normal value for all drugs tested is negative. This report includes unconfirmed screening results, with the cutoff values listed, to be used for medical treatment purposes only.  Unconfirmed results must not be used for non-medical purposes such as employment or legal testing.  Clinical consideration should be applied to any drug of abuse test, particularly when unconfirmed results are used.      Urinalysis, Microscopic Only - Urine, Clean Catch [[id number]]  (Abnormal) Collected: 09/13/20 0829    Specimen: Urine, Clean Catch Updated: 09/13/20 0915     RBC, UA 0-2 /HPF      WBC, UA 21-30 /HPF      Bacteria, UA 1+ /HPF      Squamous Epithelial Cells, UA 13-20 /HPF      Hyaline Casts, UA None Seen /LPF      Methodology Manual Light Microscopy    Urine Culture - Urine, Urine, Clean Catch [[id number]] Collected: 09/13/20 0829    Specimen: Urine, Clean Catch Updated: 09/13/20 0915    POCT Pregnancy, Urine [[id number]]  (Normal) Collected: 09/13/20 0830    Specimen: Urine Updated: 09/13/20 0830     HCG, Urine, QL Negative     Lot Number \[id number]\     Internal Positive Control Positive     Internal Negative Control Negative    Troponin [[id number]]  (Normal) Collected: 09/13/20 1000    Specimen: Blood from Arm, Left Updated: 09/13/20 1023     Troponin T <0.010 ng/mL     Narrative:      Troponin T Reference Range:  <= 0.03 ng/mL-   Negative for AMI  >0.03 ng/mL-     Abnormal for myocardial necrosis.  Clinicians would have to utilize clinical acumen, EKG, Troponin and serial changes to determine if it is an Acute Myocardial Infarction or myocardial injury due to an underlying chronic condition.       Results may  be falsely decreased if patient taking Biotin.      Gastrointestinal Panel, PCR - Stool, Per Rectum [[id number]] Collected: 09/13/20 1216    Specimen: Stool from Per Rectum Updated: 09/13/20 1221    Clostridium Difficile Toxin - Stool, Per Rectum [[id number]]  (Normal) Collected: 09/13/20 1216    Specimen: Stool from Per Rectum Updated: 09/13/20 1310    Narrative:      The following orders were created for panel order Clostridium Difficile Toxin - Stool, Per Rectum.  Procedure                               Abnormality         Status                     ---------                               -----------         ------                     Clostridium Difficile To...[[id number]]  Normal              Final result                 Please view results for these tests on the individual orders.    Clostridium Difficile Toxin, PCR - Stool, Per Rectum [[id number]]  (Normal) Collected: 09/13/20 1216    Specimen: Stool from Per Rectum Updated: 09/13/20 1310     C. Difficile Toxins by PCR Negative    Narrative:      Performance characteristics of test not established for patients <2 years of age.          Xr Chest 1 View    Result Date: 9/13/2020  Narrative: EXAMINATION: XR CHEST 1 VW-. 9/13/2020 8:48 AM CDT  CHEST, ONE VIEW:  HISTORY: Chest pain  COMPARISON: 4/8/2020, 1/8/2019 and 8/27/2018  A single frontal chest radiograph was obtained.  FINDINGS:  The lungs are clear without infiltrates.  The heart is normal in size, pulmonary circulation appropriate, without heart failure.  The bony structures are intact without acute osseous abnormality.                                  Impression: 1. No acute cardiopulmonary process.  This report was finalized on 09/13/2020 08:49 by Dr. Damián Brantley MD.    Ct Angiogram Chest    Result Date: 9/13/2020  Narrative: EXAMINATION: CT ANGIOGRAM CHEST-  9/13/2020 9:22 AM CDT  HISTORY:Chest pain and shortness of breath  COMPARISON: Current chest radiography  TECHNIQUE:  CT evaluation of the chest was  performed with intravenous contrast. 2 mm transaxial images were obtained.. Coronal reconstruction maximum intensity projection images of the pulmonary arteries and aorta were also generated.  Radiation dose equals  mGy-cm.  Automated exposure control dose reduction technique was implemented.   FINDINGS:  [The pulmonary arteries are well opacified with iodinated contrast, without intraluminal filling defects. There is no CT angiographic evidence of pulmonary embolus.  The aorta is normal in caliber, without aneurysm or dissection. There is reflux of contrast material into the inferior vena cava and hepatic veins. Elevated right heart pressures considered however there is no significant right heart enlargement.  There is residual thymus tissue in anterior mediastinum. There is no mediastinal or hilar lymphadenopathy.  There is no axillary lymph node enlargement.  There are no pleural effusions or pneumothoraces.  There are no parenchymal infiltrates. There are no suspicious pulmonary nodules or masses.  Limited imaging the upper abdomen demonstrate no acute abnormality.  The bony structures are intact without acute osseous abnormality.]      Impression: 1. No CT angiographic evidence of pulmonary embolus or acute aortic pathology. 2. No acute cardiopulmonary process. This report was finalized on 09/13/2020 09:27 by Dr. Damián Brantley MD.      ED Course  ED Course as of Sep 13 1327   Sun Sep 13, 2020   1236 Patient has been reassessed on several questions while here in the ED.  She denies any further episodes of nausea or vomiting. She was able to provide a stool sample.  Chest x-ray and CTA chest did not show any acute pathology.  Her urinalysis suggests she has a urine infection and antibiotics has been administered.    [TK]   1237 Explained to the patient that I believe her symptoms are secondary to gastritis from the clindamycin.  I do not believe this is a cardiac event since both of her cardiac enzymes are  unremarkable.  Patient voiced understanding agrees as well.  Will proceed with incision and drainage of the left axillary abscess.    [TK]   1316 The patient tolerated I&D without any complications.  She did not have any purulent drainage.  I did pack it.  Her girlfriend does have a history of I&D's as well and knows how to pack so she can take care of this.  Advise keep back in place until Wednesday morning which is 72 hours away.  Return to ED sooner if any worsening signs of infection or acute issues.  Patient and significant other voiced understanding.    [TK]   1326 Patient does states she is appointment with a general surgeon on September 22.  Advised to return back in 72 hours for or sooner for wound recheck.  Patient voiced understanding.    [TK]      ED Course User Index  [TK] Abdulkadir Spangler PA          [facility]    Final diagnoses:   Esophagitis   Adverse effect of drug, initial encounter   Cellulitis of left axilla          Abdulkadir Spangler PA  09/13/20 [id number]

## 2020-09-13 NOTE — ED NOTES
Patient became nauseated and started vomiting about 5 minutes into initiation of rocephin. Antibiotic stopped and [facility] espinal notified.      Celia Slater RN  09/13/20 [id number]

## 2020-09-14 ENCOUNTER — HOSPITAL ENCOUNTER (EMERGENCY)
Facility: HOSPITAL | Age: 30
Discharge: HOME | End: 2020-09-14
Attending: EMERGENCY MEDICINE | Admitting: EMERGENCY MEDICINE

## 2020-09-14 VITALS
BODY MASS INDEX: 39.39 KG/M2 | RESPIRATION RATE: 18 BRPM | HEIGHT: 67 IN | WEIGHT: 251 LBS | TEMPERATURE: 97.7 F | DIASTOLIC BLOOD PRESSURE: 96 MMHG | SYSTOLIC BLOOD PRESSURE: 163 MMHG | OXYGEN SATURATION: 98 % | HEART RATE: 71 BPM

## 2020-09-14 DIAGNOSIS — G89.29 CHRONIC ABDOMINAL PAIN: Primary | ICD-10-CM

## 2020-09-14 DIAGNOSIS — R10.9 CHRONIC ABDOMINAL PAIN: Primary | ICD-10-CM

## 2020-09-14 LAB — BACTERIA SPEC AEROBE CULT: NORMAL

## 2020-09-14 PROCEDURE — 99282 EMERGENCY DEPT VISIT SF MDM: CPT

## 2020-09-14 NOTE — ED PROVIDER NOTES
Subjective   This is a 30-year-old patient who has recurrent abdominal pain has been seen in the ER multiple times with CTs done in the past which have been negative has nonspecific abdominal discomfort is supposed to see a general surgeon for this came to the ER was seen yesterday for some epigastric pain discomfort CTs of the chest done at that time were negative she was discharged home with follow-up she is back in the ER complaining of  abdominal discomfort she cannot localize the pain is diffusely present there is no vomiting associate with this at this time but she states that she has been nauseous.  Is burning sensation in her entire abdomen there is no localizing signs and symptoms there is no fever.  Patient was sleeping in the room laying on her bed prone patient's examination history was performed in the presence of a chaperone patient is hemodynamically stable does not appear any obvious distress but is complaining abdominal discomfort    After examining the patient which essentially negative exam I have discussed the treatment options and evaluation I have informed her that she has had multiple studies done for this nonspecific abdominal pain we can go and initiate another comprehensive work-up for abdominal pain evaluation and see if he can find out something meanwhile we can empirically treat her with antiemetics antispasmodics and some IV fluids reorder CAT scans.  Patient at this time states that she does not even want to come to the ER 1 of her relatives made her come to the ER and she wants to be discharged home I have encouraged her to stay in the hospital to let us start evaluating her abdominal pain which is chronic and recurrent but the patient does not want to do that subsequently she got up and eloped from the ER      Abdominal Pain  Pain location:  Generalized  Pain quality: aching and bloating    Pain radiates to:  Does not radiate  Pain severity:  Moderate  Onset quality:   Gradual  Timing:  Intermittent  Progression:  Waxing and waning  Chronicity:  Chronic  Context: not alcohol use, not awakening from sleep, not diet changes, not eating, not previous surgeries, not recent illness, not retching and not sick contacts    Relieved by:  Nothing  Worsened by:  Nothing  Ineffective treatments:  None tried  Associated symptoms: belching, nausea and vomiting    Associated symptoms: no anorexia, no chest pain, no chills, no cough, no fatigue, no fever, no hematemesis, no hematochezia, no melena and no shortness of breath    Risk factors: no alcohol abuse, no aspirin use and no NSAID use    Vomiting  The primary symptoms include abdominal pain, nausea and vomiting. Primary symptoms do not include fever, fatigue, melena, hematemesis or hematochezia.   The illness does not include chills or anorexia.       Review of Systems   Constitutional: Negative.  Negative for chills, fatigue and fever.   HENT: Negative.    Eyes: Negative.    Respiratory: Negative.  Negative for cough and shortness of breath.    Cardiovascular: Negative.  Negative for chest pain.   Gastrointestinal: Positive for abdominal pain, nausea and vomiting. Negative for anorexia, hematemesis, hematochezia and melena.   Endocrine: Negative.    Genitourinary: Negative.    Skin: Negative.    Neurological: Negative.    Hematological: Negative.    All other systems reviewed and are negative.      Past Medical History:   Diagnosis Date   • Abscess of axilla    • Depression    • Hypertension    • Ovarian cyst        Allergies   Allergen Reactions   • Tramadol Rash       Past Surgical History:   Procedure Laterality Date   • NO PAST SURGERIES         History reviewed. No pertinent family history.    Social History     Socioeconomic History   • Marital status: Single     Spouse name: Not on file   • Number of children: Not on file   • Years of education: Not on file   • Highest education level: Not on file   Tobacco Use   • Smoking status:  Current Every Day Smoker     Packs/day: 0.50     Years: 12.00     Pack years: 6.00   • Smokeless tobacco: Never Used   Substance and Sexual Activity   • Alcohol use: No   • Drug use: No   • Sexual activity: Defer   Social History Narrative    single           Objective   Physical Exam  Vitals signs and nursing note reviewed. Exam conducted with a chaperone present.   Constitutional:       General: She is not in acute distress.     Appearance: Normal appearance. She is not ill-appearing.   HENT:      Head: Normocephalic.      Nose: Nose normal.      Mouth/Throat:      Mouth: Mucous membranes are moist.      Pharynx: Oropharynx is clear.   Eyes:      Pupils: Pupils are equal, round, and reactive to light.   Neck:      Musculoskeletal: Normal range of motion and neck supple.   Cardiovascular:      Rate and Rhythm: Normal rate and regular rhythm.      Pulses: Normal pulses.   Pulmonary:      Effort: Pulmonary effort is normal.      Breath sounds: Normal breath sounds. No wheezing or rhonchi.   Abdominal:      General: Abdomen is flat. Bowel sounds are normal. There is no distension or abdominal bruit.      Palpations: Abdomen is soft. There is no shifting dullness, hepatomegaly, splenomegaly, mass or pulsatile mass.      Tenderness: There is no abdominal tenderness. There is no right CVA tenderness, left CVA tenderness, guarding or rebound.      Hernia: No hernia is present.   Musculoskeletal: Normal range of motion.         General: No swelling or deformity.   Skin:     General: Skin is warm and dry.      Capillary Refill: Capillary refill takes less than 2 seconds.   Neurological:      General: No focal deficit present.      Mental Status: She is alert and oriented to person, place, and time.      Cranial Nerves: No cranial nerve deficit.      Motor: No weakness.   Psychiatric:         Mood and Affect: Mood normal.         Thought Content: Thought content normal.         Procedures           ED Course  ED Course as of  Sep 14 0734   Mon Sep 14, 2020   0733 Patient refused to stay in the ER and get a work-up initiated she wants to leave I am not sure why since we had offered lab and radiological diagnostic work-up and appropriate treatment for abdominal discomfort she subsequently got of the bed and eloped the ER    [TS]      ED Course User Index  [TS] Ignacio Gill MD                                           MDM  Number of Diagnoses or Management Options  Diagnosis management comments: Spastic abdominal discomfort recurrent    Risk of Complications, Morbidity, and/or Mortality  Presenting problems: low  Diagnostic procedures: low  Management options: low        Final diagnoses:   Chronic abdominal pain            Ignacio Gill MD  09/14/20 0783

## 2020-09-14 NOTE — ED NOTES
Patient seen by [facility]  walking out of room. Verified by security in Lobby that patient has walked out the door     Ashley Elder RN  09/14/20 0702

## 2020-09-16 ENCOUNTER — HOSPITAL ENCOUNTER (EMERGENCY)
Age: 30
Discharge: HOME | End: 2020-09-16
Attending: EMERGENCY MEDICINE
Payer: COMMERCIAL

## 2020-09-16 ENCOUNTER — APPOINTMENT (OUTPATIENT)
Dept: CT IMAGING | Age: 30
End: 2020-09-16
Payer: COMMERCIAL

## 2020-09-16 VITALS
SYSTOLIC BLOOD PRESSURE: 154 MMHG | OXYGEN SATURATION: 97 % | WEIGHT: 251 LBS | DIASTOLIC BLOOD PRESSURE: 97 MMHG | HEART RATE: 75 BPM | HEIGHT: 67 IN | RESPIRATION RATE: 19 BRPM | TEMPERATURE: 97.8 F | BODY MASS INDEX: 39.39 KG/M2

## 2020-09-16 LAB
ALBUMIN SERPL-MCNC: 4.4 G/DL (ref 3.5–5.2)
ALP BLD-CCNC: 59 U/L (ref 35–104)
ALT SERPL-CCNC: 20 U/L (ref 5–33)
ANION GAP SERPL CALCULATED.3IONS-SCNC: 15 MMOL/L (ref 7–19)
AST SERPL-CCNC: 19 U/L (ref 5–32)
BACTERIA: NEGATIVE /HPF
BASOPHILS ABSOLUTE: 0.1 K/UL (ref 0–0.2)
BASOPHILS RELATIVE PERCENT: 0.3 % (ref 0–1)
BILIRUB SERPL-MCNC: 0.3 MG/DL (ref 0.2–1.2)
BILIRUBIN URINE: NEGATIVE
BLOOD, URINE: ABNORMAL
BUN BLDV-MCNC: 18 MG/DL (ref 6–20)
CALCIUM SERPL-MCNC: 9.2 MG/DL (ref 8.6–10)
CHLORIDE BLD-SCNC: 102 MMOL/L (ref 98–111)
CLARITY: ABNORMAL
CO2: 22 MMOL/L (ref 22–29)
COLOR: ABNORMAL
CREAT SERPL-MCNC: 0.8 MG/DL (ref 0.5–0.9)
CRYSTALS, UA: ABNORMAL /HPF
EOSINOPHILS ABSOLUTE: 0 K/UL (ref 0–0.6)
EOSINOPHILS RELATIVE PERCENT: 0.1 % (ref 0–5)
EPITHELIAL CELLS, UA: 4 /HPF (ref 0–5)
GFR AFRICAN AMERICAN: >59
GFR NON-AFRICAN AMERICAN: >60
GLUCOSE BLD-MCNC: 144 MG/DL (ref 74–109)
GLUCOSE URINE: NEGATIVE MG/DL
HCG QUALITATIVE: NEGATIVE
HCT VFR BLD CALC: 41.3 % (ref 37–47)
HEMOGLOBIN: 15 G/DL (ref 12–16)
HYALINE CASTS: 5 /HPF (ref 0–8)
IMMATURE GRANULOCYTES #: 0.1 K/UL
KETONES, URINE: ABNORMAL MG/DL
LEUKOCYTE ESTERASE, URINE: ABNORMAL
LIPASE: 21 U/L (ref 13–60)
LYMPHOCYTES ABSOLUTE: 2.4 K/UL (ref 1.1–4.5)
LYMPHOCYTES RELATIVE PERCENT: 15.1 % (ref 20–40)
MCH RBC QN AUTO: 30.4 PG (ref 27–31)
MCHC RBC AUTO-ENTMCNC: 36.3 G/DL (ref 33–37)
MCV RBC AUTO: 83.8 FL (ref 81–99)
MONOCYTES ABSOLUTE: 0.8 K/UL (ref 0–0.9)
MONOCYTES RELATIVE PERCENT: 5.1 % (ref 0–10)
NEUTROPHILS ABSOLUTE: 12.4 K/UL (ref 1.5–7.5)
NEUTROPHILS RELATIVE PERCENT: 78.7 % (ref 50–65)
NITRITE, URINE: NEGATIVE
PDW BLD-RTO: 13.8 % (ref 11.5–14.5)
PH UA: 6 (ref 5–8)
PLATELET # BLD: 360 K/UL (ref 130–400)
PMV BLD AUTO: 10.1 FL (ref 9.4–12.3)
POTASSIUM SERPL-SCNC: 3.5 MMOL/L (ref 3.5–5)
PROTEIN UA: 30 MG/DL
RBC # BLD: 4.93 M/UL (ref 4.2–5.4)
RBC UA: 378 /HPF (ref 0–4)
SODIUM BLD-SCNC: 139 MMOL/L (ref 136–145)
SPECIFIC GRAVITY UA: 1.03 (ref 1–1.03)
TOTAL PROTEIN: 7.9 G/DL (ref 6.6–8.7)
UROBILINOGEN, URINE: 1 E.U./DL
WBC # BLD: 15.8 K/UL (ref 4.8–10.8)
WBC UA: 35 /HPF (ref 0–5)

## 2020-09-16 PROCEDURE — 6360000002 HC RX W HCPCS: Performed by: EMERGENCY MEDICINE

## 2020-09-16 PROCEDURE — 2580000003 HC RX 258: Performed by: EMERGENCY MEDICINE

## 2020-09-16 PROCEDURE — 96375 TX/PRO/DX INJ NEW DRUG ADDON: CPT

## 2020-09-16 PROCEDURE — 83690 ASSAY OF LIPASE: CPT

## 2020-09-16 PROCEDURE — 6370000000 HC RX 637 (ALT 250 FOR IP): Performed by: EMERGENCY MEDICINE

## 2020-09-16 PROCEDURE — 36415 COLL VENOUS BLD VENIPUNCTURE: CPT

## 2020-09-16 PROCEDURE — 80053 COMPREHEN METABOLIC PANEL: CPT

## 2020-09-16 PROCEDURE — 81001 URINALYSIS AUTO W/SCOPE: CPT

## 2020-09-16 PROCEDURE — 99285 EMERGENCY DEPT VISIT HI MDM: CPT

## 2020-09-16 PROCEDURE — 87086 URINE CULTURE/COLONY COUNT: CPT

## 2020-09-16 PROCEDURE — 74177 CT ABD & PELVIS W/CONTRAST: CPT

## 2020-09-16 PROCEDURE — 85025 COMPLETE CBC W/AUTO DIFF WBC: CPT

## 2020-09-16 PROCEDURE — 99999 PR OFFICE/OUTPT VISIT,PROCEDURE ONLY: CPT | Performed by: EMERGENCY MEDICINE

## 2020-09-16 PROCEDURE — 84703 CHORIONIC GONADOTROPIN ASSAY: CPT

## 2020-09-16 PROCEDURE — 96374 THER/PROPH/DIAG INJ IV PUSH: CPT

## 2020-09-16 PROCEDURE — 6360000004 HC RX CONTRAST MEDICATION: Performed by: EMERGENCY MEDICINE

## 2020-09-16 RX ORDER — ONDANSETRON 2 MG/ML
4 INJECTION INTRAMUSCULAR; INTRAVENOUS ONCE
Status: COMPLETED | OUTPATIENT
Start: 2020-09-16 | End: 2020-09-16

## 2020-09-16 RX ORDER — PROMETHAZINE HYDROCHLORIDE 25 MG/ML
6.25 INJECTION, SOLUTION INTRAMUSCULAR; INTRAVENOUS ONCE
Status: COMPLETED | OUTPATIENT
Start: 2020-09-16 | End: 2020-09-16

## 2020-09-16 RX ORDER — OMEPRAZOLE 20 MG/1
20 CAPSULE, DELAYED RELEASE ORAL DAILY
Qty: 30 CAPSULE | Refills: 0 | Status: SHIPPED | OUTPATIENT
Start: 2020-09-16 | End: 2021-04-15

## 2020-09-16 RX ORDER — ONDANSETRON 4 MG/1
4 TABLET, ORALLY DISINTEGRATING ORAL EVERY 8 HOURS PRN
COMMUNITY
End: 2021-02-01 | Stop reason: SDUPTHER

## 2020-09-16 RX ORDER — DOXYCYCLINE HYCLATE 100 MG/1
100 CAPSULE ORAL 2 TIMES DAILY
COMMUNITY
End: 2020-09-16 | Stop reason: SINTOL

## 2020-09-16 RX ORDER — PROMETHAZINE HYDROCHLORIDE 25 MG/1
25 TABLET ORAL EVERY 6 HOURS PRN
COMMUNITY
End: 2021-02-01 | Stop reason: SDUPTHER

## 2020-09-16 RX ORDER — 0.9 % SODIUM CHLORIDE 0.9 %
500 INTRAVENOUS SOLUTION INTRAVENOUS ONCE
Status: COMPLETED | OUTPATIENT
Start: 2020-09-16 | End: 2020-09-16

## 2020-09-16 RX ADMIN — IOPAMIDOL 90 ML: 755 INJECTION, SOLUTION INTRAVENOUS at 04:22

## 2020-09-16 RX ADMIN — PROMETHAZINE HYDROCHLORIDE 6.25 MG: 25 INJECTION INTRAMUSCULAR; INTRAVENOUS at 04:09

## 2020-09-16 RX ADMIN — ONDANSETRON 4 MG: 2 INJECTION INTRAMUSCULAR; INTRAVENOUS at 03:26

## 2020-09-16 RX ADMIN — SODIUM CHLORIDE 1000 ML: 9 INJECTION, SOLUTION INTRAVENOUS at 03:26

## 2020-09-16 RX ADMIN — LIDOCAINE HYDROCHLORIDE: 20 SOLUTION ORAL; TOPICAL at 04:09

## 2020-09-16 ASSESSMENT — ENCOUNTER SYMPTOMS
EYE PAIN: 0
VOMITING: 1
ABDOMINAL PAIN: 1
SHORTNESS OF BREATH: 0
BLOOD IN STOOL: 0
DIARRHEA: 0
NAUSEA: 1

## 2020-09-16 ASSESSMENT — PAIN SCALES - GENERAL
PAINLEVEL_OUTOF10: 0
PAINLEVEL_OUTOF10: 10

## 2020-09-16 ASSESSMENT — PAIN DESCRIPTION - PAIN TYPE: TYPE: ACUTE PAIN

## 2020-09-16 NOTE — ED NOTES
Patient ambulatory to the restroom at this time with family member for UA     Owen Mir, PAYTON  09/16/20 1909

## 2020-09-16 NOTE — ED PROVIDER NOTES
[facility]  eMERGENCY dEPARTMENT eNCOUnter      Pt Name: Yuni Akers  MRN: [id number]  [place] 1990  Date of evaluation: 9/16/2020  Provider: Brady Gallegos MD    [address] [place] [address]       Chief Complaint   Patient presents with    Abdominal Pain    Emesis     x 3 days after starting doxcycyline    Hyperventilating         HISTORY OF PRESENT ILLNESS   (Location/Symptom, Timing/Onset,Context/Setting, Quality, Duration, Modifying Factors, Severity)  Note limiting factors. Yuni Akers is a 27 y.o. female who presents to the emergency department due to nausea and vomiting. Patient said she was seen at [facility] few days ago for abscess in her left axilla. Had I&D of abscess. This has gotten much better since. She was put on doxycycline as well as some nausea medications after her I&D. Tells me she had no nausea/vomiting prior to starting doxycycline but since then has had several sides of nausea vomiting and complains of generalized abdominal pain. No hematemesis. No blood in stools. No urinary symptoms. No vaginal discharge or bleeding. Tells me the abscess that she had drained is healing very well. On exam it also is healing very well and does not look like there is any signs of persistent abscess or cellulitis. HPI    NursingNotes were reviewed. REVIEW OF SYSTEMS    (2-9 systems for level 4, 10 or more for level 5)     Review of Systems   Constitutional: Negative for fever. Eyes: Negative for pain. Respiratory: Negative for shortness of breath. Cardiovascular: Negative for chest pain and palpitations. Gastrointestinal: Positive for abdominal pain, nausea and vomiting. Negative for blood in stool and diarrhea. Genitourinary: Negative for dysuria. Skin: Negative for rash. Neurological: Negative for weakness and headaches. All other systems reviewed and are negative.       A complete review of systems was performed and is negative except as noted above violence     Fear of current or ex partner: None     Emotionally abused: None     Physically abused: None     Forced sexual activity: None   Other Topics Concern    None   Social History Narrative    None       SCREENINGS    Rob Coma Scale  Eye Opening: Spontaneous  Best Verbal Response: Oriented  Best Motor Response: Obeys commands  Rob Coma Scale Score: 15        PHYSICAL EXAM    (up to 7 for level 4, 8 or more for level 5)     ED Triage Vitals [09/16/20 0235]   BP Temp Temp Source Pulse Resp SpO2 Height Weight   (!) 143/116 97.9 °F (36.6 °C) Oral 88 20 100 % 5' 7\" (1.702 m) 251 lb (113.9 kg)       Physical Exam  Vitals signs reviewed. Constitutional:       General: She is not in acute distress. Appearance: She is well-developed. She is not toxic-appearing. HENT:      Head: Normocephalic and atraumatic. Mouth/Throat:      Mouth: Mucous membranes are moist.      Pharynx: Oropharynx is clear. Eyes:      General: No scleral icterus. Pupils: Pupils are equal, round, and reactive to light. Neck:      Musculoskeletal: Normal range of motion and neck supple. Vascular: No JVD. Cardiovascular:      Rate and Rhythm: Normal rate and regular rhythm. Pulses: Normal pulses. Heart sounds: Normal heart sounds. Pulmonary:      Effort: Pulmonary effort is normal. No respiratory distress. Breath sounds: Normal breath sounds. Abdominal:      General: There is no distension. Palpations: Abdomen is soft. There is no pulsatile mass. Tenderness: There is generalized abdominal tenderness. There is no guarding or rebound. Musculoskeletal: Normal range of motion. General: No swelling or tenderness. Arms:    Skin:     General: Skin is warm and dry. Capillary Refill: Capillary refill takes less than 2 seconds. Neurological:      General: No focal deficit present. Mental Status: She is alert and oriented to person, place, and time.    Psychiatric: Mood and Affect: Mood normal.         Behavior: Behavior normal.         DIAGNOSTIC RESULTS     RADIOLOGY:   Non-plain film images such as CT, Ultrasound and MRI are read by the radiologist. [place] [facility] images are visualized and preliminarily interpreted by the emergency physician with the below findings:    Interpretation per the Radiologist below, if available at the time of this note:    CT ABDOMEN PELVIS W IV CONTRAST Additional Contrast? None    (Results Pending)     CT ABDOMEN & PELVIS With Contrast:    1. Overall no acute findings. Incidental findings:    Mild hiatal hernia. Hepatic steatosis. No bowel obstruction or bowel wall thickening. Normal appendix. No hydronephrosis. Radiologist: Pasha Murillo MD         LABS:  Labs Reviewed   CBC WITH AUTO DIFFERENTIAL - Abnormal; Notable for the following components:       Result Value    WBC 15.8 (*)     Neutrophils % 78.7 (*)     Lymphocytes % 15.1 (*)     Neutrophils Absolute 12.4 (*)     All other components within normal limits   COMPREHENSIVE METABOLIC PANEL - Abnormal; Notable for the following components:    Glucose 144 (*)     All other components within normal limits   URINE RT REFLEX TO CULTURE - Abnormal; Notable for the following components:    Clarity, UA CLOUDY (*)     Ketones, Urine TRACE (*)     Blood, Urine LARGE (*)     Protein, UA 30 (*)     Leukocyte Esterase, Urine SMALL (*)     All other components within normal limits   MICROSCOPIC URINALYSIS - Abnormal; Notable for the following components:    Bacteria, UA NEGATIVE (*)     Crystals, UA NEG (*)     WBC, UA 35 (*)     RBC,  (*)     All other components within normal limits   CULTURE, URINE   LIPASE   HCG, SERUM, QUALITATIVE       All other labs were within normal range or not returned as of this dictation.     EMERGENCY DEPARTMENT COURSE and DIFFERENTIALDIAGNOSIS/MDM:   Vitals:    Vitals:    09/16/20 0235 09/16/20 0237 09/16/20 0238 09/16/20 0532   BP: (!) 143/116 (!) 143/116  (!) 154/97   Pulse: 88 78 68 75   Resp: 20  9 19   Temp: 97.9 °F (36.6 °C)   97.8 °F (36.6 °C)   TempSrc: Oral   Oral   SpO2: 100% 96% 98% 97%   Weight: 251 lb (113.9 kg)      Height: 5' 7\" (1.702 m)          MDM  Patient feeling much better. Tolerating oral intake. No further vomiting. Patient has some red cells and white cells in her urine but she is currently menstruating so I think this probably explains her urinalysis results. She denies any urinary symptoms so I do not see indication for antibiotics as I think that UTI is very unlikely. All of her symptoms started after she was put on doxycycline. Suspect the doxycycline might be causing her upset stomach but not certain about this. Told her to discontinue doxycycline as it seems like that the abscess in her left axilla is healing very well and there are no signs of cellulitis so do not see indication for continued antibiotics. Discussed all of her results with her including CT results. Does not have a primary care doctor. Will refer to primary care for follow-up. Told her follow-up very important. Will go ahead and start on Prilosec. GI cocktail seemed to help symptoms some so abdominal discomfort could be due to reflux/gastritis. Told patient to follow-up with primary care for further evaluation return to the ER for change worsening symptoms or new concerns. CONSULTS:  None    PROCEDURES:  Unless otherwise notedbelow, none     Procedures    FINAL IMPRESSION     1. Abdominal pain with vomiting    2.  Elevated blood pressure reading          DISPOSITION/PLAN   DISPOSITION Decision To Discharge 09/16/2020 05:21:57 AM      PATIENT REFERRED TO:  @FUP@    DISCHARGE MEDICATIONS:  New Prescriptions    OMEPRAZOLE (PRILOSEC) 20 MG DELAYED RELEASE CAPSULE    Take 1 capsule by mouth daily          (Please note that portions of this note were completed with a voice recognition program.  Efforts were made to edit the dictations butoccasionally words are mis-transcribed.)    Efren Collins MD (electronically signed)  AttendingEmergency Physician          Efren Collins MD  09/16/20 9106

## 2020-09-18 LAB — URINE CULTURE, ROUTINE: NORMAL

## 2020-10-16 ENCOUNTER — HOSPITAL ENCOUNTER (EMERGENCY)
Facility: HOSPITAL | Age: 30
Discharge: LEFT BEFORE DISCHARGE | End: 2020-10-16

## 2020-10-16 VITALS
RESPIRATION RATE: 18 BRPM | DIASTOLIC BLOOD PRESSURE: 98 MMHG | SYSTOLIC BLOOD PRESSURE: 170 MMHG | HEART RATE: 82 BPM | HEIGHT: 67 IN | WEIGHT: 248 LBS | OXYGEN SATURATION: 100 % | TEMPERATURE: 97.9 F | BODY MASS INDEX: 38.92 KG/M2

## 2020-10-16 NOTE — ED NOTES
Door screener attempted to get pt back in ED but states pt continued walking towards vehicle.     Diana Win, RN  10/16/20 3176

## 2020-12-16 ENCOUNTER — APPOINTMENT (OUTPATIENT)
Dept: GENERAL RADIOLOGY | Facility: HOSPITAL | Age: 30
End: 2020-12-16

## 2020-12-16 ENCOUNTER — HOSPITAL ENCOUNTER (EMERGENCY)
Facility: HOSPITAL | Age: 30
Discharge: HOME | End: 2020-12-16
Attending: EMERGENCY MEDICINE | Admitting: EMERGENCY MEDICINE

## 2020-12-16 VITALS
OXYGEN SATURATION: 99 % | HEIGHT: 67 IN | BODY MASS INDEX: 40.49 KG/M2 | RESPIRATION RATE: 16 BRPM | SYSTOLIC BLOOD PRESSURE: 134 MMHG | DIASTOLIC BLOOD PRESSURE: 77 MMHG | HEART RATE: 76 BPM | WEIGHT: 258 LBS | TEMPERATURE: 98.3 F

## 2020-12-16 DIAGNOSIS — J06.9 UPPER RESPIRATORY TRACT INFECTION, UNSPECIFIED TYPE: Primary | ICD-10-CM

## 2020-12-16 LAB — SARS-COV-2 RDRP RESP QL NAA+PROBE: NORMAL

## 2020-12-16 PROCEDURE — 71045 X-RAY EXAM CHEST 1 VIEW: CPT

## 2020-12-16 PROCEDURE — 99283 EMERGENCY DEPT VISIT LOW MDM: CPT

## 2020-12-16 PROCEDURE — 87635 SARS-COV-2 COVID-19 AMP PRB: CPT | Performed by: EMERGENCY MEDICINE

## 2020-12-16 NOTE — ED PROVIDER NOTES
Subjective   Patient presents with a complaint of cough and congestion has been going for last couple days.  She says she hurts in her chest whenever she coughs and sometimes when she does not cough also.  It is a sharp pain.  She had some chills but no definite fevers.  At times she feels short of breath when she gets to coughing too bad.  She says she was exposed to a friend who was positive for Covid last week.      History provided by:  Patient   used: No    Cough  Cough characteristics:  Non-productive  Sputum characteristics:  Nondescript  Severity:  Moderate  Onset quality:  Gradual  Duration:  2 days  Timing:  Intermittent  Progression:  Worsening  Chronicity:  New  Smoker: no    Context: sick contacts    Context: not animal exposure, not exposure to allergens, not fumes, not occupational exposure, not smoke exposure, not upper respiratory infection, not weather changes and not with activity    Relieved by:  Nothing  Worsened by:  Nothing  Ineffective treatments:  None tried  Associated symptoms: chest pain, chills and shortness of breath    Associated symptoms: no diaphoresis, no ear fullness, no ear pain, no eye discharge, no fever, no headaches, no myalgias, no rash, no rhinorrhea, no sinus congestion, no sore throat, no weight loss and no wheezing    Risk factors: no chemical exposure, no recent infection and no recent travel        Review of Systems   Constitutional: Positive for chills. Negative for diaphoresis, fever and weight loss.   HENT: Negative.  Negative for ear pain, rhinorrhea and sore throat.    Eyes: Negative for discharge.   Respiratory: Positive for cough and shortness of breath. Negative for wheezing.    Cardiovascular: Positive for chest pain.   Gastrointestinal: Negative.    Genitourinary: Negative.    Musculoskeletal: Negative.  Negative for myalgias.   Skin: Negative.  Negative for rash.   Neurological: Negative.  Negative for headaches.   Hematological: Negative.     Psychiatric/Behavioral: Negative.    All other systems reviewed and are negative.      Past Medical History:   Diagnosis Date   • Abscess of axilla    • Depression    • Hypertension    • Ovarian cyst        Allergies   Allergen Reactions   • Tramadol Rash       Past Surgical History:   Procedure Laterality Date   • NO PAST SURGERIES         History reviewed. No pertinent family history.    Social History     Socioeconomic History   • Marital status: Single     Spouse name: Not on file   • Number of children: Not on file   • Years of education: Not on file   • Highest education level: Not on file   Tobacco Use   • Smoking status: Current Every Day Smoker     Packs/day: 0.50     Years: 12.00     Pack years: 6.00   • Smokeless tobacco: Never Used   Substance and Sexual Activity   • Alcohol use: No   • Drug use: No   • Sexual activity: Defer   Social History Narrative    single       Prior to Admission medications    Medication Sig Start Date End Date Taking? Authorizing Provider   doxycycline (MONODOX) 100 MG capsule Take 1 capsule by mouth 2 (Two) Times a Day. 9/13/20   Abdulkadir Spangler PA   HYDROcodone-acetaminophen (NORCO) 7.5-325 MG per tablet Take 1 tablet by mouth Every 6 (Six) Hours As Needed for Moderate Pain . 9/13/20   Ignacio Gill MD   ondansetron (ZOFRAN) 4 MG tablet Take 1 tablet by mouth Every 6 (Six) Hours As Needed for Nausea or Vomiting. 9/13/20   Abdulkadir Spangler PA   promethazine (PHENERGAN) 25 MG tablet Take 1 tablet by mouth Every 6 (Six) Hours As Needed for Nausea or Vomiting. 9/13/20   Abdulkadir Spangler PA       Medications - No data to display    Vitals:    12/16/20 1205   BP: 148/79   Pulse: 78   Resp: 16   Temp: 98.6 °F (37 °C)   SpO2: 99%         Objective   Physical Exam  Vitals signs and nursing note reviewed.   Constitutional:       Appearance: She is well-developed.   HENT:      Head: Normocephalic and atraumatic.   Neck:      Musculoskeletal: Normal range  of motion and neck supple.   Cardiovascular:      Rate and Rhythm: Normal rate and regular rhythm.   Pulmonary:      Effort: Pulmonary effort is normal.      Breath sounds: Normal breath sounds.   Abdominal:      General: Bowel sounds are normal.      Palpations: Abdomen is soft.   Musculoskeletal: Normal range of motion.   Skin:     General: Skin is warm and dry.   Neurological:      General: No focal deficit present.      Mental Status: She is alert and oriented to person, place, and time.   Psychiatric:         Mood and Affect: Mood normal.         Behavior: Behavior normal.         Procedures         Lab Results (last 24 hours)     Procedure Component Value Units Date/Time    COVID PRE-OP / PRE-PROCEDURE SCREENING ORDER (NO ISOLATION) - Swab, Nasopharynx [[id number]]  (Normal) Collected: 12/16/20 1237    Specimen: Swab from Nasopharynx Updated: 12/16/20 1329    Narrative:      The following orders were created for panel order COVID PRE-OP / PRE-PROCEDURE SCREENING ORDER (NO ISOLATION) - Swab, Nasopharynx.  Procedure                               Abnormality         Status                     ---------                               -----------         ------                     COVID-19, ABBOTT IN-HOUS...[[id number]]  Normal              Final result                 Please view results for these tests on the individual orders.    COVID-19, ABBOTT IN-HOUSE,NP Swab (NO TRANSPORT MEDIA) 2 HR TAT - Swab, Nasopharynx [[id number]]  (Normal) Collected: 12/16/20 1237    Specimen: Swab from Nasopharynx Updated: 12/16/20 1329     COVID19 Presumptive Negative    Narrative:      Fact sheet for providers: https://www.fda.gov/media/084913/download     Fact sheet for patients: https://www.fda.gov/media/391842/download    Test performed by PCR.  If inconsistent with clinical signs and symptoms patient should be tested with different authorized molecular test.          XR Chest 1 View    (Results Pending)       ED Course  ED Course  as of Dec 16 1343   Wed Dec 16, 2020   1342 I told the patient her testing was okay with a negative Covid test and chest x-ray and I think her problems are just related to her breast or infection or passed by self.  She is discharged in stable condition.    [TR]      ED Course User Index  [TR] Audie Khan Jr., MD          MDM  Number of Diagnoses or Management Options  Upper respiratory tract infection, unspecified type: new and requires workup     Amount and/or Complexity of Data Reviewed  Clinical lab tests: ordered and reviewed  Tests in the radiology section of CPT®: ordered and reviewed    Risk of Complications, Morbidity, and/or Mortality  Presenting problems: moderate  Diagnostic procedures: moderate  Management options: moderate    Patient Progress  Patient progress: stable      Final diagnoses:   Upper respiratory tract infection, unspecified type          Audie Khan Jr., MD  12/16/20 1345

## 2021-01-29 ENCOUNTER — APPOINTMENT (OUTPATIENT)
Dept: CT IMAGING | Facility: HOSPITAL | Age: 31
End: 2021-01-29

## 2021-01-29 ENCOUNTER — HOSPITAL ENCOUNTER (EMERGENCY)
Facility: HOSPITAL | Age: 31
Discharge: HOME | End: 2021-01-29
Attending: EMERGENCY MEDICINE | Admitting: EMERGENCY MEDICINE

## 2021-01-29 ENCOUNTER — APPOINTMENT (OUTPATIENT)
Dept: GENERAL RADIOLOGY | Facility: HOSPITAL | Age: 31
End: 2021-01-29

## 2021-01-29 VITALS
RESPIRATION RATE: 18 BRPM | HEART RATE: 72 BPM | OXYGEN SATURATION: 98 % | SYSTOLIC BLOOD PRESSURE: 153 MMHG | DIASTOLIC BLOOD PRESSURE: 88 MMHG | HEIGHT: 67 IN | BODY MASS INDEX: 39.24 KG/M2 | TEMPERATURE: 98.3 F | WEIGHT: 250 LBS

## 2021-01-29 DIAGNOSIS — S89.91XA RIGHT KNEE INJURY, INITIAL ENCOUNTER: ICD-10-CM

## 2021-01-29 DIAGNOSIS — R51.9 ACUTE NONINTRACTABLE HEADACHE, UNSPECIFIED HEADACHE TYPE: Primary | ICD-10-CM

## 2021-01-29 PROCEDURE — 81025 URINE PREGNANCY TEST: CPT | Performed by: PHYSICIAN ASSISTANT

## 2021-01-29 PROCEDURE — 99284 EMERGENCY DEPT VISIT MOD MDM: CPT

## 2021-01-29 PROCEDURE — 25010000002 DEXAMETHASONE PER 1 MG: Performed by: PHYSICIAN ASSISTANT

## 2021-01-29 PROCEDURE — 96375 TX/PRO/DX INJ NEW DRUG ADDON: CPT

## 2021-01-29 PROCEDURE — 99283 EMERGENCY DEPT VISIT LOW MDM: CPT

## 2021-01-29 PROCEDURE — 73562 X-RAY EXAM OF KNEE 3: CPT

## 2021-01-29 PROCEDURE — 70450 CT HEAD/BRAIN W/O DYE: CPT

## 2021-01-29 PROCEDURE — 25010000002 KETOROLAC TROMETHAMINE PER 15 MG: Performed by: PHYSICIAN ASSISTANT

## 2021-01-29 PROCEDURE — 25010000002 PROCHLORPERAZINE 10 MG/2ML SOLUTION: Performed by: PHYSICIAN ASSISTANT

## 2021-01-29 PROCEDURE — 96374 THER/PROPH/DIAG INJ IV PUSH: CPT

## 2021-01-29 RX ORDER — PROMETHAZINE HYDROCHLORIDE 25 MG/1
25 TABLET ORAL EVERY 6 HOURS PRN
Qty: 12 TABLET | Refills: 0 | Status: SHIPPED | OUTPATIENT
Start: 2021-01-29 | End: 2021-11-05

## 2021-01-29 RX ORDER — PROCHLORPERAZINE EDISYLATE 5 MG/ML
10 INJECTION INTRAMUSCULAR; INTRAVENOUS ONCE
Status: COMPLETED | OUTPATIENT
Start: 2021-01-29 | End: 2021-01-29

## 2021-01-29 RX ORDER — DEXAMETHASONE SODIUM PHOSPHATE 4 MG/ML
4 INJECTION, SOLUTION INTRA-ARTICULAR; INTRALESIONAL; INTRAMUSCULAR; INTRAVENOUS; SOFT TISSUE ONCE
Status: COMPLETED | OUTPATIENT
Start: 2021-01-29 | End: 2021-01-29

## 2021-01-29 RX ORDER — KETOROLAC TROMETHAMINE 10 MG/1
10 TABLET, FILM COATED ORAL EVERY 6 HOURS PRN
Qty: 12 TABLET | Refills: 0 | Status: SHIPPED | OUTPATIENT
Start: 2021-01-29 | End: 2021-08-19

## 2021-01-29 RX ORDER — KETOROLAC TROMETHAMINE 30 MG/ML
30 INJECTION, SOLUTION INTRAMUSCULAR; INTRAVENOUS ONCE
Status: COMPLETED | OUTPATIENT
Start: 2021-01-29 | End: 2021-01-29

## 2021-01-29 RX ORDER — SODIUM CHLORIDE 0.9 % (FLUSH) 0.9 %
10 SYRINGE (ML) INJECTION AS NEEDED
Status: DISCONTINUED | OUTPATIENT
Start: 2021-01-29 | End: 2021-01-29 | Stop reason: HOSPADM

## 2021-01-29 RX ADMIN — PROCHLORPERAZINE EDISYLATE 10 MG: 5 INJECTION INTRAMUSCULAR; INTRAVENOUS at 10:38

## 2021-01-29 RX ADMIN — KETOROLAC TROMETHAMINE 30 MG: 30 INJECTION, SOLUTION INTRAMUSCULAR; INTRAVENOUS at 10:36

## 2021-01-29 RX ADMIN — DEXAMETHASONE SODIUM PHOSPHATE 4 MG: 4 INJECTION, SOLUTION INTRAMUSCULAR; INTRAVENOUS at 10:34

## 2021-01-29 RX ADMIN — SODIUM CHLORIDE 1000 ML: 9 INJECTION, SOLUTION INTRAVENOUS at 10:34

## 2021-02-01 ENCOUNTER — HOSPITAL ENCOUNTER (EMERGENCY)
Age: 31
Discharge: HOME | End: 2021-02-01
Attending: PEDIATRICS
Payer: COMMERCIAL

## 2021-02-01 ENCOUNTER — APPOINTMENT (OUTPATIENT)
Dept: CT IMAGING | Age: 31
End: 2021-02-01
Payer: COMMERCIAL

## 2021-02-01 ENCOUNTER — HOSPITAL ENCOUNTER (EMERGENCY)
Age: 31
Discharge: HOME | End: 2021-02-01
Attending: EMERGENCY MEDICINE
Payer: COMMERCIAL

## 2021-02-01 VITALS
DIASTOLIC BLOOD PRESSURE: 97 MMHG | BODY MASS INDEX: 39.24 KG/M2 | RESPIRATION RATE: 20 BRPM | TEMPERATURE: 98.1 F | OXYGEN SATURATION: 97 % | HEART RATE: 78 BPM | WEIGHT: 250 LBS | SYSTOLIC BLOOD PRESSURE: 138 MMHG | HEIGHT: 67 IN

## 2021-02-01 VITALS
RESPIRATION RATE: 14 BRPM | TEMPERATURE: 97.7 F | SYSTOLIC BLOOD PRESSURE: 177 MMHG | HEART RATE: 64 BPM | OXYGEN SATURATION: 98 % | DIASTOLIC BLOOD PRESSURE: 101 MMHG

## 2021-02-01 DIAGNOSIS — R10.9 ABDOMINAL CRAMPING: ICD-10-CM

## 2021-02-01 DIAGNOSIS — F19.10 DRUG ABUSE (HCC): ICD-10-CM

## 2021-02-01 DIAGNOSIS — R11.2 NON-INTRACTABLE VOMITING WITH NAUSEA, UNSPECIFIED VOMITING TYPE: Primary | ICD-10-CM

## 2021-02-01 LAB
ALBUMIN SERPL-MCNC: 4.4 G/DL (ref 3.5–5.2)
ALP BLD-CCNC: 59 U/L (ref 35–104)
ALT SERPL-CCNC: 18 U/L (ref 5–33)
AMPHETAMINE SCREEN, URINE: NEGATIVE
ANION GAP SERPL CALCULATED.3IONS-SCNC: 14 MMOL/L (ref 7–19)
AST SERPL-CCNC: 17 U/L (ref 5–32)
BACTERIA: ABNORMAL /HPF
BARBITURATE SCREEN URINE: NEGATIVE
BASOPHILS ABSOLUTE: 0.1 K/UL (ref 0–0.2)
BASOPHILS RELATIVE PERCENT: 0.6 % (ref 0–1)
BENZODIAZEPINE SCREEN, URINE: NEGATIVE
BILIRUB SERPL-MCNC: 0.3 MG/DL (ref 0.2–1.2)
BILIRUBIN URINE: NEGATIVE
BLOOD, URINE: NEGATIVE
BUN BLDV-MCNC: 17 MG/DL (ref 6–20)
CALCIUM SERPL-MCNC: 9.1 MG/DL (ref 8.6–10)
CANNABINOID SCREEN URINE: NEGATIVE
CHLORIDE BLD-SCNC: 102 MMOL/L (ref 98–111)
CLARITY: ABNORMAL
CO2: 22 MMOL/L (ref 22–29)
COCAINE METABOLITE SCREEN URINE: NEGATIVE
COLOR: YELLOW
CREAT SERPL-MCNC: 0.7 MG/DL (ref 0.5–0.9)
CRYSTALS, UA: ABNORMAL /HPF
EKG P AXIS: NORMAL DEGREES
EKG P-R INTERVAL: NORMAL MS
EKG Q-T INTERVAL: 434 MS
EKG QRS DURATION: 92 MS
EKG QTC CALCULATION (BAZETT): 434 MS
EKG T AXIS: 61 DEGREES
EOSINOPHILS ABSOLUTE: 0.4 K/UL (ref 0–0.6)
EOSINOPHILS RELATIVE PERCENT: 2.2 % (ref 0–5)
EPITHELIAL CELLS, UA: 16 /HPF (ref 0–5)
ETHANOL: <10 MG/DL (ref 0–0.08)
GFR AFRICAN AMERICAN: >59
GFR NON-AFRICAN AMERICAN: >60
GLUCOSE BLD-MCNC: 124 MG/DL (ref 74–109)
GLUCOSE BLD-MCNC: 133 MG/DL
GLUCOSE URINE: NEGATIVE MG/DL
HCG QUALITATIVE: NEGATIVE
HCT VFR BLD CALC: 44.9 % (ref 37–47)
HEMOGLOBIN: 15.9 G/DL (ref 12–16)
HYALINE CASTS: 10 /HPF (ref 0–8)
IMMATURE GRANULOCYTES #: 0.2 K/UL
KETONES, URINE: ABNORMAL MG/DL
LEUKOCYTE ESTERASE, URINE: ABNORMAL
LYMPHOCYTES ABSOLUTE: 5 K/UL (ref 1.1–4.5)
LYMPHOCYTES RELATIVE PERCENT: 26 % (ref 20–40)
Lab: NORMAL
MAGNESIUM: 1.8 MG/DL (ref 1.6–2.6)
MCH RBC QN AUTO: 30.9 PG (ref 27–31)
MCHC RBC AUTO-ENTMCNC: 35.4 G/DL (ref 33–37)
MCV RBC AUTO: 87.4 FL (ref 81–99)
MONOCYTES ABSOLUTE: 1.5 K/UL (ref 0–0.9)
MONOCYTES RELATIVE PERCENT: 7.9 % (ref 0–10)
NEUTROPHILS ABSOLUTE: 11.9 K/UL (ref 1.5–7.5)
NEUTROPHILS RELATIVE PERCENT: 62.5 % (ref 50–65)
NITRITE, URINE: NEGATIVE
OPIATE SCREEN URINE: NEGATIVE
PDW BLD-RTO: 13.7 % (ref 11.5–14.5)
PH UA: 5.5 (ref 5–8)
PLATELET # BLD: 332 K/UL (ref 130–400)
PMV BLD AUTO: 9.9 FL (ref 9.4–12.3)
POTASSIUM REFLEX MAGNESIUM: 3.3 MMOL/L (ref 3.5–5)
PROTEIN UA: 30 MG/DL
RBC # BLD: 5.14 M/UL (ref 4.2–5.4)
RBC UA: 5 /HPF (ref 0–4)
SARS-COV-2, NAAT: NOT DETECTED
SODIUM BLD-SCNC: 138 MMOL/L (ref 136–145)
SPECIFIC GRAVITY UA: 1.02 (ref 1–1.03)
TOTAL PROTEIN: 7.8 G/DL (ref 6.6–8.7)
TROPONIN: <0.01 NG/ML (ref 0–0.03)
UROBILINOGEN, URINE: 1 E.U./DL
WBC # BLD: 19.1 K/UL (ref 4.8–10.8)
WBC UA: 54 /HPF (ref 0–5)

## 2021-02-01 PROCEDURE — 99283 EMERGENCY DEPT VISIT LOW MDM: CPT

## 2021-02-01 PROCEDURE — 84484 ASSAY OF TROPONIN QUANT: CPT

## 2021-02-01 PROCEDURE — 81001 URINALYSIS AUTO W/SCOPE: CPT

## 2021-02-01 PROCEDURE — 93010 ELECTROCARDIOGRAM REPORT: CPT | Performed by: INTERNAL MEDICINE

## 2021-02-01 PROCEDURE — 80053 COMPREHEN METABOLIC PANEL: CPT

## 2021-02-01 PROCEDURE — 85025 COMPLETE CBC W/AUTO DIFF WBC: CPT

## 2021-02-01 PROCEDURE — 96375 TX/PRO/DX INJ NEW DRUG ADDON: CPT

## 2021-02-01 PROCEDURE — 96372 THER/PROPH/DIAG INJ SC/IM: CPT

## 2021-02-01 PROCEDURE — U0002 COVID-19 LAB TEST NON-CDC: HCPCS

## 2021-02-01 PROCEDURE — 6370000000 HC RX 637 (ALT 250 FOR IP): Performed by: EMERGENCY MEDICINE

## 2021-02-01 PROCEDURE — 6360000004 HC RX CONTRAST MEDICATION: Performed by: PEDIATRICS

## 2021-02-01 PROCEDURE — 6360000002 HC RX W HCPCS: Performed by: PEDIATRICS

## 2021-02-01 PROCEDURE — 96374 THER/PROPH/DIAG INJ IV PUSH: CPT

## 2021-02-01 PROCEDURE — 93005 ELECTROCARDIOGRAM TRACING: CPT | Performed by: PEDIATRICS

## 2021-02-01 PROCEDURE — 36415 COLL VENOUS BLD VENIPUNCTURE: CPT

## 2021-02-01 PROCEDURE — 84703 CHORIONIC GONADOTROPIN ASSAY: CPT

## 2021-02-01 PROCEDURE — 6360000002 HC RX W HCPCS: Performed by: EMERGENCY MEDICINE

## 2021-02-01 PROCEDURE — 83735 ASSAY OF MAGNESIUM: CPT

## 2021-02-01 PROCEDURE — 80307 DRUG TEST PRSMV CHEM ANLYZR: CPT

## 2021-02-01 PROCEDURE — 74177 CT ABD & PELVIS W/CONTRAST: CPT

## 2021-02-01 PROCEDURE — 82077 ASSAY SPEC XCP UR&BREATH IA: CPT

## 2021-02-01 PROCEDURE — 87086 URINE CULTURE/COLONY COUNT: CPT

## 2021-02-01 PROCEDURE — 2580000003 HC RX 258: Performed by: PEDIATRICS

## 2021-02-01 RX ORDER — METOCLOPRAMIDE 10 MG/1
10 TABLET ORAL 2 TIMES DAILY
Qty: 14 TABLET | Refills: 0 | Status: SHIPPED | OUTPATIENT
Start: 2021-02-01 | End: 2021-04-15

## 2021-02-01 RX ORDER — PROMETHAZINE HYDROCHLORIDE 25 MG/ML
25 INJECTION, SOLUTION INTRAMUSCULAR; INTRAVENOUS ONCE
Status: DISCONTINUED | OUTPATIENT
Start: 2021-02-01 | End: 2021-02-01 | Stop reason: HOSPADM

## 2021-02-01 RX ORDER — PROCHLORPERAZINE EDISYLATE 5 MG/ML
10 INJECTION INTRAMUSCULAR; INTRAVENOUS ONCE
Status: COMPLETED | OUTPATIENT
Start: 2021-02-01 | End: 2021-02-01

## 2021-02-01 RX ORDER — SUCRALFATE 1 G/1
1 TABLET ORAL 4 TIMES DAILY
Qty: 120 TABLET | Refills: 0 | Status: SHIPPED | OUTPATIENT
Start: 2021-02-01 | End: 2021-02-09 | Stop reason: ALTCHOICE

## 2021-02-01 RX ORDER — LORAZEPAM 2 MG/ML
0.5 INJECTION INTRAMUSCULAR ONCE
Status: COMPLETED | OUTPATIENT
Start: 2021-02-01 | End: 2021-02-01

## 2021-02-01 RX ORDER — HYOSCYAMINE SULFATE 0.12 MG/1
0.12 TABLET SUBLINGUAL
Qty: 120 EACH | Refills: 0 | Status: SHIPPED | OUTPATIENT
Start: 2021-02-01 | End: 2021-05-26 | Stop reason: SDUPTHER

## 2021-02-01 RX ORDER — PROMETHAZINE HYDROCHLORIDE 25 MG/1
25 TABLET ORAL EVERY 6 HOURS PRN
Qty: 20 TABLET | Refills: 0 | Status: SHIPPED | OUTPATIENT
Start: 2021-02-01 | End: 2021-02-09

## 2021-02-01 RX ORDER — ONDANSETRON 4 MG/1
4 TABLET, ORALLY DISINTEGRATING ORAL EVERY 8 HOURS PRN
Qty: 30 TABLET | Refills: 0 | Status: SHIPPED | OUTPATIENT
Start: 2021-02-01 | End: 2021-04-15

## 2021-02-01 RX ORDER — DIPHENHYDRAMINE HYDROCHLORIDE 50 MG/ML
25 INJECTION INTRAMUSCULAR; INTRAVENOUS ONCE
Status: COMPLETED | OUTPATIENT
Start: 2021-02-01 | End: 2021-02-01

## 2021-02-01 RX ORDER — ONDANSETRON 2 MG/ML
4 INJECTION INTRAMUSCULAR; INTRAVENOUS ONCE
Status: COMPLETED | OUTPATIENT
Start: 2021-02-01 | End: 2021-02-01

## 2021-02-01 RX ORDER — 0.9 % SODIUM CHLORIDE 0.9 %
1000 INTRAVENOUS SOLUTION INTRAVENOUS ONCE
Status: COMPLETED | OUTPATIENT
Start: 2021-02-01 | End: 2021-02-01

## 2021-02-01 RX ADMIN — ONDANSETRON HYDROCHLORIDE 4 MG: 2 SOLUTION INTRAMUSCULAR; INTRAVENOUS at 04:20

## 2021-02-01 RX ADMIN — SODIUM CHLORIDE 1000 ML: 9 INJECTION, SOLUTION INTRAVENOUS at 04:15

## 2021-02-01 RX ADMIN — HYOSCYAMINE SULFATE 125 MCG: 0.12 TABLET, SOLUBLE ORAL at 19:24

## 2021-02-01 RX ADMIN — DIPHENHYDRAMINE HYDROCHLORIDE 25 MG: 50 INJECTION, SOLUTION INTRAMUSCULAR; INTRAVENOUS at 19:23

## 2021-02-01 RX ADMIN — IOPAMIDOL 90 ML: 755 INJECTION, SOLUTION INTRAVENOUS at 07:15

## 2021-02-01 RX ADMIN — LORAZEPAM 0.5 MG: 2 INJECTION INTRAMUSCULAR; INTRAVENOUS at 04:15

## 2021-02-01 RX ADMIN — PROCHLORPERAZINE EDISYLATE 10 MG: 5 INJECTION INTRAMUSCULAR; INTRAVENOUS at 19:24

## 2021-02-01 ASSESSMENT — ENCOUNTER SYMPTOMS
VOMITING: 1
VOICE CHANGE: 0
EYE PAIN: 0
NAUSEA: 1
COUGH: 0
RHINORRHEA: 0
DIARRHEA: 0
SHORTNESS OF BREATH: 0
ABDOMINAL PAIN: 1
EYE REDNESS: 0

## 2021-02-01 ASSESSMENT — PAIN SCALES - GENERAL: PAINLEVEL_OUTOF10: 10

## 2021-02-01 NOTE — ED PROVIDER NOTES
Patient signed out by Dr. Roya Burgess. Awaiting CT scan. Patient is no longer vomiting. She stopped using spice which she is used before. Dr. Roya Burgess felt that if she stopped vomiting she can be discharged. Patient appears in no distress and can prescribe her some antinausea medication for home. Her vital signs are good. Her CT scan was read as negative. FINAL IMPRESSION     1. Non-intractable vomiting with nausea, unspecified vomiting type    2. Drug abuse ([facility] [address]          DISPOSITION/PLAN   DISPOSITION Decision To Discharge    PATIENT REFERRED TO:  No follow-up provider specified.     DISCHARGE MEDICATIONS:  New Prescriptions    No medications on file          (Please note that portions of this note were completed with a voice recognition program.  Efforts were made to edit the dictations but occasionally words aremis-transcribed.)    Lucy Ford MD (electronically signed)  Attending Emergency Physician   care      [facility]     Lucy Ford MD  02/01/21 1044

## 2021-02-02 ASSESSMENT — ENCOUNTER SYMPTOMS
COUGH: 0
DIARRHEA: 0
NAUSEA: 1
RHINORRHEA: 0
SHORTNESS OF BREATH: 0
EYE DISCHARGE: 0
CONSTIPATION: 0
ABDOMINAL PAIN: 1
BACK PAIN: 0
VOMITING: 1

## 2021-02-02 NOTE — ED NOTES
Pt states \"I was here last night. Know I cannot taste.   They were suppose to tet me for covid and they didn't\"     Marcio Lopez, PAYTON  02/01/21 7764

## 2021-02-02 NOTE — ED PROVIDER NOTES
[facility] EMERGENCY DEPT  EMERGENCY DEPARTMENT ENCOUNTER      Pt Name: Shaylee Gimenez  MRN: [id number]  [place] 1990  Date of evaluation: 2/1/2021  Provider: Roz Maher MD    CHIEF COMPLAINT       Chief Complaint   Patient presents with    Abdominal Pain     Started 3 days ago, was seen here last night         HISTORY OF PRESENT ILLNESS   (Location/Symptom, Timing/Onset,Context/Setting, Quality, Duration, Modifying Factors, Severity)  Note limiting factors. Shaylee Gimenez is a 32 y.o. female who presents to the emergency department with complaint of continued abdominal pain and nausea and vomiting. Patient was seen here earlier this morning for the symptoms and laboratory evaluation and CT of the abdomen pelvis were unremarkable. Patient was sent home with symptomatic treatment for nausea. States it is not working. Patient admits she has had problems with her stomach since childhood but states people keep trying the same thing over and over and it is not helping. States she had a follow-up appointment scheduled that missed it. States she was also concerned because she lost taste today and wanted a Covid swab done. Also states she needs a work note for her job. Denies any other change or worsening of symptoms today. HPI    NursingNotes were reviewed. REVIEW OF SYSTEMS    (2-9 systems for level 4, 10 or more for level 5)     Review of Systems   Constitutional: Negative for fatigue and fever. HENT: Negative for congestion, rhinorrhea and voice change. Eyes: Negative for pain and redness. Respiratory: Negative for cough and shortness of breath. Cardiovascular: Negative for chest pain. Gastrointestinal: Positive for abdominal pain, nausea and vomiting. Negative for diarrhea. Endocrine: Negative. Genitourinary: Negative. Musculoskeletal: Negative for arthralgias and gait problem. Skin: Negative for rash and wound. Neurological: Negative for weakness and headaches. Minutes per session: None    Stress: None   Relationships    Social connections     Talks on phone: None     Gets together: None     Attends Zoroastrianism service: None     Active member of club or organization: None     Attends meetings of clubs or organizations: None     Relationship status: None    Intimate partner violence     Fear of current or ex partner: None     Emotionally abused: None     Physically abused: None     Forced sexual activity: None   Other Topics Concern    None   Social History Narrative    None       SCREENINGS             PHYSICAL EXAM    (up to 7 for level 4, 8 or more for level 5)     ED Triage Vitals [02/01/21 1805]   BP Temp Temp src Pulse Resp SpO2 Height Weight   (!) 138/97 98.1 °F (36.7 °C) -- 78 20 97 % 5' 7\" (1.702 m) 250 lb (113.4 kg)       Physical Exam  Vitals signs and nursing note reviewed. Constitutional:       General: She is not in acute distress. Appearance: Normal appearance. She is well-developed. She is not diaphoretic. HENT:      Head: Normocephalic and atraumatic. Mouth/Throat:      Pharynx: No oropharyngeal exudate. Eyes:      General: No scleral icterus. Pupils: Pupils are equal, round, and reactive to light. Neck:      Musculoskeletal: Normal range of motion. Trachea: No tracheal deviation. Cardiovascular:      Rate and Rhythm: Normal rate. Pulses: Normal pulses. Heart sounds: Normal heart sounds. Pulmonary:      Effort: Pulmonary effort is normal.      Breath sounds: Normal breath sounds. No stridor. No wheezing or rhonchi. Abdominal:      General: There is no distension. Palpations: Abdomen is soft. Abdomen is not rigid. Tenderness: There is no abdominal tenderness. There is no guarding. Hernia: No hernia is present. Musculoskeletal:         General: No deformity. Skin:     General: Skin is warm and dry. Findings: No rash.    Neurological: Mental Status: She is alert and oriented to person, place, and time. Cranial Nerves: No cranial nerve deficit. Coordination: Coordination normal.   Psychiatric:         Behavior: Behavior normal.         DIAGNOSTIC RESULTS     EKG: All EKG's are interpreted by the Emergency Department Physician who either signs or Co-signs this chart in the absence of a cardiologist.        RADIOLOGY:   Non-plain film images such as CT, Ultrasound and MRI are read by the radiologist. Plainradiographic images are visualized and preliminarily interpreted by the emergency physician with the below findings:        Interpretation per the Radiologist below, if available at the time of this note:    No orders to display         ED BEDSIDE ULTRASOUND:   Performed by ED Physician - none    LABS:  Labs Reviewed   COVID-19       All other labs were within normal range or not returned as of this dictation. Medications   hyoscyamine (LEVSIN/SL) sublingual tablet 125 mcg (125 mcg Sublingual Given 2/1/21 1924)   prochlorperazine (COMPAZINE) injection 10 mg (10 mg Intramuscular Given 2/1/21 1924)   diphenhydrAMINE (BENADRYL) injection 25 mg (25 mg Intramuscular Given 2/1/21 1923)       EMERGENCY DEPARTMENT COURSE and DIFFERENTIALDIAGNOSIS/MDM:   Vitals:    Vitals:    02/01/21 1805   BP: (!) 138/97   Pulse: 78   Resp: 20   Temp: 98.1 °F (36.7 °C)   SpO2: 97%   Weight: 250 lb (113.4 kg)   Height: 5' 7\" (1.702 m)       MDM    Patient agreeable to deferring labs or imaging work-up here tonight as she just had some earlier this morning. Covid swab ordered. Patient given symptomatic treatment and prescription sent to pharmacy. Evaluation and work-up here revealed no signs of emergent or life-threatening pathology that would necessitate admission for further work-up or management at this time. Patient is felt to be stable for discharge home with return precautions for worsening of the condition or development of new concerning symptoms. Patient was encouraged to follow-up with their primary care doctor in the appropriate timeframe. Necessary prescriptions and information have been provided for treatment at home. Patient voices understanding and agreement with the plan. CONSULTS:  None    PROCEDURES:  Unless otherwise notedbelow, none     Procedures      FINAL IMPRESSION     1. Non-intractable vomiting with nausea, unspecified vomiting type    2.  Abdominal cramping          DISPOSITION/PLAN   DISPOSITION        PATIENT REFERRED TO:  [facility] EMERGENCY DEPT  [facility]  [phone]    If symptoms worsen    Carmelita Urias MD  [address] [place] [address]  [phone]    Schedule an appointment as soon as possible for a visit         DISCHARGE MEDICATIONS:  New Prescriptions    HYOSCYAMINE SULFATE SL (LEVSIN/SL) 0.125 MG SUBL    Place 0.125 mg under the tongue every 4-6 hours as needed (abdominal cramping/pain)    METOCLOPRAMIDE (REGLAN) 10 MG TABLET    Take 1 tablet by mouth 2 times daily for 7 days    SUCRALFATE (CARAFATE) 1 GM TABLET    Take 1 tablet by mouth 4 times daily          (Please note that portions of this note were completed with a voice recognition program.  Efforts were made to edit the dictations butoccasionally words are mis-transcribed.)    Justin Harper MD (electronically signed)  AttendingEmerD[facility] Physician          Justin Phillips MD  02/01/21 2009

## 2021-02-02 NOTE — ED NOTES
Bed: 13  Expected date:   Expected time:   Means of arrival:   Comments:  terminal     Cedric Campbell RN  02/01/21 6616

## 2021-02-03 LAB — URINE CULTURE, ROUTINE: NORMAL

## 2021-02-03 NOTE — ED PROVIDER NOTES
[facility] EMERGENCY DEPT  eMERGENCY dEPARTMENT eNCOUnter      Pt Name: Homero Morales  MRN: [id number]  [place] 1990  Date of evaluation: 2/1/2021  Provider: Sita Hebert MD    [address] [place] [address]       Chief Complaint   Patient presents with    Abdominal Pain     x 3 days. Quit smoking spice recently     Withdrawal         HISTORY OF PRESENT ILLNESS   (Location/Symptom, Timing/Onset,Context/Setting, Quality, Duration, Modifying Factors, Severity)  Note limiting factors. Homero Morales is a 32 y.o. female who presents to the emergency department with abdominal pain and vomiting. Patient states that she began having abdominal pain and vomiting earlier today. Patient believes that it is related to withdrawal from spice. Patient states that she used to be a daily user of spice. Patient states that she has discontinued her use of spice \"twice this week. \"  Patient's last use was \"yesterday. \"  Patient states \"I have not been able to keep anything down. \"  Abdominal pain is generalized. Patient denies radiation and notes no exacerbating or alleviating factors. Patient was crying upon entrance to the emergency department and states the pain was severe at that time but has improved spontaneously. Patient is unable to describe quality of pain. \"It just hurts. \"  Patient denies fever, diarrhea, difficulty urinating, burning with urination, frequency, hesitancy, blood in vomit or stool, flank pain, or chest pain. HPI    NursingNotes were reviewed. REVIEW OF SYSTEMS    (2-9 systems for level 4, 10 or more for level 5)     Review of Systems   Constitutional: Negative for chills and fever. HENT: Negative for congestion and rhinorrhea. Eyes: Negative for discharge. Respiratory: Negative for cough and shortness of breath. Cardiovascular: Negative for chest pain and palpitations. Gastrointestinal: Positive for abdominal pain, nausea and vomiting. Negative for constipation and diarrhea. Genitourinary: Negative for difficulty urinating, dysuria, frequency, vaginal bleeding and vaginal discharge. Musculoskeletal: Negative for back pain and neck pain. Skin: Negative for pallor and rash. Neurological: Negative for syncope and light-headedness. Psychiatric/Behavioral: Negative for agitation and confusion. All other systems reviewed and are negative. PAST MEDICALHISTORY     Past Medical History:   Diagnosis Date    Obese     Ovarian cyst          SURGICAL HISTORY     History reviewed. No pertinent surgical history.       CURRENT MEDICATIONS     Discharge Medication List as of 2/1/2021  8:17 AM      CONTINUE these medications which have NOT CHANGED    Details   omeprazole (PRILOSEC) 20 MG delayed release capsule Take 1 capsule by mouth daily, Disp-30 capsule,R-0Print      albuterol sulfate  (90 Base) MCG/ACT inhaler Inhale 2 puffs into the lungs 4 times daily as needed for Wheezing, Disp-1 Inhaler, R-0Print             ALLERGIES     Other, Tylenol [acetaminophen], and Tramadol    FAMILY HISTORY       Family History   Problem Relation Age of Onset    Diabetes Mother     High Blood Pressure Mother     Diabetes Maternal Grandmother           SOCIAL HISTORY       Social History     Socioeconomic History    Marital status: Single     Spouse name: None    Number of children: None    Years of education: None    Highest education level: None   Occupational History    None   Social Needs    Financial resource strain: None    Food insecurity     Worry: None     Inability: None    Transportation needs     Medical: None     Non-medical: None   Tobacco Use    Smoking status: Current Every Day Smoker     Packs/day: 0.50     Years: 10.00     Pack years: 5.00     Types: Cigarettes    Smokeless tobacco: Never Used   Substance and Sexual Activity    Alcohol use: No     Alcohol/week: 0.0 standard drinks    Drug use: Yes     Comment: Spice    Sexual activity: Yes     Partners: Male, Female   Lifestyle    Physical activity     Days per week: None     Minutes per session: None    Stress: None   Relationships    Social connections     Talks on phone: None     Gets together: None     Attends Yarsanism service: None     Active member of club or organization: None     Attends meetings of clubs or organizations: None     Relationship status: None    Intimate partner violence     Fear of current or ex partner: None     Emotionally abused: None     Physically abused: None     Forced sexual activity: None   Other Topics Concern    None   Social History Narrative    None       SCREENINGS    Rob Coma Scale  Eye Opening: Spontaneous  Best Verbal Response: Oriented  Best Motor Response: Obeys commands  [place] Coma Scale Score: 15        PHYSICAL EXAM    (up to 7 for level 4, 8 or more for level 5)     ED Triage Vitals [02/01/21 0346]   BP Temp Temp Source Pulse Resp SpO2 Height Weight   (!) 166/107 96.6 °F (35.9 °C) Infrared 91 22 98 % -- --       Physical Exam  Vitals signs and nursing note reviewed. Constitutional:       General: She is not in acute distress. Appearance: Normal appearance. She is obese. HENT:      Head: Normocephalic and atraumatic. Right Ear: External ear normal.      Left Ear: External ear normal.      Nose: Nose normal.      Mouth/Throat:      Pharynx: Oropharynx is clear. No oropharyngeal exudate. Comments: Tacky mucous membranes  Eyes:      General: No scleral icterus. Conjunctiva/sclera: Conjunctivae normal.      Pupils: Pupils are equal, round, and reactive to light. Neck:      Musculoskeletal: Neck supple. No neck rigidity. Cardiovascular:      Rate and Rhythm: Normal rate and regular rhythm. Pulses: Normal pulses. Heart sounds: Normal heart sounds. Pulmonary:      Effort: Pulmonary effort is normal.      Breath sounds: Normal breath sounds. Abdominal:      General: Bowel sounds are normal.      Palpations: Abdomen is soft. Tenderness: There is abdominal tenderness (diffuse). There is no guarding or rebound. Musculoskeletal:         General: No tenderness or deformity. Skin:     General: Skin is warm and dry. Capillary Refill: Capillary refill takes less than 2 seconds. Coloration: Skin is not jaundiced. Neurological:      General: No focal deficit present. Mental Status: She is alert and oriented to person, place, and time. Mental status is at baseline. Coordination: Coordination normal.   Psychiatric:         Mood and Affect: Mood normal. Affect is flat. Speech: Speech normal.         Behavior: Behavior is withdrawn. Behavior is cooperative. Thought Content: Thought content normal. Thought content does not include homicidal or suicidal ideation. Cognition and Memory: Cognition normal.         DIAGNOSTIC RESULTS     EKG: All EKG's areinterpreted by the Emergency Department Physician who either signs or Co-signs this chart in the absence of a cardiologist.    EKG dated 2/1/2021 at 0 4:21 AM: Normal sinus rhythm, rate 61. Poor R wave progression otherwise normal EKG. RADIOLOGY:  Non-plain film images such as CT, Ultrasound and MRI are read by the radiologist. Plain radiographic images are visualized and preliminarily interpreted bythe emergency physician with the below findings:      CT ABDOMEN PELVIS W IV CONTRAST Additional Contrast? None   Final Result   No acute abnormality of the abdomen or pelvis. A normal appendix.    Signed by Dr Milly Jimenez on 2/1/2021 7:50 AM              LABS:  Labs Reviewed   CBC WITH AUTO DIFFERENTIAL - Abnormal; Notable for the following components:       Result Value    WBC 19.1 (*)     Neutrophils Absolute 11.9 (*)     Lymphocytes Absolute 5.0 (*)     Monocytes Absolute 1.50 (*)     All other components within normal limits   COMPREHENSIVE METABOLIC PANEL W/ REFLEX TO MG FOR LOW K - Abnormal; Notable for the following components:    Potassium reflex Magnesium 3.3 (*)     Glucose 124 (*)     All other components within normal limits   URINE RT REFLEX TO CULTURE - Abnormal; Notable for the following components:    Clarity, UA CLOUDY (*)     Ketones, Urine TRACE (*)     Protein, UA 30 (*)     Leukocyte Esterase, Urine MODERATE (*)     All other components within normal limits   MICROSCOPIC URINALYSIS - Abnormal; Notable for the following components:    Bacteria, UA TRACE (*)     Crystals, UA NEG (*)     Hyaline Casts, UA 10 (*)     WBC, UA 54 (*)     RBC, UA 5 (*)     All other components within normal limits   POCT GLUCOSE - Normal   CULTURE, URINE    Narrative:     ORDER#: [id number]                          ORDERED BY: FEMI JACKSON  SOURCE: Urine Clean Catch                  COLLECTED:  02/01/21 04:11  ANTIBIOTICS AT BUZZ.:                      RECEIVED :  02/01/21 04:15   TROPONIN   HCG, SERUM, QUALITATIVE   URINE DRUG SCREEN   ETHANOL   MAGNESIUM       All other labs were within normal range or not returned as of this dictation. EMERGENCY DEPARTMENT COURSE and DIFFERENTIAL DIAGNOSIS/MDM:   Vitals:    Vitals:    02/01/21 0346 02/01/21 0540 02/01/21 0815   BP: (!) 166/107  (!) 177/101   Pulse: 91  64   Resp: 22  14   Temp: 96.6 °F (35.9 °C)  97.7 °F (36.5 °C)   TempSrc: Infrared     SpO2: 98% 97% 98%       MDM  27yo female presents with abdominal pain and vomiting. Lab and radiology testing performed. IV fluids, Ativan, and Zofran given. Patient appears much improved from arrival.  Blood pressure has improved as well. CT scan and urine culture are pending. 07:00  Discussed with Dr. Makayla Briones who assumes care due to end of shift. Plan to discharge with antiemetics if appropriate given CT results. CONSULTS:  None    PROCEDURES:  Unless otherwise noted below, none     Procedures    FINAL IMPRESSION      1. Non-intractable vomiting with nausea, unspecified vomiting type    2.  Drug abuse [facility])          DISPOSITION/PLAN   DISPOSITION Decision To Discharge 02/01/2021 08:15:28 AM        (Please note that portions of this note were completed with a voice recognition program.  Efforts were made to edit thedictations but occasionally words are mis-transcribed.)    Milton Guzman MD (electronically signed)  Attending Emergency Physician          Milton Guzman MD  02/02/21 2053

## 2021-02-07 ENCOUNTER — HOSPITAL ENCOUNTER (EMERGENCY)
Age: 31
Discharge: HOME | End: 2021-02-07
Attending: EMERGENCY MEDICINE
Payer: COMMERCIAL

## 2021-02-07 ENCOUNTER — APPOINTMENT (OUTPATIENT)
Dept: GENERAL RADIOLOGY | Age: 31
End: 2021-02-07
Payer: COMMERCIAL

## 2021-02-07 VITALS
HEIGHT: 67 IN | DIASTOLIC BLOOD PRESSURE: 66 MMHG | TEMPERATURE: 97.5 F | BODY MASS INDEX: 34.53 KG/M2 | WEIGHT: 220 LBS | HEART RATE: 65 BPM | SYSTOLIC BLOOD PRESSURE: 144 MMHG | OXYGEN SATURATION: 98 % | RESPIRATION RATE: 27 BRPM

## 2021-02-07 DIAGNOSIS — R11.0 NAUSEA: Primary | ICD-10-CM

## 2021-02-07 DIAGNOSIS — F41.1 ANXIETY STATE: ICD-10-CM

## 2021-02-07 LAB
ALBUMIN SERPL-MCNC: 4.9 G/DL (ref 3.5–5.2)
ALP BLD-CCNC: 71 U/L (ref 35–104)
ALT SERPL-CCNC: 20 U/L (ref 5–33)
AMPHETAMINE SCREEN, URINE: NEGATIVE
ANION GAP SERPL CALCULATED.3IONS-SCNC: 12 MMOL/L (ref 7–19)
AST SERPL-CCNC: 23 U/L (ref 5–32)
BARBITURATE SCREEN URINE: NEGATIVE
BASOPHILS ABSOLUTE: 0 K/UL (ref 0–0.2)
BASOPHILS RELATIVE PERCENT: 0.2 % (ref 0–1)
BENZODIAZEPINE SCREEN, URINE: NEGATIVE
BILIRUB SERPL-MCNC: 0.3 MG/DL (ref 0.2–1.2)
BUN BLDV-MCNC: 11 MG/DL (ref 6–20)
CALCIUM SERPL-MCNC: 9.7 MG/DL (ref 8.6–10)
CANNABINOID SCREEN URINE: POSITIVE
CHLORIDE BLD-SCNC: 99 MMOL/L (ref 98–111)
CO2: 24 MMOL/L (ref 22–29)
COCAINE METABOLITE SCREEN URINE: NEGATIVE
CREAT SERPL-MCNC: 0.7 MG/DL (ref 0.5–0.9)
EOSINOPHILS ABSOLUTE: 0 K/UL (ref 0–0.6)
EOSINOPHILS RELATIVE PERCENT: 0.1 % (ref 0–5)
GFR AFRICAN AMERICAN: >59
GFR NON-AFRICAN AMERICAN: >60
GLUCOSE BLD-MCNC: 150 MG/DL (ref 74–109)
HCG(URINE) PREGNANCY TEST: NEGATIVE
HCT VFR BLD CALC: 48.1 % (ref 37–47)
HEMOGLOBIN: 17 G/DL (ref 12–16)
IMMATURE GRANULOCYTES #: 0.1 K/UL
LYMPHOCYTES ABSOLUTE: 1.7 K/UL (ref 1.1–4.5)
LYMPHOCYTES RELATIVE PERCENT: 10.4 % (ref 20–40)
Lab: ABNORMAL
MCH RBC QN AUTO: 30.7 PG (ref 27–31)
MCHC RBC AUTO-ENTMCNC: 35.3 G/DL (ref 33–37)
MCV RBC AUTO: 87 FL (ref 81–99)
MONOCYTES ABSOLUTE: 0.6 K/UL (ref 0–0.9)
MONOCYTES RELATIVE PERCENT: 3.5 % (ref 0–10)
NEUTROPHILS ABSOLUTE: 14 K/UL (ref 1.5–7.5)
NEUTROPHILS RELATIVE PERCENT: 85.1 % (ref 50–65)
OPIATE SCREEN URINE: NEGATIVE
PDW BLD-RTO: 13.8 % (ref 11.5–14.5)
PLATELET # BLD: 378 K/UL (ref 130–400)
PMV BLD AUTO: 9.9 FL (ref 9.4–12.3)
POTASSIUM SERPL-SCNC: 4.7 MMOL/L (ref 3.5–5)
RBC # BLD: 5.53 M/UL (ref 4.2–5.4)
SODIUM BLD-SCNC: 135 MMOL/L (ref 136–145)
TOTAL PROTEIN: 8.5 G/DL (ref 6.6–8.7)
WBC # BLD: 16.5 K/UL (ref 4.8–10.8)

## 2021-02-07 PROCEDURE — 6360000002 HC RX W HCPCS

## 2021-02-07 PROCEDURE — 93005 ELECTROCARDIOGRAM TRACING: CPT | Performed by: EMERGENCY MEDICINE

## 2021-02-07 PROCEDURE — 85025 COMPLETE CBC W/AUTO DIFF WBC: CPT

## 2021-02-07 PROCEDURE — 84703 CHORIONIC GONADOTROPIN ASSAY: CPT

## 2021-02-07 PROCEDURE — 36415 COLL VENOUS BLD VENIPUNCTURE: CPT

## 2021-02-07 PROCEDURE — 71045 X-RAY EXAM CHEST 1 VIEW: CPT

## 2021-02-07 PROCEDURE — 96374 THER/PROPH/DIAG INJ IV PUSH: CPT

## 2021-02-07 PROCEDURE — 80307 DRUG TEST PRSMV CHEM ANLYZR: CPT

## 2021-02-07 PROCEDURE — 80053 COMPREHEN METABOLIC PANEL: CPT

## 2021-02-07 PROCEDURE — 99282 EMERGENCY DEPT VISIT SF MDM: CPT

## 2021-02-07 RX ORDER — LORAZEPAM 2 MG/ML
INJECTION INTRAMUSCULAR
Status: COMPLETED
Start: 2021-02-07 | End: 2021-02-07

## 2021-02-07 RX ORDER — LORAZEPAM 2 MG/ML
2 INJECTION INTRAMUSCULAR ONCE
Status: COMPLETED | OUTPATIENT
Start: 2021-02-07 | End: 2021-02-07

## 2021-02-07 RX ADMIN — LORAZEPAM 2 MG: 2 INJECTION INTRAMUSCULAR; INTRAVENOUS at 12:14

## 2021-02-07 RX ADMIN — LORAZEPAM 2 MG: 2 INJECTION INTRAMUSCULAR at 12:14

## 2021-02-07 NOTE — ED NOTES
Patient requesting \"Mommie\". Patient made aware of our current no visitor policy.        Yuliana Adair RN  02/07/21 6526

## 2021-02-07 NOTE — ED PROVIDER NOTES
[facility] EMERGENCY DEPT  eMERGENCY dEPARTMENT eNCOUnter      Pt Name: Sophie Del Real  MRN: [id number]  [place] 1990  Date of evaluation: 2/7/2021  Provider: Camryn Valera MD    [address] [place] [address]       Chief Complaint   Patient presents with    Shortness of Breath     pt presents gasping for air and shaking. pt wont talk. friend stated over the phone, possible food poisoning or spice the pt smokes. HISTORY OF PRESENT ILLNESS   (Location/Symptom, Timing/Onset,Context/Setting, Quality, Duration, Modifying Factors, Severity)  Note limiting factors. Sophie Del Real is a 32 y.o. female who presents to the emergency department for evaluation regarding shortness of breath. Patient arrives here to the ED via private vehicle and on arrival she seems to be gasping for air. Patient has normal vital signs although I am not really able to obtain very much history. Patient's friend states that she is not sure if patient may be experiencing some food poisoning or that she recently smoked some spice. Additional history obtained after patient is more calm here in the ED. States that she is feeling much better and her symptoms have essentially resolved. HPI    NursingNotes were reviewed. REVIEW OF SYSTEMS    (2-9 systems for level 4, 10 or more for level 5)     Review of Systems   Unable to perform ROS: Acuity of condition            PAST MEDICALHISTORY     Past Medical History:   Diagnosis Date    Obese     Ovarian cyst          SURGICAL HISTORY     No past surgical history on file.       CURRENT MEDICATIONS     Previous Medications    ALBUTEROL SULFATE  (90 BASE) MCG/ACT INHALER    Inhale 2 puffs into the lungs 4 times daily as needed for Wheezing    HYOSCYAMINE SULFATE SL (LEVSIN/SL) 0.125 MG SUBL    Place 0.125 mg under the tongue every 4-6 hours as needed (abdominal cramping/pain)    METOCLOPRAMIDE (REGLAN) 10 MG TABLET    Take 1 tablet by mouth 2 times daily for 7 days OMEPRAZOLE (PRILOSEC) 20 MG DELAYED RELEASE CAPSULE    Take 1 capsule by mouth daily    ONDANSETRON (ZOFRAN ODT) 4 MG DISINTEGRATING TABLET    Take 1 tablet by mouth every 8 hours as needed for Nausea or Vomiting    PROMETHAZINE (PHENERGAN) 25 MG TABLET    Take 1 tablet by mouth every 6 hours as needed for Nausea    SUCRALFATE (CARAFATE) 1 GM TABLET    Take 1 tablet by mouth 4 times daily       ALLERGIES     Other, Tylenol [acetaminophen], and Tramadol    FAMILY HISTORY       Family History   Problem Relation Age of Onset    Diabetes Mother     High Blood Pressure Mother     Diabetes Maternal Grandmother           SOCIAL HISTORY       Social History     Socioeconomic History    Marital status: Single     Spouse name: Not on file    Number of children: Not on file    Years of education: Not on file    Highest education level: Not on file   Occupational History    Not on file   Social Needs    Financial resource strain: Not on file    Food insecurity     Worry: Not on file     Inability: Not on file    Transportation needs     Medical: Not on file     Non-medical: Not on file   Tobacco Use    Smoking status: Current Every Day Smoker     Packs/day: 0.50     Years: 10.00     Pack years: 5.00     Types: Cigarettes    Smokeless tobacco: Never Used   Substance and Sexual Activity    Alcohol use: No     Alcohol/week: 0.0 standard drinks    Drug use: Yes     Comment: Spice    Sexual activity: Yes     Partners: Male, Female   Lifestyle    Physical activity     Days per week: Not on file     Minutes per session: Not on file    Stress: Not on file   Relationships    Social connections     Talks on phone: Not on file     Gets together: Not on file     Attends Adventism service: Not on file     Active member of club or organization: Not on file     Attends meetings of clubs or organizations: Not on file     Relationship status: Not on file    Intimate partner violence Fear of current or ex partner: Not on file     Emotionally abused: Not on file     Physically abused: Not on file     Forced sexual activity: Not on file   Other Topics Concern    Not on file   Social History Narrative    Not on file       SCREENINGS             PHYSICAL EXAM    (up to 7 for level 4, 8 or more for level 5)     ED Triage Vitals [02/07/21 1206]   BP Temp Temp src Pulse Resp SpO2 Height Weight   (!) 157/94 97.8 °F (36.6 °C) -- 109 22 100 % 5' 7\" (1.702 m) 220 lb (99.8 kg)       Physical Exam  Vitals signs and nursing note reviewed. HENT:      Head: Atraumatic. Mouth/Throat:      Mouth: Mucous membranes are moist.      Pharynx: Oropharynx is clear. No pharyngeal swelling. Cardiovascular:      Rate and Rhythm: Normal rate and regular rhythm. Heart sounds: Normal heart sounds. Pulmonary:      Effort: Pulmonary effort is normal. No respiratory distress. Breath sounds: Normal breath sounds. Abdominal:      General: There is no distension. Tenderness: There is no abdominal tenderness. Musculoskeletal:         General: No deformity or signs of injury. Neurological:      General: No focal deficit present. Mental Status: She is alert and oriented to person, place, and time. Cranial Nerves: No cranial nerve deficit. DIAGNOSTIC RESULTS     EKG: All EKG's areinterpreted by the Emergency Department Physician who either signs or Co-signs this chart in the absence of a cardiologist.    [id number]: NSR @ 68    RADIOLOGY:  Non-plain film images such as CT, Ultrasound and MRI are read by the radiologist. Plain radiographic images are visualized and preliminarily interpreted bythe emergency physician with the below findings:        XR CHEST PORTABLE   Final Result   1. No radiographic evidence of acute cardiopulmonary process.    Signed by Dr Laurita Blair on 2/7/2021 12:17 PM              LABS:  Labs Reviewed COMPREHENSIVE METABOLIC PANEL - Abnormal; Notable for the following components:       Result Value    Sodium 135 (*)     Glucose 150 (*)     All other components within normal limits   CBC WITH AUTO DIFFERENTIAL - Abnormal; Notable for the following components:    WBC 16.5 (*)     RBC 5.53 (*)     Hemoglobin 17.0 (*)     Hematocrit 48.1 (*)     Neutrophils % 85.1 (*)     Lymphocytes % 10.4 (*)     Neutrophils Absolute 14.0 (*)     All other components within normal limits   URINE DRUG SCREEN - Abnormal; Notable for the following components:    Cannabinoid Scrn, Ur Positive (*)     All other components within normal limits   PREGNANCY, URINE       All other labs were within normal range or not returned as of this dictation. EMERGENCY DEPARTMENT COURSE and DIFFERENTIAL DIAGNOSIS/MDM:   Vitals:    Vitals:    02/07/21 1206   BP: (!) 157/94   Pulse: 109   Resp: 22   Temp: 97.8 °F (36.6 °C)   SpO2: 100%   Weight: 220 lb (99.8 kg)   Height: 5' 7\" (1.702 m)       MDM    Reassessment    Patient is alert, resting comfortably at this time. Her vital signs are stable. Laboratory studies unremarkable. She is able to ambulate in the ED. We will plan to discharge her home at this time. PROCEDURES:  Unless otherwise noted below, none     Procedures    FINAL IMPRESSION      1. Nausea    2. Anxiety state          DISPOSITION/PLAN   DISPOSITION Decision To Discharge 02/07/2021 01:23:36 PM      PATIENT REFERRED TO:  [address]   [facility] [id number] [phone]  Schedule an appointment as soon as possible for a visit       (Please note that portions of this note were completed with a voice recognition program.  Efforts were made to edit thedictations but occasionally words are mis-transcribed.)    Jimenez Rosales MD (electronically signed)  Attending Emergency Physician         Jimenez Rosales MD  02/07/21 2871

## 2021-02-09 ENCOUNTER — OFFICE VISIT (OUTPATIENT)
Dept: GASTROENTEROLOGY | Age: 31
End: 2021-02-09
Payer: COMMERCIAL

## 2021-02-09 VITALS
OXYGEN SATURATION: 99 % | HEIGHT: 67 IN | BODY MASS INDEX: 38.92 KG/M2 | HEART RATE: 87 BPM | DIASTOLIC BLOOD PRESSURE: 80 MMHG | WEIGHT: 248 LBS | SYSTOLIC BLOOD PRESSURE: 120 MMHG

## 2021-02-09 DIAGNOSIS — R73.09 ELEVATED GLUCOSE: ICD-10-CM

## 2021-02-09 DIAGNOSIS — R63.0 LOSS OF APPETITE: ICD-10-CM

## 2021-02-09 DIAGNOSIS — R10.84 GENERALIZED ABDOMINAL PAIN: Primary | ICD-10-CM

## 2021-02-09 DIAGNOSIS — R11.2 NAUSEA AND VOMITING, INTRACTABILITY OF VOMITING NOT SPECIFIED, UNSPECIFIED VOMITING TYPE: ICD-10-CM

## 2021-02-09 LAB
EKG P AXIS: 25 DEGREES
EKG P-R INTERVAL: 184 MS
EKG Q-T INTERVAL: 424 MS
EKG QRS DURATION: 82 MS
EKG QTC CALCULATION (BAZETT): 436 MS
EKG T AXIS: 46 DEGREES

## 2021-02-09 PROCEDURE — 93010 ELECTROCARDIOGRAM REPORT: CPT | Performed by: INTERNAL MEDICINE

## 2021-02-09 PROCEDURE — 99204 OFFICE O/P NEW MOD 45 MIN: CPT | Performed by: NURSE PRACTITIONER

## 2021-02-09 ASSESSMENT — ENCOUNTER SYMPTOMS
CHOKING: 0
ANAL BLEEDING: 0
NAUSEA: 1
BLOOD IN STOOL: 0
TROUBLE SWALLOWING: 0
COUGH: 0
SHORTNESS OF BREATH: 0
ABDOMINAL DISTENTION: 0
DIARRHEA: 0
ABDOMINAL PAIN: 1
VOMITING: 1
CONSTIPATION: 0
RECTAL PAIN: 0

## 2021-02-09 NOTE — PATIENT INSTRUCTIONS
Schedule colonoscopy and endoscopy. Do not eat or drink after midnight the day of the procedure. Allowed medications can be taken with a small sip of water. Please review your prep instructions for allowed medications. You will not be able to drive for 24 hours after the procedure due to sedation. Must have a responsible adult, 18 years or older, accompany you to drive you home the day of your procedure. If you are on blood thinners, clearance from the prescribing physician will be obtained before your procedure is scheduled. If it is determined it is not safe to hold these medications for a short time an alternative procedure for evaluation may be recommended. No aspirin, ibuprofen, naproxen, fish oil or vitamin E for 5 days before procedure. Risks of colonoscopy and endoscopy include, but are not limited to, perforation, bleeding, and infection, Risk of perforation and bleeding are increased if there is a polyp removed or dilation completed. Anesthesia risks will be reviewed with you before the procedure by a member of the anesthesia department. Your physician may also schedule a follow up appointment with the nurse practitioner to discuss pathology, symptoms or to check if you have had any problems related to your procedure. If you prefer not to return to the office after your procedure please discuss this with your physician on the day of your colonoscopy. The physician will talk with you and/or your family after the procedure is completed. Final recommendations are based on the pathologist report if biopsies or specimens are taken. If polyps are removed during the procedure they will be sent to a pathologist for analysis. Unless you have a follow up appointment scheduled, you will be notified by mail of the pathology results within 4 weeks. If you have not received results after 4 weeks you may call the office to obtain this information.      For Colonoscopy: You will be given specific directions regarding restrictions to diet and bowel prep instructions including laxatives. Please read these instructions one week prior to your scheduled procedure to ensure that you are prepared. If you have any questions regarding these instructions please call our office Mon through Fri from 8:00 am to 4:00 pm.     Follow prep instructions provided for bowel prep. Take all of the bowel prep as directed. If you are having problems with nausea, stop your prep for 30-45 min to allow the nausea to subside before resuming your prep. It is important to drink plenty of fluids throughout the day before taking your laxatives. This will help to protect your kidneys, prevent dehydration and maximize the effect of the bowel prep. Your diet before a colonoscopy bowel preparation is very important to ensure a successful colon exam. It is recommended to consider certain changes to your diet three to four days prior to the procedure. Remember that your bowels need to be completely empty for the exam.    What foods are good to eat? Cut down on heavy solid foods three to four days before the procedure and start introducing lighter meals to your diet. The following food suggestions are a good part of your diet before a colonoscopy bowel preparation. ? Light meat that is easily digestible such as chicken (without the skin)   ? Potatoes without skin   ? Cheese   ? Eggs   ? A light meal of steamed white fish   ? Light clear soups    Foods and drinks to avoid  Avoid foods that contain too much fiber. Stay clear of dark colored beverages. They can stick to the walls of the digestive tract and make it difficult to differentiate from blood. Some of these foods are:  ? Red meat, rice, nuts and vegetables   ? Milk, other milk based fluids and cream   ? Most fruit and puddings   ? Whole grain pasta   ? Cereals, bran and seeds   ?  Colored beverages, especially those that are red or purple in color ? Red colored Jell-O   On the day before the colonoscopy, continue to drink plenty of clear fluids. It is important   to keep yourself hydrated before the exam.     Please follow all instructions as provided for cleansing the bowel. Failure to have an adequately prepped colon may cause you to have incomplete exam with further testing required.      [email or web address]

## 2021-02-09 NOTE — PROGRESS NOTES
Subjective:     Patient ID: Burke Ferrell is a 32 y.o. female  PCP: No primary care provider on file. Referring Provider: No ref. provider found    HPI  Patient presents to the office today with the following complaints: New Patient, Abdominal Pain, and Nausea & Vomiting      Abdominal Pain  Patient complains of abdominal pain. The pain is located in the entire abdomen. The pain is described as sharp, cramping, stabbing. Onset was years ago, but worsening recently. Pain is constant, but worse at times. Aggravating factors include food and physical activity. Alleviating factors include none. Associated symptoms include nausea, vomiting, constipation, and loss appetite. The patient denies diarrhea and melena. She has recently lost her job due to pain. She is using Levsin prn for pain with some improvement in pain. She is also requesting referral for PCP    Narrative   Examination. CT ABDOMEN PELVIS W IV CONTRAST 2/1/2021 5:46 AM   History: Abdominal pain. DLP: 1617 mGycm. The CT scan of the abdomen and pelvis is performed after intravenous   contrast enhancement. The images are acquired in axial plane with   subsequent reconstruction in coronal and sagittal planes. The comparison is made with the previous study dated 9/16/2020. The lung bases included in the study appear unremarkable. The limited   visualized cardiomediastinal structures are unremarkable. There is fatty infiltration of the liver. There is an ill-defined   low-density area located anteriorly in the left lobe of the liver on   both sides of the fissure for ligamentum teres, similar to the   previous study, representing an area of focal fatty infiltration. The   spleen is normal.   No radiopaque gallstones. The common bile duct appears normal.   The pancreas appear normal. The Pancreatic duct is not visualized.    The adrenal glands bilaterally appear normal. There is normal renal outline bilaterally. No mass. No radiopaque   calculi. No hydronephrosis. The limited visualized ureters are normal.   The urinary bladder is poorly distended with a moderate symmetrical   thickening of the walls. No intrinsic abnormality. A normal size anteverted uterus is seen displaced to the left. There   are cystic changes in both ovaries representing follicular cysts. There is an irregular shaped peripherally enhancing nodule in the left   ovary representing an evolving follicular cysts. The stomach duodenum and small bowel are normal. A normal retrocecal   appendix is seen. Moderate gas and stool are seen in the colon. Normal abdominal aorta and iliac arteries. There is no evidence of abdominal or pelvic lymphadenopathy. The images reviewed in bone window show no acute bony abnormality.       Impression   No acute abnormality of the abdomen or pelvis. A normal appendix. Signed by Dr Adriana Tidwell on 2/1/2021 7:50 AM           Assessment:     No diagnosis found. Plan:   - Continue Levsin and Zofran prn  - [facility]  - Follow up in 6-8 weeks or sooner if needed  - Schedule colonoscopy and endoscopy  Instruct on bowel prep. Nothing to eat or drink after midnight the day of the exam.  Unable to drive for 24 hours after the procedure. No aspirin or nonsteroidal anti-inflammatories for 5 days before procedure. I have discussed the benefits, alternatives, and risks (including bleeding, perforation and death)  for pursuing Endoscopy (EGD/Colonscopy/EUS/ERCP) with the patient and they are willing to continue. We also discussed the need for anesthesia, IV access, proper dietary changes, medication changes if necessary, and need for bowel prep (if ordered) prior to their Endoscopic procedure. They are aware they must have someone accompany them to their scheduled procedure to drive them home - they agree to the above and are willing to continue. Orders  No orders of the defined types were placed in this encounter. Medications  No orders of the defined types were placed in this encounter. Patient History:     Past Medical History:   Diagnosis Date    Obese     Ovarian cyst        History reviewed. No pertinent surgical history.     Family History   Problem Relation Age of Onset    Diabetes Mother     High Blood Pressure Mother     Diabetes Maternal Grandmother     Colon Cancer Neg Hx     Colon Polyps Neg Hx        Social History     Socioeconomic History    Marital status: Single     Spouse name: None    Number of children: None    Years of education: None    Highest education level: None   Occupational History    None   Social Needs    Financial resource strain: None    Food insecurity     Worry: None     Inability: None    Transportation needs     Medical: None     Non-medical: None   Tobacco Use    Smoking status: Current Every Day Smoker     Packs/day: 0.50     Years: 10.00     Pack years: 5.00     Types: Cigarettes    Smokeless tobacco: Never Used   Substance and Sexual Activity    Alcohol use: No     Alcohol/week: 0.0 standard drinks    Drug use: Yes     Comment: Spice    Sexual activity: Yes     Partners: Male, Female   Lifestyle    Physical activity     Days per week: None     Minutes per session: None    Stress: None   Relationships    Social connections     Talks on phone: None Gets together: None     Attends Mosque service: None     Active member of club or organization: None     Attends meetings of clubs or organizations: None     Relationship status: None    Intimate partner violence     Fear of current or ex partner: None     Emotionally abused: None     Physically abused: None     Forced sexual activity: None   Other Topics Concern    None   Social History Narrative    None       Current Outpatient Medications   Medication Sig Dispense Refill    ondansetron (ZOFRAN ODT) 4 MG disintegrating tablet Take 1 tablet by mouth every 8 hours as needed for Nausea or Vomiting 30 tablet 0    Hyoscyamine Sulfate SL (LEVSIN/SL) 0.125 MG SUBL Place 0.125 mg under the tongue every 4-6 hours as needed (abdominal cramping/pain) 120 each 0    metoclopramide (REGLAN) 10 MG tablet Take 1 tablet by mouth 2 times daily for 7 days 14 tablet 0    omeprazole (PRILOSEC) 20 MG delayed release capsule Take 1 capsule by mouth daily 30 capsule 0    albuterol sulfate  (90 Base) MCG/ACT inhaler Inhale 2 puffs into the lungs 4 times daily as needed for Wheezing 1 Inhaler 0     No current facility-administered medications for this visit. Allergies   Allergen Reactions    Other Anaphylaxis     onions    Tylenol [Acetaminophen]     Carafate [Sucralfate] Nausea And Vomiting    Tramadol Rash       Review of Systems   Constitutional: Positive for appetite change (loss). Negative for activity change, fatigue, fever and unexpected weight change. HENT: Negative for trouble swallowing. Respiratory: Negative for cough, choking and shortness of breath. Cardiovascular: Negative for chest pain. Gastrointestinal: Positive for abdominal pain, nausea and vomiting. Negative for abdominal distention, anal bleeding, blood in stool, constipation, diarrhea and rectal pain. Allergic/Immunologic: Negative for food allergies. All other systems reviewed and are negative.         Objective: /80   Pulse 87   Ht 5' 7\" (1.702 m)   Wt 248 lb (112.5 kg)   LMP  (LMP Unknown)   SpO2 99%   BMI 38.84 kg/m²     Physical Exam  Vitals signs reviewed. Constitutional:       General: She is not in acute distress. Appearance: She is well-developed. HENT:      Head: Normocephalic and atraumatic. Right Ear: External ear normal.      Left Ear: External ear normal.      Nose: Nose normal.      Comments: Mask on     Mouth/Throat:      Comments: Mask on  Eyes:      Conjunctiva/sclera: Conjunctivae normal.      Pupils: Pupils are equal, round, and reactive to light. Neck:      Musculoskeletal: Normal range of motion and neck supple. Cardiovascular:      Rate and Rhythm: Normal rate and regular rhythm. Heart sounds: Normal heart sounds. No murmur. No friction rub. No gallop. Pulmonary:      Effort: Pulmonary effort is normal. No respiratory distress. Breath sounds: Normal breath sounds. Abdominal:      General: Bowel sounds are normal. There is no distension. Palpations: Abdomen is soft. There is no mass. Tenderness: There is generalized abdominal tenderness (epigastric > generalized). There is no guarding or rebound. Musculoskeletal: Normal range of motion. Skin:     General: Skin is warm and dry. Findings: No rash. Nails: There is no clubbing. Neurological:      Mental Status: She is alert and oriented to person, place, and time. Gait: Gait normal.   Psychiatric:         Behavior: Behavior normal.         Thought Content:  Thought content normal.

## 2021-02-19 ENCOUNTER — OFFICE VISIT (OUTPATIENT)
Age: 31
End: 2021-02-19

## 2021-02-19 VITALS — HEART RATE: 91 BPM | OXYGEN SATURATION: 94 %

## 2021-02-19 DIAGNOSIS — Z11.59 SCREENING FOR VIRAL DISEASE: Primary | ICD-10-CM

## 2021-02-19 LAB — SARS-COV-2, PCR: NOT DETECTED

## 2021-02-19 PROCEDURE — 99999 PR OFFICE/OUTPT VISIT,PROCEDURE ONLY: CPT | Performed by: NURSE PRACTITIONER

## 2021-04-15 ENCOUNTER — HOSPITAL ENCOUNTER (EMERGENCY)
Age: 31
Discharge: HOME | End: 2021-04-15
Attending: EMERGENCY MEDICINE
Payer: COMMERCIAL

## 2021-04-15 VITALS
SYSTOLIC BLOOD PRESSURE: 167 MMHG | TEMPERATURE: 96.7 F | DIASTOLIC BLOOD PRESSURE: 90 MMHG | RESPIRATION RATE: 28 BRPM | HEIGHT: 67 IN | BODY MASS INDEX: 38.92 KG/M2 | WEIGHT: 248 LBS | HEART RATE: 68 BPM | OXYGEN SATURATION: 97 %

## 2021-04-15 DIAGNOSIS — R10.84 GENERALIZED ABDOMINAL PAIN: ICD-10-CM

## 2021-04-15 DIAGNOSIS — N30.00 ACUTE CYSTITIS WITHOUT HEMATURIA: ICD-10-CM

## 2021-04-15 DIAGNOSIS — R11.2 NON-INTRACTABLE VOMITING WITH NAUSEA, UNSPECIFIED VOMITING TYPE: Primary | ICD-10-CM

## 2021-04-15 LAB
ALBUMIN SERPL-MCNC: 4.6 G/DL (ref 3.5–5.2)
ALP BLD-CCNC: 69 U/L (ref 35–104)
ALT SERPL-CCNC: 16 U/L (ref 5–33)
AMORPHOUS: ABNORMAL /HPF
ANION GAP SERPL CALCULATED.3IONS-SCNC: 13 MMOL/L (ref 7–19)
AST SERPL-CCNC: 16 U/L (ref 5–32)
BACTERIA: ABNORMAL /HPF
BASOPHILS ABSOLUTE: 0.1 K/UL (ref 0–0.2)
BASOPHILS RELATIVE PERCENT: 0.4 % (ref 0–1)
BILIRUB SERPL-MCNC: 0.3 MG/DL (ref 0.2–1.2)
BILIRUBIN URINE: NEGATIVE
BLOOD, URINE: NEGATIVE
BUN BLDV-MCNC: 20 MG/DL (ref 6–20)
CALCIUM SERPL-MCNC: 9.4 MG/DL (ref 8.6–10)
CHLORIDE BLD-SCNC: 102 MMOL/L (ref 98–111)
CLARITY: ABNORMAL
CO2: 23 MMOL/L (ref 22–29)
COLOR: YELLOW
CREAT SERPL-MCNC: 0.8 MG/DL (ref 0.5–0.9)
EOSINOPHILS ABSOLUTE: 0 K/UL (ref 0–0.6)
EOSINOPHILS RELATIVE PERCENT: 0.3 % (ref 0–5)
EPITHELIAL CELLS, UA: ABNORMAL /HPF
GFR AFRICAN AMERICAN: >59
GFR NON-AFRICAN AMERICAN: >60
GLUCOSE BLD-MCNC: 145 MG/DL (ref 74–109)
GLUCOSE URINE: NEGATIVE MG/DL
HCG(URINE) PREGNANCY TEST: NEGATIVE
HCT VFR BLD CALC: 44.7 % (ref 37–47)
HEMOGLOBIN: 16.7 G/DL (ref 12–16)
IMMATURE GRANULOCYTES #: 0.1 K/UL
KETONES, URINE: NEGATIVE MG/DL
LEUKOCYTE ESTERASE, URINE: ABNORMAL
LIPASE: 24 U/L (ref 13–60)
LYMPHOCYTES ABSOLUTE: 2.3 K/UL (ref 1.1–4.5)
LYMPHOCYTES RELATIVE PERCENT: 14.2 % (ref 20–40)
MCH RBC QN AUTO: 32.1 PG (ref 27–31)
MCHC RBC AUTO-ENTMCNC: 37.4 G/DL (ref 33–37)
MCV RBC AUTO: 86 FL (ref 81–99)
MONOCYTES ABSOLUTE: 1.1 K/UL (ref 0–0.9)
MONOCYTES RELATIVE PERCENT: 6.6 % (ref 0–10)
NEUTROPHILS ABSOLUTE: 12.4 K/UL (ref 1.5–7.5)
NEUTROPHILS RELATIVE PERCENT: 77.9 % (ref 50–65)
NITRITE, URINE: NEGATIVE
PDW BLD-RTO: 13.8 % (ref 11.5–14.5)
PH UA: 5.5 (ref 5–8)
PLATELET # BLD: 349 K/UL (ref 130–400)
PMV BLD AUTO: 10.1 FL (ref 9.4–12.3)
POTASSIUM REFLEX MAGNESIUM: 3.7 MMOL/L (ref 3.5–5)
PROTEIN UA: 30 MG/DL
RBC # BLD: 5.2 M/UL (ref 4.2–5.4)
RBC UA: ABNORMAL /HPF (ref 0–2)
SODIUM BLD-SCNC: 138 MMOL/L (ref 136–145)
SPECIFIC GRAVITY UA: 1.04 (ref 1–1.03)
TOTAL PROTEIN: 8.4 G/DL (ref 6.6–8.7)
UROBILINOGEN, URINE: 1 E.U./DL
WBC # BLD: 16 K/UL (ref 4.8–10.8)
WBC UA: ABNORMAL /HPF (ref 0–5)

## 2021-04-15 PROCEDURE — 85025 COMPLETE CBC W/AUTO DIFF WBC: CPT

## 2021-04-15 PROCEDURE — 83690 ASSAY OF LIPASE: CPT

## 2021-04-15 PROCEDURE — 81001 URINALYSIS AUTO W/SCOPE: CPT

## 2021-04-15 PROCEDURE — 96375 TX/PRO/DX INJ NEW DRUG ADDON: CPT

## 2021-04-15 PROCEDURE — 36415 COLL VENOUS BLD VENIPUNCTURE: CPT

## 2021-04-15 PROCEDURE — 87086 URINE CULTURE/COLONY COUNT: CPT

## 2021-04-15 PROCEDURE — 80053 COMPREHEN METABOLIC PANEL: CPT

## 2021-04-15 PROCEDURE — 6370000000 HC RX 637 (ALT 250 FOR IP): Performed by: EMERGENCY MEDICINE

## 2021-04-15 PROCEDURE — 2580000003 HC RX 258: Performed by: EMERGENCY MEDICINE

## 2021-04-15 PROCEDURE — 6360000002 HC RX W HCPCS: Performed by: EMERGENCY MEDICINE

## 2021-04-15 PROCEDURE — 99283 EMERGENCY DEPT VISIT LOW MDM: CPT

## 2021-04-15 PROCEDURE — 96365 THER/PROPH/DIAG IV INF INIT: CPT

## 2021-04-15 PROCEDURE — 84703 CHORIONIC GONADOTROPIN ASSAY: CPT

## 2021-04-15 PROCEDURE — 87635 SARS-COV-2 COVID-19 AMP PRB: CPT

## 2021-04-15 RX ORDER — WHEAT DEXTRIN 3 G/3.8 G
4 POWDER (GRAM) ORAL
Qty: 1 CAN | Refills: 0 | Status: SHIPPED | OUTPATIENT
Start: 2021-04-15 | End: 2021-05-26

## 2021-04-15 RX ORDER — KETOROLAC TROMETHAMINE 30 MG/ML
15 INJECTION, SOLUTION INTRAMUSCULAR; INTRAVENOUS ONCE
Status: COMPLETED | OUTPATIENT
Start: 2021-04-15 | End: 2021-04-15

## 2021-04-15 RX ORDER — PROCHLORPERAZINE EDISYLATE 5 MG/ML
10 INJECTION INTRAMUSCULAR; INTRAVENOUS ONCE
Status: COMPLETED | OUTPATIENT
Start: 2021-04-15 | End: 2021-04-15

## 2021-04-15 RX ORDER — PROMETHAZINE HYDROCHLORIDE 12.5 MG/1
12.5 TABLET ORAL 3 TIMES DAILY PRN
Qty: 12 TABLET | Refills: 0 | Status: SHIPPED | OUTPATIENT
Start: 2021-04-15 | End: 2021-04-22

## 2021-04-15 RX ORDER — CEPHALEXIN 500 MG/1
500 CAPSULE ORAL 2 TIMES DAILY
Qty: 10 CAPSULE | Refills: 0 | Status: SHIPPED | OUTPATIENT
Start: 2021-04-15 | End: 2021-04-20

## 2021-04-15 RX ORDER — DIPHENHYDRAMINE HYDROCHLORIDE 50 MG/ML
50 INJECTION INTRAMUSCULAR; INTRAVENOUS ONCE
Status: COMPLETED | OUTPATIENT
Start: 2021-04-15 | End: 2021-04-15

## 2021-04-15 RX ADMIN — HYOSCYAMINE SULFATE 125 MCG: 0.12 TABLET ORAL; SUBLINGUAL at 10:41

## 2021-04-15 RX ADMIN — DIPHENHYDRAMINE HYDROCHLORIDE 50 MG: 50 INJECTION, SOLUTION INTRAMUSCULAR; INTRAVENOUS at 08:53

## 2021-04-15 RX ADMIN — CEFTRIAXONE 1000 MG: 1 INJECTION, POWDER, FOR SOLUTION INTRAMUSCULAR; INTRAVENOUS at 11:58

## 2021-04-15 RX ADMIN — PROCHLORPERAZINE EDISYLATE 10 MG: 5 INJECTION INTRAMUSCULAR; INTRAVENOUS at 08:53

## 2021-04-15 RX ADMIN — KETOROLAC TROMETHAMINE 15 MG: 30 INJECTION, SOLUTION INTRAMUSCULAR; INTRAVENOUS at 10:41

## 2021-04-15 ASSESSMENT — ENCOUNTER SYMPTOMS
VOMITING: 1
EYE PAIN: 0
EYE REDNESS: 0
SHORTNESS OF BREATH: 0
DIARRHEA: 0
VOICE CHANGE: 0
ABDOMINAL PAIN: 1
COUGH: 0
RHINORRHEA: 0
NAUSEA: 1

## 2021-04-15 ASSESSMENT — PAIN DESCRIPTION - LOCATION: LOCATION: ABDOMEN;CHEST

## 2021-04-15 ASSESSMENT — PAIN SCALES - GENERAL
PAINLEVEL_OUTOF10: 9
PAINLEVEL_OUTOF10: 10

## 2021-04-15 NOTE — ED TRIAGE NOTES
Pt appears very anxious, unable to speak, breathing rapidly and trembling.  Pt denies anxiety, depression, AH/VH

## 2021-04-15 NOTE — ED PROVIDER NOTES
[facility] EMERGENCY DEPT  EMERGENCY DEPARTMENT ENCOUNTER      Pt Name: Poncho Whitehead  MRN: [id number]  [place] 1990  Date of evaluation: 4/15/2021  Provider: Pema Howell MD    [address] [place] [address]       Chief Complaint   Patient presents with    Chest Pain     x 2 days with SOB    Abdominal Pain     x 2 days with n/v         HISTORY OF PRESENT ILLNESS   (Location/Symptom, Timing/Onset,Context/Setting, Quality, Duration, Modifying Factors, Severity)  Note limiting factors. Poncho Whitehead is a 32 y.o. female who presents to the emergency department with complaint of generalized abdominal pain and cramping that radiates into the chest associated with nausea and vomiting. Symptoms been ongoing for the last 3 to 4 days. Had several similar episodes in the past.  Has seen GI in the last 2 months for this and plan for endoscopy and colonoscopy but has not been scheduled for those. Denies any associated diarrhea, constipation, fever, or other associated symptoms. HPI    NursingNotes were reviewed. REVIEW OF SYSTEMS    (2-9 systems for level 4, 10 or more for level 5)     Review of Systems   Constitutional: Negative for fatigue and fever. HENT: Negative for congestion, rhinorrhea and voice change. Eyes: Negative for pain and redness. Respiratory: Negative for cough and shortness of breath. Cardiovascular: Negative for chest pain. Gastrointestinal: Positive for abdominal pain, nausea and vomiting. Negative for diarrhea. Endocrine: Negative. Genitourinary: Negative. Musculoskeletal: Negative for arthralgias and gait problem. Skin: Negative for rash and wound. Neurological: Negative for weakness and headaches. Hematological: Negative. Psychiatric/Behavioral: Negative. All other systems reviewed and are negative. A complete review of systems was performed and is negative except as noted above in the HPI.        PAST MEDICAL HISTORY     Past Medical History:   Diagnosis Date    Obese     Ovarian cyst          SURGICAL HISTORY     History reviewed. No pertinent surgical history.       CURRENT MEDICATIONS       Discharge Medication List as of 4/15/2021 11:32 AM      CONTINUE these medications which have NOT CHANGED    Details   Hyoscyamine Sulfate SL (LEVSIN/SL) 0.125 MG SUBL Place 0.125 mg under the tongue every 4-6 hours as needed (abdominal cramping/pain), Disp-120 each, R-0Normal      albuterol sulfate  (90 Base) MCG/ACT inhaler Inhale 2 puffs into the lungs 4 times daily as needed for Wheezing, Disp-1 Inhaler, R-0Print             ALLERGIES     Other, Tylenol [acetaminophen], Carafate [sucralfate], and Tramadol    FAMILY HISTORY       Family History   Problem Relation Age of Onset    Diabetes Mother     High Blood Pressure Mother     Diabetes Maternal Grandmother     Colon Cancer Neg Hx     Colon Polyps Neg Hx           SOCIAL HISTORY       Social History     Socioeconomic History    Marital status: Single     Spouse name: None    Number of children: None    Years of education: None    Highest education level: None   Occupational History    None   Social Needs    Financial resource strain: None    Food insecurity     Worry: None     Inability: None    Transportation needs     Medical: None     Non-medical: None   Tobacco Use    Smoking status: Current Every Day Smoker     Packs/day: 0.50     Years: 10.00     Pack years: 5.00     Types: Cigarettes    Smokeless tobacco: Never Used   Substance and Sexual Activity    Alcohol use: No     Alcohol/week: 0.0 standard drinks    Drug use: Yes     Comment: Spice    Sexual activity: Yes     Partners: Male, Female   Lifestyle    Physical activity     Days per week: None     Minutes per session: None    Stress: None   Relationships    Social connections     Talks on phone: None     Gets together: None     Attends Anabaptism service: None     Active member of club or organization: None     Attends meetings of clubs or organizations: None     Relationship status: None    Intimate partner violence     Fear of current or ex partner: None     Emotionally abused: None     Physically abused: None     Forced sexual activity: None   Other Topics Concern    None   Social History Narrative    None       SCREENINGS             PHYSICAL EXAM    (up to 7 for level 4, 8 or more for level 5)     ED Triage Vitals [04/15/21 0818]   BP Temp Temp src Pulse Resp SpO2 Height Weight   (!) 174/123 96.7 °F (35.9 °C) -- 68 28 99 % 5' 7\" (1.702 m) 248 lb (112.5 kg)       Physical Exam  Vitals signs and nursing note reviewed. Constitutional:       General: She is not in acute distress. Appearance: Normal appearance. She is well-developed. She is not diaphoretic. HENT:      Head: Normocephalic and atraumatic. Mouth/Throat:      Pharynx: No oropharyngeal exudate. Eyes:      General: No scleral icterus. Pupils: Pupils are equal, round, and reactive to light. Neck:      Musculoskeletal: Normal range of motion. Trachea: No tracheal deviation. Cardiovascular:      Rate and Rhythm: Normal rate. Pulses: Normal pulses. Heart sounds: Normal heart sounds. Pulmonary:      Effort: Pulmonary effort is normal.      Breath sounds: Normal breath sounds. No stridor. No wheezing or rhonchi. Abdominal:      General: There is no distension. Palpations: Abdomen is soft. Abdomen is not rigid. Tenderness: There is generalized abdominal tenderness. There is no guarding or rebound. Hernia: No hernia is present. Musculoskeletal:         General: No deformity. Skin:     General: Skin is warm and dry. Findings: No rash. Neurological:      Mental Status: She is alert and oriented to person, place, and time. Cranial Nerves: No cranial nerve deficit.       Coordination: Coordination normal.   Psychiatric:         Behavior: Behavior normal.         DIAGNOSTIC RESULTS     EKG: All EKG's are interpreted by the Emergency Department Physician who either signs or Co-signs this chart in the absence of a cardiologist.        RADIOLOGY:   Non-plain film images such as CT, Ultrasound and MRI are read by the radiologist. Lauree Gitelman images are visualized and preliminarily interpreted by the emergency physician with the below findings:        Interpretation per the Radiologist below, if available at the time of this note:    No orders to display         ED BEDSIDE ULTRASOUND:   Performed by ED Physician - none    LABS:  Labs Reviewed   URINE RT REFLEX TO CULTURE - Abnormal; Notable for the following components:       Result Value    Clarity, UA TURBID (*)     Protein, UA 30 (*)     Leukocyte Esterase, Urine MODERATE (*)     All other components within normal limits   CBC WITH AUTO DIFFERENTIAL - Abnormal; Notable for the following components:    WBC 16.0 (*)     Hemoglobin 16.7 (*)     MCH 32.1 (*)     MCHC 37.4 (*)     Neutrophils % 77.9 (*)     Lymphocytes % 14.2 (*)     Neutrophils Absolute 12.4 (*)     Monocytes Absolute 1.10 (*)     All other components within normal limits   COMPREHENSIVE METABOLIC PANEL W/ REFLEX TO MG FOR LOW K - Abnormal; Notable for the following components:    Glucose 145 (*)     All other components within normal limits   MICROSCOPIC URINALYSIS - Abnormal; Notable for the following components:    WBC, UA 16-20 (*)     Bacteria, UA 4+ (*)     Amorphous, UA 1+ (*)     All other components within normal limits   COVID-19, RAPID   CULTURE, URINE   PREGNANCY, URINE   LIPASE       All other labs were within normal range or not returned as of this dictation.     Medications   prochlorperazine (COMPAZINE) injection 10 mg (10 mg Intravenous Given 4/15/21 0853)   diphenhydrAMINE (BENADRYL) injection 50 mg (50 mg Intravenous Given 4/15/21 0853)   hyoscyamine (LEVSIN/SL) sublingual tablet 125 mcg (125 mcg Sublingual Given 4/15/21 1041)   ketorolac (TORADOL) injection 15 mg (15 mg Intravenous Given 4/15/21 1041)   cefTRIAXone (ROCEPHIN) 1,000 mg in sterile water 10 mL IV syringe (0 mg Intravenous Stopped 4/15/21 1215)       EMERGENCY DEPARTMENT COURSE and DIFFERENTIALDIAGNOSIS/MDM:   Vitals:    Vitals:    04/15/21 1033 04/15/21 1100 04/15/21 1130 04/15/21 1200   BP: (!) 145/106 (!) 160/91 (!) 161/117 (!) 167/90   Pulse:       Resp:       Temp:       SpO2: 98% 97% 98% 97%   Weight:       Height:           [facility]    ED Course as of Apr 15 1516   Thu Apr 15, 2021   0826 Ekg shows NSR with rate of 60. Normal EKG with no ischemia and normal intervals. [MAGGIE]      ED Course User Index  [MAGGIE] Elena Duggan MD     Laboratory evaluation here is unremarkable. Patient had no further episodes of emesis after treatment here in the emergency department. Pain is improved but still present. After review of previous work-up and previous visits here, feel patient is stable for discharge home with no radiographic imaging or other evaluation here today. His pain is chronic and nausea has been treated and resolved here. Evaluation and work-up here revealed no signs of emergent or life-threatening pathology that would necessitate admission for further work-up or management at this time. Patient is felt to be stable for discharge home with return precautions for worsening of the condition or development of new concerning symptoms. Patient was encouraged to follow-up with their primary care doctor in the appropriate timeframe. Necessary prescriptions and information have been provided for treatment at home. Patient voices understanding and agreement with the plan. CONSULTS:  None    PROCEDURES:  Unless otherwise notedbelow, none     Procedures      FINAL IMPRESSION     1. Non-intractable vomiting with nausea, unspecified vomiting type    2. Generalized abdominal pain    3.  Acute cystitis without hematuria          DISPOSITION/PLAN   DISPOSITION Decision To Discharge 04/15/2021 10:22:35 AM      PATIENT REFERRED TO:  JG

## 2021-04-17 LAB
SARS-COV-2, NAAT: NOT DETECTED
URINE CULTURE, ROUTINE: NORMAL

## 2021-04-20 LAB
EKG P AXIS: 16 DEGREES
EKG P-R INTERVAL: 164 MS
EKG Q-T INTERVAL: 432 MS
EKG QRS DURATION: 96 MS
EKG QTC CALCULATION (BAZETT): 431 MS
EKG T AXIS: 50 DEGREES

## 2021-05-26 ENCOUNTER — OFFICE VISIT (OUTPATIENT)
Dept: GASTROENTEROLOGY | Age: 31
End: 2021-05-26
Payer: COMMERCIAL

## 2021-05-26 VITALS
HEIGHT: 67 IN | OXYGEN SATURATION: 99 % | DIASTOLIC BLOOD PRESSURE: 80 MMHG | SYSTOLIC BLOOD PRESSURE: 130 MMHG | BODY MASS INDEX: 37.83 KG/M2 | WEIGHT: 241 LBS | HEART RATE: 78 BPM

## 2021-05-26 DIAGNOSIS — R10.84 GENERALIZED ABDOMINAL PAIN: Primary | ICD-10-CM

## 2021-05-26 DIAGNOSIS — R63.0 LOSS OF APPETITE: ICD-10-CM

## 2021-05-26 DIAGNOSIS — R11.2 NAUSEA AND VOMITING, INTRACTABILITY OF VOMITING NOT SPECIFIED, UNSPECIFIED VOMITING TYPE: ICD-10-CM

## 2021-05-26 DIAGNOSIS — K62.5 BRBPR (BRIGHT RED BLOOD PER RECTUM): ICD-10-CM

## 2021-05-26 PROCEDURE — 99214 OFFICE O/P EST MOD 30 MIN: CPT | Performed by: NURSE PRACTITIONER

## 2021-05-26 RX ORDER — HYOSCYAMINE SULFATE 0.12 MG/1
0.12 TABLET SUBLINGUAL
Qty: 120 EACH | Refills: 0 | Status: SHIPPED | OUTPATIENT
Start: 2021-05-26 | End: 2021-06-22

## 2021-05-26 ASSESSMENT — ENCOUNTER SYMPTOMS
TROUBLE SWALLOWING: 0
ABDOMINAL PAIN: 1
BLOOD IN STOOL: 1
ANAL BLEEDING: 1
RECTAL PAIN: 0
ABDOMINAL DISTENTION: 0
COUGH: 0
VOMITING: 1
CONSTIPATION: 1
SHORTNESS OF BREATH: 0
DIARRHEA: 0
CHOKING: 0
NAUSEA: 1

## 2021-05-26 NOTE — PATIENT INSTRUCTIONS
Schedule colonoscopy and endoscopy. Do not eat or drink after midnight the day of the procedure. Allowed medications can be taken with a small sip of water. Please review your prep instructions for allowed medications. You will not be able to drive for 24 hours after the procedure due to sedation. Must have a responsible adult, 18 years or older, accompany you to drive you home the day of your procedure. If you are on blood thinners, clearance from the prescribing physician will be obtained before your procedure is scheduled. If it is determined it is not safe to hold these medications for a short time an alternative procedure for evaluation may be recommended. No aspirin, ibuprofen, naproxen, fish oil or vitamin E for 5 days before procedure. Risks of colonoscopy and endoscopy include, but are not limited to, perforation, bleeding, and infection, Risk of perforation and bleeding are increased if there is a polyp removed or dilation completed. Anesthesia risks will be reviewed with you before the procedure by a member of the anesthesia department. Your physician may also schedule a follow up appointment with the nurse practitioner to discuss pathology, symptoms or to check if you have had any problems related to your procedure. If you prefer not to return to the office after your procedure please discuss this with your physician on the day of your colonoscopy. The physician will talk with you and/or your family after the procedure is completed. Final recommendations are based on the pathologist report if biopsies or specimens are taken. If polyps are removed during the procedure they will be sent to a pathologist for analysis. Unless you have a follow up appointment scheduled, you will be notified by mail of the pathology results within 4 weeks. If you have not received results after 4 weeks you may call the office to obtain this information. For Colonoscopy:   You will be given the day before the colonoscopy, continue to drink plenty of clear fluids. It is important   to keep yourself hydrated before the exam.     Please follow all instructions as provided for cleansing the bowel. Failure to have an adequately prepped colon may cause you to have incomplete exam with further testing required. [email or web address]      Increasing Your Fiber Intake   What is fiber? Fiber is the portion of plant foods that cannot be digested. There are two kinds of fiber, both of which are keys to a healthy diet and a healthy digestive system:   - Soluble fiber aids in bulking and moving food through the gut. It forms a gel when mixed with liquid. - Insoluble fiber does not mix with liquids and passes through the GI tract mostly intact. It is sometimes called \"roughage. \"     Why do I need to eat it? Fiber has many important roles:   - Helps maintain regular bowel movements. More fiber can improve both diarrhea and constipation.   - Reduces the risk of developing hemorrhoids. - Lowers LDL or \"bad\" cholesterol levels, which lowers risk of heart disease.   - Regulates blood sugar levels in people with diabetes. - Provides a feeling of fullness and may help with weight loss. How much do I need? The Academy of Nutrition and Dietetics recommends:   - For women, 25 grams per day under age 48 and 21 grams per day over age 48. - For men, 38 grams per day under age 48 and 30 grams per day over age 48. What foods are the best sources? Plant foods contain fiber, but some more than others. Best choices are:   - Whole grains and high fiber cereals. - Dried beans and legumes. - Fruits and vegetables, especially raw. What about fiber supplements?    If you need to add more fiber than you can get in your diet, consider:   - Type of Fiber: The major brands of fiber supplements (Metamucil®, Konsyl®, Citrucel®, Benefiber®, Fibercon®) all use soluble fiber and work in the same way. - Flavorings and Mixing: Many of the powdered brands have added flavoring and are mixed with just water. Other varieties are \"clear\" and can be added to numerous beverages and food items. Some brands also offer a \"wafer\" form. It's up to you! Tip: Increasing the fiber in your diet gradually may help minimize bloating and discomfort. Be sure to drink plenty of fluids as you increase your fiber intake.         Fruits  Serving size  Total fiber (grams)    Pear  1 medium  5.1    Figs, dried  2 medium  3.7    Blueberries  1 cup  3.5    Apple, with skin  1 medium  4.4    Strawberries  1 cup  3.3    Peaches, dried  3 halves  3.2    Orange  1 medium  3.1    Apricots, dried  10 halves  2.6    Raisins  1.5-ounce box  1.6    Grains, cereal & pasta  Serving size  Total fiber (grams)    Spaghetti, whole-wheat  1 cup  6.3    Bran flakes  3/4 cup  5.1    Oatmeal  1 cup  4.0    Bread, rye  1 slice  1.9    Bread, whole-wheat  1 slice  1.9    Bread, mixed-grain  1 slice  1.7    Bread, cracked-wheat  1 slice  1.4

## 2021-05-26 NOTE — PROGRESS NOTES
discussed the benefits, alternatives, and risks (including bleeding, perforation and death)  for pursuing Endoscopy (EGD/Colonscopy/EUS/ERCP) with the patient and they are willing to continue. We also discussed the need for anesthesia, IV access, proper dietary changes, medication changes if necessary, and need for bowel prep (if ordered) prior to their Endoscopic procedure. They are aware they must have someone accompany them to their scheduled procedure to drive them home - they agree to the above and are willing to continue. Orders  No orders of the defined types were placed in this encounter. Medications  Orders Placed This Encounter   Medications    Hyoscyamine Sulfate SL (LEVSIN/SL) 0.125 MG SUBL     Sig: Place 0.125 mg under the tongue every 4-6 hours as needed (abdominal cramping/pain)     Dispense:  120 each     Refill:  0         Patient History:     Past Medical History:   Diagnosis Date    Obese     Ovarian cyst        History reviewed. No pertinent surgical history.     Family History   Problem Relation Age of Onset    Diabetes Mother     High Blood Pressure Mother     Diabetes Maternal Grandmother     Colon Cancer Neg Hx     Colon Polyps Neg Hx        Social History     Socioeconomic History    Marital status: Single     Spouse name: None    Number of children: None    Years of education: None    Highest education level: None   Occupational History    None   Tobacco Use    Smoking status: Current Every Day Smoker     Packs/day: 0.50     Years: 10.00     Pack years: 5.00     Types: Cigarettes    Smokeless tobacco: Never Used   Vaping Use    Vaping Use: Never used   Substance and Sexual Activity    Alcohol use: No     Alcohol/week: 0.0 standard drinks    Drug use: Yes     Comment: Spice    Sexual activity: Yes     Partners: Male, Female   Other Topics Concern    None   Social History Narrative    None     Social Determinants of Health     Financial Resource Strain:     kg/m²     Physical Exam  Vitals reviewed. Constitutional:       General: She is not in acute distress. Appearance: She is well-developed. HENT:      Head: Normocephalic and atraumatic. Right Ear: External ear normal.      Left Ear: External ear normal.      Nose: Nose normal.      Comments: Mask on     Mouth/Throat:      Comments: Mask on  Eyes:      General: No scleral icterus. Right eye: No discharge. Left eye: No discharge. Conjunctiva/sclera: Conjunctivae normal.      Pupils: Pupils are equal, round, and reactive to light. Cardiovascular:      Rate and Rhythm: Normal rate and regular rhythm. Heart sounds: Normal heart sounds. No murmur heard. Pulmonary:      Effort: Pulmonary effort is normal. No respiratory distress. Breath sounds: Normal breath sounds. No wheezing or rales. Abdominal:      General: Bowel sounds are normal. There is no distension. Palpations: Abdomen is soft. There is no mass. Tenderness: There is generalized abdominal tenderness. There is no guarding or rebound. Musculoskeletal:         General: Normal range of motion. Cervical back: Normal range of motion and neck supple. Skin:     General: Skin is warm and dry. Coloration: Skin is not pale. Neurological:      Mental Status: She is alert and oriented to person, place, and time.    Psychiatric:         Behavior: Behavior normal.

## 2021-05-30 ENCOUNTER — APPOINTMENT (OUTPATIENT)
Dept: GENERAL RADIOLOGY | Facility: HOSPITAL | Age: 31
End: 2021-05-30

## 2021-05-30 ENCOUNTER — APPOINTMENT (OUTPATIENT)
Dept: CT IMAGING | Facility: HOSPITAL | Age: 31
End: 2021-05-30

## 2021-05-30 ENCOUNTER — HOSPITAL ENCOUNTER (EMERGENCY)
Facility: HOSPITAL | Age: 31
Discharge: LEFT BEFORE DISCHARGE | End: 2021-05-30
Admitting: FAMILY MEDICINE

## 2021-05-30 VITALS
HEART RATE: 63 BPM | OXYGEN SATURATION: 96 % | BODY MASS INDEX: 36.41 KG/M2 | TEMPERATURE: 98.2 F | RESPIRATION RATE: 16 BRPM | HEIGHT: 67 IN | SYSTOLIC BLOOD PRESSURE: 113 MMHG | WEIGHT: 232 LBS | DIASTOLIC BLOOD PRESSURE: 68 MMHG

## 2021-05-30 DIAGNOSIS — N30.00 ACUTE CYSTITIS WITHOUT HEMATURIA: ICD-10-CM

## 2021-05-30 DIAGNOSIS — Z53.29 LEFT AGAINST MEDICAL ADVICE: ICD-10-CM

## 2021-05-30 DIAGNOSIS — H66.90 ACUTE OTITIS MEDIA, UNSPECIFIED OTITIS MEDIA TYPE: Primary | ICD-10-CM

## 2021-05-30 DIAGNOSIS — A59.9 TRICHOMONIASIS: ICD-10-CM

## 2021-05-30 DIAGNOSIS — N83.202 CYST OF LEFT OVARY: ICD-10-CM

## 2021-05-30 LAB
ALBUMIN SERPL-MCNC: 4.1 G/DL (ref 3.5–5.2)
ALBUMIN/GLOB SERPL: 1.2 G/DL
ALP SERPL-CCNC: 57 U/L (ref 39–117)
ALT SERPL W P-5'-P-CCNC: 14 U/L (ref 1–33)
ANION GAP SERPL CALCULATED.3IONS-SCNC: 11 MMOL/L (ref 5–15)
AST SERPL-CCNC: 19 U/L (ref 1–32)
B-HCG UR QL: NEGATIVE
BACTERIA UR QL AUTO: ABNORMAL /HPF
BASOPHILS # BLD AUTO: 0.04 10*3/MM3 (ref 0–0.2)
BASOPHILS NFR BLD AUTO: 0.3 % (ref 0–1.5)
BILIRUB SERPL-MCNC: 0.3 MG/DL (ref 0–1.2)
BILIRUB UR QL STRIP: ABNORMAL
BUN SERPL-MCNC: 10 MG/DL (ref 6–20)
BUN/CREAT SERPL: 14.3 (ref 7–25)
CALCIUM SPEC-SCNC: 9.3 MG/DL (ref 8.6–10.5)
CHLORIDE SERPL-SCNC: 102 MMOL/L (ref 98–107)
CLARITY UR: ABNORMAL
CLUE CELLS SPEC QL WET PREP: ABNORMAL
CO2 SERPL-SCNC: 25 MMOL/L (ref 22–29)
COLOR UR: ABNORMAL
CREAT SERPL-MCNC: 0.7 MG/DL (ref 0.57–1)
DEPRECATED RDW RBC AUTO: 42.6 FL (ref 37–54)
EOSINOPHIL # BLD AUTO: 0.06 10*3/MM3 (ref 0–0.4)
EOSINOPHIL NFR BLD AUTO: 0.5 % (ref 0.3–6.2)
ERYTHROCYTE [DISTWIDTH] IN BLOOD BY AUTOMATED COUNT: 13.7 % (ref 12.3–15.4)
GFR SERPL CREATININE-BSD FRML MDRD: 118 ML/MIN/1.73
GLOBULIN UR ELPH-MCNC: 3.3 GM/DL
GLUCOSE SERPL-MCNC: 106 MG/DL (ref 65–99)
GLUCOSE UR STRIP-MCNC: NEGATIVE MG/DL
HCT VFR BLD AUTO: 42.4 % (ref 34–46.6)
HGB BLD-MCNC: 15.4 G/DL (ref 12–15.9)
HGB UR QL STRIP.AUTO: NEGATIVE
HYDATID CYST SPEC WET PREP: ABNORMAL
IMM GRANULOCYTES # BLD AUTO: 0.06 10*3/MM3 (ref 0–0.05)
IMM GRANULOCYTES NFR BLD AUTO: 0.5 % (ref 0–0.5)
INTERNAL NEGATIVE CONTROL: NEGATIVE
INTERNAL POSITIVE CONTROL: POSITIVE
KETONES UR QL STRIP: NEGATIVE
LEUKOCYTE ESTERASE UR QL STRIP.AUTO: ABNORMAL
LIPASE SERPL-CCNC: 15 U/L (ref 13–60)
LYMPHOCYTES # BLD AUTO: 2.67 10*3/MM3 (ref 0.7–3.1)
LYMPHOCYTES NFR BLD AUTO: 21.1 % (ref 19.6–45.3)
Lab: NORMAL
MCH RBC QN AUTO: 31 PG (ref 26.6–33)
MCHC RBC AUTO-ENTMCNC: 36.3 G/DL (ref 31.5–35.7)
MCV RBC AUTO: 85.3 FL (ref 79–97)
MONOCYTES # BLD AUTO: 0.66 10*3/MM3 (ref 0.1–0.9)
MONOCYTES NFR BLD AUTO: 5.2 % (ref 5–12)
NEUTROPHILS NFR BLD AUTO: 72.4 % (ref 42.7–76)
NEUTROPHILS NFR BLD AUTO: 9.16 10*3/MM3 (ref 1.7–7)
NITRITE UR QL STRIP: NEGATIVE
NRBC BLD AUTO-RTO: 0 /100 WBC (ref 0–0.2)
PH UR STRIP.AUTO: 5.5 [PH] (ref 5–8)
PLATELET # BLD AUTO: 308 10*3/MM3 (ref 140–450)
PMV BLD AUTO: 10.2 FL (ref 6–12)
POTASSIUM SERPL-SCNC: 3.9 MMOL/L (ref 3.5–5.2)
PROT SERPL-MCNC: 7.4 G/DL (ref 6–8.5)
PROT UR QL STRIP: ABNORMAL
RBC # BLD AUTO: 4.97 10*6/MM3 (ref 3.77–5.28)
RBC # UR: ABNORMAL /HPF
REF LAB TEST METHOD: ABNORMAL
SODIUM SERPL-SCNC: 138 MMOL/L (ref 136–145)
SP GR UR STRIP: >=1.03 (ref 1–1.03)
SQUAMOUS #/AREA URNS HPF: ABNORMAL /HPF
T VAGINALIS SPEC QL WET PREP: ABNORMAL
TRICHOMONAS #/AREA URNS HPF: ABNORMAL /HPF
UROBILINOGEN UR QL STRIP: ABNORMAL
WBC # BLD AUTO: 12.65 10*3/MM3 (ref 3.4–10.8)
WBC SPEC QL WET PREP: ABNORMAL
WBC UR QL AUTO: ABNORMAL /HPF
YEAST GENITAL QL WET PREP: ABNORMAL

## 2021-05-30 PROCEDURE — 96375 TX/PRO/DX INJ NEW DRUG ADDON: CPT

## 2021-05-30 PROCEDURE — 96372 THER/PROPH/DIAG INJ SC/IM: CPT

## 2021-05-30 PROCEDURE — 87210 SMEAR WET MOUNT SALINE/INK: CPT | Performed by: NURSE PRACTITIONER

## 2021-05-30 PROCEDURE — 25010000003 LIDOCAINE 1 % SOLUTION 20 ML VIAL: Performed by: NURSE PRACTITIONER

## 2021-05-30 PROCEDURE — 83690 ASSAY OF LIPASE: CPT | Performed by: NURSE PRACTITIONER

## 2021-05-30 PROCEDURE — 87591 N.GONORRHOEAE DNA AMP PROB: CPT | Performed by: NURSE PRACTITIONER

## 2021-05-30 PROCEDURE — 99283 EMERGENCY DEPT VISIT LOW MDM: CPT

## 2021-05-30 PROCEDURE — 80053 COMPREHEN METABOLIC PANEL: CPT | Performed by: NURSE PRACTITIONER

## 2021-05-30 PROCEDURE — 87086 URINE CULTURE/COLONY COUNT: CPT | Performed by: NURSE PRACTITIONER

## 2021-05-30 PROCEDURE — 81001 URINALYSIS AUTO W/SCOPE: CPT | Performed by: NURSE PRACTITIONER

## 2021-05-30 PROCEDURE — 74177 CT ABD & PELVIS W/CONTRAST: CPT

## 2021-05-30 PROCEDURE — 25010000002 CEFTRIAXONE PER 250 MG: Performed by: NURSE PRACTITIONER

## 2021-05-30 PROCEDURE — 25010000002 IOPAMIDOL 61 % SOLUTION: Performed by: NURSE PRACTITIONER

## 2021-05-30 PROCEDURE — 81025 URINE PREGNANCY TEST: CPT | Performed by: NURSE PRACTITIONER

## 2021-05-30 PROCEDURE — 74019 RADEX ABDOMEN 2 VIEWS: CPT

## 2021-05-30 PROCEDURE — 87491 CHLMYD TRACH DNA AMP PROBE: CPT | Performed by: NURSE PRACTITIONER

## 2021-05-30 PROCEDURE — 99284 EMERGENCY DEPT VISIT MOD MDM: CPT

## 2021-05-30 PROCEDURE — 96374 THER/PROPH/DIAG INJ IV PUSH: CPT

## 2021-05-30 PROCEDURE — 25010000002 KETOROLAC TROMETHAMINE PER 15 MG: Performed by: NURSE PRACTITIONER

## 2021-05-30 PROCEDURE — 85025 COMPLETE CBC W/AUTO DIFF WBC: CPT | Performed by: NURSE PRACTITIONER

## 2021-05-30 RX ORDER — SODIUM CHLORIDE 0.9 % (FLUSH) 0.9 %
10 SYRINGE (ML) INJECTION AS NEEDED
Status: DISCONTINUED | OUTPATIENT
Start: 2021-05-30 | End: 2021-05-30 | Stop reason: HOSPADM

## 2021-05-30 RX ORDER — KETOROLAC TROMETHAMINE 30 MG/ML
30 INJECTION, SOLUTION INTRAMUSCULAR; INTRAVENOUS ONCE
Status: COMPLETED | OUTPATIENT
Start: 2021-05-30 | End: 2021-05-30

## 2021-05-30 RX ORDER — AZITHROMYCIN 250 MG/1
1000 TABLET, FILM COATED ORAL ONCE
Status: COMPLETED | OUTPATIENT
Start: 2021-05-30 | End: 2021-05-30

## 2021-05-30 RX ORDER — METRONIDAZOLE 500 MG/1
500 TABLET ORAL 2 TIMES DAILY
Qty: 14 TABLET | Refills: 0 | Status: SHIPPED | OUTPATIENT
Start: 2021-05-30 | End: 2021-06-06

## 2021-05-30 RX ORDER — FAMOTIDINE 10 MG/ML
20 INJECTION, SOLUTION INTRAVENOUS ONCE
Status: COMPLETED | OUTPATIENT
Start: 2021-05-30 | End: 2021-05-30

## 2021-05-30 RX ADMIN — FAMOTIDINE 20 MG: 10 INJECTION INTRAVENOUS at 13:23

## 2021-05-30 RX ADMIN — AZITHROMYCIN 1000 MG: 250 TABLET, FILM COATED ORAL at 15:39

## 2021-05-30 RX ADMIN — IOPAMIDOL 100 ML: 612 INJECTION, SOLUTION INTRAVENOUS at 14:56

## 2021-05-30 RX ADMIN — CEFTRIAXONE SODIUM 500 MG: 500 INJECTION, POWDER, FOR SOLUTION INTRAMUSCULAR; INTRAVENOUS at 15:39

## 2021-05-30 RX ADMIN — KETOROLAC TROMETHAMINE 30 MG: 30 INJECTION, SOLUTION INTRAMUSCULAR; INTRAVENOUS at 14:17

## 2021-05-30 RX ADMIN — SODIUM CHLORIDE, POTASSIUM CHLORIDE, SODIUM LACTATE AND CALCIUM CHLORIDE 1000 ML: 600; 310; 30; 20 INJECTION, SOLUTION INTRAVENOUS at 13:23

## 2021-05-31 LAB — BACTERIA SPEC AEROBE CULT: NO GROWTH

## 2021-06-01 ENCOUNTER — TELEPHONE (OUTPATIENT)
Dept: EMERGENCY DEPT | Facility: HOSPITAL | Age: 31
End: 2021-06-01

## 2021-06-01 LAB
C TRACH RRNA CVX QL NAA+PROBE: NOT DETECTED
N GONORRHOEA RRNA SPEC QL NAA+PROBE: NOT DETECTED

## 2021-06-02 ENCOUNTER — TELEPHONE (OUTPATIENT)
Dept: EMERGENCY DEPT | Facility: HOSPITAL | Age: 31
End: 2021-06-02

## 2021-06-03 ENCOUNTER — ANESTHESIA EVENT (OUTPATIENT)
Dept: OPERATING ROOM | Age: 31
End: 2021-06-03

## 2021-06-04 ENCOUNTER — OFFICE VISIT (OUTPATIENT)
Age: 31
End: 2021-06-04

## 2021-06-04 DIAGNOSIS — Z11.59 SCREENING FOR VIRAL DISEASE: Primary | ICD-10-CM

## 2021-06-04 LAB — SARS-COV-2, PCR: NOT DETECTED

## 2021-06-04 PROCEDURE — 99999 PR OFFICE/OUTPT VISIT,PROCEDURE ONLY: CPT | Performed by: NURSE PRACTITIONER

## 2021-06-07 ENCOUNTER — HOSPITAL ENCOUNTER (OUTPATIENT)
Age: 31
Setting detail: OUTPATIENT SURGERY
Discharge: HOME | End: 2021-06-07
Attending: INTERNAL MEDICINE | Admitting: INTERNAL MEDICINE
Payer: COMMERCIAL

## 2021-06-07 ENCOUNTER — ANESTHESIA (OUTPATIENT)
Dept: OPERATING ROOM | Age: 31
End: 2021-06-07

## 2021-06-07 ENCOUNTER — HOSPITAL ENCOUNTER (OUTPATIENT)
Age: 31
Setting detail: SPECIMEN
Discharge: HOME | End: 2021-06-07
Payer: COMMERCIAL

## 2021-06-07 ENCOUNTER — APPOINTMENT (OUTPATIENT)
Dept: OPERATING ROOM | Age: 31
End: 2021-06-07

## 2021-06-07 VITALS
HEART RATE: 72 BPM | SYSTOLIC BLOOD PRESSURE: 106 MMHG | HEIGHT: 67 IN | RESPIRATION RATE: 18 BRPM | BODY MASS INDEX: 38.45 KG/M2 | OXYGEN SATURATION: 99 % | TEMPERATURE: 97.3 F | DIASTOLIC BLOOD PRESSURE: 66 MMHG | WEIGHT: 245 LBS

## 2021-06-07 VITALS — DIASTOLIC BLOOD PRESSURE: 112 MMHG | SYSTOLIC BLOOD PRESSURE: 177 MMHG | OXYGEN SATURATION: 95 %

## 2021-06-07 DIAGNOSIS — R10.11 RIGHT UPPER QUADRANT ABDOMINAL PAIN: Primary | ICD-10-CM

## 2021-06-07 PROCEDURE — 88305 TISSUE EXAM BY PATHOLOGIST: CPT

## 2021-06-07 PROCEDURE — 88342 IMHCHEM/IMCYTCHM 1ST ANTB: CPT

## 2021-06-07 PROCEDURE — G8918 PT W/O PREOP ORDER IV AB PRO: HCPCS

## 2021-06-07 PROCEDURE — 43239 EGD BIOPSY SINGLE/MULTIPLE: CPT

## 2021-06-07 PROCEDURE — 45378 DIAGNOSTIC COLONOSCOPY: CPT

## 2021-06-07 PROCEDURE — 45378 DIAGNOSTIC COLONOSCOPY: CPT | Performed by: INTERNAL MEDICINE

## 2021-06-07 PROCEDURE — G8907 PT DOC NO EVENTS ON DISCHARG: HCPCS

## 2021-06-07 PROCEDURE — 43239 EGD BIOPSY SINGLE/MULTIPLE: CPT | Performed by: INTERNAL MEDICINE

## 2021-06-07 RX ORDER — PROPOFOL 10 MG/ML
INJECTION, EMULSION INTRAVENOUS PRN
Status: DISCONTINUED | OUTPATIENT
Start: 2021-06-07 | End: 2021-06-07 | Stop reason: SDUPTHER

## 2021-06-07 RX ORDER — LIDOCAINE HYDROCHLORIDE 10 MG/ML
INJECTION, SOLUTION INFILTRATION; PERINEURAL PRN
Status: DISCONTINUED | OUTPATIENT
Start: 2021-06-07 | End: 2021-06-07 | Stop reason: SDUPTHER

## 2021-06-07 RX ORDER — SODIUM CHLORIDE 9 MG/ML
INJECTION, SOLUTION INTRAVENOUS CONTINUOUS
Status: DISCONTINUED | OUTPATIENT
Start: 2021-06-07 | End: 2021-06-07 | Stop reason: HOSPADM

## 2021-06-07 RX ORDER — SODIUM CHLORIDE 9 MG/ML
INJECTION, SOLUTION INTRAVENOUS CONTINUOUS PRN
Status: DISCONTINUED | OUTPATIENT
Start: 2021-06-07 | End: 2021-06-07 | Stop reason: SDUPTHER

## 2021-06-07 RX ADMIN — SODIUM CHLORIDE: 9 INJECTION, SOLUTION INTRAVENOUS at 13:17

## 2021-06-07 RX ADMIN — LIDOCAINE HYDROCHLORIDE 40 MG: 10 INJECTION, SOLUTION INFILTRATION; PERINEURAL at 13:55

## 2021-06-07 RX ADMIN — PROPOFOL 500 MG: 10 INJECTION, EMULSION INTRAVENOUS at 13:55

## 2021-06-07 RX ADMIN — SODIUM CHLORIDE: 9 INJECTION, SOLUTION INTRAVENOUS at 13:26

## 2021-06-07 ASSESSMENT — LIFESTYLE VARIABLES: SMOKING_STATUS: 1

## 2021-06-07 NOTE — ANESTHESIA PRE PROCEDURE
Department of Anesthesiology  Preprocedure Note       Name:  Karan Fitzpatrick   Age:  32 y.o.  :  1990                                          MRN:  [id number]         Date:  2021      Surgeon: Ted Diaz):  Anjana Abdi MD    Procedure: Procedure(s):  EGD BIOPSY  COLONOSCOPY DIAGNOSTIC    Medications prior to admission:   Prior to Admission medications    Medication Sig Start Date End Date Taking? Authorizing Provider   Hyoscyamine Sulfate SL (LEVSIN/SL) 0.125 MG SUBL Place 0.125 mg under the tongue every 4-6 hours as needed (abdominal cramping/pain) 21   ZACKERY Estrella - NP       Current medications:    No current facility-administered medications for this encounter. Allergies: Allergies   Allergen Reactions    Other Anaphylaxis     onions    Tylenol [Acetaminophen]     Carafate [Sucralfate] Nausea And Vomiting    Tramadol Rash       Problem List:    Patient Active Problem List   Diagnosis Code    Infertility, female, primary N97.9    Hidradenitis suppurativa of right axilla L73.2       Past Medical History:        Diagnosis Date    Obese     Ovarian cyst        Past Surgical History:  No past surgical history on file. Social History:    Social History     Tobacco Use    Smoking status: Current Every Day Smoker     Packs/day: 0.50     Years: 10.00     Pack years: 5.00     Types: Cigarettes    Smokeless tobacco: Never Used   Substance Use Topics    Alcohol use: No     Alcohol/week: 0.0 standard drinks                                Ready to quit: Not Answered  Counseling given: Not Answered      Vital Signs (Current): There were no vitals filed for this visit.                                            BP Readings from Last 3 Encounters:   21 130/80   04/15/21 (!) 167/90   21 120/80       NPO Status:                                                                                 BMI:   Wt Readings from Last 3 Encounters:   21 241 lb (109.3 kg) 04/15/21 248 lb (112.5 kg)   02/09/21 248 lb (112.5 kg)     There is no height or weight on file to calculate BMI.    CBC:   Lab Results   Component Value Date    WBC 16.0 04/15/2021    RBC 5.20 04/15/2021    HGB 16.7 04/15/2021    HCT 44.7 04/15/2021    HCT 36.9 07/23/2011    MCV 86.0 04/15/2021    RDW 13.8 04/15/2021     04/15/2021     07/23/2011       CMP:   Lab Results   Component Value Date     04/15/2021     07/23/2011    K 3.7 04/15/2021    K 3.9 07/23/2011     04/15/2021     07/23/2011    CO2 23 04/15/2021    BUN 20 04/15/2021    CREATININE 0.8 04/15/2021    CREATININE 0.8 07/23/2011    GFRAA >59 04/15/2021    LABGLOM >60 04/15/2021    GLUCOSE 145 04/15/2021    PROT 8.4 04/15/2021    CALCIUM 9.4 04/15/2021    BILITOT 0.3 04/15/2021    ALKPHOS 69 04/15/2021    AST 16 04/15/2021    ALT 16 04/15/2021       POC Tests: No results for input(s): POCGLU, POCNA, POCK, POCCL, POCBUN, POCHEMO, POCHCT in the last 72 hours. Coags:   Lab Results   Component Value Date    PROTIME 13.3 12/04/2019    INR 1.07 12/04/2019       HCG (If Applicable):   Lab Results   Component Value Date    PREGTESTUR Negative 04/15/2021        ABGs: No results found for: PHART, PO2ART, WUD6VFA, MCK1GIV, BEART, S8YVRKOF     Type & Screen (If Applicable):  No results found for: LABABO, LABRH    Drug/Infectious Status (If Applicable):  No results found for: HIV, HEPCAB    COVID-19 Screening (If Applicable):   Lab Results   Component Value Date    COVID19 Not Detected 06/04/2021           Anesthesia Evaluation  Patient summary reviewed  Airway: Mallampati: II  TM distance: >3 FB   Neck ROM: full  Mouth opening: < 3 FB Dental: normal exam         Pulmonary:normal exam    (+) current smoker          Patient smoked on day of surgery.                  Cardiovascular:             Beta Blocker:  Not on Beta Blocker         Neuro/Psych:   Negative Neuro/Psych ROS              GI/Hepatic/Renal:   (+) bowel prep, ROS comment: dysphaiga  etoh use per chart  BMI 38. Endo/Other: Negative Endo/Other ROS                    Abdominal:   (+) obese,         Vascular: negative vascular ROS. Anesthesia Plan      general and TIVA     ASA 2       Induction: intravenous. Anesthetic plan and risks discussed with patient.                   Check HCG    Jeremias Basurto, APRN - CRNA   6/7/2021

## 2021-06-07 NOTE — OP NOTE
Patient: Dallin Burton : 1990  Med Rec#: [id number] Acc#: [id number]   Primary Care Provider No primary care provider on file. Date of Procedure:  2021    Endoscopist: Neno Kim MD    Referring Provider: ZACKERY Donato    Operation Performed: Colonoscopy     Indications: rectal bleeding, abdominal pain    Anesthesia:  Sedation was administered by anesthesia who monitored the patient during the procedure. I met with Dallin Burton prior to procedure. We discussed the procedure itself, and I have discussed the risks of endoscopy (including-- but not limited to-- pain, discomfort, bleeding potentially requiring second endoscopic procedure and/or blood transfusion, organ perforation requiring operative repair, damage to organs near the colon, infection, aspiration, cardiopulmonary/allergic reaction), benefits, indications to endoscopy. Additionally, we discussed options other than colonoscopy. The patient expressed understanding. All questions answered. The patient decided to proceed with the procedure. Signed informed consent was placed on the chart. Blood Loss: minimal    Withdrawal time: > 6 min  Bowel Prep: adequate     Complications: no immediate complications    DESCRIPTION OF PROCEDURE:     A time out was performed. After written informed consent was obtained, the patient was placed in the left lateral position. The perianal area was inspected, and a digital rectal exam was performed. A rectal exam was performed: normal tone, no palpable lesions. At this point, a forward viewing Olympus colonoscope was inserted into the anus and carefully advanced to the cecum. The cecum was identified by the ileocecal valve and the appendiceal orifice. The colonoscope was then slowly withdrawn with careful inspection of the mucosa in a linear and circumferential fashion. The scope was retroflexed in the rectum.  Suction was utilized during the procedure to remove as much air as possible from the bowel. The colonoscope was removed from the patient, and the procedure was terminated. Findings are listed below. Findings: The mucosa appeared normal throughout the entire examined colon. Retroflexion in the rectum was normal and revealed no further abnormalities. Recommendations:  1. Repeat colonoscopy: at the age of 39, for CRC screening  2. Await biopsy results-you will receive a letter with your results. Findings and recommendations were discussed w/ the patient. A copy of the images was provided. Bonnie Reyes am scribing for and in the presence of Dr. Ammy Mcnamara MD.  Electronically signed by Jesis Dent RN on 6/7/2021 at 1:27 PM    I personally performed the services described in this documentation as scribed by Destini Wilkerson, and it appears accurate and complete.      Ammy Mcnamara MD  6/7/2021

## 2021-06-07 NOTE — H&P
Patient Name: Fitz Velasquez  : 1990  MRN: [id number]  DATE: 21    Allergies: Allergies   Allergen Reactions    Other Anaphylaxis     onions    Tylenol [Acetaminophen]     Carafate [Sucralfate] Nausea And Vomiting    Tramadol Rash        ENDOSCOPY  History and Physical    Procedure:    [x] Diagnostic Colonoscopy       [] Screening Colonoscopy  [x] EGD      [] ERCP      [] EUS       [] Other    [x] Previous office notes/History and Physical reviewed from the patients chart. Please see EMR for further details of HPI. I have examined the patient's status immediately prior to the procedure and:      Indications/HPI:    [x]Abdominal Pain   []Cancer- GI/Lung     []Fhx of colon CA/polyps  []History of Polyps  []Barretts            []Melena  []Abnormal Imaging              []Dysphagia              []Persistent Pneumonia   []Anemia                            []Food Impaction        []History of Polyps  [] GI Bleed             []Pulmonary nodule/Mass   []Change in bowel habits []Heartburn/Reflux  [x]Rectal Bleed (BRBPR)  []Chest Pain - Non Cardiac []Heme (+) Stool []Ulcers  []Constipation  []Hemoptysis  []Varices  []Diarrhea  []Hypoxemia    [x]Nausea/Vomiting   []Screening   []Crohns/Colitis  []Other:     Anesthesia:   [x] MAC [] Moderate Sedation   [] General   [] None     ROS: 12 pt Review of Symptoms was negative unless mentioned above    Medications:   Prior to Admission medications    Medication Sig Start Date End Date Taking? Authorizing Provider   Hyoscyamine Sulfate SL (LEVSIN/SL) 0.125 MG SUBL Place 0.125 mg under the tongue every 4-6 hours as needed (abdominal cramping/pain) 21   ZACKERY Cortes NP       Past Medical History:  Past Medical History:   Diagnosis Date    Asthma     Obese     Ovarian cyst        Past Surgical History:  History reviewed. No pertinent surgical history.     Social History:  Social History     Tobacco Use    Smoking status: Current Every Day

## 2021-06-07 NOTE — ANESTHESIA POSTPROCEDURE EVALUATION
Department of Anesthesiology  Postprocedure Note    Patient: Evert Orozco  MRN: [id number]  YOB: 1990  Date of evaluation: 6/7/2021  Time:  2:01 PM     Procedure Summary     Date: 06/07/21 Room / Location: Replaced by [facility] ENDO 02 / [address]    Anesthesia Start: 0547 Anesthesia Stop:     Procedures:       EGD BIOPSY (N/A Esophagus)      COLONOSCOPY DIAGNOSTIC (N/A Abdomen) Diagnosis: (N/V, BRBPR, ABD PAIN)    Surgeons: Jasper Brush MD Responsible Provider: ZACKERY Barbosa CRNA    Anesthesia Type: general, TIVA ASA Status: 2          Anesthesia Type: No value filed. [place] Phase I:      [place] Phase II:      Last vitals: Reviewed and per EMR flowsheets.        Anesthesia Post Evaluation    Patient location during evaluation: bedside  Patient participation: complete - patient participated  Level of consciousness: sleepy but conscious  Pain score: 0  Airway patency: patent  Nausea & Vomiting: no nausea and no vomiting  Complications: no  Cardiovascular status: blood pressure returned to baseline  Respiratory status: acceptable, room air and spontaneous ventilation  Hydration status: euvolemic

## 2021-06-07 NOTE — OP NOTE
Endoscopic Procedure Note    Patient: Poncho Whitehead : 1990  Med Rec#: [id number] Acc#: [id number]     Primary Care Provider No primary care provider on file. Referring Provider: ZACKERY Martinez    Endoscopist: Nataliya England MD    Date of Procedure:  2021    Procedure:   1. EGD with biopsy    Indications:   1. Abdominal pain  2. Nausea/vomiting    Anesthesia:  Sedation was administered by anesthesia who monitored the patient during the procedure. Estimated Blood Loss: minimal    Procedure:   After reviewing the patient's chart and obtaining informed consent, the patient was placed in the left lateral decubitus position. A forward-viewing Olympus endoscope was lubricated and inserted through the mouth into the oropharynx. Under direct visualization, the upper esophagus was intubated. The scope was advanced to the level of the third portion of duodenum. Scope was slowly withdrawn with careful inspection of the mucosal surfaces. The scope was retroflexed for inspection of the gastric fundus and incisura. Findings and maneuvers are listed in impression below. The patient tolerated the procedure well. The scope was removed. There were no immediate complications. Findings:   Esophagus: normal  There is no hiatal hernia present. Stomach:  Abnormal: Mild mucosal changes suggestive of gastritis noted -  Gastric biopsies were taken from the antrum and body to rule out Helicobacter pylori infection. Duodenum: normal      IMPRESSION:  1. Gastric biopsies obtained as above       RECOMMENDATIONS:    1. Await path results, the patient will be contacted in 7-10 days with biopsy results. 2.  Consider gallbladder testing  3. Colonoscopy today    The results were discussed with the patient and family. A copy of the images obtained were given to the patient.      Columba Dale am scribing for and in the presence of Dr. Nataliya England MD.  Electronically signed by Kady Briones

## 2021-06-08 ENCOUNTER — TELEPHONE (OUTPATIENT)
Dept: GASTROENTEROLOGY | Age: 31
End: 2021-06-08

## 2021-06-08 NOTE — TELEPHONE ENCOUNTER
Assessment:      1. Generalized abdominal pain    2. Nausea and vomiting, intractability of vomiting not specified, unspecified vomiting type    3. Loss of appetite    4. BRBPR (bright red blood per rectum)                        Plan:   - Levsin prn  - Follow up in 6-8 weeks or sooner if needed  - Consider gallbladder testing  - High Fiber Diet   - Schedule colonoscopy and endoscopy  Instruct on bowel prep. Last visit [facility] aprn 5-26-21, see above. Egd/Cln yesterday 6-7-21 with .  7-7-21. Patient called today from [id number] stating she was in an extreme amount of abdominal pain, on pain scale a 10/10 at present, said she has been to ED twice and was asking for something to help control this pain level. I see [facility] prescribed Hyoscyamine 0.125 mg and I asked the patient if she was using this and she said yes but it does not help at all. I will forward to [facility] aprn to review. I did tell the patient with a pain level that high we have to again recommend ED, she voiced understanding. I did see active orders for GB testing per  from yesterday post scopes and that seems to be pending, patient said not scheduled yet, waiting on a phone call.  [facility]

## 2021-07-07 ENCOUNTER — OFFICE VISIT (OUTPATIENT)
Dept: GASTROENTEROLOGY | Age: 31
End: 2021-07-07
Payer: COMMERCIAL

## 2021-07-07 VITALS
DIASTOLIC BLOOD PRESSURE: 70 MMHG | WEIGHT: 232.8 LBS | SYSTOLIC BLOOD PRESSURE: 118 MMHG | OXYGEN SATURATION: 98 % | HEART RATE: 72 BPM | HEIGHT: 67 IN | BODY MASS INDEX: 36.54 KG/M2

## 2021-07-07 DIAGNOSIS — R10.84 GENERALIZED ABDOMINAL PAIN: Primary | ICD-10-CM

## 2021-07-07 DIAGNOSIS — R11.2 NAUSEA AND VOMITING, INTRACTABILITY OF VOMITING NOT SPECIFIED, UNSPECIFIED VOMITING TYPE: ICD-10-CM

## 2021-07-07 PROCEDURE — 99213 OFFICE O/P EST LOW 20 MIN: CPT | Performed by: NURSE PRACTITIONER

## 2021-07-07 RX ORDER — DICYCLOMINE HYDROCHLORIDE 10 MG/1
10 CAPSULE ORAL 4 TIMES DAILY
Qty: 120 CAPSULE | Refills: 2 | Status: SHIPPED | OUTPATIENT
Start: 2021-07-07 | End: 2021-08-05

## 2021-07-07 ASSESSMENT — ENCOUNTER SYMPTOMS
CHOKING: 0
COUGH: 0
SHORTNESS OF BREATH: 0
TROUBLE SWALLOWING: 0
BLOOD IN STOOL: 0
ANAL BLEEDING: 0
VOMITING: 1
NAUSEA: 1
DIARRHEA: 0
CONSTIPATION: 0
ABDOMINAL PAIN: 1
RECTAL PAIN: 0
ABDOMINAL DISTENTION: 0

## 2021-07-07 NOTE — PROGRESS NOTES
Subjective:     Patient ID: Beatris Guo is a 32 y.o. female  PCP: No primary care provider on file. Referring Provider: No ref. provider found    HPI  Patient presents to the office today with the following complaints: Follow Up After Procedure      Beatris Guo is here for follow up after EGD & Colonoscopy on 6/7/2021. During her last visit, she had c/o abdominal pain, loss of appetite, nausea, and vomiting. Today, she reports symptoms have not improved. She is currently using Hyoscyamine prn without any improvement in pain. She is scheduled for US gallbladder and Hida scan on 7/15/2021. Abdominal pain is worse after eating and will wake her in the night at times. Colonoscopy Findings 6/7/2021:   The mucosa appeared normal throughout the entire examined colon. Retroflexion in the rectum was normal and revealed no further abnormalities. Recommendations:  1. Repeat colonoscopy: at the age of 39, for CRC screening  2. Await biopsy results-you will receive a letter with your results. EGD Findings 6/7/2021:   Esophagus: normal  There is no hiatal hernia present. Stomach:  Abnormal: Mild mucosal changes suggestive of gastritis noted - Gastric biopsies were taken from the antrum and body to rule out Helicobacter pylori infection. Duodenum: normal  IMPRESSION:  1. Gastric biopsies obtained as above  RECOMMENDATIONS:    1. Await path results, the patient will be contacted in 7-10 days with biopsy results. 2.  Consider gallbladder testing  3. Colonoscopy today    FINAL DIAGNOSIS:   Stomach, gastric biopsy:   1.  Benign gastric mucosa with minimal chronic inflammation. 2.  No Helicobacter pylori organisms identified by immunohistochemical   stain. All scope and pathology reports were reviewed and discussed with patient. All questions answered, pt verbalized understanding. Assessment:     1. Generalized abdominal pain    2.  Nausea and vomiting, intractability of vomiting not specified, unspecified vomiting type            Plan:   - Stop Hyoscyamine, trial of Dicyclomine QID   - Continue with gallbladder testing  - Call with any questions or concerns  - Follow up prn or sooner if needed    Orders  No orders of the defined types were placed in this encounter. Medications  Orders Placed This Encounter   Medications    dicyclomine (BENTYL) 10 MG capsule     Sig: Take 1 capsule by mouth 4 times daily     Dispense:  120 capsule     Refill:  2         Patient History:     Past Medical History:   Diagnosis Date    Asthma     Obese     Ovarian cyst        Past Surgical History:   Procedure Laterality Date    COLONOSCOPY N/A 06/07/2021    Dr Phi Parekh yr (age 39) recall   [address] [place] [address] UPPER GASTROINTESTINAL ENDOSCOPY N/A 06/07/2021    Dr Moore Files       Family History   Problem Relation Age of Onset    Diabetes Mother     High Blood Pressure Mother     Diabetes Maternal Grandmother     Colon Cancer Neg Hx     Colon Polyps Neg Hx     Esophageal Cancer Neg Hx     Liver Cancer Neg Hx     Rectal Cancer Neg Hx     Stomach Cancer Neg Hx        Social History     Socioeconomic History    Marital status: Single     Spouse name: None    Number of children: None    Years of education: None    Highest education level: None   Occupational History    None   Tobacco Use    Smoking status: Current Every Day Smoker     Packs/day: 0.50     Years: 10.00     Pack years: 5.00     Types: Cigarettes    Smokeless tobacco: Never Used   Vaping Use    Vaping Use: Never used   Substance and Sexual Activity    Alcohol use:  Yes     Alcohol/week: 0.0 standard drinks     Comment: occ    Drug use: Yes     Comment: Spice     Sexual activity: Yes     Partners: Male, Female   Other Topics Concern    None   Social History Narrative    None     Social Determinants of Health     Financial Resource Strain:     Difficulty of Paying Living Expenses:    Food Insecurity:     Worried About Running Out of Food in the Last Year:    [address] in the Last Year:    Transportation Needs:     Lack of Transportation (Medical):  Lack of Transportation (Non-Medical):    Physical Activity:     Days of Exercise per Week:     Minutes of Exercise per Session:    Stress:     Feeling of Stress :    Social Connections:     Frequency of Communication with Friends and Family:     Frequency of Social Gatherings with Friends and Family:     Attends Buddhism Services:     Active Member of Clubs or Organizations:     Attends Club or Organization Meetings:     Marital Status:    Intimate Partner Violence:     Fear of Current or Ex-Partner:     Emotionally Abused:     Physically Abused:     Sexually Abused:        Current Outpatient Medications   Medication Sig Dispense Refill    dicyclomine (BENTYL) 10 MG capsule Take 1 capsule by mouth 4 times daily 120 capsule 2     No current facility-administered medications for this visit. Allergies   Allergen Reactions    Other Anaphylaxis     onions    Tylenol [Acetaminophen]     Carafate [Sucralfate] Nausea And Vomiting    Tramadol Rash       Review of Systems   Constitutional: Negative for activity change, appetite change, fatigue, fever and unexpected weight change. HENT: Negative for trouble swallowing. Respiratory: Negative for cough, choking and shortness of breath. Cardiovascular: Negative for chest pain. Gastrointestinal: Positive for abdominal pain, nausea and vomiting. Negative for abdominal distention, anal bleeding, blood in stool, constipation, diarrhea and rectal pain. Allergic/Immunologic: Negative for food allergies. All other systems reviewed and are negative. Objective:     /70   Pulse 72   Ht 5' 7\" (1.702 m)   Wt 232 lb 12.8 oz (105.6 kg)   SpO2 98%   BMI 36.46 kg/m²     Physical Exam  Vitals reviewed. Constitutional:       General: She is not in acute distress. Appearance: She is well-developed.    Eyes: General:         Right eye: No discharge. Left eye: No discharge. Cardiovascular:      Rate and Rhythm: Normal rate and regular rhythm. Heart sounds: No murmur heard. Pulmonary:      Effort: Pulmonary effort is normal. No respiratory distress. Breath sounds: Normal breath sounds. Abdominal:      General: Bowel sounds are normal. There is no distension. Palpations: Abdomen is soft. There is no mass. Tenderness: There is generalized abdominal tenderness. There is no guarding or rebound. Musculoskeletal:         General: Normal range of motion. Cervical back: Normal range of motion. Skin:     General: Skin is warm and dry. Neurological:      Mental Status: She is alert and oriented to person, place, and time.    Psychiatric:         Behavior: Behavior normal.

## 2021-07-07 NOTE — PATIENT INSTRUCTIONS
Patient Education        dicyclomine  Pronunciation:  rach ly myah  Brand:  April  What is the most important information I should know about dicyclomine? This medication may impair your thinking or reactions. Be careful if you drive or do anything that requires you to be alert. Drinking alcohol can increase certain side effects of dicyclomine. Avoid becoming overheated or dehydrated during exercise and in hot weather. Dicyclomine can decrease your sweating, which can lead to heat stroke in a hot environment. Stop using dicyclomine and call your doctor right away if you have serious side effects such as confusion, hallucinations, unusual thoughts or behavior, fast or uneven heart rate, or if you urinate less than usual or not at all. There are many other medicines that can interact with dicyclomine. Tell your doctor about all the prescription and over-the-counter medications you use. This includes vitamins, minerals, herbal products, and drugs prescribed by other doctors. Do not start using a new medication without telling your doctor. Keep a list with you of all the medicines you use and show this list to any doctor or other healthcare provider who treats you. What is dicyclomine? Dicyclomine relieves spasms of the muscles in the stomach and intestines. Dicyclomine is used to treat functional bowel or irritable bowel syndrome. Dicyclomine may also be used for purposes not listed in this medication guide. What should I discuss with my healthcare provider before taking dicyclomine? You should not take this medication if you are allergic to dicyclomine, or if you have:  · problems with urination;  · a bowel obstruction or severe constipation;  · severe ulcerative colitis or toxic megacolon;  · gastroesophageal reflux disease (GERD);  · a serious heart condition or active bleeding;  · glaucoma;  · myasthenia gravis; or  · if you are breast-feeding a baby.   To make sure you can safely take dicyclomine, tell your doctor if you have any of these other conditions:  · ulcerative colitis;  · an ileostomy or colostomy;  · a nerve problem (such as numbness or tingling);  · liver or kidney disease;  · heart disease, congestive heart failure, high blood pressure, or a heart rhythm disorder;  · hiatal hernia; or  · an enlarged prostate. FDA pregnancy category B. This medication is not expected to be harmful to an unborn baby. Tell your doctor if you are pregnant or plan to become pregnant during treatment. Dicyclomine can pass into breast milk and can cause breathing problems or other life-threatening side effects in infants younger than 10months of age. Do not breast feed a baby while taking this medication. Older adults may be more likely to have side effects from this medicine. Dicyclomine should not be given to a child younger than 7 months old. How should I take dicyclomine? Take exactly as prescribed by your doctor. Do not take in larger or smaller amounts or for longer than recommended. Follow the directions on your prescription label. Dicyclomine is usually taken 4 times each day. Your doctor may occasionally change your dose to make sure you get the best results. Take this medicine with a full glass of water. Measure liquid medicine with a special dose-measuring spoon or cup, not a regular table spoon. If you do not have a dose-measuring device, ask your pharmacist for one. Talk with your doctor if your symptoms do not improve after 2 weeks of treatment. Store at room temperature away from moisture and heat. What happens if I miss a dose? Take the missed dose as soon as you remember. Skip the missed dose if it is almost time for your next scheduled dose. Do not take extra medicine to make up the missed dose. What happens if I overdose? Seek emergency medical attention or call the Poison Help line at 1-763.142.4522.   Overdose symptoms may include nausea, vomiting, dilated pupils, weakness or loss of movement in any part of your body, trouble swallowing, fainting, or seizure (convulsions). What should I avoid while taking dicyclomine? This medication may cause blurred vision and may impair your thinking or reactions. Be careful if you drive or do anything that requires you to be alert and able to see clearly. Avoid becoming overheated or dehydrated during exercise and in hot weather. Dicyclomine can cause decreased sweating, which can lead to heat stroke in a hot environment. Drinking alcohol can increase certain side effects of dicyclomine. Avoid using antacids without your doctor's advice. Use only the type of antacid your doctor recommends. Some antacids can make it harder for your body to absorb dicyclomine. What are the possible side effects of dicyclomine? Get emergency medical help if you have any of these signs of an allergic reaction: hives; difficult breathing; swelling of your face, lips, tongue, or throat. Stop using dicyclomine and call your doctor at once if you have a serious side effect such as:  · severe constipation, bloating, or stomach pain;  · worsening of diarrhea or other irritable bowel symptoms;  · feeling very thirsty or hot, being unable to urinate, heavy sweating, or hot and dry skin  · confusion, hallucinations, unusual thoughts or behavior; or  · pounding heartbeats or fluttering in your chest.  Less serious side effects may include:  · drowsiness, dizziness, weakness, nervousness;  · blurred vision;  · dry mouth, stuffy nose; or  · mild constipation. This is not a complete list of side effects and others may occur. Call your doctor for medical advice about side effects. You may report side effects to FDA at 1-381-FDA-1263. What other drugs will affect dicyclomine?   Before using dicyclomine, tell your doctor if you regularly use other medicines that make you sleepy (such as cold or allergy medicine, sedatives, narcotic pain medicine, sleeping pills, muscle relaxers, and medicine for seizures, depression, or anxiety). They can add to sleepiness caused by dicyclomine. Tell your doctor about all other medicines you use, especially:  · amantadine (Symmetrel);  · digoxin (Lanoxin, Lanoxicaps);  · metoclopramide (Reglan);  · atropine (Atreza, Aaron-Tropine), belladonna (Donnatal, and others), benztropine (Cogentin), dimenhydrinate (Dramamine), methscopolamine (Pamine), or scopolamine (Transderm Scop);  · bronchodilators such as ipratropium (Atrovent) or tiotropium (Spiriva);  · bladder or urinary medications such as darifenacin (Enablex), flavoxate (Urispas), oxybutynin (Ditropan, Oxytrol), tolterodine (Detrol), or solifenacin (Vesicare);  · a heart rhythm medication such as quinidine (Kellen-G), procainamide (Procan, Pronestyl), disopyramide (Norpace), flecaininde (Tambocor), mexiletine (Mexitil), propafenone, (Rythmol), and others;  · irritable bowel medications such as hyoscyamine (Hyomax) or propantheline (Pro Banthine);  · an MAO inhibitor such as furazolidone (Furoxone), isocarboxazid (Marplan), phenelzine (Nardil), rasagiline (Azilect), selegiline (Eldepryl, Emsam, Zelapar), or tranylcypromine (Parnate);  · nitrate medication, such as nitroglycerin (Nitro Dur, Nitrolingual, Nitrostat, Transderm Nitro, and others), isosorbide dinitrate (Dilatrate, Isordil, Isochron), or isosorbide mononitrate (Imdur, ISMO, Monoket);  · phenothiazines such as chlorpromazine (Thorazine), fluphenazine (Permitil, Prolixin), perphenazine (Trilafon), prochlorperazine (Compazine, Compro), promethazine (Pentazine, Phenergan, Anergan, Antinaus), thioridazine (Mellaril), or trifluoperazine (Stelazine);  · steroid medication such as prednisone and others; or  · ulcer medication such as glycopyrrolate (Robinul) or mepenzolate (Cantil). This list is not complete and other drugs may interact with dicyclomine. Tell your doctor about all medications you use.  This includes prescription, over-the-counter, vitamin, and herbal products. Do not start a new medication without telling your doctor. Where can I get more information? Your pharmacist can provide more information about dicyclomine. Remember, keep this and all other medicines out of the reach of children, never share your medicines with others, and use this medication only for the indication prescribed. Every effort has been made to ensure that the information provided by [address] [place], [place] [address]can Dr is accurate, up-to-date, and complete, but no guarantee is made to that effect. Drug information contained herein may be time sensitive. [facility] information has been compiled for use by healthcare practitioners and consumers in the [facility] and therefore [facility] does not warrant that uses outside of the [facility] are appropriate, unless specifically indicated otherwise. [facility]'s drug information does not endorse drugs, diagnose patients or recommend therapy. [facility] drug information is an informational resource designed to assist licensed healthcare practitioners in caring for their patients and/or to serve consumers viewing this service as a supplement to, and not a substitute for, the expertise, skill, knowledge and judgment of healthcare practitioners. The absence of a warning for a given drug or drug combination in no way should be construed to indicate that the drug or drug combination is safe, effective or appropriate for any given patient. [facility] does not assume any responsibility for any aspect of healthcare administered with the aid of information [facility] provides. The information contained herein is not intended to cover all possible uses, directions, precautions, warnings, drug interactions, allergic reactions, or adverse effects. If you have questions about the drugs you are taking, check with your doctor, nurse or pharmacist.  Copyright 9812-0438 [address]. Version: 5.01. Revision date: 11/8/2011.   Care instructions adapted under license by [facility] ([facility]). If you have questions about a medical condition or this instruction, always ask your healthcare professional. [address] any warranty or liability for your use of this information.

## 2021-07-09 ENCOUNTER — HOSPITAL ENCOUNTER (EMERGENCY)
Age: 31
Discharge: HOME | End: 2021-07-09
Payer: COMMERCIAL

## 2021-07-09 ENCOUNTER — TELEPHONE (OUTPATIENT)
Dept: GASTROENTEROLOGY | Age: 31
End: 2021-07-09

## 2021-07-09 ENCOUNTER — OFFICE VISIT (OUTPATIENT)
Dept: URGENT CARE | Age: 31
End: 2021-07-09

## 2021-07-09 ENCOUNTER — APPOINTMENT (OUTPATIENT)
Dept: CT IMAGING | Age: 31
End: 2021-07-09
Payer: COMMERCIAL

## 2021-07-09 VITALS
DIASTOLIC BLOOD PRESSURE: 82 MMHG | BODY MASS INDEX: 36.1 KG/M2 | SYSTOLIC BLOOD PRESSURE: 146 MMHG | WEIGHT: 230 LBS | OXYGEN SATURATION: 98 % | TEMPERATURE: 98.2 F | HEIGHT: 67 IN | RESPIRATION RATE: 17 BRPM | HEART RATE: 74 BPM

## 2021-07-09 VITALS — OXYGEN SATURATION: 96 % | SYSTOLIC BLOOD PRESSURE: 144 MMHG | DIASTOLIC BLOOD PRESSURE: 91 MMHG | HEART RATE: 62 BPM

## 2021-07-09 DIAGNOSIS — R10.84 GENERALIZED ABDOMINAL PAIN: Primary | ICD-10-CM

## 2021-07-09 DIAGNOSIS — R10.9 ABDOMINAL PAIN, UNSPECIFIED ABDOMINAL LOCATION: ICD-10-CM

## 2021-07-09 DIAGNOSIS — N83.202 CYST OF LEFT OVARY: Primary | ICD-10-CM

## 2021-07-09 LAB
ALBUMIN SERPL-MCNC: 4.6 G/DL (ref 3.5–5.2)
ALP BLD-CCNC: 64 U/L (ref 35–104)
ALT SERPL-CCNC: 14 U/L (ref 5–33)
AMORPHOUS: ABNORMAL /HPF
ANION GAP SERPL CALCULATED.3IONS-SCNC: 13 MMOL/L (ref 7–19)
AST SERPL-CCNC: 18 U/L (ref 5–32)
BACTERIA: ABNORMAL /HPF
BASOPHILS ABSOLUTE: 0.1 K/UL (ref 0–0.2)
BASOPHILS RELATIVE PERCENT: 0.7 % (ref 0–1)
BILIRUB SERPL-MCNC: 0.4 MG/DL (ref 0.2–1.2)
BILIRUBIN URINE: NEGATIVE
BLOOD, URINE: NEGATIVE
BUN BLDV-MCNC: 9 MG/DL (ref 6–20)
CALCIUM SERPL-MCNC: 9.6 MG/DL (ref 8.6–10)
CHLORIDE BLD-SCNC: 103 MMOL/L (ref 98–111)
CLARITY: ABNORMAL
CO2: 22 MMOL/L (ref 22–29)
COLOR: YELLOW
CREAT SERPL-MCNC: 0.6 MG/DL (ref 0.5–0.9)
EOSINOPHILS ABSOLUTE: 0.1 K/UL (ref 0–0.6)
EOSINOPHILS RELATIVE PERCENT: 1.3 % (ref 0–5)
EPITHELIAL CELLS, UA: ABNORMAL /HPF
GFR AFRICAN AMERICAN: >59
GFR NON-AFRICAN AMERICAN: >60
GLUCOSE BLD-MCNC: 87 MG/DL (ref 74–109)
GLUCOSE URINE: NEGATIVE MG/DL
HCG QUALITATIVE: NEGATIVE
HCT VFR BLD CALC: 44.2 % (ref 37–47)
HEMOGLOBIN: 15.8 G/DL (ref 12–16)
IMMATURE GRANULOCYTES #: 0 K/UL
KETONES, URINE: NEGATIVE MG/DL
LACTIC ACID: 1 MMOL/L (ref 0.5–1.9)
LEUKOCYTE ESTERASE, URINE: ABNORMAL
LIPASE: 29 U/L (ref 13–60)
LYMPHOCYTES ABSOLUTE: 2.4 K/UL (ref 1.1–4.5)
LYMPHOCYTES RELATIVE PERCENT: 28.8 % (ref 20–40)
MCH RBC QN AUTO: 31.4 PG (ref 27–31)
MCHC RBC AUTO-ENTMCNC: 35.7 G/DL (ref 33–37)
MCV RBC AUTO: 87.9 FL (ref 81–99)
MONOCYTES ABSOLUTE: 0.7 K/UL (ref 0–0.9)
MONOCYTES RELATIVE PERCENT: 8 % (ref 0–10)
NEUTROPHILS ABSOLUTE: 5.1 K/UL (ref 1.5–7.5)
NEUTROPHILS RELATIVE PERCENT: 60.8 % (ref 50–65)
NITRITE, URINE: NEGATIVE
PDW BLD-RTO: 14.2 % (ref 11.5–14.5)
PH UA: 6 (ref 5–8)
PLATELET # BLD: 271 K/UL (ref 130–400)
PMV BLD AUTO: 10.7 FL (ref 9.4–12.3)
POTASSIUM REFLEX MAGNESIUM: 4.1 MMOL/L (ref 3.5–5)
PROTEIN UA: NEGATIVE MG/DL
RBC # BLD: 5.03 M/UL (ref 4.2–5.4)
RBC UA: ABNORMAL /HPF (ref 0–2)
SODIUM BLD-SCNC: 138 MMOL/L (ref 136–145)
SPECIFIC GRAVITY UA: 1.02 (ref 1–1.03)
TOTAL PROTEIN: 7.8 G/DL (ref 6.6–8.7)
UROBILINOGEN, URINE: 0.2 E.U./DL
WBC # BLD: 8.4 K/UL (ref 4.8–10.8)
WBC UA: ABNORMAL /HPF (ref 0–5)

## 2021-07-09 PROCEDURE — 99282 EMERGENCY DEPT VISIT SF MDM: CPT

## 2021-07-09 PROCEDURE — 36415 COLL VENOUS BLD VENIPUNCTURE: CPT

## 2021-07-09 PROCEDURE — 83605 ASSAY OF LACTIC ACID: CPT

## 2021-07-09 PROCEDURE — 80053 COMPREHEN METABOLIC PANEL: CPT

## 2021-07-09 PROCEDURE — 83690 ASSAY OF LIPASE: CPT

## 2021-07-09 PROCEDURE — 81001 URINALYSIS AUTO W/SCOPE: CPT

## 2021-07-09 PROCEDURE — 74177 CT ABD & PELVIS W/CONTRAST: CPT

## 2021-07-09 PROCEDURE — 6360000004 HC RX CONTRAST MEDICATION: Performed by: NURSE PRACTITIONER

## 2021-07-09 PROCEDURE — 99999 PR OFFICE/OUTPT VISIT,PROCEDURE ONLY: CPT | Performed by: NURSE PRACTITIONER

## 2021-07-09 PROCEDURE — 96375 TX/PRO/DX INJ NEW DRUG ADDON: CPT

## 2021-07-09 PROCEDURE — 96374 THER/PROPH/DIAG INJ IV PUSH: CPT

## 2021-07-09 PROCEDURE — 6360000002 HC RX W HCPCS: Performed by: NURSE PRACTITIONER

## 2021-07-09 PROCEDURE — 85025 COMPLETE CBC W/AUTO DIFF WBC: CPT

## 2021-07-09 PROCEDURE — 2580000003 HC RX 258: Performed by: NURSE PRACTITIONER

## 2021-07-09 PROCEDURE — 84703 CHORIONIC GONADOTROPIN ASSAY: CPT

## 2021-07-09 PROCEDURE — C9113 INJ PANTOPRAZOLE SODIUM, VIA: HCPCS | Performed by: NURSE PRACTITIONER

## 2021-07-09 RX ORDER — PROMETHAZINE HYDROCHLORIDE 25 MG/ML
12.5 INJECTION, SOLUTION INTRAMUSCULAR; INTRAVENOUS ONCE
Status: COMPLETED | OUTPATIENT
Start: 2021-07-09 | End: 2021-07-09

## 2021-07-09 RX ORDER — PROMETHAZINE HYDROCHLORIDE 25 MG/1
25 TABLET ORAL EVERY 6 HOURS PRN
Qty: 10 TABLET | Refills: 0 | Status: SHIPPED | OUTPATIENT
Start: 2021-07-09 | End: 2021-08-05

## 2021-07-09 RX ORDER — PANTOPRAZOLE SODIUM 40 MG/10ML
40 INJECTION, POWDER, LYOPHILIZED, FOR SOLUTION INTRAVENOUS DAILY
Status: DISCONTINUED | OUTPATIENT
Start: 2021-07-09 | End: 2021-07-09 | Stop reason: HOSPADM

## 2021-07-09 RX ORDER — ONDANSETRON 2 MG/ML
4 INJECTION INTRAMUSCULAR; INTRAVENOUS ONCE
Status: DISCONTINUED | OUTPATIENT
Start: 2021-07-09 | End: 2021-07-09 | Stop reason: HOSPADM

## 2021-07-09 RX ORDER — 0.9 % SODIUM CHLORIDE 0.9 %
1000 INTRAVENOUS SOLUTION INTRAVENOUS ONCE
Status: COMPLETED | OUTPATIENT
Start: 2021-07-09 | End: 2021-07-09

## 2021-07-09 RX ORDER — SODIUM CHLORIDE 9 MG/ML
10 INJECTION INTRAVENOUS DAILY
Status: DISCONTINUED | OUTPATIENT
Start: 2021-07-09 | End: 2021-07-09 | Stop reason: HOSPADM

## 2021-07-09 RX ORDER — NAPROXEN 500 MG/1
500 TABLET ORAL 2 TIMES DAILY PRN
Qty: 20 TABLET | Refills: 0 | Status: SHIPPED | OUTPATIENT
Start: 2021-07-09 | End: 2022-06-16

## 2021-07-09 RX ORDER — MORPHINE SULFATE 4 MG/ML
4 INJECTION, SOLUTION INTRAMUSCULAR; INTRAVENOUS ONCE
Status: COMPLETED | OUTPATIENT
Start: 2021-07-09 | End: 2021-07-09

## 2021-07-09 RX ADMIN — SODIUM CHLORIDE, PRESERVATIVE FREE 10 ML: 5 INJECTION INTRAVENOUS at 19:24

## 2021-07-09 RX ADMIN — MORPHINE SULFATE 4 MG: 4 INJECTION, SOLUTION INTRAMUSCULAR; INTRAVENOUS at 19:16

## 2021-07-09 RX ADMIN — SODIUM CHLORIDE 1000 ML: 9 INJECTION, SOLUTION INTRAVENOUS at 18:58

## 2021-07-09 RX ADMIN — PROMETHAZINE HYDROCHLORIDE 12.5 MG: 25 INJECTION INTRAMUSCULAR; INTRAVENOUS at 19:16

## 2021-07-09 RX ADMIN — IOPAMIDOL 90 ML: 755 INJECTION, SOLUTION INTRAVENOUS at 19:34

## 2021-07-09 RX ADMIN — PANTOPRAZOLE SODIUM 40 MG: 40 INJECTION, POWDER, FOR SOLUTION INTRAVENOUS at 19:24

## 2021-07-09 ASSESSMENT — PAIN SCALES - GENERAL
PAINLEVEL_OUTOF10: 7
PAINLEVEL_OUTOF10: 4

## 2021-07-09 ASSESSMENT — ENCOUNTER SYMPTOMS
NAUSEA: 1
VOMITING: 1
ABDOMINAL PAIN: 1

## 2021-07-09 NOTE — TELEPHONE ENCOUNTER
PATIENT CALLED THE OFFICE CRYING AND STATED THAT SHE HAD A MEDICATION CHANGE. SHE SAID THAT HER STOMACH ISSUE HAVE NOT IMPROVED AND THE SHE DOESN'T THINK THE MEDICATION IS WORKING. SHE WAS TRANSFERRED TO [facility].

## 2021-07-09 NOTE — TELEPHONE ENCOUNTER
Assessment:      1. Generalized abdominal pain    2. Nausea and vomiting, intractability of vomiting not specified, unspecified vomiting type                        Plan:   - Stop Hyoscyamine, trial of Dicyclomine QID   - Continue with gallbladder testing  - Call with any questions or concerns  - Follow up prn or sooner if needed         Last visit [address] [place] [address] aprn 7-7-21. Egd/Cln per  completed 6-7-21. No FU scheduled as to date. Patient called crying with severe lower abdominal pain since her medication was changed from Hyoscyamine to Bentyl 10 mg Qid, patient said on pain scale she is at a 9/10 , she has emesis, no swelling or fever was reported. Patient said she most likely will report to ED, I also mentioned Urgent Care as another option. [address] [place] [address] aprpaco is not in clinic today but will return on Monday and I will make sure she receives this message for review. Again the patient said she most likely will go to ED hurting this bad. I will forward to [address] [place] [address] apr to review.  Manny hernandez
None known

## 2021-07-09 NOTE — PROGRESS NOTES
Called to triage by MA for patient in distress. Patient in triage chair screaming and crying, holding abdomen. She is stating it feels like she is being stabbed. She was driven here by girlfriend. Asked patient if she would like us to call her an ambulance or have girlfriend drive her to ER, she originally stated for family to drive her but it was found family had left. Ambulance called per patient request. ZACKERY, RN and MA with patient until ambulance on scene. Report given to first responders and also EMS upon arrival.    Patient was seen by GI two days ago for abdominal pain. 1 month ago she had EGD and colonoscopy that was WNL. She is being worked up for [employer] currently has HIDA and US scheduled on 7/15. She contacted GI today crying on phone with severe pain. It is documented that urgent care was suggested to evaluate her severe abdominal pain today. Patient presents here with abdominal pain and once being triaged pain becoming so severe she is screaming out and crying. Patient needing higher level of care than what we can provide for severe abdominal pain; I do not have pain control to offer patient and it is too late in the day to get CT approved for same day.

## 2021-07-09 NOTE — ED PROVIDER NOTES
[facility] EMERGENCY DEPT  eMERGENCY dEPARTMENT eNCOUnter      Pt Name: Toro Gill  MRN: [id number]  [place] 1990  Date of evaluation: 7/9/2021  Provider: ZACKERY Sutton    CHIEF COMPLAINT       Chief Complaint   Patient presents with    Abdominal Pain     presents wih c/o abd pain          HISTORY OF PRESENT ILLNESS   (Location/Symptom, Timing/Onset,Context/Setting, Quality, Duration, Modifying Factors, Severity)  Note limiting factors. Toro Gill a 32 y.o. female who presents to the emergency department for evaluation of abdominal pain. Pt tells me that she has had intermittent problems with abdominal pain over past few years with increased abdominal pain today. She tells me that she has had several episodes of nausea and vomiting. She relates that she has had no fevers. She has tells me that she has had recent colonoscopy. She is following with gi provider at this facility. She tells me that she is scheduled for gallbladder testing next week. She denies fevers, diarrhea as well as dysuria. She denies previous abdominal surgery. [facility]    Nursing Notes were reviewed. REVIEW OF SYSTEMS    (2-9 systems for level 4, 10 or more for level 5)     Review of Systems   Constitutional: Negative for fever. Gastrointestinal: Positive for abdominal pain, nausea and vomiting. All other systems reviewed and are negative. A complete review of systems was performed and is negative except as noted above in the HPI.        PAST MEDICAL HISTORY     Past Medical History:   Diagnosis Date    Asthma     Obese     Ovarian cyst          SURGICAL HISTORY       Past Surgical History:   Procedure Laterality Date    COLONOSCOPY N/A 06/07/2021    Dr Fawn Garvey yr (age 39) recall    UPPER GASTROINTESTINAL ENDOSCOPY N/A 06/07/2021    Dr Nino Golden       Discharge Medication List as of 7/9/2021  9:20 PM      CONTINUE these medications which have NOT CHANGED    Details dicyclomine (BENTYL) 10 MG capsule Take 1 capsule by mouth 4 times daily, Disp-120 capsule, R-2Normal             ALLERGIES     Other, Tylenol [acetaminophen], Carafate [sucralfate], and Tramadol    FAMILY HISTORY       Family History   Problem Relation Age of Onset    Diabetes Mother     High Blood Pressure Mother     Diabetes Maternal Grandmother     Colon Cancer Neg Hx     Colon Polyps Neg Hx     Esophageal Cancer Neg Hx     Liver Cancer Neg Hx     Rectal Cancer Neg Hx     Stomach Cancer Neg Hx           SOCIAL HISTORY       Social History     Socioeconomic History    Marital status: Single     Spouse name: None    Number of children: None    Years of education: None    Highest education level: None   Occupational History    None   Tobacco Use    Smoking status: Current Every Day Smoker     Packs/day: 0.50     Years: 10.00     Pack years: 5.00     Types: Cigarettes    Smokeless tobacco: Never Used   Vaping Use    Vaping Use: Never used   Substance and Sexual Activity    Alcohol use: Yes     Alcohol/week: 0.0 standard drinks     Comment: occ    Drug use: Yes     Comment: Spice     Sexual activity: Yes     Partners: Male, Female   Other Topics Concern    None   Social History Narrative    None     Social Determinants of Health     Financial Resource Strain:     Difficulty of Paying Living Expenses:    Food Insecurity:     Worried About Running Out of Food in the Last Year:     Ran Out of Food in the Last Year:    Transportation Needs:     Lack of Transportation (Medical):      Lack of Transportation (Non-Medical):    Physical Activity:     Days of Exercise per Week:     Minutes of Exercise per Session:    Stress:     Feeling of Stress :    Social Connections:     Frequency of Communication with Friends and Family:     Frequency of Social Gatherings with Friends and Family:     Attends [employer] Services:     Active Member of Clubs or Organizations:     Attends Club or medications    naproxen 500 MG tablet  Commonly known as: Naprosyn  Take 1 tablet by mouth 2 times daily as needed for Pain Take with food     promethazine 25 MG tablet  Commonly known as: PHENERGAN  Take 1 tablet by mouth every 6 hours as needed for Nausea        ASK your doctor about these medications    dicyclomine 10 MG capsule  Commonly known as: BENTYL  Take 1 capsule by mouth 4 times daily           Where to Get Your Medications      You can get these medications from any pharmacy    Bring a paper prescription for each of these medications  · naproxen 500 MG tablet  · promethazine 25 MG tablet           (Pleasenote that portions of this note were completed with a voice recognition program.  Efforts were made to edit the dictations but occasionally words are mis-transcribed.)              Slick Avalos, ZACKERY  07/09/21 4370

## 2021-07-15 ENCOUNTER — HOSPITAL ENCOUNTER (OUTPATIENT)
Dept: ULTRASOUND IMAGING | Age: 31
Discharge: HOME | End: 2021-07-15
Payer: COMMERCIAL

## 2021-07-15 ENCOUNTER — HOSPITAL ENCOUNTER (OUTPATIENT)
Dept: NUCLEAR MEDICINE | Age: 31
Discharge: HOME | End: 2021-07-17
Payer: COMMERCIAL

## 2021-07-15 DIAGNOSIS — R10.11 RIGHT UPPER QUADRANT ABDOMINAL PAIN: ICD-10-CM

## 2021-07-15 PROCEDURE — 78227 HEPATOBIL SYST IMAGE W/DRUG: CPT

## 2021-07-15 PROCEDURE — 76705 ECHO EXAM OF ABDOMEN: CPT

## 2021-07-15 PROCEDURE — 3430000000 HC RX DIAGNOSTIC RADIOPHARMACEUTICAL: Performed by: INTERNAL MEDICINE

## 2021-07-15 PROCEDURE — A9537 TC99M MEBROFENIN: HCPCS | Performed by: INTERNAL MEDICINE

## 2021-07-15 RX ADMIN — Medication 4 MILLICURIE: at 14:52

## 2021-07-16 DIAGNOSIS — R94.8 ABNORMAL BILIARY HIDA SCAN: Primary | ICD-10-CM

## 2021-07-19 ENCOUNTER — OFFICE VISIT (OUTPATIENT)
Dept: SURGERY | Age: 31
End: 2021-07-19
Payer: COMMERCIAL

## 2021-07-19 ENCOUNTER — ANESTHESIA EVENT (OUTPATIENT)
Dept: OPERATING ROOM | Age: 31
End: 2021-07-19

## 2021-07-19 VITALS
TEMPERATURE: 97.9 F | DIASTOLIC BLOOD PRESSURE: 80 MMHG | WEIGHT: 229.4 LBS | BODY MASS INDEX: 36 KG/M2 | HEIGHT: 67 IN | SYSTOLIC BLOOD PRESSURE: 134 MMHG

## 2021-07-19 DIAGNOSIS — K81.9 ACALCULOUS CHOLECYSTITIS: Primary | ICD-10-CM

## 2021-07-19 PROCEDURE — 99203 OFFICE O/P NEW LOW 30 MIN: CPT | Performed by: PHYSICIAN ASSISTANT

## 2021-07-19 NOTE — LETTER
Preop Orders:     Patient: Lynsey Brooks  : 1990    Hospital:  [facility]    Admitting Physician:   or  Dr Asia Lane    Diagnosis:   acalculous cholecystitis                     Procedure: laparoscopic or robotic cholecystectomy     Anesthesia: General    Admission:Outpatient    Date: next available    Labs:per anesthesia    Pre-Op Meds:  Kefzol 2grm IV      Latex Allergy: no    Beta Blocker: no    Medication Instructions: No plavix for 2 weeks prior to procedure; no asprin for 3 days prior to procedure    This has been electronically signed by Veronica Hall M.D.

## 2021-07-22 ENCOUNTER — OFFICE VISIT (OUTPATIENT)
Age: 31
End: 2021-07-22

## 2021-07-22 DIAGNOSIS — Z11.59 SCREENING FOR VIRAL DISEASE: Primary | ICD-10-CM

## 2021-07-22 LAB — SARS-COV-2, PCR: NOT DETECTED

## 2021-07-22 PROCEDURE — 99999 PR OFFICE/OUTPT VISIT,PROCEDURE ONLY: CPT | Performed by: NURSE PRACTITIONER

## 2021-07-22 NOTE — PROGRESS NOTES
HISTORY OF PRESENT ILLNESS:  Helder Schwartz is a 26-year-old female who presents to the office today with postprandial midepigastric right upper quadrant abdominal pain she is also has some associated bloating. She has been seen by the gastroenterology service and subsequent gallbladder studies have been ordered. She has had ultrasound of the gallbladder which showed no evidence of cholelithiasis. She was noted to have a fatty liver. She had a HIDA scan which showed nonvisualization of the gallbladder. She has been sent to us today to discuss laparoscopic cholecystectomy. We have discussed the risk benefits and alternatives of cholecystectomy with the patient. She wishes to proceed. Patient Active Problem List    Diagnosis Date Noted    Hidradenitis suppurativa of right axilla 01/03/2019    Infertility, female, primary 06/30/2016     Current Outpatient Medications   Medication Sig Dispense Refill    naproxen (NAPROSYN) 500 MG tablet Take 1 tablet by mouth 2 times daily as needed for Pain Take with food 20 tablet 0    promethazine (PHENERGAN) 25 MG tablet Take 1 tablet by mouth every 6 hours as needed for Nausea 10 tablet 0    dicyclomine (BENTYL) 10 MG capsule Take 1 capsule by mouth 4 times daily 120 capsule 2     No current facility-administered medications for this visit. Allergies:  Other, Tylenol [acetaminophen], Carafate [sucralfate], and Tramadol     Past Medical History:   Diagnosis Date    Asthma     Obese     Ovarian cyst      Past Surgical History:   Procedure Laterality Date    COLONOSCOPY N/A 06/07/2021    Dr Raciel Adrian yr (age 39) recall    UPPER GASTROINTESTINAL ENDOSCOPY N/A 06/07/2021    Dr Petty Yoder     Family History   Problem Relation Age of Onset    Diabetes Mother     High Blood Pressure Mother     Diabetes Maternal Grandmother     Diabetes Sister     Colon Cancer Neg Hx     Colon Polyps Neg Hx     Esophageal Cancer Neg Hx     Liver Cancer Neg Hx  Rectal Cancer Neg Hx     Stomach Cancer Neg Hx      Social History     Tobacco Use    Smoking status: Current Every Day Smoker     Packs/day: 0.50     Years: 10.00     Pack years: 5.00     Types: Cigarettes    Smokeless tobacco: Never Used   Substance Use Topics    Alcohol use: Yes     Alcohol/week: 0.0 standard drinks     Comment: occ       Review of Systems  Reviewed and positive for the above. All other systems noted to be negative. Physical Exam  Blood pressure 134/80, temperature 97.9 °F (36.6 °C), temperature source Temporal, height 5' 7\" (1.702 m), weight 229 lb 6.4 oz (104.1 kg), last menstrual period 06/28/2021, not currently breastfeeding. GENERAL:  Reveals a 32 y.o. female that  appears to be in no acute distress. HEENT:  Head is normocephalic and atraumatic. NECK:  Neck is supple without masses     CHEST:  Patient has normal respiratory effort. Chest is clear bilaterally with good thoracic expansion. HEART:  Heart demonstrated a regular rhythm and rate with no cardiac murmurs, gallops or rubs noted to auscultation. ABDOMEN:  Inspection of the abdomen demonstrated the patient to have normal bowel sounds present. The abdomen is soft and nontender with mild midepigastric tenderness. There is no organosplenomegaly appreciable. EXTREMITIES:  Extremities demonstrated no cyanosis or pitting edema bilaterally. IMPRESSION:  Acalculous cholecystitis    PLAN:  The risks, benefits, and options were discussed with the patient. She  is willing to proceed with surgery.

## 2021-07-23 ENCOUNTER — HOSPITAL ENCOUNTER (EMERGENCY)
Age: 31
Discharge: LEFT BEFORE DISCHARGE | End: 2021-07-23
Payer: COMMERCIAL

## 2021-07-23 VITALS
HEART RATE: 83 BPM | HEIGHT: 67 IN | TEMPERATURE: 98 F | BODY MASS INDEX: 35.31 KG/M2 | DIASTOLIC BLOOD PRESSURE: 86 MMHG | WEIGHT: 225 LBS | OXYGEN SATURATION: 97 % | RESPIRATION RATE: 20 BRPM | SYSTOLIC BLOOD PRESSURE: 121 MMHG

## 2021-07-23 ASSESSMENT — PAIN SCALES - GENERAL: PAINLEVEL_OUTOF10: 9

## 2021-07-23 ASSESSMENT — PAIN DESCRIPTION - PAIN TYPE: TYPE: ACUTE PAIN

## 2021-07-24 ENCOUNTER — HOSPITAL ENCOUNTER (EMERGENCY)
Age: 31
Discharge: HOME | End: 2021-07-24
Attending: EMERGENCY MEDICINE
Payer: COMMERCIAL

## 2021-07-24 VITALS
RESPIRATION RATE: 18 BRPM | BODY MASS INDEX: 36.88 KG/M2 | HEIGHT: 67 IN | TEMPERATURE: 98.3 F | WEIGHT: 235 LBS | SYSTOLIC BLOOD PRESSURE: 132 MMHG | OXYGEN SATURATION: 96 % | HEART RATE: 56 BPM | DIASTOLIC BLOOD PRESSURE: 78 MMHG

## 2021-07-24 DIAGNOSIS — K82.8 BILIARY DYSKINESIA: ICD-10-CM

## 2021-07-24 DIAGNOSIS — R10.9 RIGHT SIDED ABDOMINAL PAIN: Primary | ICD-10-CM

## 2021-07-24 LAB
ALBUMIN SERPL-MCNC: 3.8 G/DL (ref 3.5–5.2)
ALP BLD-CCNC: 50 U/L (ref 35–104)
ALT SERPL-CCNC: 15 U/L (ref 5–33)
ANION GAP SERPL CALCULATED.3IONS-SCNC: 9 MMOL/L (ref 7–19)
AST SERPL-CCNC: 18 U/L (ref 5–32)
BACTERIA: ABNORMAL /HPF
BASOPHILS ABSOLUTE: 0.1 K/UL (ref 0–0.2)
BASOPHILS RELATIVE PERCENT: 1 % (ref 0–1)
BILIRUB SERPL-MCNC: <0.2 MG/DL (ref 0.2–1.2)
BILIRUBIN URINE: NEGATIVE
BLOOD, URINE: NEGATIVE
BUN BLDV-MCNC: 9 MG/DL (ref 6–20)
CALCIUM SERPL-MCNC: 8.7 MG/DL (ref 8.6–10)
CHLORIDE BLD-SCNC: 109 MMOL/L (ref 98–111)
CLARITY: ABNORMAL
CO2: 22 MMOL/L (ref 22–29)
COLOR: YELLOW
CREAT SERPL-MCNC: 0.7 MG/DL (ref 0.5–0.9)
CRYSTALS, UA: ABNORMAL /HPF
EOSINOPHILS ABSOLUTE: 0.3 K/UL (ref 0–0.6)
EOSINOPHILS RELATIVE PERCENT: 4.1 % (ref 0–5)
EPITHELIAL CELLS, UA: 24 /HPF (ref 0–5)
GFR AFRICAN AMERICAN: >59
GFR NON-AFRICAN AMERICAN: >60
GLUCOSE BLD-MCNC: 113 MG/DL (ref 74–109)
GLUCOSE URINE: NEGATIVE MG/DL
HCT VFR BLD CALC: 39 % (ref 37–47)
HEMOGLOBIN: 14.1 G/DL (ref 12–16)
HYALINE CASTS: 3 /HPF (ref 0–8)
IMMATURE GRANULOCYTES #: 0 K/UL
KETONES, URINE: NEGATIVE MG/DL
LEUKOCYTE ESTERASE, URINE: ABNORMAL
LIPASE: 40 U/L (ref 13–60)
LYMPHOCYTES ABSOLUTE: 2.6 K/UL (ref 1.1–4.5)
LYMPHOCYTES RELATIVE PERCENT: 33 % (ref 20–40)
MCH RBC QN AUTO: 32.3 PG (ref 27–31)
MCHC RBC AUTO-ENTMCNC: 36.2 G/DL (ref 33–37)
MCV RBC AUTO: 89.2 FL (ref 81–99)
MONOCYTES ABSOLUTE: 0.7 K/UL (ref 0–0.9)
MONOCYTES RELATIVE PERCENT: 9.2 % (ref 0–10)
NEUTROPHILS ABSOLUTE: 4.1 K/UL (ref 1.5–7.5)
NEUTROPHILS RELATIVE PERCENT: 52.3 % (ref 50–65)
NITRITE, URINE: NEGATIVE
PDW BLD-RTO: 14 % (ref 11.5–14.5)
PH UA: 6 (ref 5–8)
PLATELET # BLD: 255 K/UL (ref 130–400)
PMV BLD AUTO: 10.2 FL (ref 9.4–12.3)
POTASSIUM REFLEX MAGNESIUM: 4.1 MMOL/L (ref 3.5–5)
PROTEIN UA: NEGATIVE MG/DL
RBC # BLD: 4.37 M/UL (ref 4.2–5.4)
RBC UA: 2 /HPF (ref 0–4)
SODIUM BLD-SCNC: 140 MMOL/L (ref 136–145)
SPECIFIC GRAVITY UA: 1.02 (ref 1–1.03)
TOTAL PROTEIN: 6.5 G/DL (ref 6.6–8.7)
UROBILINOGEN, URINE: 0.2 E.U./DL
WBC # BLD: 7.9 K/UL (ref 4.8–10.8)
WBC UA: 6 /HPF (ref 0–5)

## 2021-07-24 PROCEDURE — 81001 URINALYSIS AUTO W/SCOPE: CPT

## 2021-07-24 PROCEDURE — 85025 COMPLETE CBC W/AUTO DIFF WBC: CPT

## 2021-07-24 PROCEDURE — 83690 ASSAY OF LIPASE: CPT

## 2021-07-24 PROCEDURE — 96375 TX/PRO/DX INJ NEW DRUG ADDON: CPT

## 2021-07-24 PROCEDURE — 6370000000 HC RX 637 (ALT 250 FOR IP): Performed by: EMERGENCY MEDICINE

## 2021-07-24 PROCEDURE — 99283 EMERGENCY DEPT VISIT LOW MDM: CPT

## 2021-07-24 PROCEDURE — 36415 COLL VENOUS BLD VENIPUNCTURE: CPT

## 2021-07-24 PROCEDURE — 2580000003 HC RX 258: Performed by: EMERGENCY MEDICINE

## 2021-07-24 PROCEDURE — 6360000002 HC RX W HCPCS: Performed by: EMERGENCY MEDICINE

## 2021-07-24 PROCEDURE — 80053 COMPREHEN METABOLIC PANEL: CPT

## 2021-07-24 PROCEDURE — 96374 THER/PROPH/DIAG INJ IV PUSH: CPT

## 2021-07-24 RX ORDER — DIPHENHYDRAMINE HYDROCHLORIDE 50 MG/ML
25 INJECTION INTRAMUSCULAR; INTRAVENOUS ONCE
Status: COMPLETED | OUTPATIENT
Start: 2021-07-24 | End: 2021-07-24

## 2021-07-24 RX ORDER — HALOPERIDOL 5 MG/ML
5 INJECTION INTRAMUSCULAR ONCE
Status: COMPLETED | OUTPATIENT
Start: 2021-07-24 | End: 2021-07-24

## 2021-07-24 RX ORDER — METOCLOPRAMIDE HYDROCHLORIDE 5 MG/ML
10 INJECTION INTRAMUSCULAR; INTRAVENOUS ONCE
Status: DISCONTINUED | OUTPATIENT
Start: 2021-07-24 | End: 2021-07-24

## 2021-07-24 RX ORDER — ONDANSETRON 4 MG/1
4 TABLET, ORALLY DISINTEGRATING ORAL EVERY 8 HOURS PRN
Qty: 20 TABLET | Refills: 0 | Status: SHIPPED | OUTPATIENT
Start: 2021-07-24 | End: 2021-08-05

## 2021-07-24 RX ORDER — ONDANSETRON 2 MG/ML
4 INJECTION INTRAMUSCULAR; INTRAVENOUS ONCE
Status: COMPLETED | OUTPATIENT
Start: 2021-07-24 | End: 2021-07-24

## 2021-07-24 RX ORDER — SODIUM CHLORIDE, SODIUM LACTATE, POTASSIUM CHLORIDE, CALCIUM CHLORIDE 600; 310; 30; 20 MG/100ML; MG/100ML; MG/100ML; MG/100ML
1000 INJECTION, SOLUTION INTRAVENOUS ONCE
Status: COMPLETED | OUTPATIENT
Start: 2021-07-24 | End: 2021-07-24

## 2021-07-24 RX ORDER — METOCLOPRAMIDE 10 MG/1
10 TABLET ORAL 3 TIMES DAILY
Qty: 9 TABLET | Refills: 0 | Status: SHIPPED | OUTPATIENT
Start: 2021-07-24 | End: 2021-10-07 | Stop reason: SDUPTHER

## 2021-07-24 RX ADMIN — DIPHENHYDRAMINE HYDROCHLORIDE 25 MG: 50 INJECTION, SOLUTION INTRAMUSCULAR; INTRAVENOUS at 14:53

## 2021-07-24 RX ADMIN — ONDANSETRON 4 MG: 2 INJECTION INTRAMUSCULAR; INTRAVENOUS at 14:29

## 2021-07-24 RX ADMIN — HYOSCYAMINE SULFATE 125 MCG: 0.12 TABLET ORAL; SUBLINGUAL at 14:34

## 2021-07-24 RX ADMIN — LIDOCAINE HYDROCHLORIDE: 20 SOLUTION ORAL; TOPICAL at 14:33

## 2021-07-24 RX ADMIN — DIPHENHYDRAMINE HYDROCHLORIDE 25 MG: 50 INJECTION, SOLUTION INTRAMUSCULAR; INTRAVENOUS at 14:30

## 2021-07-24 RX ADMIN — SODIUM CHLORIDE, POTASSIUM CHLORIDE, SODIUM LACTATE AND CALCIUM CHLORIDE 1000 ML: 600; 310; 30; 20 INJECTION, SOLUTION INTRAVENOUS at 14:27

## 2021-07-24 RX ADMIN — HALOPERIDOL LACTATE 5 MG: 5 INJECTION, SOLUTION INTRAMUSCULAR at 14:32

## 2021-07-24 ASSESSMENT — ENCOUNTER SYMPTOMS
SHORTNESS OF BREATH: 0
COUGH: 0
RHINORRHEA: 0
NAUSEA: 1
VOICE CHANGE: 0
EYE PAIN: 0
EYE REDNESS: 0
ABDOMINAL PAIN: 1
VOMITING: 1
DIARRHEA: 1

## 2021-07-24 ASSESSMENT — PAIN SCALES - GENERAL: PAINLEVEL_OUTOF10: 9

## 2021-07-24 NOTE — LETTER
Ezequiel Walters was seen and treated in our emergency department on 7/24/2021. She may return to work on 7/26/2021    If you have any questions or concerns, please don't hesitate to call.

## 2021-07-24 NOTE — ED NOTES
Pt with akathisia, Dr Manzano Line aware.       [address] [place] [address], RN  07/24/21 5835

## 2021-07-24 NOTE — ED PROVIDER NOTES
standard drinks     Comment: occ    Drug use: Yes     Comment: Spice     Sexual activity: Yes     Partners: Male, Female   Other Topics Concern    None   Social History Narrative    None     Social Determinants of Health     Financial Resource Strain:     Difficulty of Paying Living Expenses:    Food Insecurity:     Worried About Running Out of Food in the Last Year:     Ran Out of Food in the Last Year:    Transportation Needs:     Lack of Transportation (Medical):  Lack of Transportation (Non-Medical):    Physical Activity:     Days of Exercise per Week:     Minutes of Exercise per Session:    Stress:     Feeling of Stress :    Social Connections:     Frequency of Communication with Friends and Family:     Frequency of Social Gatherings with Friends and Family:     Attends Sabianism Services:     Active Member of Clubs or Organizations:     Attends Club or Organization Meetings:     Marital Status:    Intimate Partner Violence:     Fear of Current or Ex-Partner:     Emotionally Abused:     Physically Abused:     Sexually Abused:        SCREENINGS             PHYSICAL EXAM    (up to 7 for level 4, 8 or more for level 5)     ED Triage Vitals [07/24/21 1230]   BP Temp Temp src Pulse Resp SpO2 Height Weight   (!) 140/93 98.3 °F (36.8 °C) -- 77 14 96 % 5' 7\" (1.702 m) 235 lb (106.6 kg)       Physical Exam  Vitals and nursing note reviewed. Constitutional:       General: She is not in acute distress. Appearance: Normal appearance. She is well-developed. She is not diaphoretic. HENT:      Head: Normocephalic and atraumatic. Mouth/Throat:      Pharynx: No oropharyngeal exudate. Eyes:      General: No scleral icterus. Pupils: Pupils are equal, round, and reactive to light. Neck:      Trachea: No tracheal deviation. Cardiovascular:      Rate and Rhythm: Normal rate. Pulses: Normal pulses. Heart sounds: Normal heart sounds.    Pulmonary:      Effort: Pulmonary effort is normal.      Breath sounds: Normal breath sounds. No stridor. No wheezing or rhonchi. Abdominal:      General: There is no distension. Palpations: Abdomen is soft. Abdomen is not rigid. Tenderness: There is no abdominal tenderness. There is no guarding. Hernia: No hernia is present. Musculoskeletal:         General: No deformity. Cervical back: Normal range of motion. Skin:     General: Skin is warm and dry. Findings: No rash. Neurological:      Mental Status: She is alert and oriented to person, place, and time. Cranial Nerves: No cranial nerve deficit.       Coordination: Coordination normal.   Psychiatric:         Behavior: Behavior normal.         DIAGNOSTIC RESULTS     EKG: All EKG's are interpreted by the Emergency Department Physician who either signs or Co-signs this chart in the absence of a cardiologist.        RADIOLOGY:   Non-plain film images such as CT, Ultrasound and MRI are read by the radiologist. Edison Dilma images are visualized and preliminarily interpreted by the emergency physician with the below findings:        Interpretation per the Radiologist below, if available at the time of this note:    No orders to display         ED BEDSIDE ULTRASOUND:   Performed by ED Physician - none    LABS:  Labs Reviewed   CBC WITH AUTO DIFFERENTIAL - Abnormal; Notable for the following components:       Result Value    MCH 32.3 (*)     All other components within normal limits   COMPREHENSIVE METABOLIC PANEL W/ REFLEX TO MG FOR LOW K - Abnormal; Notable for the following components:    Glucose 113 (*)     Total Protein 6.5 (*)     All other components within normal limits   URINE RT REFLEX TO CULTURE - Abnormal; Notable for the following components:    Clarity, UA CLOUDY (*)     Leukocyte Esterase, Urine TRACE (*)     All other components within normal limits   MICROSCOPIC URINALYSIS - Abnormal; Notable for the following components:    Bacteria, UA 1+ (*) Crystals, UA NEG (*)     WBC, UA 6 (*)     All other components within normal limits   LIPASE       All other labs were within normal range or not returned as of this dictation. Medications   ondansetron (ZOFRAN) injection 4 mg (4 mg Intravenous Given 7/24/21 1429)   aluminum & magnesium hydroxide-simethicone (MAALOX) 30 mL, lidocaine viscous hcl (XYLOCAINE) 5 mL (GI COCKTAIL) ( Oral Given 7/24/21 1433)   hyoscyamine (LEVSIN/SL) sublingual tablet 125 mcg (125 mcg Sublingual Given 7/24/21 1434)   lactated ringers infusion 1,000 mL (0 mLs Intravenous Stopped 7/24/21 1527)   haloperidol lactate (HALDOL) injection 5 mg (5 mg Intravenous Given 7/24/21 1432)   diphenhydrAMINE (BENADRYL) injection 25 mg (25 mg Intravenous Given 7/24/21 1430)   diphenhydrAMINE (BENADRYL) injection 25 mg (25 mg Intravenous Given 7/24/21 1453)       EMERGENCY DEPARTMENT COURSE and DIFFERENTIALDIAGNOSIS/MDM:   Vitals:    Vitals:    07/24/21 1402 07/24/21 1436 07/24/21 1501 07/24/21 1531   BP: (!) 157/115 (!) 153/111 123/83 132/78   Pulse: 66 67 63 56   Resp: 15 16 21 18   Temp:       SpO2: 94% 95% 95% 96%   Weight:       Height:           [facility]    ED Course as of Jul 24 1618   Sat Jul 24, 2021   1538 EKG shows sinus rhythm with a rate of 72 with no findings of using infarction. No right heart strain. Normal intervals. Normal EKG overall    [MAGGIE]      ED Course User Index  [MAGGIE] Alexi Bone MD     Labs show normal white blood cell count, LFTs, and lipase. No evidence of cholecystitis, choledocholithiasis or cholangitis, or more serious complication with patient's history of biliary dyskinesia. Patient's abdominal exam is clear she appears uncomfortable. Evaluation and work-up here revealed no signs of emergent or life-threatening pathology that would necessitate admission for further work-up or management at this time.   Patient is felt to be stable for discharge home with return precautions for worsening of the condition or development of new concerning symptoms. Patient was encouraged to follow-up with their primary care doctor in the appropriate timeframe. Necessary prescriptions and information have been provided for treatment at home. Patient voices understanding and agreement with the plan. CONSULTS:  None    PROCEDURES:  Unless otherwise notedbelow, none     Procedures      FINAL IMPRESSION     1. Right sided abdominal pain    2.  Biliary dyskinesia          DISPOSITION/PLAN   DISPOSITION        PATIENT REFERRED TO:  [facility] EMERGENCY DEPT  Robert Lu  [phone]    If symptoms worsen      DISCHARGE MEDICATIONS:  Discharge Medication List as of 7/24/2021  3:36 PM      START taking these medications    Details   ondansetron (ZOFRAN ODT) 4 MG disintegrating tablet Take 1 tablet by mouth every 8 hours as needed for Nausea or Vomiting, Disp-20 tablet, R-0Normal      metoclopramide (REGLAN) 10 MG tablet Take 1 tablet by mouth 3 times daily for 3 days, Disp-9 tablet, R-0Normal                (Please note that portions of this note were completed with a voice recognition program.  Efforts were made to edit the dictations butoccasionally words are mis-transcribed.)    Savanna Cunningham MD (electronically signed)  AttendingEmergency Physician          Savanna Cunningham., MD  07/24/21 9565

## 2021-07-26 ENCOUNTER — PREP FOR PROCEDURE (OUTPATIENT)
Dept: SURGERY | Age: 31
End: 2021-07-26

## 2021-07-26 ENCOUNTER — ANESTHESIA (OUTPATIENT)
Dept: OPERATING ROOM | Age: 31
End: 2021-07-26

## 2021-07-26 ENCOUNTER — HOSPITAL ENCOUNTER (EMERGENCY)
Age: 31
Discharge: LEFT BEFORE DISCHARGE | End: 2021-07-26
Payer: COMMERCIAL

## 2021-07-26 ENCOUNTER — HOSPITAL ENCOUNTER (OUTPATIENT)
Age: 31
Setting detail: SPECIMEN
Discharge: HOME | End: 2021-07-26
Payer: COMMERCIAL

## 2021-07-26 ENCOUNTER — HOSPITAL ENCOUNTER (OUTPATIENT)
Age: 31
Setting detail: OUTPATIENT SURGERY
Discharge: HOME | End: 2021-07-26
Attending: SURGERY | Admitting: SURGERY
Payer: COMMERCIAL

## 2021-07-26 VITALS
TEMPERATURE: 97.4 F | HEART RATE: 75 BPM | SYSTOLIC BLOOD PRESSURE: 132 MMHG | BODY MASS INDEX: 35.31 KG/M2 | OXYGEN SATURATION: 97 % | WEIGHT: 225 LBS | RESPIRATION RATE: 16 BRPM | DIASTOLIC BLOOD PRESSURE: 78 MMHG | HEIGHT: 67 IN

## 2021-07-26 VITALS
OXYGEN SATURATION: 96 % | DIASTOLIC BLOOD PRESSURE: 100 MMHG | SYSTOLIC BLOOD PRESSURE: 176 MMHG | HEART RATE: 56 BPM | WEIGHT: 225 LBS | TEMPERATURE: 97.8 F | BODY MASS INDEX: 35.24 KG/M2 | RESPIRATION RATE: 15 BRPM

## 2021-07-26 VITALS
SYSTOLIC BLOOD PRESSURE: 125 MMHG | TEMPERATURE: 97 F | OXYGEN SATURATION: 98 % | DIASTOLIC BLOOD PRESSURE: 52 MMHG | RESPIRATION RATE: 34 BRPM

## 2021-07-26 DIAGNOSIS — G89.18 POST-OP PAIN: Primary | ICD-10-CM

## 2021-07-26 PROCEDURE — G8918 PT W/O PREOP ORDER IV AB PRO: HCPCS

## 2021-07-26 PROCEDURE — 47562 LAPAROSCOPIC CHOLECYSTECTOMY: CPT | Performed by: SURGERY

## 2021-07-26 PROCEDURE — G8907 PT DOC NO EVENTS ON DISCHARG: HCPCS

## 2021-07-26 PROCEDURE — 47562 LAPAROSCOPIC CHOLECYSTECTOMY: CPT

## 2021-07-26 PROCEDURE — 88304 TISSUE EXAM BY PATHOLOGIST: CPT

## 2021-07-26 RX ORDER — LIDOCAINE HYDROCHLORIDE 10 MG/ML
INJECTION, SOLUTION INFILTRATION; PERINEURAL PRN
Status: DISCONTINUED | OUTPATIENT
Start: 2021-07-26 | End: 2021-07-26 | Stop reason: SDUPTHER

## 2021-07-26 RX ORDER — HYDROMORPHONE HCL 110MG/55ML
PATIENT CONTROLLED ANALGESIA SYRINGE INTRAVENOUS PRN
Status: DISCONTINUED | OUTPATIENT
Start: 2021-07-26 | End: 2021-07-26 | Stop reason: SDUPTHER

## 2021-07-26 RX ORDER — PROMETHAZINE HYDROCHLORIDE 25 MG/ML
6.25 INJECTION, SOLUTION INTRAMUSCULAR; INTRAVENOUS
Status: COMPLETED | OUTPATIENT
Start: 2021-07-26 | End: 2021-07-26

## 2021-07-26 RX ORDER — FENTANYL CITRATE 50 UG/ML
INJECTION, SOLUTION INTRAMUSCULAR; INTRAVENOUS PRN
Status: DISCONTINUED | OUTPATIENT
Start: 2021-07-26 | End: 2021-07-26 | Stop reason: SDUPTHER

## 2021-07-26 RX ORDER — MEPERIDINE HYDROCHLORIDE 25 MG/ML
12.5 INJECTION INTRAMUSCULAR; INTRAVENOUS; SUBCUTANEOUS EVERY 5 MIN PRN
Status: DISCONTINUED | OUTPATIENT
Start: 2021-07-26 | End: 2021-07-26 | Stop reason: HOSPADM

## 2021-07-26 RX ORDER — SODIUM CHLORIDE 9 MG/ML
25 INJECTION, SOLUTION INTRAVENOUS PRN
Status: CANCELLED | OUTPATIENT
Start: 2021-07-26

## 2021-07-26 RX ORDER — SODIUM CHLORIDE, SODIUM LACTATE, POTASSIUM CHLORIDE, CALCIUM CHLORIDE 600; 310; 30; 20 MG/100ML; MG/100ML; MG/100ML; MG/100ML
INJECTION, SOLUTION INTRAVENOUS CONTINUOUS PRN
Status: DISCONTINUED | OUTPATIENT
Start: 2021-07-26 | End: 2021-07-26 | Stop reason: SDUPTHER

## 2021-07-26 RX ORDER — OXYCODONE HYDROCHLORIDE 5 MG/1
5 TABLET ORAL EVERY 4 HOURS PRN
Qty: 24 TABLET | Refills: 0 | Status: SHIPPED | OUTPATIENT
Start: 2021-07-26 | End: 2021-07-30 | Stop reason: SDUPTHER

## 2021-07-26 RX ORDER — MORPHINE SULFATE 10 MG/ML
2 INJECTION, SOLUTION INTRAMUSCULAR; INTRAVENOUS EVERY 5 MIN PRN
Status: DISCONTINUED | OUTPATIENT
Start: 2021-07-26 | End: 2021-07-26 | Stop reason: HOSPADM

## 2021-07-26 RX ORDER — OXYCODONE HYDROCHLORIDE AND ACETAMINOPHEN 5; 325 MG/1; MG/1
1 TABLET ORAL EVERY 4 HOURS PRN
Status: DISCONTINUED | OUTPATIENT
Start: 2021-07-26 | End: 2021-07-26 | Stop reason: HOSPADM

## 2021-07-26 RX ORDER — SODIUM CHLORIDE, SODIUM LACTATE, POTASSIUM CHLORIDE, CALCIUM CHLORIDE 600; 310; 30; 20 MG/100ML; MG/100ML; MG/100ML; MG/100ML
INJECTION, SOLUTION INTRAVENOUS CONTINUOUS
Status: DISCONTINUED | OUTPATIENT
Start: 2021-07-26 | End: 2021-07-26 | Stop reason: HOSPADM

## 2021-07-26 RX ORDER — MORPHINE SULFATE 10 MG/ML
2 INJECTION, SOLUTION INTRAMUSCULAR; INTRAVENOUS
Status: DISCONTINUED | OUTPATIENT
Start: 2021-07-26 | End: 2021-07-26

## 2021-07-26 RX ORDER — HYDROMORPHONE HCL 110MG/55ML
0.25 PATIENT CONTROLLED ANALGESIA SYRINGE INTRAVENOUS EVERY 5 MIN PRN
Status: DISCONTINUED | OUTPATIENT
Start: 2021-07-26 | End: 2021-07-26 | Stop reason: HOSPADM

## 2021-07-26 RX ORDER — HYDROMORPHONE HCL 110MG/55ML
0.5 PATIENT CONTROLLED ANALGESIA SYRINGE INTRAVENOUS EVERY 5 MIN PRN
Status: DISCONTINUED | OUTPATIENT
Start: 2021-07-26 | End: 2021-07-26 | Stop reason: HOSPADM

## 2021-07-26 RX ORDER — ROCURONIUM BROMIDE 10 MG/ML
INJECTION, SOLUTION INTRAVENOUS PRN
Status: DISCONTINUED | OUTPATIENT
Start: 2021-07-26 | End: 2021-07-26 | Stop reason: SDUPTHER

## 2021-07-26 RX ORDER — BUPIVACAINE HYDROCHLORIDE AND EPINEPHRINE 2.5; 5 MG/ML; UG/ML
INJECTION, SOLUTION INFILTRATION; PERINEURAL PRN
Status: DISCONTINUED | OUTPATIENT
Start: 2021-07-26 | End: 2021-07-26 | Stop reason: ALTCHOICE

## 2021-07-26 RX ORDER — HYDRALAZINE HYDROCHLORIDE 20 MG/ML
5 INJECTION INTRAMUSCULAR; INTRAVENOUS EVERY 10 MIN PRN
Status: DISCONTINUED | OUTPATIENT
Start: 2021-07-26 | End: 2021-07-26 | Stop reason: HOSPADM

## 2021-07-26 RX ORDER — DEXAMETHASONE SODIUM PHOSPHATE 10 MG/ML
INJECTION, SOLUTION INTRAMUSCULAR; INTRAVENOUS PRN
Status: DISCONTINUED | OUTPATIENT
Start: 2021-07-26 | End: 2021-07-26 | Stop reason: SDUPTHER

## 2021-07-26 RX ORDER — ONDANSETRON 2 MG/ML
4 INJECTION INTRAMUSCULAR; INTRAVENOUS EVERY 6 HOURS PRN
Status: CANCELLED | OUTPATIENT
Start: 2021-07-26

## 2021-07-26 RX ORDER — SODIUM CHLORIDE 0.9 % (FLUSH) 0.9 %
5-40 SYRINGE (ML) INJECTION EVERY 12 HOURS SCHEDULED
Status: CANCELLED | OUTPATIENT
Start: 2021-07-26

## 2021-07-26 RX ORDER — MORPHINE SULFATE 10 MG/ML
4 INJECTION, SOLUTION INTRAMUSCULAR; INTRAVENOUS
Status: DISCONTINUED | OUTPATIENT
Start: 2021-07-26 | End: 2021-07-26

## 2021-07-26 RX ORDER — OXYCODONE HYDROCHLORIDE 5 MG/1
10 TABLET ORAL EVERY 4 HOURS PRN
Status: DISCONTINUED | OUTPATIENT
Start: 2021-07-26 | End: 2021-07-26 | Stop reason: HOSPADM

## 2021-07-26 RX ORDER — METOCLOPRAMIDE HYDROCHLORIDE 5 MG/ML
10 INJECTION INTRAMUSCULAR; INTRAVENOUS
Status: DISCONTINUED | OUTPATIENT
Start: 2021-07-26 | End: 2021-07-26 | Stop reason: HOSPADM

## 2021-07-26 RX ORDER — SCOLOPAMINE TRANSDERMAL SYSTEM 1 MG/1
1 PATCH, EXTENDED RELEASE TRANSDERMAL
Status: DISCONTINUED | OUTPATIENT
Start: 2021-07-26 | End: 2021-07-26 | Stop reason: HOSPADM

## 2021-07-26 RX ORDER — DOCUSATE SODIUM 100 MG/1
100 CAPSULE, LIQUID FILLED ORAL 2 TIMES DAILY PRN
Qty: 60 CAPSULE | Refills: 0 | Status: SHIPPED | OUTPATIENT
Start: 2021-07-26 | End: 2021-08-05

## 2021-07-26 RX ORDER — ONDANSETRON 4 MG/1
4 TABLET, ORALLY DISINTEGRATING ORAL EVERY 8 HOURS PRN
Status: CANCELLED | OUTPATIENT
Start: 2021-07-26

## 2021-07-26 RX ORDER — LIDOCAINE HYDROCHLORIDE 10 MG/ML
1 INJECTION, SOLUTION EPIDURAL; INFILTRATION; INTRACAUDAL; PERINEURAL
Status: DISCONTINUED | OUTPATIENT
Start: 2021-07-26 | End: 2021-07-26 | Stop reason: HOSPADM

## 2021-07-26 RX ORDER — OXYCODONE HYDROCHLORIDE 5 MG/1
5 TABLET ORAL EVERY 4 HOURS PRN
Status: DISCONTINUED | OUTPATIENT
Start: 2021-07-26 | End: 2021-07-26 | Stop reason: HOSPADM

## 2021-07-26 RX ORDER — MORPHINE SULFATE 10 MG/ML
4 INJECTION, SOLUTION INTRAMUSCULAR; INTRAVENOUS EVERY 5 MIN PRN
Status: DISCONTINUED | OUTPATIENT
Start: 2021-07-26 | End: 2021-07-26 | Stop reason: HOSPADM

## 2021-07-26 RX ORDER — ONDANSETRON 2 MG/ML
INJECTION INTRAMUSCULAR; INTRAVENOUS PRN
Status: DISCONTINUED | OUTPATIENT
Start: 2021-07-26 | End: 2021-07-26

## 2021-07-26 RX ORDER — SODIUM CHLORIDE 0.9 % (FLUSH) 0.9 %
5-40 SYRINGE (ML) INJECTION PRN
Status: CANCELLED | OUTPATIENT
Start: 2021-07-26

## 2021-07-26 RX ORDER — DIPHENHYDRAMINE HYDROCHLORIDE 50 MG/ML
12.5 INJECTION INTRAMUSCULAR; INTRAVENOUS
Status: DISCONTINUED | OUTPATIENT
Start: 2021-07-26 | End: 2021-07-26 | Stop reason: HOSPADM

## 2021-07-26 RX ORDER — ENALAPRILAT 2.5 MG/2ML
1.25 INJECTION INTRAVENOUS
Status: DISCONTINUED | OUTPATIENT
Start: 2021-07-26 | End: 2021-07-26 | Stop reason: HOSPADM

## 2021-07-26 RX ORDER — PROPOFOL 10 MG/ML
INJECTION, EMULSION INTRAVENOUS PRN
Status: DISCONTINUED | OUTPATIENT
Start: 2021-07-26 | End: 2021-07-26 | Stop reason: SDUPTHER

## 2021-07-26 RX ORDER — LABETALOL HYDROCHLORIDE 5 MG/ML
5 INJECTION, SOLUTION INTRAVENOUS EVERY 10 MIN PRN
Status: DISCONTINUED | OUTPATIENT
Start: 2021-07-26 | End: 2021-07-26 | Stop reason: HOSPADM

## 2021-07-26 RX ADMIN — FENTANYL CITRATE 100 MCG: 50 INJECTION, SOLUTION INTRAMUSCULAR; INTRAVENOUS at 11:38

## 2021-07-26 RX ADMIN — FENTANYL CITRATE 50 MCG: 50 INJECTION, SOLUTION INTRAMUSCULAR; INTRAVENOUS at 12:00

## 2021-07-26 RX ADMIN — PROPOFOL 200 MG: 10 INJECTION, EMULSION INTRAVENOUS at 11:38

## 2021-07-26 RX ADMIN — SODIUM CHLORIDE, SODIUM LACTATE, POTASSIUM CHLORIDE, CALCIUM CHLORIDE: 600; 310; 30; 20 INJECTION, SOLUTION INTRAVENOUS at 11:29

## 2021-07-26 RX ADMIN — Medication 1 MG: at 12:32

## 2021-07-26 RX ADMIN — ROCURONIUM BROMIDE 50 MG: 10 INJECTION, SOLUTION INTRAVENOUS at 11:38

## 2021-07-26 RX ADMIN — LIDOCAINE HYDROCHLORIDE 50 MG: 10 INJECTION, SOLUTION INFILTRATION; PERINEURAL at 11:38

## 2021-07-26 RX ADMIN — DEXAMETHASONE SODIUM PHOSPHATE 10 MG: 10 INJECTION, SOLUTION INTRAMUSCULAR; INTRAVENOUS at 11:38

## 2021-07-26 RX ADMIN — Medication 0.5 MG: at 13:07

## 2021-07-26 RX ADMIN — Medication 0.5 MG: at 13:37

## 2021-07-26 RX ADMIN — SODIUM CHLORIDE, SODIUM LACTATE, POTASSIUM CHLORIDE, CALCIUM CHLORIDE: 600; 310; 30; 20 INJECTION, SOLUTION INTRAVENOUS at 09:30

## 2021-07-26 RX ADMIN — PROMETHAZINE HYDROCHLORIDE 6.25 MG: 25 INJECTION, SOLUTION INTRAMUSCULAR; INTRAVENOUS at 12:39

## 2021-07-26 RX ADMIN — ONDANSETRON 4 MG: 2 INJECTION INTRAMUSCULAR; INTRAVENOUS at 11:38

## 2021-07-26 RX ADMIN — FENTANYL CITRATE 50 MCG: 50 INJECTION, SOLUTION INTRAMUSCULAR; INTRAVENOUS at 12:10

## 2021-07-26 RX ADMIN — OXYCODONE HYDROCHLORIDE AND ACETAMINOPHEN 1 TABLET: 5; 325 TABLET ORAL at 13:19

## 2021-07-26 ASSESSMENT — PAIN SCALES - GENERAL
PAINLEVEL_OUTOF10: 6
PAINLEVEL_OUTOF10: 8
PAINLEVEL_OUTOF10: 9

## 2021-07-26 ASSESSMENT — LIFESTYLE VARIABLES: SMOKING_STATUS: 1

## 2021-07-26 NOTE — ANESTHESIA POSTPROCEDURE EVALUATION
Department of Anesthesiology  Postprocedure Note    Patient: Dora Costa  MRN: [id number]  YOB: 1990  Date of evaluation: 7/26/2021  Time:  12:37 PM     Procedure Summary     Date: 07/26/21 Room / Location: [facility]1983 OR [address] [place] [address]    Anesthesia Start: 7352 Anesthesia Stop: 0122    Procedure: CHOLECYSTECTOMY LAPAROSCOPIC (N/A Abdomen) Diagnosis: (ACALCULOUS CHOLECYSTITIS)    Surgeons: Elli Hu MD Responsible Provider: ZACKERY Merino CRNA    Anesthesia Type: general, TIVA ASA Status: 2          Anesthesia Type: general, TIVA    Orville Phase I: Orville Score: 9    Orville Phase II:      Last vitals: Reviewed and per EMR flowsheets.        Anesthesia Post Evaluation    Patient location during evaluation: bedside  Patient participation: complete - patient participated  Level of consciousness: awake and alert  Pain score: 0  Airway patency: patent  Nausea & Vomiting: no nausea  Complications: no  Cardiovascular status: hemodynamically stable  Respiratory status: acceptable  Hydration status: euvolemic

## 2021-07-26 NOTE — DISCHARGE INSTR - ACTIVITY
Activity as tolerated except no lifting more than 10 pounds for the first week and no driving if taking pain medication. Okay to shower over incisions, but do not submerge under water like a tub or pool for one week. Watch for redness, drainage, fever, or worsening abdominal pain, and call for any questions or concerns.

## 2021-07-26 NOTE — ANESTHESIA PRE PROCEDURE
Department of Anesthesiology  Preprocedure Note       Name:  Shyla Rivera   Age:  32 y.o.  :  1990                                          MRN:  [id number]         Date:  2021      Surgeon: Melly Sanders):  Sangeetha Casas MD    Procedure: Procedure(s):  CHOLECYSTECTOMY LAPAROSCOPIC    Medications prior to admission:   Prior to Admission medications    Medication Sig Start Date End Date Taking? Authorizing Provider   ondansetron (ZOFRAN ODT) 4 MG disintegrating tablet Take 1 tablet by mouth every 8 hours as needed for Nausea or Vomiting 21   Merita Ahumada., MD   metoclopramide (REGLAN) 10 MG tablet Take 1 tablet by mouth 3 times daily for 3 days 21  Merita Ahumada., MD   naproxen (NAPROSYN) 500 MG tablet Take 1 tablet by mouth 2 times daily as needed for Pain Take with food 21  ZACKERY Medina   promethazine (PHENERGAN) 25 MG tablet Take 1 tablet by mouth every 6 hours as needed for Nausea 21   ZACKERY Medina   dicyclomine (BENTYL) 10 MG capsule Take 1 capsule by mouth 4 times daily 21   Odette ZACKERY - NP       Current medications:    No current facility-administered medications for this visit. No current outpatient medications on file. Facility-Administered Medications Ordered in Other Visits   Medication Dose Route Frequency Provider Last Rate Last Admin    scopolamine (TRANSDERM-SCOP) transdermal patch 1 patch  1 patch Transdermal 60 Min Pre-Op Valdene Cam, APRN - CRNA   1 patch at 21 0930    lactated ringers infusion   Intravenous Continuous Valdene Cam, APRN - CRNA 125 mL/hr at 21 0930 New Bag at 21 0930    lidocaine PF 1 % injection 1 mL  1 mL Intradermal Once PRN Valdene Cam, APRN - CRNA           Allergies:     Allergies   Allergen Reactions    Other Anaphylaxis     onions    Tylenol [Acetaminophen]     Carafate [Sucralfate] Nausea And Vomiting    Tramadol Rash       Problem List:    Patient Active Problem List   Diagnosis Code    Infertility, female, primary N97.9    Hidradenitis suppurativa of right axilla L73.2       Past Medical History:        Diagnosis Date    Asthma     Obese     Ovarian cyst        Past Surgical History:        Procedure Laterality Date    COLONOSCOPY N/A 06/07/2021    Dr Jackie Valenzuela yr (age 39) recall    UPPER GASTROINTESTINAL ENDOSCOPY N/A 06/07/2021    Dr Shailesh Mena       Social History:    Social History     Tobacco Use    Smoking status: Current Every Day Smoker     Packs/day: 0.50     Years: 10.00     Pack years: 5.00     Types: Cigarettes    Smokeless tobacco: Never Used   Substance Use Topics    Alcohol use: Yes     Alcohol/week: 0.0 standard drinks     Comment: occ                                Ready to quit: Not Answered  Counseling given: Not Answered      Vital Signs (Current): There were no vitals filed for this visit.                                            BP Readings from Last 3 Encounters:   07/26/21 (!) 133/91   07/24/21 132/78   07/19/21 134/80       NPO Status:                                                                                 BMI:   Wt Readings from Last 3 Encounters:   07/26/21 225 lb (102.1 kg)   07/24/21 235 lb (106.6 kg)   07/19/21 229 lb 6.4 oz (104.1 kg)     There is no height or weight on file to calculate BMI.    CBC:   Lab Results   Component Value Date    WBC 7.9 07/24/2021    RBC 4.37 07/24/2021    HGB 14.1 07/24/2021    HCT 39.0 07/24/2021    HCT 36.9 07/23/2011    MCV 89.2 07/24/2021    RDW 14.0 07/24/2021     07/24/2021     07/23/2011       CMP:   Lab Results   Component Value Date     07/24/2021     07/23/2011    K 4.1 07/24/2021    K 3.9 07/23/2011     07/24/2021     07/23/2011    CO2 22 07/24/2021    BUN 9 07/24/2021    CREATININE 0.7 07/24/2021    CREATININE 0.8 07/23/2011    GFRAA >59 07/24/2021    LABGLOM >60 07/24/2021    GLUCOSE 113 07/24/2021    PROT 6.5 07/24/2021 CALCIUM 8.7 07/24/2021    BILITOT <0.2 07/24/2021    ALKPHOS 50 07/24/2021    AST 18 07/24/2021    ALT 15 07/24/2021       POC Tests: No results for input(s): POCGLU, POCNA, POCK, POCCL, POCBUN, POCHEMO, POCHCT in the last 72 hours. Coags:   Lab Results   Component Value Date    PROTIME 13.3 12/04/2019    INR 1.07 12/04/2019       HCG (If Applicable):   Lab Results   Component Value Date    PREGTESTUR Negative 04/15/2021        ABGs: No results found for: PHART, PO2ART, JBP1NSY, LUS0MKE, BEART, V1FTTMQZ     Type & Screen (If Applicable):  No results found for: LABABO, LABRH    Drug/Infectious Status (If Applicable):  No results found for: HIV, HEPCAB    COVID-19 Screening (If Applicable):   Lab Results   Component Value Date    COVID19 Not Detected 07/22/2021           Anesthesia Evaluation  Patient summary reviewed and Nursing notes reviewed  Airway: Mallampati: II  TM distance: >3 FB   Neck ROM: full  Mouth opening: < 3 FB Dental: normal exam         Pulmonary:normal exam    (+) current smoker          Patient smoked on day of surgery. Cardiovascular:             Beta Blocker:  Not on Beta Blocker         Neuro/Psych:   Negative Neuro/Psych ROS              GI/Hepatic/Renal:   (+) bowel prep,          ROS comment: dysphaiga  etoh use per chart  BMI 38. Endo/Other: Negative Endo/Other ROS                    Abdominal:   (+) obese,           Vascular: negative vascular ROS. Other Findings:               Anesthesia Plan      general and TIVA     ASA 2       Induction: intravenous. Anesthetic plan and risks discussed with patient.                   Check HCG    Nick ZACKERY Nugent CRNA   7/26/2021

## 2021-07-26 NOTE — H&P (VIEW-ONLY)
HISTORY OF PRESENT ILLNESS:  Sudhir Patton is a 70-year-old female who presents to the office today with postprandial midepigastric right upper quadrant abdominal pain she is also has some associated bloating. She has been seen by the gastroenterology service and subsequent gallbladder studies have been ordered. She has had ultrasound of the gallbladder which showed no evidence of cholelithiasis. She was noted to have a fatty liver. She had a HIDA scan which showed nonvisualization of the gallbladder. She has been sent to us today to discuss laparoscopic cholecystectomy.     We have discussed the risk benefits and alternatives of cholecystectomy with the patient. She wishes to proceed.           Patient Active Problem List     Diagnosis Date Noted    Hidradenitis suppurativa of right axilla 01/03/2019    Infertility, female, primary 06/30/2016      Current Facility-Administered Medications          Current Outpatient Medications   Medication Sig Dispense Refill    naproxen (NAPROSYN) 500 MG tablet Take 1 tablet by mouth 2 times daily as needed for Pain Take with food 20 tablet 0    promethazine (PHENERGAN) 25 MG tablet Take 1 tablet by mouth every 6 hours as needed for Nausea 10 tablet 0    dicyclomine (BENTYL) 10 MG capsule Take 1 capsule by mouth 4 times daily 120 capsule 2      No current facility-administered medications for this visit.         Allergies:  Other, Tylenol [acetaminophen], Carafate [sucralfate], and Tramadol      Past Medical History        Past Medical History:   Diagnosis Date    Asthma      Obese      Ovarian cyst           Past Surgical History         Past Surgical History:   Procedure Laterality Date    COLONOSCOPY N/A 06/07/2021     Dr Dallin Mayorga yr (age 39) recall    UPPER GASTROINTESTINAL ENDOSCOPY N/A 06/07/2021     Dr Gulshan Jaimes         Family History         Family History   Problem Relation Age of Onset    Diabetes Mother      High Blood Pressure Mother    Diabetes Maternal Grandmother      Diabetes Sister      Colon Cancer Neg Hx      Colon Polyps Neg Hx      Esophageal Cancer Neg Hx      Liver Cancer Neg Hx      Rectal Cancer Neg Hx      Stomach Cancer Neg Hx           Social History            Tobacco Use    Smoking status: Current Every Day Smoker       Packs/day: 0.50       Years: 10.00       Pack years: 5.00       Types: Cigarettes    Smokeless tobacco: Never Used   Substance Use Topics    Alcohol use: Yes       Alcohol/week: 0.0 standard drinks       Comment: occ         Review of Systems  Reviewed and positive for the above. All other systems noted to be negative.       Physical Exam  Blood pressure 134/80, temperature 97.9 °F (36.6 °C), temperature source Temporal, height 5' 7\" (1.702 m), weight 229 lb 6.4 oz (104.1 kg), last menstrual period 06/28/2021, not currently breastfeeding.     GENERAL:  Reveals a 32 y.o. female that  appears to be in no acute distress.     HEENT:  Head is normocephalic and atraumatic.     NECK:  Neck is supple without masses      CHEST:  Patient has normal respiratory effort. Chest is clear bilaterally with good thoracic expansion.       HEART:  Heart demonstrated a regular rhythm and rate with no cardiac murmurs, gallops or rubs noted to auscultation.     ABDOMEN:  Inspection of the abdomen demonstrated the patient to have normal bowel sounds present. The abdomen is soft and nontender with mild midepigastric tenderness. There is no organosplenomegaly appreciable.        EXTREMITIES:  Extremities demonstrated no cyanosis or pitting edema bilaterally.     IMPRESSION:  Acalculous cholecystitis     PLAN:  The risks, benefits, and options were discussed with the patient. She  is willing to proceed with surgery.

## 2021-07-26 NOTE — INTERVAL H&P NOTE
Update History & Physical    The patient's History and Physical of July 19, 2021 was reviewed with the patient and I examined the patient. There was no change. The surgical site was confirmed by the patient and me. Plan: The risks, benefits, expected outcome, and alternative to the recommended procedure have been discussed with the patient. Patient understands and wants to proceed with the procedure.      Electronically signed by Rick Santos MD on 7/26/2021 at 11:12 AM

## 2021-07-26 NOTE — BRIEF OP NOTE
Brief Postoperative Note      Patient: Marvin Ortiz  YOB: 1990  MRN: [id number]    Date of Procedure: 7/26/2021    Pre-Op Diagnosis: ACALCULOUS CHOLECYSTITIS    Post-Op Diagnosis: Same       Procedure(s):  CHOLECYSTECTOMY LAPAROSCOPIC    Surgeon(s):  Angel Gallo MD    Assistant:  * No surgical staff found *    Anesthesia: General    Estimated Blood Loss (mL): Minimal    Complications: None    Specimens:   ID Type Source Tests Collected by Time Destination   A : GALLBLADDER Tissue Gallbladder SURGICAL PATHOLOGY Angel Gallo MD 7/26/2021 1153        Implants:  * No implants in log *      Drains: * No LDAs found *    Findings: no acute findings    Electronically signed by Angel Gallo MD on 7/26/2021 at 12:29 PM

## 2021-07-26 NOTE — H&P
HISTORY OF PRESENT ILLNESS:  Karis Espinal is a 80-year-old female who presents to the office today with postprandial midepigastric right upper quadrant abdominal pain she is also has some associated bloating. She has been seen by the gastroenterology service and subsequent gallbladder studies have been ordered. She has had ultrasound of the gallbladder which showed no evidence of cholelithiasis. She was noted to have a fatty liver. She had a HIDA scan which showed nonvisualization of the gallbladder. She has been sent to us today to discuss laparoscopic cholecystectomy.     We have discussed the risk benefits and alternatives of cholecystectomy with the patient. She wishes to proceed.           Patient Active Problem List     Diagnosis Date Noted    Hidradenitis suppurativa of right axilla 01/03/2019    Infertility, female, primary 06/30/2016      Current Facility-Administered Medications          Current Outpatient Medications   Medication Sig Dispense Refill    naproxen (NAPROSYN) 500 MG tablet Take 1 tablet by mouth 2 times daily as needed for Pain Take with food 20 tablet 0    promethazine (PHENERGAN) 25 MG tablet Take 1 tablet by mouth every 6 hours as needed for Nausea 10 tablet 0    dicyclomine (BENTYL) 10 MG capsule Take 1 capsule by mouth 4 times daily 120 capsule 2      No current facility-administered medications for this visit.         Allergies:  Other, Tylenol [acetaminophen], Carafate [sucralfate], and Tramadol      Past Medical History        Past Medical History:   Diagnosis Date    Asthma      Obese      Ovarian cyst           Past Surgical History         Past Surgical History:   Procedure Laterality Date    COLONOSCOPY N/A 06/07/2021     Dr Kapadia Hash yr (age 39) recall    UPPER GASTROINTESTINAL ENDOSCOPY N/A 06/07/2021     Dr Beryl Corbett         Family History         Family History   Problem Relation Age of Onset    Diabetes Mother      High Blood Pressure Mother    Diabetes Maternal Grandmother      Diabetes Sister      Colon Cancer Neg Hx      Colon Polyps Neg Hx      Esophageal Cancer Neg Hx      Liver Cancer Neg Hx      Rectal Cancer Neg Hx      Stomach Cancer Neg Hx           Social History            Tobacco Use    Smoking status: Current Every Day Smoker       Packs/day: 0.50       Years: 10.00       Pack years: 5.00       Types: Cigarettes    Smokeless tobacco: Never Used   Substance Use Topics    Alcohol use: Yes       Alcohol/week: 0.0 standard drinks       Comment: occ         Review of Systems  Reviewed and positive for the above. All other systems noted to be negative.       Physical Exam  Blood pressure 134/80, temperature 97.9 °F (36.6 °C), temperature source Temporal, height 5' 7\" (1.702 m), weight 229 lb 6.4 oz (104.1 kg), last menstrual period 06/28/2021, not currently breastfeeding.     GENERAL:  Reveals a 32 y.o. female that  appears to be in no acute distress.     HEENT:  Head is normocephalic and atraumatic.     NECK:  Neck is supple without masses      CHEST:  Patient has normal respiratory effort. Chest is clear bilaterally with good thoracic expansion.       HEART:  Heart demonstrated a regular rhythm and rate with no cardiac murmurs, gallops or rubs noted to auscultation.     ABDOMEN:  Inspection of the abdomen demonstrated the patient to have normal bowel sounds present. The abdomen is soft and nontender with mild midepigastric tenderness. There is no organosplenomegaly appreciable.        EXTREMITIES:  Extremities demonstrated no cyanosis or pitting edema bilaterally.     IMPRESSION:  Acalculous cholecystitis     PLAN:  The risks, benefits, and options were discussed with the patient. She  is willing to proceed with surgery.

## 2021-07-28 NOTE — OP NOTE
Operative Report    PATIENT NAME: Mayi Zuniga  MEDICAL RECORD NO. [id number]  SURGEON: Flor Valle MD MD   Primary Care Physician: No primary care provider on file. Date: 7/26/2021     PROCEDURE PERFORMED: Laparoscopic Cholecystectomy  PREOPERATIVE DIAGNOSIS: laparoscopic cholecystectomy  POSTOPERATIVE DIAGNOSIS: Same, path pending  SURGEON:  Flor Valle MD   Assistant: Marsha Tucker  ANESTHESIA:  General endotracheal anesthesia and local  ESTIMATED BLOOD LOSS: minimal  SPECIMEN:  Gallbladder  COMPLICATIONS:  None; patient tolerated the procedure well. FINDINGS: no acute findings   DISPOSITION: PACU - hemodynamically stable. CONDITION: stable      Indications: The patient is a 32year old female who presented with upper abdominal pain. On gallbladder imaging, she had non-filling of the gallbladder on HIDA. She presents at this time for laparoscopic cholecystectomy. Procedure Details     After the risks and benefits of the procedure were explained, informed consent was obtained, and the patient was taken to the operating room. The patient was placed in supine position and general anesthesia was begun. The abdomen was prepped and draped in normal sterile fashion. Preoperative antibiotics had been given. A time out was performed. Local anesthetic was injected and a 5mm supraumbilical incision was made. The base of the umbilicus was grasped and elevated and the verus needle was inserted. The abdomen was insufflated and the 5mm trochar was inserted. The camera was inserted and three additional ports were placed under direct visualization: an 11 mm subxiphoid port and two 5 mm right quadrant ports. The patient was then placed in position head up and rotated slightly to the left. The tip of the gallbladder was then grasped and elevated over the edge of the liver. The peritoneum over the neck of the gallbladder was incised using electrocautery.  This was extended medially and laterally along the edges

## 2021-07-30 ENCOUNTER — TELEPHONE (OUTPATIENT)
Dept: SURGERY | Age: 31
End: 2021-07-30

## 2021-07-30 DIAGNOSIS — G89.18 POST-OP PAIN: ICD-10-CM

## 2021-07-30 RX ORDER — OXYCODONE HYDROCHLORIDE 5 MG/1
5 TABLET ORAL EVERY 6 HOURS PRN
Qty: 15 TABLET | Refills: 0 | Status: SHIPPED | OUTPATIENT
Start: 2021-07-30 | End: 2021-08-04

## 2021-07-30 NOTE — TELEPHONE ENCOUNTER
Gave patient Dr. Adryan Montes recommendations, she will try to wean off prescription drugs, asked if she could return to work at [employer] and sit in the drive through next week per Dr. Dodie Suarez, that is fine. Patient instructed to call office if she needs a rtw note dated.

## 2021-07-30 NOTE — TELEPHONE ENCOUNTER
Pain medication called in. Please discuss with her taking tylenol and ibuprofen as well to hopefully spread the narcotic pain medication out. Do not typically give any more narcotic after this refill.

## 2021-07-30 NOTE — TELEPHONE ENCOUNTER
Patient called back with Fax number for the return to work letter to be faxed. [phone] is the fax number. She also stated that she would be returning to work on Sunday 08/01/2021 and her boss would be having her to sit instead of stand.

## 2021-07-30 NOTE — TELEPHONE ENCOUNTER
Patient called in and stated that she is still in a lot of pain and would like to have some more pain medication called in to her pharmacy. Patient was RX Oxycodone 5 mg. Please send to [facility] .  Please call the patient and advise if medication has been called in or what the provider suggests. Thank you   [phone]  [facility] Practice Staff.

## 2021-08-05 ENCOUNTER — HOSPITAL ENCOUNTER (EMERGENCY)
Age: 31
Discharge: HOME | End: 2021-08-05
Attending: EMERGENCY MEDICINE
Payer: COMMERCIAL

## 2021-08-05 ENCOUNTER — APPOINTMENT (OUTPATIENT)
Dept: CT IMAGING | Age: 31
End: 2021-08-05
Payer: COMMERCIAL

## 2021-08-05 VITALS
HEIGHT: 67 IN | RESPIRATION RATE: 17 BRPM | DIASTOLIC BLOOD PRESSURE: 80 MMHG | HEART RATE: 88 BPM | SYSTOLIC BLOOD PRESSURE: 139 MMHG | OXYGEN SATURATION: 96 % | WEIGHT: 225 LBS | BODY MASS INDEX: 35.31 KG/M2 | TEMPERATURE: 98.6 F

## 2021-08-05 DIAGNOSIS — Z90.49 HISTORY OF CHOLECYSTECTOMY: ICD-10-CM

## 2021-08-05 DIAGNOSIS — R10.11 RIGHT UPPER QUADRANT ABDOMINAL PAIN: Primary | ICD-10-CM

## 2021-08-05 LAB
ALBUMIN SERPL-MCNC: 4.3 G/DL (ref 3.5–5.2)
ALP BLD-CCNC: 62 U/L (ref 35–104)
ALT SERPL-CCNC: 14 U/L (ref 5–33)
ANION GAP SERPL CALCULATED.3IONS-SCNC: 12 MMOL/L (ref 7–19)
AST SERPL-CCNC: 21 U/L (ref 5–32)
BASOPHILS ABSOLUTE: 0.1 K/UL (ref 0–0.2)
BASOPHILS RELATIVE PERCENT: 0.8 % (ref 0–1)
BILIRUB SERPL-MCNC: <0.2 MG/DL (ref 0.2–1.2)
BUN BLDV-MCNC: 12 MG/DL (ref 6–20)
CALCIUM SERPL-MCNC: 9.6 MG/DL (ref 8.6–10)
CHLORIDE BLD-SCNC: 103 MMOL/L (ref 98–111)
CO2: 22 MMOL/L (ref 22–29)
CREAT SERPL-MCNC: 0.7 MG/DL (ref 0.5–0.9)
EOSINOPHILS ABSOLUTE: 0.2 K/UL (ref 0–0.6)
EOSINOPHILS RELATIVE PERCENT: 2.3 % (ref 0–5)
GFR AFRICAN AMERICAN: >59
GFR NON-AFRICAN AMERICAN: >60
GLUCOSE BLD-MCNC: 178 MG/DL (ref 74–109)
HCG QUALITATIVE: NEGATIVE
HCT VFR BLD CALC: 38.5 % (ref 37–47)
HEMOGLOBIN: 14.1 G/DL (ref 12–16)
IMMATURE GRANULOCYTES #: 0.1 K/UL
LACTIC ACID: 0.9 MMOL/L (ref 0.5–1.9)
LYMPHOCYTES ABSOLUTE: 3.6 K/UL (ref 1.1–4.5)
LYMPHOCYTES RELATIVE PERCENT: 35.3 % (ref 20–40)
MCH RBC QN AUTO: 31.8 PG (ref 27–31)
MCHC RBC AUTO-ENTMCNC: 36.6 G/DL (ref 33–37)
MCV RBC AUTO: 86.7 FL (ref 81–99)
MONOCYTES ABSOLUTE: 0.6 K/UL (ref 0–0.9)
MONOCYTES RELATIVE PERCENT: 6.1 % (ref 0–10)
NEUTROPHILS ABSOLUTE: 5.6 K/UL (ref 1.5–7.5)
NEUTROPHILS RELATIVE PERCENT: 54.9 % (ref 50–65)
PDW BLD-RTO: 13.8 % (ref 11.5–14.5)
PLATELET # BLD: 304 K/UL (ref 130–400)
PMV BLD AUTO: 9.9 FL (ref 9.4–12.3)
POTASSIUM SERPL-SCNC: 4.7 MMOL/L (ref 3.5–5)
RBC # BLD: 4.44 M/UL (ref 4.2–5.4)
SODIUM BLD-SCNC: 137 MMOL/L (ref 136–145)
TOTAL PROTEIN: 7.1 G/DL (ref 6.6–8.7)
WBC # BLD: 10.2 K/UL (ref 4.8–10.8)

## 2021-08-05 PROCEDURE — 96374 THER/PROPH/DIAG INJ IV PUSH: CPT

## 2021-08-05 PROCEDURE — 6360000004 HC RX CONTRAST MEDICATION: Performed by: EMERGENCY MEDICINE

## 2021-08-05 PROCEDURE — 83605 ASSAY OF LACTIC ACID: CPT

## 2021-08-05 PROCEDURE — 99283 EMERGENCY DEPT VISIT LOW MDM: CPT

## 2021-08-05 PROCEDURE — 96375 TX/PRO/DX INJ NEW DRUG ADDON: CPT

## 2021-08-05 PROCEDURE — 6360000002 HC RX W HCPCS

## 2021-08-05 PROCEDURE — 36415 COLL VENOUS BLD VENIPUNCTURE: CPT

## 2021-08-05 PROCEDURE — 84703 CHORIONIC GONADOTROPIN ASSAY: CPT

## 2021-08-05 PROCEDURE — 85025 COMPLETE CBC W/AUTO DIFF WBC: CPT

## 2021-08-05 PROCEDURE — 2580000003 HC RX 258: Performed by: EMERGENCY MEDICINE

## 2021-08-05 PROCEDURE — 80053 COMPREHEN METABOLIC PANEL: CPT

## 2021-08-05 PROCEDURE — 74177 CT ABD & PELVIS W/CONTRAST: CPT

## 2021-08-05 RX ORDER — SODIUM CHLORIDE, SODIUM LACTATE, POTASSIUM CHLORIDE, AND CALCIUM CHLORIDE .6; .31; .03; .02 G/100ML; G/100ML; G/100ML; G/100ML
1000 INJECTION, SOLUTION INTRAVENOUS ONCE
Status: COMPLETED | OUTPATIENT
Start: 2021-08-05 | End: 2021-08-05

## 2021-08-05 RX ORDER — ONDANSETRON 2 MG/ML
INJECTION INTRAMUSCULAR; INTRAVENOUS
Status: COMPLETED
Start: 2021-08-05 | End: 2021-08-05

## 2021-08-05 RX ORDER — ONDANSETRON 2 MG/ML
4 INJECTION INTRAMUSCULAR; INTRAVENOUS ONCE
Status: COMPLETED | OUTPATIENT
Start: 2021-08-05 | End: 2021-08-05

## 2021-08-05 RX ORDER — HYDROMORPHONE HYDROCHLORIDE 1 MG/ML
1 INJECTION, SOLUTION INTRAMUSCULAR; INTRAVENOUS; SUBCUTANEOUS ONCE
Status: COMPLETED | OUTPATIENT
Start: 2021-08-05 | End: 2021-08-05

## 2021-08-05 RX ADMIN — HYDROMORPHONE HYDROCHLORIDE 1 MG: 1 INJECTION, SOLUTION INTRAMUSCULAR; INTRAVENOUS; SUBCUTANEOUS at 03:53

## 2021-08-05 RX ADMIN — SODIUM CHLORIDE, POTASSIUM CHLORIDE, SODIUM LACTATE AND CALCIUM CHLORIDE 1000 ML: 600; 310; 30; 20 INJECTION, SOLUTION INTRAVENOUS at 03:53

## 2021-08-05 RX ADMIN — ONDANSETRON 4 MG: 2 INJECTION INTRAMUSCULAR; INTRAVENOUS at 03:52

## 2021-08-05 RX ADMIN — IOPAMIDOL 90 ML: 755 INJECTION, SOLUTION INTRAVENOUS at 04:11

## 2021-08-05 RX ADMIN — ONDANSETRON HYDROCHLORIDE 4 MG: 2 SOLUTION INTRAMUSCULAR; INTRAVENOUS at 03:52

## 2021-08-05 ASSESSMENT — PAIN SCALES - GENERAL
PAINLEVEL_OUTOF10: 10
PAINLEVEL_OUTOF10: 10

## 2021-08-05 ASSESSMENT — PAIN DESCRIPTION - DESCRIPTORS: DESCRIPTORS: SHARP;STABBING

## 2021-08-05 ASSESSMENT — PAIN DESCRIPTION - LOCATION: LOCATION: ABDOMEN

## 2021-08-05 ASSESSMENT — PAIN DESCRIPTION - ORIENTATION: ORIENTATION: RIGHT

## 2021-08-05 ASSESSMENT — PAIN DESCRIPTION - PAIN TYPE: TYPE: ACUTE PAIN

## 2021-08-05 NOTE — ED PROVIDER NOTES
[facility] EMERGENCY DEPT  eMERGENCY dEPARTMENT eNCOUnter      Pt Name: Toro Gill  MRN: [id number]  [place] 1990  Date of evaluation: 8/5/2021  Provider: Luigi Crawley MD    CHIEF COMPLAINT       Chief Complaint   Patient presents with    Abdominal Pain     pt states pain in right quadrant since gallbladder remobval 2 weeks ago. pt states it became worse after being released to go back to work         HISTORY OF PRESENT ILLNESS   (Location/Symptom, Timing/Onset,Context/Setting, Quality, Duration, Modifying Factors, Severity)  Note limiting factors. Toro Gill is a 32 y.o. female who presents to the emergency department for evaluation regarding moderate severity\". pain. They were that the symptoms and present when she underwent cholecystectomy tubes previously. Patient describes the pain is fairly sharp in nature and without radiation. She was recently released back to work and states that since going back to work she has noticed a worsening of the pain. There have really been no relieving factors. She has not had any vomiting, fever or chills. There has not been any drainage or dehiscence from her abdominal wounds. She denies any chest pain or shortness of breath. HPI    NursingNotes were reviewed. REVIEW OF SYSTEMS    (2-9 systems for level 4, 10 or more for level 5)     Review of Systems   Constitutional: Negative for chills and fever. Respiratory: Negative for shortness of breath and wheezing. Cardiovascular: Negative for chest pain. Gastrointestinal: Positive for abdominal pain. Negative for abdominal distention, nausea and vomiting. Genitourinary: Negative for dysuria and hematuria. Neurological: Negative for dizziness and syncope. All other systems reviewed and are negative.            PAST MEDICALHISTORY     Past Medical History:   Diagnosis Date    Asthma     Obese     Ovarian cyst          SURGICAL HISTORY       Past Surgical History:   Procedure Laterality Date    CHOLECYSTECTOMY, LAPAROSCOPIC N/A 7/26/2021    CHOLECYSTECTOMY LAPAROSCOPIC performed by Rick Santos MD at [address] [place] [address] COLONOSCOPY N/A 06/07/2021    Dr Kang craig (age 39) recall    UPPER GASTROINTESTINAL ENDOSCOPY N/A 06/07/2021    Dr Grace Mckinney     Discharge Medication List as of 8/5/2021  6:12 AM      CONTINUE these medications which have NOT CHANGED    Details   metoclopramide (REGLAN) 10 MG tablet Take 1 tablet by mouth 3 times daily for 3 days, Disp-9 tablet, R-0Normal      naproxen (NAPROSYN) 500 MG tablet Take 1 tablet by mouth 2 times daily as needed for Pain Take with food, Disp-20 tablet, R-0Print             ALLERGIES     Other, Tylenol [acetaminophen], Carafate [sucralfate], and Tramadol    FAMILY HISTORY       Family History   Problem Relation Age of Onset    Diabetes Mother     High Blood Pressure Mother     Diabetes Maternal Grandmother     Diabetes Sister     Colon Cancer Neg Hx     Colon Polyps Neg Hx     Esophageal Cancer Neg Hx     Liver Cancer Neg Hx     Rectal Cancer Neg Hx     Stomach Cancer Neg Hx           SOCIAL HISTORY       Social History     Socioeconomic History    Marital status: Single     Spouse name: Not on file    Number of children: Not on file    Years of education: Not on file    Highest education level: Not on file   Occupational History    Not on file   Tobacco Use    Smoking status: Current Every Day Smoker     Packs/day: 0.50     Years: 10.00     Pack years: 5.00     Types: Cigarettes    Smokeless tobacco: Never Used   Vaping Use    Vaping Use: Never used   Substance and Sexual Activity    Alcohol use:  Yes     Alcohol/week: 0.0 standard drinks     Comment: occ    Drug use: Yes     Comment: Spice     Sexual activity: Yes     Partners: Male, Female   Other Topics Concern    Not on file   Social History Narrative    Not on file     Social Determinants of Health     Financial Resource Strain:     Difficulty of Paying Living Expenses:    Food Insecurity:     Worried About Running Out of Food in the Last Year:     [address] in the Last Year:    Transportation Needs:     Lack of Transportation (Medical):  Lack of Transportation (Non-Medical):    Physical Activity:     Days of Exercise per Week:     Minutes of Exercise per Session:    Stress:     Feeling of Stress :    Social Connections:     Frequency of Communication with Friends and Family:     Frequency of Social Gatherings with Friends and Family:     Attends Bahai Services:     Active Member of Clubs or Organizations:     Attends Club or Organization Meetings:     Marital Status:    Intimate Partner Violence:     Fear of Current or Ex-Partner:     Emotionally Abused:     Physically Abused:     Sexually Abused:        SCREENINGS             PHYSICAL EXAM    (up to 7 for level 4, 8 or more for level 5)     ED Triage Vitals [08/05/21 0028]   BP Temp Temp Source Pulse Resp SpO2 Height Weight   139/80 98.6 °F (37 °C) Oral 88 17 96 % 5' 7\" (1.702 m) 225 lb (102.1 kg)       Physical Exam  Vitals and nursing note reviewed. HENT:      Head: Atraumatic. Mouth/Throat:      Mouth: Mucous membranes are not dry. Pharynx: No posterior oropharyngeal erythema. Eyes:      General: No scleral icterus. Pupils: Pupils are equal, round, and reactive to light. Neck:      Trachea: No tracheal deviation. Cardiovascular:      Rate and Rhythm: Normal rate and regular rhythm. Heart sounds: Normal heart sounds. No murmur heard. Pulmonary:      Effort: Pulmonary effort is normal. No respiratory distress. Breath sounds: Normal breath sounds. No stridor. Abdominal:      General: There is no distension. Palpations: Abdomen is soft. Tenderness: There is abdominal tenderness. There is no guarding. Comments: Abdominal incision well-healing without evidence of surrounding erythema or wound dehiscence. Skin:     Coloration: Skin is not pale. Findings: No rash. Neurological:      Mental Status: She is alert and oriented to person, place, and time. Psychiatric:         Behavior: Behavior is cooperative. DIAGNOSTIC RESULTS       RADIOLOGY:  Non-plain film images such as CT, Ultrasound and MRI are read by the radiologist. Plain radiographic images are visualized and preliminarily interpreted bythe emergency physician with the below findings:    CT ABDOMEN & PELVIS With Contrast:    Cholecystectomy. No abnormal fluid collection. No evidence of bowel obstruction or appendicitis. No evidence of urinary tract calculus or obstruction. 3.3 cm left ovarian cyst.          LABS:  Labs Reviewed   COMPREHENSIVE METABOLIC PANEL - Abnormal; Notable for the following components:       Result Value    Glucose 178 (*)     All other components within normal limits   CBC WITH AUTO DIFFERENTIAL - Abnormal; Notable for the following components:    MCH 31.8 (*)     All other components within normal limits   LACTIC ACID, PLASMA   HCG, SERUM, QUALITATIVE       All other labs were within normal range or not returned as of this dictation. EMERGENCY DEPARTMENT COURSE and DIFFERENTIAL DIAGNOSIS/MDM:   Vitals:    Vitals:    08/05/21 0028   BP: 139/80   Pulse: 88   Resp: 17   Temp: 98.6 °F (37 °C)   TempSrc: Oral   SpO2: 96%   Weight: 225 lb (102.1 kg)   Height: 5' 7\" (1.702 m)       MDM    Reassessment    CT scan does not reveal any evidence of postsurgical complication or abscess. Her laboratory studies are unremarkable. Overall she is feeling better after some IV fluids and pain medications. We will plan to discharge her home with plans for outpatient follow-up. PROCEDURES:  Unless otherwise noted below, none     Procedures    FINAL IMPRESSION      1. Right upper quadrant abdominal pain    2.  History of cholecystectomy          DISPOSITION/PLAN   DISPOSITION Decision To Discharge 08/05/2021 06:12:10 AM      PATIENT REFERRED TO:  Elli Hu MD  [address] [address]            DISCHARGE MEDICATIONS:  Discharge Medication List as of 8/5/2021  6:12 AM             (Please note that portions of this note were completed with a voice recognition program.  Efforts were made to edit thedictations but occasionally words are mis-transcribed.)    Pepe King MD (electronically signed)  Attending Emergency Physician         Pepe King MD  08/23/21 7642

## 2021-08-05 NOTE — LETTER
[facility] EMERGENCY DEPT  [address]  Phone: [phone]               August 5, 2021    Patient: Deloras Kawasaki   YOB: 1990   Date of Visit: 8/5/2021       To Whom It May Concern:    Ezequiel Walters was seen and treated in our emergency department on 8/5/2021.  She may return to work 8/6/2021      Sincerely,       ED Provider        Signature:__________________________________

## 2021-08-05 NOTE — ED NOTES
Bed: 10  Expected date:   Expected time:   Means of arrival:   Comments:     Julien Art RN  08/05/21 2067

## 2021-08-06 ENCOUNTER — TELEPHONE (OUTPATIENT)
Dept: SURGERY | Age: 31
End: 2021-08-06

## 2021-08-06 NOTE — TELEPHONE ENCOUNTER
Pt had gallbladder surgery 07/29/21 - Dr Izaiah Hauser -was seen in the ED yesterday for abd pain - is requesting a work excuse for tonight    Cc:  Humberto Boudreaux MA

## 2021-08-10 ENCOUNTER — OFFICE VISIT (OUTPATIENT)
Dept: SURGERY | Age: 31
End: 2021-08-10

## 2021-08-10 VITALS
WEIGHT: 224 LBS | HEART RATE: 94 BPM | OXYGEN SATURATION: 98 % | BODY MASS INDEX: 35.16 KG/M2 | HEIGHT: 67 IN | TEMPERATURE: 97.9 F

## 2021-08-10 DIAGNOSIS — Z09 POSTOPERATIVE EXAMINATION: Primary | ICD-10-CM

## 2021-08-10 PROCEDURE — 99024 POSTOP FOLLOW-UP VISIT: CPT | Performed by: SURGERY

## 2021-08-10 RX ORDER — QUETIAPINE FUMARATE 300 MG/1
TABLET, FILM COATED ORAL
COMMUNITY
Start: 2021-06-15 | End: 2022-08-12

## 2021-08-10 RX ORDER — METRONIDAZOLE 500 MG/1
TABLET ORAL
COMMUNITY
Start: 2021-05-30 | End: 2022-08-12

## 2021-08-10 RX ORDER — PROMETHAZINE HYDROCHLORIDE 25 MG/1
25 TABLET ORAL EVERY 6 HOURS PRN
COMMUNITY
Start: 2021-01-29 | End: 2022-01-24 | Stop reason: SDUPTHER

## 2021-08-10 NOTE — PROGRESS NOTES
Postop Progress Note    Subjective    Aniya Serrato presents to the office for postop follow up, 2 weeks s/p laparoscopic cholecystectomy. She reports that she has continued to have nausea and vomiting as well as upper abdominal pain that she had before her scopes and before her gallbaldder was removed. Objective    Vitals:    08/10/21 1001   Pulse: 94   Temp: 97.9 °F (36.6 °C)   SpO2: 98%     General: alert, cooperative and no distress  Abdomen: Soft, NT, ND, incisions C/D/I, no erythema or induration    Pathology:  FINAL DIAGNOSIS:     Gallbladder, cholecystectomy: Mild chronic cholecystitis.    CBG/CBG    Assessment  31 yo female s/p laparoscopic cholecystectomy  1) The patient has been to the ER since her surgery with these complaints. CT and labs done showed no abnormalities or indication of surgical complication.    2) Recommended she follow up with GI for workup of any other gastric causes of symptoms or medical treatments of complaints  3) Follow up in general surgery as needed and call for any questions or concerns      Electronically signed by Kiran Back MD on 8/10/2021 at 10:23 AM

## 2021-08-11 ENCOUNTER — TELEPHONE (OUTPATIENT)
Dept: SURGERY | Age: 31
End: 2021-08-11

## 2021-08-11 NOTE — TELEPHONE ENCOUNTER
Tried several times to call patient, she ios scheduled to see Gastro tomorrow at 11:20, her phone continues to give busy signal.  Will keep trying.

## 2021-08-12 ENCOUNTER — HOSPITAL ENCOUNTER (EMERGENCY)
Age: 31
Discharge: LEFT BEFORE DISCHARGE | End: 2021-08-12
Payer: COMMERCIAL

## 2021-08-12 ENCOUNTER — TELEPHONE (OUTPATIENT)
Dept: GASTROENTEROLOGY | Age: 31
End: 2021-08-12

## 2021-08-12 VITALS
OXYGEN SATURATION: 96 % | SYSTOLIC BLOOD PRESSURE: 157 MMHG | RESPIRATION RATE: 22 BRPM | WEIGHT: 224 LBS | DIASTOLIC BLOOD PRESSURE: 110 MMHG | BODY MASS INDEX: 35.16 KG/M2 | HEART RATE: 92 BPM | TEMPERATURE: 97.7 F | HEIGHT: 67 IN

## 2021-08-12 PROCEDURE — 93005 ELECTROCARDIOGRAM TRACING: CPT

## 2021-08-12 NOTE — TELEPHONE ENCOUNTER
PATIENT CALLED IN DISTRESS AND STATED THAT SHE WAS IN A LOT OF PAIN AND NEEDED TO BE SEEN. I ADVISED THE PATIENT THAT SHE HAD AN APPT TODAY AT 11:20 AND SHE NO SHOWED. PATIENT SAID THAT SHE DID NOT GET A TELEPHONE REMINDER OR SHE WOULD HAVE COME. THERE IS NOTHING AVAILABLE TODAY AND THE NEXT OV THAT Fer Hoskins HAS IS 8/31. THAT IS TOO LONG FOR THE PT TO WAIT SO I ADVISED HER THAT IF THE PAIN WAS THAT SERIOUS SHE SHOULD GO TO THE ER. IN THE MEANTIME SHE WAS TRANSFERRED TO [facility] TO SEE IF THERE IS ANYTHING OTC THAT SHE CAN TAKE TO HELP EASE THE PAIN.    BL

## 2021-08-13 NOTE — ED NOTES
Pt walked up front and told [address] [place] [address] she was leaving.  Pt showed her arms to the [address] [place] [address] and stated she did not have an [address], PAYTON  08/12/21 2044

## 2021-08-13 NOTE — ED NOTES
Pt thrashing in bed. Told her she had to be seen by a physician before receiving pain meds. Pt now walking around [place] on phone yelling.       Sarai Bond RN  08/12/21 2038

## 2021-08-14 ENCOUNTER — HOSPITAL ENCOUNTER (EMERGENCY)
Facility: HOSPITAL | Age: 31
Discharge: HOME | End: 2021-08-15
Attending: FAMILY MEDICINE | Admitting: FAMILY MEDICINE

## 2021-08-14 DIAGNOSIS — R10.9 ABDOMINAL PAIN, UNSPECIFIED ABDOMINAL LOCATION: Primary | ICD-10-CM

## 2021-08-14 PROCEDURE — 99283 EMERGENCY DEPT VISIT LOW MDM: CPT

## 2021-08-15 ENCOUNTER — HOSPITAL ENCOUNTER (EMERGENCY)
Age: 31
Discharge: HOME | End: 2021-08-16
Attending: PEDIATRICS
Payer: COMMERCIAL

## 2021-08-15 ENCOUNTER — APPOINTMENT (OUTPATIENT)
Dept: CT IMAGING | Facility: HOSPITAL | Age: 31
End: 2021-08-15

## 2021-08-15 VITALS
BODY MASS INDEX: 30.66 KG/M2 | TEMPERATURE: 98.5 F | WEIGHT: 219 LBS | SYSTOLIC BLOOD PRESSURE: 125 MMHG | DIASTOLIC BLOOD PRESSURE: 82 MMHG | OXYGEN SATURATION: 99 % | RESPIRATION RATE: 19 BRPM | HEART RATE: 65 BPM | HEIGHT: 71 IN

## 2021-08-15 DIAGNOSIS — N83.202 LEFT OVARIAN CYST: ICD-10-CM

## 2021-08-15 DIAGNOSIS — R10.84 GENERALIZED ABDOMINAL PAIN: Primary | ICD-10-CM

## 2021-08-15 LAB
ALBUMIN SERPL-MCNC: 4.8 G/DL (ref 3.5–5.2)
ALBUMIN/GLOB SERPL: 1.5 G/DL
ALP SERPL-CCNC: 74 U/L (ref 39–117)
ALT SERPL W P-5'-P-CCNC: 14 U/L (ref 1–33)
AMPHET+METHAMPHET UR QL: NEGATIVE
AMPHETAMINES UR QL: NEGATIVE
ANION GAP SERPL CALCULATED.3IONS-SCNC: 11 MMOL/L (ref 5–15)
APTT PPP: 31 SECONDS (ref 24.1–35)
AST SERPL-CCNC: 16 U/L (ref 1–32)
BACTERIA UR QL AUTO: ABNORMAL /HPF
BARBITURATES UR QL SCN: NEGATIVE
BASOPHILS # BLD AUTO: 0.03 10*3/MM3 (ref 0–0.2)
BASOPHILS ABSOLUTE: 0.1 K/UL (ref 0–0.2)
BASOPHILS NFR BLD AUTO: 0.2 % (ref 0–1.5)
BASOPHILS RELATIVE PERCENT: 0.3 % (ref 0–1)
BENZODIAZ UR QL SCN: NEGATIVE
BILIRUB SERPL-MCNC: 0.5 MG/DL (ref 0–1.2)
BILIRUB UR QL STRIP: NEGATIVE
BUN SERPL-MCNC: 13 MG/DL (ref 6–20)
BUN/CREAT SERPL: 14.1 (ref 7–25)
BUPRENORPHINE SERPL-MCNC: NEGATIVE NG/ML
CALCIUM SPEC-SCNC: 9.8 MG/DL (ref 8.6–10.5)
CANNABINOIDS SERPL QL: POSITIVE
CHLORIDE SERPL-SCNC: 99 MMOL/L (ref 98–107)
CLARITY UR: CLEAR
CO2 SERPL-SCNC: 27 MMOL/L (ref 22–29)
COCAINE UR QL: POSITIVE
COLOR UR: ABNORMAL
CREAT SERPL-MCNC: 0.92 MG/DL (ref 0.57–1)
CRP SERPL-MCNC: <0.3 MG/DL (ref 0–0.5)
D-LACTATE SERPL-SCNC: 1.4 MMOL/L (ref 0.5–2)
DEPRECATED RDW RBC AUTO: 41.6 FL (ref 37–54)
EOSINOPHIL # BLD AUTO: 0.02 10*3/MM3 (ref 0–0.4)
EOSINOPHIL NFR BLD AUTO: 0.2 % (ref 0.3–6.2)
EOSINOPHILS ABSOLUTE: 0.1 K/UL (ref 0–0.6)
EOSINOPHILS RELATIVE PERCENT: 0.5 % (ref 0–5)
ERYTHROCYTE [DISTWIDTH] IN BLOOD BY AUTOMATED COUNT: 13.1 % (ref 12.3–15.4)
GFR SERPL CREATININE-BSD FRML MDRD: 86 ML/MIN/1.73
GLOBULIN UR ELPH-MCNC: 3.3 GM/DL
GLUCOSE SERPL-MCNC: 119 MG/DL (ref 65–99)
GLUCOSE UR STRIP-MCNC: NEGATIVE MG/DL
HCG SERPL QL: NEGATIVE
HCT VFR BLD AUTO: 42.4 % (ref 34–46.6)
HCT VFR BLD CALC: 42.8 % (ref 37–47)
HEMOGLOBIN: 15.1 G/DL (ref 12–16)
HGB BLD-MCNC: 15.2 G/DL (ref 12–15.9)
HGB UR QL STRIP.AUTO: ABNORMAL
HOLD SPECIMEN: NORMAL
HOLD SPECIMEN: NORMAL
HYALINE CASTS UR QL AUTO: ABNORMAL /LPF
IMM GRANULOCYTES # BLD AUTO: 0.05 10*3/MM3 (ref 0–0.05)
IMM GRANULOCYTES NFR BLD AUTO: 0.4 % (ref 0–0.5)
IMMATURE GRANULOCYTES #: 0.1 K/UL
INR PPP: 1.16 (ref 0.91–1.09)
KETONES UR QL STRIP: ABNORMAL
LEUKOCYTE ESTERASE UR QL STRIP.AUTO: ABNORMAL
LIPASE SERPL-CCNC: 22 U/L (ref 13–60)
LYMPHOCYTES # BLD AUTO: 2.52 10*3/MM3 (ref 0.7–3.1)
LYMPHOCYTES ABSOLUTE: 2.1 K/UL (ref 1.1–4.5)
LYMPHOCYTES NFR BLD AUTO: 19.6 % (ref 19.6–45.3)
LYMPHOCYTES RELATIVE PERCENT: 14.3 % (ref 20–40)
MCH RBC QN AUTO: 31.1 PG (ref 27–31)
MCH RBC QN AUTO: 31.3 PG (ref 26.6–33)
MCHC RBC AUTO-ENTMCNC: 35.3 G/DL (ref 33–37)
MCHC RBC AUTO-ENTMCNC: 35.8 G/DL (ref 31.5–35.7)
MCV RBC AUTO: 87.4 FL (ref 79–97)
MCV RBC AUTO: 88.2 FL (ref 81–99)
METHADONE UR QL SCN: NEGATIVE
MONOCYTES # BLD AUTO: 0.58 10*3/MM3 (ref 0.1–0.9)
MONOCYTES ABSOLUTE: 1.2 K/UL (ref 0–0.9)
MONOCYTES NFR BLD AUTO: 4.5 % (ref 5–12)
MONOCYTES RELATIVE PERCENT: 8.4 % (ref 0–10)
NEUTROPHILS ABSOLUTE: 10.9 K/UL (ref 1.5–7.5)
NEUTROPHILS NFR BLD AUTO: 75.1 % (ref 42.7–76)
NEUTROPHILS NFR BLD AUTO: 9.64 10*3/MM3 (ref 1.7–7)
NEUTROPHILS RELATIVE PERCENT: 75.9 % (ref 50–65)
NITRITE UR QL STRIP: NEGATIVE
NRBC BLD AUTO-RTO: 0 /100 WBC (ref 0–0.2)
OPIATES UR QL: NEGATIVE
OXYCODONE UR QL SCN: NEGATIVE
PCP UR QL SCN: NEGATIVE
PDW BLD-RTO: 12.9 % (ref 11.5–14.5)
PH UR STRIP.AUTO: 5.5 [PH] (ref 5–8)
PLATELET # BLD AUTO: 369 10*3/MM3 (ref 140–450)
PLATELET # BLD: 360 K/UL (ref 130–400)
PMV BLD AUTO: 10.4 FL (ref 6–12)
PMV BLD AUTO: 9.7 FL (ref 9.4–12.3)
POTASSIUM SERPL-SCNC: 3.3 MMOL/L (ref 3.5–5.2)
PROPOXYPH UR QL: NEGATIVE
PROT SERPL-MCNC: 8.1 G/DL (ref 6–8.5)
PROT UR QL STRIP: ABNORMAL
PROTHROMBIN TIME: 13.9 SECONDS (ref 11.5–13.4)
RBC # BLD AUTO: 4.85 10*6/MM3 (ref 3.77–5.28)
RBC # BLD: 4.85 M/UL (ref 4.2–5.4)
RBC # UR: ABNORMAL /HPF
REF LAB TEST METHOD: ABNORMAL
SODIUM SERPL-SCNC: 137 MMOL/L (ref 136–145)
SP GR UR STRIP: >1.03 (ref 1–1.03)
SQUAMOUS #/AREA URNS HPF: ABNORMAL /HPF
TRICYCLICS UR QL SCN: NEGATIVE
UROBILINOGEN UR QL STRIP: ABNORMAL
WBC # BLD AUTO: 12.84 10*3/MM3 (ref 3.4–10.8)
WBC # BLD: 14.3 K/UL (ref 4.8–10.8)
WBC UR QL AUTO: ABNORMAL /HPF
WHOLE BLOOD HOLD SPECIMEN: NORMAL

## 2021-08-15 PROCEDURE — 96375 TX/PRO/DX INJ NEW DRUG ADDON: CPT

## 2021-08-15 PROCEDURE — 81001 URINALYSIS AUTO W/SCOPE: CPT | Performed by: FAMILY MEDICINE

## 2021-08-15 PROCEDURE — 80053 COMPREHEN METABOLIC PANEL: CPT | Performed by: FAMILY MEDICINE

## 2021-08-15 PROCEDURE — 6360000002 HC RX W HCPCS: Performed by: PEDIATRICS

## 2021-08-15 PROCEDURE — 84703 CHORIONIC GONADOTROPIN ASSAY: CPT | Performed by: FAMILY MEDICINE

## 2021-08-15 PROCEDURE — 74177 CT ABD & PELVIS W/CONTRAST: CPT

## 2021-08-15 PROCEDURE — 85025 COMPLETE CBC W/AUTO DIFF WBC: CPT | Performed by: FAMILY MEDICINE

## 2021-08-15 PROCEDURE — 25010000002 FENTANYL CITRATE (PF) 50 MCG/ML SOLUTION: Performed by: FAMILY MEDICINE

## 2021-08-15 PROCEDURE — 80306 DRUG TEST PRSMV INSTRMNT: CPT | Performed by: FAMILY MEDICINE

## 2021-08-15 PROCEDURE — 85610 PROTHROMBIN TIME: CPT | Performed by: FAMILY MEDICINE

## 2021-08-15 PROCEDURE — 25010000002 IOPAMIDOL 61 % SOLUTION: Performed by: FAMILY MEDICINE

## 2021-08-15 PROCEDURE — 83690 ASSAY OF LIPASE: CPT | Performed by: FAMILY MEDICINE

## 2021-08-15 PROCEDURE — 85730 THROMBOPLASTIN TIME PARTIAL: CPT | Performed by: FAMILY MEDICINE

## 2021-08-15 PROCEDURE — 25010000002 ONDANSETRON PER 1 MG: Performed by: FAMILY MEDICINE

## 2021-08-15 PROCEDURE — 83605 ASSAY OF LACTIC ACID: CPT | Performed by: FAMILY MEDICINE

## 2021-08-15 PROCEDURE — 99283 EMERGENCY DEPT VISIT LOW MDM: CPT

## 2021-08-15 PROCEDURE — 86140 C-REACTIVE PROTEIN: CPT | Performed by: FAMILY MEDICINE

## 2021-08-15 PROCEDURE — 96374 THER/PROPH/DIAG INJ IV PUSH: CPT

## 2021-08-15 RX ORDER — FENTANYL CITRATE 50 UG/ML
75 INJECTION, SOLUTION INTRAMUSCULAR; INTRAVENOUS ONCE
Status: COMPLETED | OUTPATIENT
Start: 2021-08-15 | End: 2021-08-15

## 2021-08-15 RX ORDER — ONDANSETRON 2 MG/ML
4 INJECTION INTRAMUSCULAR; INTRAVENOUS ONCE
Status: COMPLETED | OUTPATIENT
Start: 2021-08-15 | End: 2021-08-15

## 2021-08-15 RX ORDER — SODIUM CHLORIDE 0.9 % (FLUSH) 0.9 %
10 SYRINGE (ML) INJECTION AS NEEDED
Status: DISCONTINUED | OUTPATIENT
Start: 2021-08-15 | End: 2021-08-15 | Stop reason: HOSPADM

## 2021-08-15 RX ORDER — FENTANYL CITRATE 50 UG/ML
100 INJECTION, SOLUTION INTRAMUSCULAR; INTRAVENOUS ONCE
Status: COMPLETED | OUTPATIENT
Start: 2021-08-15 | End: 2021-08-15

## 2021-08-15 RX ORDER — SODIUM CHLORIDE 9 MG/ML
125 INJECTION, SOLUTION INTRAVENOUS CONTINUOUS
Status: DISCONTINUED | OUTPATIENT
Start: 2021-08-15 | End: 2021-08-15 | Stop reason: HOSPADM

## 2021-08-15 RX ADMIN — FENTANYL CITRATE 100 MCG: 50 INJECTION INTRAMUSCULAR; INTRAVENOUS at 23:44

## 2021-08-15 RX ADMIN — IOPAMIDOL 100 ML: 612 INJECTION, SOLUTION INTRAVENOUS at 01:23

## 2021-08-15 RX ADMIN — ONDANSETRON 4 MG: 2 INJECTION INTRAMUSCULAR; INTRAVENOUS at 00:56

## 2021-08-15 RX ADMIN — SODIUM CHLORIDE 1000 ML: 9 INJECTION, SOLUTION INTRAVENOUS at 00:55

## 2021-08-15 RX ADMIN — FENTANYL CITRATE 75 MCG: 50 INJECTION, SOLUTION INTRAMUSCULAR; INTRAVENOUS at 00:56

## 2021-08-15 RX ADMIN — ONDANSETRON HYDROCHLORIDE 4 MG: 2 SOLUTION INTRAMUSCULAR; INTRAVENOUS at 23:44

## 2021-08-15 ASSESSMENT — PAIN SCALES - GENERAL: PAINLEVEL_OUTOF10: 10

## 2021-08-15 ASSESSMENT — PAIN DESCRIPTION - LOCATION: LOCATION: ABDOMEN

## 2021-08-15 ASSESSMENT — PAIN DESCRIPTION - DESCRIPTORS: DESCRIPTORS: SHARP

## 2021-08-15 ASSESSMENT — PAIN DESCRIPTION - ORIENTATION: ORIENTATION: RIGHT

## 2021-08-15 NOTE — ED PROVIDER NOTES
Subjective   Ms. Ibarra is a 31-year-old female who has had a cholecystectomy approximately 3 weeks ago over Psychiatric.  She is presenting tonight because of complaints of severe abdominal pain she was crying on the stretcher she was brought in and had a major breakdown during the evaluation and was yelling and screaming.  Eventually she was able to tell me that she has no fever and gave a convoluted story about trying to see her surgeon and her primary care physician.  She is also being seen at [facility] where they did imaging studies and labs and discharged her home because there was nothing concrete found.          Review of Systems   Gastrointestinal: Positive for abdominal pain.   All other systems reviewed and are negative.      Past Medical History:   Diagnosis Date   • Abscess of axilla    • Depression    • Hypertension    • Ovarian cyst        Allergies   Allergen Reactions   • Tramadol Rash       Past Surgical History:   Procedure Laterality Date   • NO PAST SURGERIES         No family history on file.    Social History     Socioeconomic History   • Marital status: Single     Spouse name: Not on file   • Number of children: Not on file   • Years of education: Not on file   • Highest education level: Not on file   Tobacco Use   • Smoking status: Current Every Day Smoker     Packs/day: 0.50     Years: 12.00     Pack years: 6.00   • Smokeless tobacco: Never Used   Substance and Sexual Activity   • Alcohol use: No   • Drug use: No   • Sexual activity: Defer           Objective   Physical Exam  Vitals and nursing note reviewed.   Constitutional:       Appearance: She is well-developed.   HENT:      Head: Normocephalic and atraumatic.      Right Ear: External ear normal.      Left Ear: External ear normal.      Nose: Nose normal.      Mouth/Throat:      Mouth: Mucous membranes are moist.      Pharynx: Oropharynx is clear.   Eyes:      Conjunctiva/sclera: Conjunctivae normal.   Cardiovascular:      Rate and  Rhythm: Normal rate and regular rhythm.      Heart sounds: Normal heart sounds.   Pulmonary:      Effort: Pulmonary effort is normal.      Breath sounds: Normal breath sounds.   Abdominal:      General: Bowel sounds are normal.      Palpations: Abdomen is soft.   Musculoskeletal:         General: Normal range of motion.      Cervical back: Normal range of motion and neck supple.   Skin:     General: Skin is warm and dry.      Capillary Refill: Capillary refill takes less than 2 seconds.   Neurological:      Mental Status: She is alert and oriented to person, place, and time.   Psychiatric:         Mood and Affect: Affect is labile, angry, tearful and inappropriate.         Speech: Speech is rapid and pressured.         Behavior: Behavior normal.         Thought Content: Thought content normal.         Judgment: Judgment normal.         Procedures           ED Course                                           MDM    Final diagnoses:   Abdominal pain, unspecified abdominal location       ED Disposition  ED Disposition     ED Disposition Condition Comment    Discharge Stable           Dandy Manuel MD  [address] [facility] [place] [address]  [phone]               Medication List      No changes were made to your prescriptions during this visit.       The patient's work-up included essentially normal labs, UDS that was positive for cocaine and a CAT scan that did not show anything other than expected surgical changes.  The patient was discharged home to follow-up with her surgeon or primary care physician.  I did advise her to stay away from cocaine as this could not help the situation in any way.     Alphonso Lewis MD  08/15/21 9146

## 2021-08-16 ENCOUNTER — APPOINTMENT (OUTPATIENT)
Dept: CT IMAGING | Age: 31
End: 2021-08-16
Payer: COMMERCIAL

## 2021-08-16 VITALS
HEART RATE: 69 BPM | BODY MASS INDEX: 35.16 KG/M2 | SYSTOLIC BLOOD PRESSURE: 159 MMHG | TEMPERATURE: 98.2 F | HEIGHT: 67 IN | RESPIRATION RATE: 20 BRPM | WEIGHT: 224 LBS | OXYGEN SATURATION: 97 % | DIASTOLIC BLOOD PRESSURE: 74 MMHG

## 2021-08-16 LAB
ALBUMIN SERPL-MCNC: 4.4 G/DL (ref 3.5–5.2)
ALP BLD-CCNC: 71 U/L (ref 35–104)
ALT SERPL-CCNC: 16 U/L (ref 5–33)
ANION GAP SERPL CALCULATED.3IONS-SCNC: 12 MMOL/L (ref 7–19)
AST SERPL-CCNC: 17 U/L (ref 5–32)
BACTERIA: NEGATIVE /HPF
BILIRUB SERPL-MCNC: 0.3 MG/DL (ref 0.2–1.2)
BILIRUBIN URINE: NEGATIVE
BLOOD, URINE: ABNORMAL
BUN BLDV-MCNC: 12 MG/DL (ref 6–20)
CALCIUM SERPL-MCNC: 9.2 MG/DL (ref 8.6–10)
CHLORIDE BLD-SCNC: 99 MMOL/L (ref 98–111)
CLARITY: CLEAR
CO2: 28 MMOL/L (ref 22–29)
COLOR: YELLOW
CREAT SERPL-MCNC: 0.8 MG/DL (ref 0.5–0.9)
CRYSTALS, UA: ABNORMAL /HPF
EKG P AXIS: 57 DEGREES
EKG P-R INTERVAL: 140 MS
EKG Q-T INTERVAL: 352 MS
EKG QRS DURATION: 80 MS
EKG QTC CALCULATION (BAZETT): 420 MS
EKG T AXIS: 49 DEGREES
EPITHELIAL CELLS, UA: ABNORMAL /HPF
GFR AFRICAN AMERICAN: >59
GFR NON-AFRICAN AMERICAN: >60
GLUCOSE BLD-MCNC: 128 MG/DL (ref 74–109)
GLUCOSE URINE: 100 MG/DL
HCG QUALITATIVE: NEGATIVE
KETONES, URINE: 15 MG/DL
LEUKOCYTE ESTERASE, URINE: NEGATIVE
LIPASE: 30 U/L (ref 13–60)
MUCUS: ABNORMAL /LPF
NITRITE, URINE: NEGATIVE
PH UA: 7.5 (ref 5–8)
POTASSIUM REFLEX MAGNESIUM: 3.6 MMOL/L (ref 3.5–5)
PROTEIN UA: NEGATIVE MG/DL
SODIUM BLD-SCNC: 139 MMOL/L (ref 136–145)
SPECIFIC GRAVITY UA: >=1.045 (ref 1–1.03)
TOTAL PROTEIN: 7.9 G/DL (ref 6.6–8.7)
TROPONIN: <0.01 NG/ML (ref 0–0.03)
UROBILINOGEN, URINE: 1 E.U./DL
WBC UA: ABNORMAL /HPF (ref 0–5)

## 2021-08-16 PROCEDURE — 36415 COLL VENOUS BLD VENIPUNCTURE: CPT

## 2021-08-16 PROCEDURE — 84484 ASSAY OF TROPONIN QUANT: CPT

## 2021-08-16 PROCEDURE — 84703 CHORIONIC GONADOTROPIN ASSAY: CPT

## 2021-08-16 PROCEDURE — 81001 URINALYSIS AUTO W/SCOPE: CPT

## 2021-08-16 PROCEDURE — 83690 ASSAY OF LIPASE: CPT

## 2021-08-16 PROCEDURE — 80053 COMPREHEN METABOLIC PANEL: CPT

## 2021-08-16 PROCEDURE — 85025 COMPLETE CBC W/AUTO DIFF WBC: CPT

## 2021-08-16 PROCEDURE — 74177 CT ABD & PELVIS W/CONTRAST: CPT

## 2021-08-16 PROCEDURE — 96375 TX/PRO/DX INJ NEW DRUG ADDON: CPT

## 2021-08-16 PROCEDURE — 6360000002 HC RX W HCPCS: Performed by: PEDIATRICS

## 2021-08-16 PROCEDURE — 6360000004 HC RX CONTRAST MEDICATION: Performed by: PEDIATRICS

## 2021-08-16 RX ORDER — OMEPRAZOLE 20 MG/1
20 CAPSULE, DELAYED RELEASE ORAL
Qty: 30 CAPSULE | Refills: 0 | Status: SHIPPED | OUTPATIENT
Start: 2021-08-16 | End: 2022-08-12

## 2021-08-16 RX ORDER — KETOROLAC TROMETHAMINE 30 MG/ML
15 INJECTION, SOLUTION INTRAMUSCULAR; INTRAVENOUS ONCE
Status: COMPLETED | OUTPATIENT
Start: 2021-08-16 | End: 2021-08-16

## 2021-08-16 RX ADMIN — IOPAMIDOL 90 ML: 755 INJECTION, SOLUTION INTRAVENOUS at 02:10

## 2021-08-16 RX ADMIN — KETOROLAC TROMETHAMINE 15 MG: 30 INJECTION, SOLUTION INTRAMUSCULAR; INTRAVENOUS at 04:03

## 2021-08-16 ASSESSMENT — PAIN SCALES - GENERAL: PAINLEVEL_OUTOF10: 8

## 2021-08-16 NOTE — ED NOTES
RN has explained Narcotic Policy to patient. Patient understands that they are not allowed to operate a motor vehicle for a minimum of 4 hours after administration. Patient is aware and understands that law enforcement will be contacted if the patient attempts to operate a motor vehicle prior to the stated 4 hours.       Carol Rosales RN  08/16/21 3730

## 2021-08-16 NOTE — ED NOTES
Patient crying again stating her abd is hurting.      Dr. Lucie Yin updated     Alberto Adams, PAYTON  08/16/21 220 Lynn Rainey RN  08/16/21 0966

## 2021-08-22 ASSESSMENT — ENCOUNTER SYMPTOMS
SHORTNESS OF BREATH: 0
EYE DISCHARGE: 0
RHINORRHEA: 0
BACK PAIN: 0
COUGH: 0
COLOR CHANGE: 0

## 2021-08-22 NOTE — ED PROVIDER NOTES
[facility] EMERGENCY DEPT  eMERGENCY dEPARTMENT eNCOUnter      Pt Name: Deloras Kawasaki  MRN: [id number]  [place] 1990  Date of evaluation: 8/15/2021  Provider: Shannon Claros MD    [address] [place] [address]       Chief Complaint   Patient presents with    Abdominal Pain     gallbladder removed 3 weeks ago           HISTORY OF PRESENT ILLNESS   (Location/Symptom, Timing/Onset,Context/Setting, Quality, Duration, Modifying Factors, Severity)  Note limiting factors. Deloras Kawasaki is a 32 y.o. female who presents to the emergency department with abdominal pain. Pain began approximately 3 weeks ago following a cholecystectomy by Dr. Dara Martin, general surgery. Patient describes the pain as \"sharp\". Pain is located in the right upper quadrant without radiation. Patient does not note any exacerbating or alleviating factors. Patient has had vomiting and diarrhea x4 days. Patient denies cough, congestion, fever, urinary frequency, dysuria, or difficulty urinating. HPI    NursingNotes were reviewed. REVIEW OF SYSTEMS    (2-9 systems for level 4, 10 or more for level 5)     Review of Systems   Constitutional: Negative for chills and fever. HENT: Negative for congestion and rhinorrhea. Eyes: Negative for discharge. Respiratory: Negative for cough and shortness of breath. Cardiovascular: Negative for chest pain and palpitations. Gastrointestinal: Positive for abdominal pain, blood in stool, diarrhea and vomiting. Genitourinary: Negative for difficulty urinating and dysuria. Musculoskeletal: Negative for back pain and neck pain. Skin: Negative for color change and pallor. Neurological: Negative for syncope and light-headedness. Psychiatric/Behavioral: Negative for agitation and confusion. All other systems reviewed and are negative.            PAST MEDICALHISTORY     Past Medical History:   Diagnosis Date    Asthma     Obese     Ovarian cyst          SURGICAL HISTORY       Past Surgical History:   Procedure Laterality Date    CHOLECYSTECTOMY, LAPAROSCOPIC N/A 7/26/2021    CHOLECYSTECTOMY LAPAROSCOPIC performed by Diane Galindo MD at [address] N/A 06/07/2021    Dr Ranjit Marin yr (age 39) recall    UPPER GASTROINTESTINAL ENDOSCOPY N/A 06/07/2021    Dr Alvarado [facility]     Discharge Medication List as of 8/16/2021  4:00 AM      CONTINUE these medications which have NOT CHANGED    Details   metroNIDAZOLE (FLAGYL) 500 MG tablet TAKE 1 TABLET BY MOUTH 2 (TWO) TIMES A DAY FOR 7 DAYS. Historical Med      QUEtiapine (SEROQUEL) 300 MG tablet Historical Med      promethazine (PHENERGAN) 25 MG tablet Take 25 mg by mouth every 6 hours as neededHistorical Med      metoclopramide (REGLAN) 10 MG tablet Take 1 tablet by mouth 3 times daily for 3 days, Disp-9 tablet, R-0Normal      naproxen (NAPROSYN) 500 MG tablet Take 1 tablet by mouth 2 times daily as needed for Pain Take with food, Disp-20 tablet, R-0Print             ALLERGIES     Other, Tylenol [acetaminophen], Carafate [sucralfate], and Tramadol    FAMILY HISTORY       Family History   Problem Relation Age of Onset    Diabetes Mother     High Blood Pressure Mother     Diabetes Maternal Grandmother     Diabetes Sister     Colon Cancer Neg Hx     Colon Polyps Neg Hx     Esophageal Cancer Neg Hx     Liver Cancer Neg Hx     Rectal Cancer Neg Hx     Stomach Cancer Neg Hx           SOCIAL HISTORY       Social History     Socioeconomic History    Marital status: Single     Spouse name: Not on file    Number of children: Not on file    Years of education: Not on file    Highest education level: Not on file   Occupational History    Not on file   Tobacco Use    Smoking status: Current Every Day Smoker     Packs/day: 0.50     Years: 10.00     Pack years: 5.00     Types: Cigarettes    Smokeless tobacco: Never Used   Vaping Use    Vaping Use: Never used   Substance and Sexual Activity    Alcohol use:  Yes Alcohol/week: 0.0 standard drinks     Comment: occ    Drug use: Yes     Comment: Spice     Sexual activity: Yes     Partners: Male, Female   Other Topics Concern    Not on file   Social History Narrative    Not on file     Social Determinants of Health     Financial Resource Strain:     Difficulty of Paying Living Expenses:    Food Insecurity:     Worried About Running Out of Food in the Last Year:     [address] in the Last Year:    Transportation Needs:     Lack of Transportation (Medical):  Lack of Transportation (Non-Medical):    Physical Activity:     Days of Exercise per Week:     Minutes of Exercise per Session:    Stress:     Feeling of Stress :    Social Connections:     Frequency of Communication with Friends and Family:     Frequency of Social Gatherings with Friends and Family:     Attends [employer] Services:     Active Member of Clubs or Organizations:     Attends Club or Organization Meetings:     Marital Status:    Intimate Partner Violence:     Fear of Current or Ex-Partner:     Emotionally Abused:     Physically Abused:     Sexually Abused:        SCREENINGS    Rob Coma Scale  Eye Opening: Spontaneous  Best Verbal Response: Oriented  Best Motor Response: Obeys commands  [place] Coma Scale Score: 15        PHYSICAL EXAM    (up to 7 for level 4, 8 or more for level 5)     ED Triage Vitals   BP Temp Temp Source Pulse Resp SpO2 Height Weight   08/15/21 2332 08/15/21 2332 08/15/21 2332 08/15/21 2332 08/16/21 0252 08/15/21 2332 08/15/21 2332 08/15/21 2332   (!) 151/98 98.3 °F (36.8 °C) Infrared 91 20 96 % 5' 7\" (1.702 m) 224 lb (101.6 kg)       Physical Exam  Vitals and nursing note reviewed. Constitutional:       General: She is not in acute distress. Appearance: She is obese. HENT:      Head: Normocephalic and atraumatic.       Right Ear: External ear normal.      Left Ear: External ear normal.      Nose: Nose normal.      Mouth/Throat:      Mouth: Mucous membranes are moist.      Pharynx: Oropharynx is clear. No oropharyngeal exudate. Eyes:      General: No scleral icterus. Conjunctiva/sclera: Conjunctivae normal.      Pupils: Pupils are equal, round, and reactive to light. Cardiovascular:      Rate and Rhythm: Normal rate and regular rhythm. Pulses: Normal pulses. Heart sounds: Normal heart sounds. Pulmonary:      Effort: Pulmonary effort is normal.      Breath sounds: Normal breath sounds. Abdominal:      General: Bowel sounds are normal. There is no distension. Palpations: Abdomen is soft. Tenderness: There is abdominal tenderness (Tenderness to palpation right upper quadrant. ). There is no guarding or rebound. Musculoskeletal:         General: No tenderness or deformity. Cervical back: Neck supple. No rigidity. Skin:     General: Skin is warm and dry. Capillary Refill: Capillary refill takes less than 2 seconds. Coloration: Skin is not jaundiced. Neurological:      General: No focal deficit present. Mental Status: She is alert and oriented to person, place, and time. Mental status is at baseline. Coordination: Coordination normal.   Psychiatric:         Mood and Affect: Mood normal.         Behavior: Behavior normal.         DIAGNOSTIC RESULTS     RADIOLOGY:  Non-plain film images such as CT, Ultrasound and MRI are read by the radiologist. Plain radiographic images are visualized and preliminarily interpreted bythe emergency physician with the below findings:    CT abdomen pelvis with IV contrast dated 8/16/2021 at 0 2:23 AM-preliminary read: Gallbladder surgically absent. Hepatic steatosis. Spleen, pancreas, and adrenal glands are unremarkable. Kidneys, ureters and urinary bladder are unremarkable. Uterus is unremarkable. Left ovarian cyst measuring 3 cm. Appendix is unremarkable. Bowel is unremarkable. No acute osseous abnormality.   Read by Dr. Lynae Nageotte CONTRAST Additional Contrast? None   Final Result   1.  3 cm left ovarian cyst redemonstrated. 2.  Otherwise, no acute abdominal pelvic abnormalities. Signed by Dr Ronald Greenberg:  Labs Reviewed   CBC WITH AUTO DIFFERENTIAL - Abnormal; Notable for the following components:       Result Value    WBC 14.3 (*)     MCH 31.1 (*)     Neutrophils % 75.9 (*)     Lymphocytes % 14.3 (*)     Neutrophils Absolute 10.9 (*)     Monocytes Absolute 1.20 (*)     All other components within normal limits   COMPREHENSIVE METABOLIC PANEL W/ REFLEX TO MG FOR LOW K - Abnormal; Notable for the following components:    Glucose 128 (*)     All other components within normal limits   URINE RT REFLEX TO CULTURE - Abnormal; Notable for the following components:    Glucose, Ur 100 (*)     Ketones, Urine 15 (*)     Blood, Urine TRACE (*)     All other components within normal limits   MICROSCOPIC URINALYSIS - Abnormal; Notable for the following components:    Mucus, UA Rare (*)     Bacteria, UA NEGATIVE (*)     Crystals, UA NEG (*)     All other components within normal limits   LIPASE   TROPONIN   HCG, SERUM, QUALITATIVE       All other labs were within normal range or not returned as of this dictation. EMERGENCY DEPARTMENT COURSE and DIFFERENTIAL DIAGNOSIS/MDM:   Vitals:    Vitals:    08/15/21 2332 08/16/21 0252 08/16/21 0408   BP: (!) 151/98 (!) 180/88 (!) 159/74   Pulse: 91 70 69   Resp:  20    Temp: 98.3 °F (36.8 °C)  98.2 °F (36.8 °C)   TempSrc: Infrared  Temporal   SpO2: 96% 98% 97%   Weight: 224 lb (101.6 kg)     Height: 5' 7\" (1.702 m)         MDM  77-year-old female presents with abdominal pain status post cholecystectomy 3 weeks ago. Lab and radiology results reviewed. Patient will follow up with Dr. Edwin Turner, general surgery. She will also follow-up with her primary care provider or [facility]. Patient will be following up with Dr. Nina Patel, GI specialist as well.   Patient will return with increasing or severe pain, persistent vomiting, or other concerns. CONSULTS:  None    PROCEDURES:  Unless otherwise noted below, none     Procedures    FINAL IMPRESSION      1. Generalized abdominal pain    2. Left ovarian cyst          DISPOSITION/PLAN   DISPOSITION Decision To Discharge 08/16/2021 03:53:05 AM      PATIENT REFERRED TO:  Radha Oconnor MD  Raad Moreno  [phone]    Schedule an appointment as soon as possible for a visit       Frandy Bernal [id number] [phone]  Schedule an appointment as soon as possible for a visit         DISCHARGE MEDICATIONS:  Discharge Medication List as of 8/16/2021  4:00 AM      START taking these medications    Details   omeprazole (PRILOSEC) 20 MG delayed release capsule Take 1 capsule by mouth every morning (before breakfast), Disp-30 capsule, R-0Normal                (Please note that portions of this note were completed with a voice recognition program.  Efforts were made to edit thedictations but occasionally words are mis-transcribed.)    Kitty Mcgregor MD (electronically signed)  Attending Emergency Physician          Kitty Mcgregor MD  08/26/21 2872

## 2021-08-23 ASSESSMENT — ENCOUNTER SYMPTOMS
ABDOMINAL DISTENTION: 0
VOMITING: 0
SHORTNESS OF BREATH: 0
ABDOMINAL PAIN: 1
WHEEZING: 0
NAUSEA: 0

## 2021-08-24 PROCEDURE — U0004 COV-19 TEST NON-CDC HGH THRU: HCPCS | Performed by: NURSE PRACTITIONER

## 2021-08-26 ASSESSMENT — ENCOUNTER SYMPTOMS
VOMITING: 1
DIARRHEA: 1
BLOOD IN STOOL: 1
ABDOMINAL PAIN: 1

## 2021-09-10 ENCOUNTER — TELEPHONE (OUTPATIENT)
Dept: GASTROENTEROLOGY | Age: 31
End: 2021-09-10

## 2021-09-10 DIAGNOSIS — R94.8 ABNORMAL BILIARY HIDA SCAN: Primary | ICD-10-CM

## 2021-09-10 NOTE — TELEPHONE ENCOUNTER
09-10-21 Called spoke with the patient she is still having problems and does want to proceed with the referral to [facility] Surgery as per [facility]. New Referral order put in Epic.        Referral   [place] [id number] General Surgery   Date: 07-16-21  For Abnormal Biliary HIDA scan

## 2021-09-15 ENCOUNTER — OFFICE VISIT (OUTPATIENT)
Dept: SURGERY | Age: 31
End: 2021-09-15
Payer: COMMERCIAL

## 2021-09-15 VITALS
SYSTOLIC BLOOD PRESSURE: 134 MMHG | HEART RATE: 71 BPM | TEMPERATURE: 97.1 F | DIASTOLIC BLOOD PRESSURE: 87 MMHG | WEIGHT: 231 LBS | HEIGHT: 67 IN | BODY MASS INDEX: 36.26 KG/M2

## 2021-09-15 DIAGNOSIS — K59.00 OBSTIPATION: Primary | ICD-10-CM

## 2021-09-15 PROCEDURE — 99212 OFFICE O/P EST SF 10 MIN: CPT | Performed by: PHYSICIAN ASSISTANT

## 2021-09-16 NOTE — PROGRESS NOTES
Subjective  Patient presents today with complaints of abdominal pain and nausea. She is status post laparoscopic cholecystectomy by Dr. America Bourne for a calculus cholecystitis. Pathology report revealed mild chronic cholecystitis. Of note as well, she has had a history of recent endoscopy by Dr. Jasper Ashley which was negative she is also had colonoscopy. She is been to the emergency department twice with abdominal pain. States her pain can happen if she is eating or if she is not eating. She is also has some associated nausea. She reports she has been on Reglan and omeprazole but she is currently not taking any of these medications. Patient Active Problem List    Diagnosis Date Noted    Hidradenitis suppurativa of right axilla 01/03/2019    Infertility, female, primary 06/30/2016     Current Outpatient Medications   Medication Sig Dispense Refill    omeprazole (PRILOSEC) 20 MG delayed release capsule Take 1 capsule by mouth every morning (before breakfast) 30 capsule 0    metroNIDAZOLE (FLAGYL) 500 MG tablet TAKE 1 TABLET BY MOUTH 2 (TWO) TIMES A DAY FOR 7 DAYS.  QUEtiapine (SEROQUEL) 300 MG tablet       promethazine (PHENERGAN) 25 MG tablet Take 25 mg by mouth every 6 hours as needed      metoclopramide (REGLAN) 10 MG tablet Take 1 tablet by mouth 3 times daily for 3 days 9 tablet 0    naproxen (NAPROSYN) 500 MG tablet Take 1 tablet by mouth 2 times daily as needed for Pain Take with food 20 tablet 0     No current facility-administered medications for this visit. Allergies:  Other, Tylenol [acetaminophen], Carafate [sucralfate], and Tramadol  Past Medical History:   Diagnosis Date    Asthma     Obese     Ovarian cyst      Past Surgical History:   Procedure Laterality Date    CHOLECYSTECTOMY, LAPAROSCOPIC N/A 7/26/2021    CHOLECYSTECTOMY LAPAROSCOPIC performed by Sangeetha Casas MD at [address]  N/A 06/07/2021    Dr Lalo craig (age 39) recall    UPPER GASTROINTESTINAL ENDOSCOPY N/A 06/07/2021    Dr Yessy Jeong-Gastritis     Family History   Problem Relation Age of Onset    Diabetes Mother     High Blood Pressure Mother     Diabetes Maternal Grandmother     Diabetes Sister     Colon Cancer Neg Hx     Colon Polyps Neg Hx     Esophageal Cancer Neg Hx     Liver Cancer Neg Hx     Rectal Cancer Neg Hx     Stomach Cancer Neg Hx      Social History     Tobacco Use    Smoking status: Current Every Day Smoker     Packs/day: 0.50     Years: 10.00     Pack years: 5.00     Types: Cigarettes    Smokeless tobacco: Never Used   Substance Use Topics    Alcohol use: Yes     Alcohol/week: 0.0 standard drinks     Comment: occ     Lab  Chemistry profile was reviewed from her recent ED visit that showed her LFTs to be normal.      CT scan reviewed  It does demonstrate significant obstipation to my evaluation  . Review of systems  Reviewed and positive for the above all is as noted be negative    Exam  Blood pressure 134/87, pulse 71, temperature 97.1 °F (36.2 °C), temperature source Temporal, height 5' 7\" (1.702 m), weight 231 lb (104.8 kg), not currently breastfeeding. On examination, she has some abdominal pain and right upper quadrant. There are no palpable masses. There is no guarding or rebound tenderness. Impression  Abdominal pain, obstipation    Plan  I have recommended the patient take MiraLAX to restore bowel function. I have also recommended that she resume her omeprazole and Prilosec which is been prescribed by the gastroenterology service. I do not see a surgery related problem. I will plan to see patient back in office on a as needed basis.

## 2021-10-06 ENCOUNTER — HOSPITAL ENCOUNTER (EMERGENCY)
Facility: HOSPITAL | Age: 31
Discharge: LEFT BEFORE DISCHARGE | End: 2021-10-06
Admitting: FAMILY MEDICINE

## 2021-10-06 ENCOUNTER — APPOINTMENT (OUTPATIENT)
Dept: CT IMAGING | Facility: HOSPITAL | Age: 31
End: 2021-10-06

## 2021-10-06 VITALS
TEMPERATURE: 97.9 F | HEIGHT: 67 IN | OXYGEN SATURATION: 99 % | RESPIRATION RATE: 20 BRPM | DIASTOLIC BLOOD PRESSURE: 73 MMHG | HEART RATE: 57 BPM | BODY MASS INDEX: 36.1 KG/M2 | WEIGHT: 230 LBS | SYSTOLIC BLOOD PRESSURE: 131 MMHG

## 2021-10-06 DIAGNOSIS — R11.2 NAUSEA AND VOMITING, INTRACTABILITY OF VOMITING NOT SPECIFIED, UNSPECIFIED VOMITING TYPE: ICD-10-CM

## 2021-10-06 DIAGNOSIS — R10.84 GENERALIZED ABDOMINAL PAIN: Primary | ICD-10-CM

## 2021-10-06 DIAGNOSIS — F19.10 SUBSTANCE ABUSE (HCC): ICD-10-CM

## 2021-10-06 LAB
ALBUMIN SERPL-MCNC: 4.4 G/DL (ref 3.5–5.2)
ALBUMIN/GLOB SERPL: 1.3 G/DL
ALP SERPL-CCNC: 74 U/L (ref 39–117)
ALT SERPL W P-5'-P-CCNC: 19 U/L (ref 1–33)
AMPHET+METHAMPHET UR QL: NEGATIVE
AMPHETAMINES UR QL: NEGATIVE
ANION GAP SERPL CALCULATED.3IONS-SCNC: 10 MMOL/L (ref 5–15)
AST SERPL-CCNC: 24 U/L (ref 1–32)
BACTERIA UR QL AUTO: ABNORMAL /HPF
BARBITURATES UR QL SCN: NEGATIVE
BASOPHILS # BLD AUTO: 0.03 10*3/MM3 (ref 0–0.2)
BASOPHILS NFR BLD AUTO: 0.4 % (ref 0–1.5)
BENZODIAZ UR QL SCN: NEGATIVE
BILIRUB SERPL-MCNC: 0.2 MG/DL (ref 0–1.2)
BILIRUB UR QL STRIP: NEGATIVE
BUN SERPL-MCNC: 9 MG/DL (ref 6–20)
BUN/CREAT SERPL: 12.9 (ref 7–25)
BUPRENORPHINE SERPL-MCNC: NEGATIVE NG/ML
CALCIUM SPEC-SCNC: 9.4 MG/DL (ref 8.6–10.5)
CANNABINOIDS SERPL QL: POSITIVE
CHLORIDE SERPL-SCNC: 107 MMOL/L (ref 98–107)
CLARITY UR: ABNORMAL
CO2 SERPL-SCNC: 24 MMOL/L (ref 22–29)
COCAINE UR QL: POSITIVE
COD CRY URNS QL: ABNORMAL /HPF
COLOR UR: ABNORMAL
CREAT SERPL-MCNC: 0.7 MG/DL (ref 0.57–1)
DEPRECATED RDW RBC AUTO: 42.5 FL (ref 37–54)
EOSINOPHIL # BLD AUTO: 0.11 10*3/MM3 (ref 0–0.4)
EOSINOPHIL NFR BLD AUTO: 1.5 % (ref 0.3–6.2)
ERYTHROCYTE [DISTWIDTH] IN BLOOD BY AUTOMATED COUNT: 13.5 % (ref 12.3–15.4)
GFR SERPL CREATININE-BSD FRML MDRD: 118 ML/MIN/1.73
GLOBULIN UR ELPH-MCNC: 3.4 GM/DL
GLUCOSE SERPL-MCNC: 126 MG/DL (ref 65–99)
GLUCOSE UR STRIP-MCNC: NEGATIVE MG/DL
HCG SERPL QL: NEGATIVE
HCT VFR BLD AUTO: 42.1 % (ref 34–46.6)
HGB BLD-MCNC: 15.6 G/DL (ref 12–15.9)
HGB UR QL STRIP.AUTO: NEGATIVE
KETONES UR QL STRIP: ABNORMAL
LEUKOCYTE ESTERASE UR QL STRIP.AUTO: NEGATIVE
LIPASE SERPL-CCNC: 24 U/L (ref 13–60)
LYMPHOCYTES # BLD AUTO: 2.04 10*3/MM3 (ref 0.7–3.1)
LYMPHOCYTES NFR BLD AUTO: 26.9 % (ref 19.6–45.3)
MCH RBC QN AUTO: 32.2 PG (ref 26.6–33)
MCHC RBC AUTO-ENTMCNC: 37.1 G/DL (ref 31.5–35.7)
MCV RBC AUTO: 86.8 FL (ref 79–97)
METHADONE UR QL SCN: NEGATIVE
MONOCYTES # BLD AUTO: 0.79 10*3/MM3 (ref 0.1–0.9)
MONOCYTES NFR BLD AUTO: 10.4 % (ref 5–12)
NEUTROPHILS NFR BLD AUTO: 4.57 10*3/MM3 (ref 1.7–7)
NEUTROPHILS NFR BLD AUTO: 60.4 % (ref 42.7–76)
NITRITE UR QL STRIP: NEGATIVE
OPIATES UR QL: NEGATIVE
OXYCODONE UR QL SCN: NEGATIVE
PCP UR QL SCN: NEGATIVE
PH UR STRIP.AUTO: 7 [PH] (ref 5–8)
PLATELET # BLD AUTO: 280 10*3/MM3 (ref 140–450)
PMV BLD AUTO: 10.4 FL (ref 6–12)
POTASSIUM SERPL-SCNC: 3.9 MMOL/L (ref 3.5–5.2)
PROPOXYPH UR QL: NEGATIVE
PROT SERPL-MCNC: 7.8 G/DL (ref 6–8.5)
PROT UR QL STRIP: ABNORMAL
RBC # BLD AUTO: 4.85 10*6/MM3 (ref 3.77–5.28)
RBC # UR: ABNORMAL /HPF
REF LAB TEST METHOD: ABNORMAL
SARS-COV-2 RNA PNL SPEC NAA+PROBE: NOT DETECTED
SODIUM SERPL-SCNC: 141 MMOL/L (ref 136–145)
SP GR UR STRIP: >=1.03 (ref 1–1.03)
SQUAMOUS #/AREA URNS HPF: ABNORMAL /HPF
TRICYCLICS UR QL SCN: NEGATIVE
UROBILINOGEN UR QL STRIP: ABNORMAL
WBC # BLD AUTO: 7.57 10*3/MM3 (ref 3.4–10.8)
WBC UR QL AUTO: ABNORMAL /HPF

## 2021-10-06 PROCEDURE — 81001 URINALYSIS AUTO W/SCOPE: CPT | Performed by: NURSE PRACTITIONER

## 2021-10-06 PROCEDURE — 25010000002 PROCHLORPERAZINE 10 MG/2ML SOLUTION: Performed by: NURSE PRACTITIONER

## 2021-10-06 PROCEDURE — 87635 SARS-COV-2 COVID-19 AMP PRB: CPT | Performed by: NURSE PRACTITIONER

## 2021-10-06 PROCEDURE — 80053 COMPREHEN METABOLIC PANEL: CPT | Performed by: NURSE PRACTITIONER

## 2021-10-06 PROCEDURE — 83690 ASSAY OF LIPASE: CPT | Performed by: NURSE PRACTITIONER

## 2021-10-06 PROCEDURE — 99283 EMERGENCY DEPT VISIT LOW MDM: CPT

## 2021-10-06 PROCEDURE — 96374 THER/PROPH/DIAG INJ IV PUSH: CPT

## 2021-10-06 PROCEDURE — 84703 CHORIONIC GONADOTROPIN ASSAY: CPT | Performed by: NURSE PRACTITIONER

## 2021-10-06 PROCEDURE — 80306 DRUG TEST PRSMV INSTRMNT: CPT | Performed by: NURSE PRACTITIONER

## 2021-10-06 PROCEDURE — 85025 COMPLETE CBC W/AUTO DIFF WBC: CPT | Performed by: NURSE PRACTITIONER

## 2021-10-06 RX ORDER — PROCHLORPERAZINE EDISYLATE 5 MG/ML
10 INJECTION INTRAMUSCULAR; INTRAVENOUS ONCE
Status: COMPLETED | OUTPATIENT
Start: 2021-10-06 | End: 2021-10-06

## 2021-10-06 RX ADMIN — SODIUM CHLORIDE 1000 ML: 9 INJECTION, SOLUTION INTRAVENOUS at 08:59

## 2021-10-06 RX ADMIN — PROCHLORPERAZINE EDISYLATE 10 MG: 5 INJECTION INTRAMUSCULAR; INTRAVENOUS at 09:02

## 2021-10-06 NOTE — ED PROVIDER NOTES
Subjective   Patient is a 31-year-old black female presents the emergency department with abdominal pain and nausea and vomiting for 2 days.  Patient is thrashing all over the bed at this time is difficult to obtain a medical history from her.  She states the pain is gotten worse today.  She has had nausea with several episodes of vomiting.  She denies any vaginal discharge.  She states that there is no way she is pregnant because she is in a homosexual relationship.  He denies any fever or chills      History provided by:  Patient   used: No        Review of Systems   Constitutional: Negative.    HENT: Negative.    Eyes: Negative.    Respiratory: Negative.    Cardiovascular: Negative.    Gastrointestinal:        Patient is a 31-year-old black female presents the emergency department with abdominal pain and nausea and vomiting for 2 days.  Patient is thrashing all over the bed at this time is difficult to obtain a medical history from her.  She states the pain is gotten worse today.  She has had nausea with several episodes of vomiting.  She denies any vaginal discharge.  She states that there is no way she is pregnant because she is in a homosexual relationship.  He denies any fever or chills     Endocrine: Negative.    Genitourinary: Negative.    Musculoskeletal: Negative.    Skin: Negative.    Allergic/Immunologic: Negative.    Neurological: Negative.    Hematological: Negative.    Psychiatric/Behavioral: Negative.    All other systems reviewed and are negative.      Past Medical History:   Diagnosis Date   • Abscess of axilla    • Depression    • Hypertension    • Ovarian cyst        Allergies   Allergen Reactions   • Ibuprofen & Acetaminophen Anaphylaxis   • Sucralfate Nausea And Vomiting   • Tramadol Rash       Past Surgical History:   Procedure Laterality Date   • CHOLECYSTECTOMY     • GALLBLADDER SURGERY  08/2021   • NO PAST SURGERIES         History reviewed. No pertinent family  history.    Social History     Socioeconomic History   • Marital status: Single     Spouse name: Not on file   • Number of children: Not on file   • Years of education: Not on file   • Highest education level: Not on file   Tobacco Use   • Smoking status: Current Every Day Smoker     Packs/day: 0.50     Years: 12.00     Pack years: 6.00     Types: Cigarettes   • Smokeless tobacco: Never Used   Vaping Use   • Vaping Use: Never used   Substance and Sexual Activity   • Alcohol use: No   • Drug use: No   • Sexual activity: Defer           Objective   Physical Exam  Vitals and nursing note reviewed.   Constitutional:       Appearance: She is well-developed.      Comments: Patient thrashing about the bed crying throughout exam difficult to obtain a history   HENT:      Head: Normocephalic and atraumatic.   Eyes:      Conjunctiva/sclera: Conjunctivae normal.      Pupils: Pupils are equal, round, and reactive to light.   Neck:      Thyroid: No thyromegaly.      Trachea: No tracheal deviation.   Cardiovascular:      Rate and Rhythm: Normal rate and regular rhythm.      Heart sounds: Normal heart sounds.   Pulmonary:      Effort: Pulmonary effort is normal. No respiratory distress.      Breath sounds: Normal breath sounds. No wheezing or rales.   Chest:      Chest wall: No tenderness.   Abdominal:      General: Bowel sounds are normal.      Palpations: Abdomen is soft.      Comments: Abdomen is soft, nondistended.  She has moderate tenderness right lower quadrant abdomen.  There is no guarding or rebound   Musculoskeletal:         General: Normal range of motion.      Cervical back: Normal range of motion and neck supple.   Skin:     General: Skin is warm and dry.   Neurological:      Mental Status: She is alert and oriented to person, place, and time.      Cranial Nerves: No cranial nerve deficit.      Deep Tendon Reflexes: Reflexes are normal and symmetric.   Psychiatric:         Behavior: Behavior normal.         Thought  "Content: Thought content normal.         Judgment: Judgment normal.         Procedures           ED Course  ED Course as of Oct 06 1257   Wed Oct 06, 2021   0906 Review of patient's charts noted the patient is seen often here in the emergency room for abdominal pain.  She was seen August 15 as well as August 5 for abdominal pain.  She had a negative CT of the abdomen pelvis on August 15.    [CW]   0939 Pt is wanting to sign out ama because her landlord is \"throwing my stuff out.\" encouraged pt to stay- she advised that she could not at this time. She is alert and able to make own decisions. Advised to return if symptoms worsen .     [CW]      ED Course User Index  [CW] Maria Teresa Mathews, APRN                                           MDM  Number of Diagnoses or Management Options  Generalized abdominal pain: minor  Nausea and vomiting, intractability of vomiting not specified, unspecified vomiting type: minor  Substance abuse (HCC): minor     Amount and/or Complexity of Data Reviewed  Clinical lab tests: ordered and reviewed  Tests in the radiology section of CPT®: ordered and reviewed    Patient Progress  Patient progress: stable      Final diagnoses:   Generalized abdominal pain   Substance abuse (HCC)   Nausea and vomiting, intractability of vomiting not specified, unspecified vomiting type       ED Disposition  ED Disposition     ED Disposition Condition Comment    AMA            No follow-up provider specified.       Medication List      No changes were made to your prescriptions during this visit.          Maria Teresa Mathews, APRN  10/06/21 1257    "

## 2021-10-07 ENCOUNTER — HOSPITAL ENCOUNTER (EMERGENCY)
Facility: HOSPITAL | Age: 31
Discharge: HOME | End: 2021-10-08
Attending: STUDENT IN AN ORGANIZED HEALTH CARE EDUCATION/TRAINING PROGRAM | Admitting: STUDENT IN AN ORGANIZED HEALTH CARE EDUCATION/TRAINING PROGRAM

## 2021-10-07 ENCOUNTER — HOSPITAL ENCOUNTER (EMERGENCY)
Age: 31
Discharge: HOME | End: 2021-10-07
Attending: EMERGENCY MEDICINE
Payer: COMMERCIAL

## 2021-10-07 VITALS
RESPIRATION RATE: 22 BRPM | BODY MASS INDEX: 36.1 KG/M2 | WEIGHT: 230 LBS | SYSTOLIC BLOOD PRESSURE: 180 MMHG | HEIGHT: 67 IN | TEMPERATURE: 97.7 F | DIASTOLIC BLOOD PRESSURE: 127 MMHG | OXYGEN SATURATION: 98 % | HEART RATE: 94 BPM

## 2021-10-07 DIAGNOSIS — R11.2 NON-INTRACTABLE VOMITING WITH NAUSEA, UNSPECIFIED VOMITING TYPE: ICD-10-CM

## 2021-10-07 DIAGNOSIS — Z87.19 HISTORY OF ACUTE CHOLECYSTITIS: ICD-10-CM

## 2021-10-07 DIAGNOSIS — R10.9 RIGHT SIDED ABDOMINAL PAIN: Primary | ICD-10-CM

## 2021-10-07 DIAGNOSIS — R10.84 GENERALIZED ABDOMINAL PAIN: Primary | ICD-10-CM

## 2021-10-07 LAB
ALBUMIN SERPL-MCNC: 4.5 G/DL (ref 3.5–5.2)
ALP BLD-CCNC: 71 U/L (ref 35–104)
ALT SERPL-CCNC: 17 U/L (ref 5–33)
ANION GAP SERPL CALCULATED.3IONS-SCNC: 15 MMOL/L (ref 7–19)
AST SERPL-CCNC: 20 U/L (ref 5–32)
BASOPHILS ABSOLUTE: 0 K/UL (ref 0–0.2)
BASOPHILS RELATIVE PERCENT: 0.3 % (ref 0–1)
BILIRUB SERPL-MCNC: 0.4 MG/DL (ref 0.2–1.2)
BUN BLDV-MCNC: 7 MG/DL (ref 6–20)
CALCIUM SERPL-MCNC: 9.3 MG/DL (ref 8.6–10)
CHLORIDE BLD-SCNC: 103 MMOL/L (ref 98–111)
CO2: 21 MMOL/L (ref 22–29)
CREAT SERPL-MCNC: 0.7 MG/DL (ref 0.5–0.9)
EOSINOPHILS ABSOLUTE: 0 K/UL (ref 0–0.6)
EOSINOPHILS RELATIVE PERCENT: 0.3 % (ref 0–5)
GFR AFRICAN AMERICAN: >59
GFR NON-AFRICAN AMERICAN: >60
GLUCOSE BLD-MCNC: 161 MG/DL (ref 74–109)
HCT VFR BLD CALC: 43.1 % (ref 37–47)
HEMOGLOBIN: 15.3 G/DL (ref 12–16)
IMMATURE GRANULOCYTES #: 0 K/UL
LIPASE: 17 U/L (ref 13–60)
LYMPHOCYTES ABSOLUTE: 1.8 K/UL (ref 1.1–4.5)
LYMPHOCYTES RELATIVE PERCENT: 21.1 % (ref 20–40)
MAGNESIUM: 1.9 MG/DL (ref 1.6–2.6)
MCH RBC QN AUTO: 31.5 PG (ref 27–31)
MCHC RBC AUTO-ENTMCNC: 35.5 G/DL (ref 33–37)
MCV RBC AUTO: 88.9 FL (ref 81–99)
MONOCYTES ABSOLUTE: 0.5 K/UL (ref 0–0.9)
MONOCYTES RELATIVE PERCENT: 5.4 % (ref 0–10)
NEUTROPHILS ABSOLUTE: 6.4 K/UL (ref 1.5–7.5)
NEUTROPHILS RELATIVE PERCENT: 72.7 % (ref 50–65)
PDW BLD-RTO: 13.2 % (ref 11.5–14.5)
PLATELET # BLD: 263 K/UL (ref 130–400)
PMV BLD AUTO: 10 FL (ref 9.4–12.3)
POTASSIUM REFLEX MAGNESIUM: 3.4 MMOL/L (ref 3.5–5)
RBC # BLD: 4.85 M/UL (ref 4.2–5.4)
SODIUM BLD-SCNC: 139 MMOL/L (ref 136–145)
TOTAL PROTEIN: 7.8 G/DL (ref 6.6–8.7)
WBC # BLD: 8.7 K/UL (ref 4.8–10.8)

## 2021-10-07 PROCEDURE — 85025 COMPLETE CBC W/AUTO DIFF WBC: CPT

## 2021-10-07 PROCEDURE — 6370000000 HC RX 637 (ALT 250 FOR IP): Performed by: EMERGENCY MEDICINE

## 2021-10-07 PROCEDURE — 99284 EMERGENCY DEPT VISIT MOD MDM: CPT

## 2021-10-07 PROCEDURE — 6360000002 HC RX W HCPCS: Performed by: EMERGENCY MEDICINE

## 2021-10-07 PROCEDURE — 99283 EMERGENCY DEPT VISIT LOW MDM: CPT

## 2021-10-07 PROCEDURE — 83735 ASSAY OF MAGNESIUM: CPT

## 2021-10-07 PROCEDURE — 83690 ASSAY OF LIPASE: CPT

## 2021-10-07 PROCEDURE — 96375 TX/PRO/DX INJ NEW DRUG ADDON: CPT

## 2021-10-07 PROCEDURE — 80053 COMPREHEN METABOLIC PANEL: CPT

## 2021-10-07 PROCEDURE — 96374 THER/PROPH/DIAG INJ IV PUSH: CPT

## 2021-10-07 PROCEDURE — 36415 COLL VENOUS BLD VENIPUNCTURE: CPT

## 2021-10-07 RX ORDER — DIPHENHYDRAMINE HYDROCHLORIDE 50 MG/ML
25 INJECTION INTRAMUSCULAR; INTRAVENOUS ONCE
Status: COMPLETED | OUTPATIENT
Start: 2021-10-07 | End: 2021-10-07

## 2021-10-07 RX ORDER — PROCHLORPERAZINE EDISYLATE 5 MG/ML
10 INJECTION INTRAMUSCULAR; INTRAVENOUS ONCE
Status: COMPLETED | OUTPATIENT
Start: 2021-10-07 | End: 2021-10-07

## 2021-10-07 RX ORDER — ONDANSETRON 2 MG/ML
4 INJECTION INTRAMUSCULAR; INTRAVENOUS ONCE
Status: COMPLETED | OUTPATIENT
Start: 2021-10-07 | End: 2021-10-07

## 2021-10-07 RX ORDER — METOCLOPRAMIDE 10 MG/1
10 TABLET ORAL 3 TIMES DAILY
Qty: 9 TABLET | Refills: 0 | Status: SHIPPED | OUTPATIENT
Start: 2021-10-07 | End: 2022-06-16

## 2021-10-07 RX ADMIN — ONDANSETRON 4 MG: 2 INJECTION INTRAMUSCULAR; INTRAVENOUS at 07:24

## 2021-10-07 RX ADMIN — PROCHLORPERAZINE EDISYLATE 10 MG: 5 INJECTION INTRAMUSCULAR; INTRAVENOUS at 07:25

## 2021-10-07 RX ADMIN — HYOSCYAMINE SULFATE 125 MCG: 0.12 TABLET ORAL; SUBLINGUAL at 07:25

## 2021-10-07 RX ADMIN — DIPHENHYDRAMINE HYDROCHLORIDE 25 MG: 50 INJECTION, SOLUTION INTRAMUSCULAR; INTRAVENOUS at 07:25

## 2021-10-07 ASSESSMENT — ENCOUNTER SYMPTOMS
SHORTNESS OF BREATH: 0
EYE REDNESS: 0
ABDOMINAL PAIN: 1
DIARRHEA: 0
COUGH: 0
NAUSEA: 1
RHINORRHEA: 0
EYE PAIN: 0
VOMITING: 1
VOICE CHANGE: 0

## 2021-10-07 ASSESSMENT — PAIN SCALES - GENERAL: PAINLEVEL_OUTOF10: 10

## 2021-10-07 NOTE — ED PROVIDER NOTES
[facility] EMERGENCY DEPT  EMERGENCY DEPARTMENT ENCOUNTER      Pt Name: Mikey Garza  MRN: [id number]  [place] 1990  Date of evaluation: 10/7/2021  Provider: Karolina Brenner MD    [address] [place] [address]       Chief Complaint   Patient presents with    Abdominal Pain     pain on the right side for 3 days, gallbladder out 2 months ago. HISTORY OF PRESENT ILLNESS   (Location/Symptom, Timing/Onset,Context/Setting, Quality, Duration, Modifying Factors, Severity)  Note limiting factors. Mikey Garza is a 32 y.o. female who presents to the emergency department with right-sided abdominal pain with nausea and vomiting for 3 days. No specific triggers. Pain waxes and wanes. Or similar pain in the past.  Prior cholecystectomy several months ago. Pain has continued despite the    The history is provided by the patient and medical records. Abdominal Pain  Pain location:  RUQ  Pain quality: sharp    Pain severity:  Moderate  Onset quality:  Gradual  Duration:  3 days  Progression:  Waxing and waning  Chronicity:  Recurrent  Context: eating    Worsened by:  Eating  Associated symptoms: anorexia, nausea and vomiting    Associated symptoms: no chest pain, no cough, no diarrhea, no fatigue, no fever and no shortness of breath    Risk factors: obesity        NursingNotes were reviewed. REVIEW OF SYSTEMS    (2-9 systems for level 4, 10 or more for level 5)     Review of Systems   Constitutional: Negative for fatigue and fever. HENT: Negative for congestion, rhinorrhea and voice change. Eyes: Negative for pain and redness. Respiratory: Negative for cough and shortness of breath. Cardiovascular: Negative for chest pain. Gastrointestinal: Positive for abdominal pain, anorexia, nausea and vomiting. Negative for diarrhea. Endocrine: Negative. Genitourinary: Negative. Musculoskeletal: Negative for arthralgias and gait problem. Skin: Negative for rash and wound.    Neurological: Negative for education: Not on file    Highest education level: Not on file   Occupational History    Not on file   Tobacco Use    Smoking status: Current Every Day Smoker     Packs/day: 0.50     Years: 10.00     Pack years: 5.00     Types: Cigarettes    Smokeless tobacco: Never Used   Vaping Use    Vaping Use: Never used   Substance and Sexual Activity    Alcohol use: Yes     Alcohol/week: 0.0 standard drinks     Comment: occ    Drug use: Yes     Comment: Spice     Sexual activity: Yes     Partners: Male, Female   Other Topics Concern    Not on file   Social History Narrative    Not on file     Social Determinants of Health     Financial Resource Strain:     Difficulty of Paying Living Expenses:    Food Insecurity:     Worried About Running Out of Food in the Last Year:     [address] in the Last Year:    Transportation Needs:     Lack of Transportation (Medical):  Lack of Transportation (Non-Medical):    Physical Activity:     Days of Exercise per Week:     Minutes of Exercise per Session:    Stress:     Feeling of Stress :    Social Connections:     Frequency of Communication with Friends and Family:     Frequency of Social Gatherings with Friends and Family:     Attends Yarsanism Services:     Active Member of Clubs or Organizations:     Attends Club or Organization Meetings:     Marital Status:    Intimate Partner Violence:     Fear of Current or Ex-Partner:     Emotionally Abused:     Physically Abused:     Sexually Abused:        SCREENINGS    Henley Coma Scale  Eye Opening: Spontaneous  Best Verbal Response: Oriented  Best Motor Response: Obeys commands  Rob Coma Scale Score: 15        PHYSICAL EXAM    (up to 7 for level 4, 8 or more for level 5)     ED Triage Vitals [10/07/21 0652]   BP Temp Temp Source Pulse Resp SpO2 Height Weight   (!) 180/127 97.7 °F (36.5 °C) Oral 94 22 98 % 5' 7\" (1.702 m) 230 lb (104.3 kg)       Physical Exam  Vitals and nursing note reviewed. Constitutional:       General: She is not in acute distress. Appearance: Normal appearance. She is well-developed. She is not diaphoretic. HENT:      Head: Normocephalic and atraumatic. Mouth/Throat:      Pharynx: No oropharyngeal exudate. Eyes:      General: No scleral icterus. Pupils: Pupils are equal, round, and reactive to light. Neck:      Trachea: No tracheal deviation. Cardiovascular:      Rate and Rhythm: Normal rate. Pulses: Normal pulses. Heart sounds: Normal heart sounds. Pulmonary:      Effort: Pulmonary effort is normal.      Breath sounds: Normal breath sounds. No stridor. No wheezing or rhonchi. Abdominal:      General: There is no distension. Palpations: Abdomen is soft. Abdomen is not rigid. Tenderness: There is no abdominal tenderness. There is no guarding. Hernia: No hernia is present. Musculoskeletal:         General: No deformity. Cervical back: Normal range of motion. Skin:     General: Skin is warm and dry. Findings: No rash. Neurological:      Mental Status: She is alert and oriented to person, place, and time. Cranial Nerves: No cranial nerve deficit.       Coordination: Coordination normal.   Psychiatric:         Behavior: Behavior normal.         DIAGNOSTIC RESULTS     EKG: All EKG's are interpreted by the Emergency Department Physician who either signs or Co-signs this chart in the absence of a cardiologist.        RADIOLOGY:   Non-plain film images such as CT, Ultrasound and MRI are read by the radiologist. Velvet Poag images are visualized and preliminarily interpreted by the emergency physician with the below findings:        Interpretation per the Radiologist below, if available at the time of this note:    No orders to display         ED BEDSIDE ULTRASOUND:   Performed by ED Physician - none    LABS:  Labs Reviewed   CBC WITH AUTO DIFFERENTIAL - Abnormal; Notable for the following components:       Result Value    MCH 31.5 (*)     Neutrophils % 72.7 (*)     All other components within normal limits   COMPREHENSIVE METABOLIC PANEL W/ REFLEX TO MG FOR LOW K - Abnormal; Notable for the following components:    Potassium reflex Magnesium 3.4 (*)     CO2 21 (*)     Glucose 161 (*)     All other components within normal limits   LIPASE   MAGNESIUM   URINE RT REFLEX TO CULTURE   PREGNANCY, URINE       All other labs were within normal range or not returned as of this dictation. Medications   hyoscyamine (LEVSIN/SL) sublingual tablet 125 mcg (125 mcg SubLINGual Given 10/7/21 0725)   prochlorperazine (COMPAZINE) injection 10 mg (10 mg IntraVENous Given 10/7/21 0725)   diphenhydrAMINE (BENADRYL) injection 25 mg (25 mg IntraVENous Given 10/7/21 0725)   ondansetron (ZOFRAN) injection 4 mg (4 mg IntraVENous Given 10/7/21 0724)       EMERGENCY DEPARTMENT COURSE and DIFFERENTIALDIAGNOSIS/MDM:   Vitals:    Vitals:    10/07/21 0652   BP: (!) 180/127   Pulse: 94   Resp: 22   Temp: 97.7 °F (36.5 °C)   TempSrc: Oral   SpO2: 98%   Weight: 230 lb (104.3 kg)   Height: 5' 7\" (1.702 m)       MDM    ED Course as of Oct 07 0825   Thu Oct 07, 2021   4226 Bilirubin: 0.4 [MAGGIE]   0752 Alk Phos: 71 [MAGGIE]   0752 ALT: 17 [MAGGIE]   0752 AST: 20 [MAGGIE]   0752 WBC: 8.7 [MAGGIE]   0752 Lipase: 17 [MAGGIE]      ED Course User Index  [MAGGIE] John Vargas MD     Normal wbc, LFTs, alk phso, bili, and all other labs w/ reassuring exam. No e/o pancreatitis, appendicitis, ectopic pregnancy, cholangitis    Evaluation and work-up here revealed no signs of emergent or life-threatening pathology that would necessitate admission for further work-up or management at this time. Patient is felt to be stable for discharge home with return precautions for worsening of the condition or development of new concerning symptoms. Patient was encouraged to follow-up with their primary care doctor in the appropriate timeframe.   Necessary prescriptions and information have been provided for treatment at home. Patient voices understanding and agreement with the plan. CONSULTS:  None    PROCEDURES:  Unless otherwise notedbelow, none     Procedures      FINAL IMPRESSION     1. Right sided abdominal pain    2. History of acute cholecystitis    3.  Non-intractable vomiting with nausea, unspecified vomiting type          DISPOSITION/PLAN   DISPOSITION Decision To Discharge 10/07/2021 07:53:01 AM      PATIENT REFERRED TO:  [address] [place] [address] EMERGENCY DEPT  [facility]  [phone]    If symptoms worsen      DISCHARGE MEDICATIONS:  Current Discharge Medication List             (Please note that portions of this note were completed with a voice recognition program.  Efforts were made to edit the dictations butoccasionally words are mis-transcribed.)    Will Yoo MD (electronically signed)  AttendingEmergency Physician          Will Yoo., MD  10/07/21 7270

## 2021-10-08 VITALS
RESPIRATION RATE: 16 BRPM | DIASTOLIC BLOOD PRESSURE: 85 MMHG | WEIGHT: 230 LBS | TEMPERATURE: 98.8 F | OXYGEN SATURATION: 100 % | HEART RATE: 82 BPM | SYSTOLIC BLOOD PRESSURE: 142 MMHG | HEIGHT: 67 IN | BODY MASS INDEX: 36.1 KG/M2

## 2021-10-08 LAB
ALBUMIN SERPL-MCNC: 4.8 G/DL (ref 3.5–5.2)
ALBUMIN/GLOB SERPL: 1.5 G/DL
ALP SERPL-CCNC: 71 U/L (ref 39–117)
ALT SERPL W P-5'-P-CCNC: 17 U/L (ref 1–33)
AMORPH URATE CRY URNS QL MICRO: ABNORMAL /HPF
ANION GAP SERPL CALCULATED.3IONS-SCNC: 14 MMOL/L (ref 5–15)
AST SERPL-CCNC: 17 U/L (ref 1–32)
B-HCG UR QL: NEGATIVE
BACTERIA UR QL AUTO: ABNORMAL /HPF
BASOPHILS # BLD AUTO: 0.03 10*3/MM3 (ref 0–0.2)
BASOPHILS NFR BLD AUTO: 0.3 % (ref 0–1.5)
BILIRUB SERPL-MCNC: 0.4 MG/DL (ref 0–1.2)
BILIRUB UR QL STRIP: NEGATIVE
BUN SERPL-MCNC: 8 MG/DL (ref 6–20)
BUN/CREAT SERPL: 12.7 (ref 7–25)
CALCIUM SPEC-SCNC: 9.9 MG/DL (ref 8.6–10.5)
CHLORIDE SERPL-SCNC: 102 MMOL/L (ref 98–107)
CLARITY UR: ABNORMAL
CO2 SERPL-SCNC: 25 MMOL/L (ref 22–29)
COLOR UR: ABNORMAL
CREAT SERPL-MCNC: 0.63 MG/DL (ref 0.57–1)
D-LACTATE SERPL-SCNC: 1.6 MMOL/L (ref 0.5–2)
DEPRECATED RDW RBC AUTO: 42.1 FL (ref 37–54)
EOSINOPHIL # BLD AUTO: 0 10*3/MM3 (ref 0–0.4)
EOSINOPHIL NFR BLD AUTO: 0 % (ref 0.3–6.2)
ERYTHROCYTE [DISTWIDTH] IN BLOOD BY AUTOMATED COUNT: 13.4 % (ref 12.3–15.4)
GFR SERPL CREATININE-BSD FRML MDRD: 134 ML/MIN/1.73
GLOBULIN UR ELPH-MCNC: 3.3 GM/DL
GLUCOSE SERPL-MCNC: 142 MG/DL (ref 65–99)
GLUCOSE UR STRIP-MCNC: NEGATIVE MG/DL
HCT VFR BLD AUTO: 43.9 % (ref 34–46.6)
HGB BLD-MCNC: 15.9 G/DL (ref 12–15.9)
HGB UR QL STRIP.AUTO: NEGATIVE
HOLD SPECIMEN: NORMAL
HOLD SPECIMEN: NORMAL
HYALINE CASTS UR QL AUTO: ABNORMAL /LPF
IMM GRANULOCYTES # BLD AUTO: 0.05 10*3/MM3 (ref 0–0.05)
IMM GRANULOCYTES NFR BLD AUTO: 0.4 % (ref 0–0.5)
INTERNAL NEGATIVE CONTROL: NEGATIVE
INTERNAL POSITIVE CONTROL: POSITIVE
KETONES UR QL STRIP: ABNORMAL
LEUKOCYTE ESTERASE UR QL STRIP.AUTO: NEGATIVE
LIPASE SERPL-CCNC: 20 U/L (ref 13–60)
LYMPHOCYTES # BLD AUTO: 1.64 10*3/MM3 (ref 0.7–3.1)
LYMPHOCYTES NFR BLD AUTO: 14.2 % (ref 19.6–45.3)
Lab: NORMAL
MCH RBC QN AUTO: 31.1 PG (ref 26.6–33)
MCHC RBC AUTO-ENTMCNC: 36.2 G/DL (ref 31.5–35.7)
MCV RBC AUTO: 85.7 FL (ref 79–97)
MONOCYTES # BLD AUTO: 0.71 10*3/MM3 (ref 0.1–0.9)
MONOCYTES NFR BLD AUTO: 6.1 % (ref 5–12)
NEUTROPHILS NFR BLD AUTO: 79 % (ref 42.7–76)
NEUTROPHILS NFR BLD AUTO: 9.14 10*3/MM3 (ref 1.7–7)
NITRITE UR QL STRIP: NEGATIVE
NRBC BLD AUTO-RTO: 0 /100 WBC (ref 0–0.2)
PH UR STRIP.AUTO: 6 [PH] (ref 5–8)
PLATELET # BLD AUTO: 314 10*3/MM3 (ref 140–450)
PMV BLD AUTO: 10.5 FL (ref 6–12)
POTASSIUM SERPL-SCNC: 3.7 MMOL/L (ref 3.5–5.2)
PROT SERPL-MCNC: 8.1 G/DL (ref 6–8.5)
PROT UR QL STRIP: ABNORMAL
QT INTERVAL: 402 MS
QTC INTERVAL: 424 MS
RBC # BLD AUTO: 5.12 10*6/MM3 (ref 3.77–5.28)
RBC # UR: ABNORMAL /HPF
REF LAB TEST METHOD: ABNORMAL
SODIUM SERPL-SCNC: 141 MMOL/L (ref 136–145)
SP GR UR STRIP: >1.03 (ref 1–1.03)
SQUAMOUS #/AREA URNS HPF: ABNORMAL /HPF
UROBILINOGEN UR QL STRIP: ABNORMAL
WBC # BLD AUTO: 11.57 10*3/MM3 (ref 3.4–10.8)
WBC UR QL AUTO: ABNORMAL /HPF
WHOLE BLOOD HOLD SPECIMEN: NORMAL
WHOLE BLOOD HOLD SPECIMEN: NORMAL

## 2021-10-08 PROCEDURE — 83605 ASSAY OF LACTIC ACID: CPT | Performed by: STUDENT IN AN ORGANIZED HEALTH CARE EDUCATION/TRAINING PROGRAM

## 2021-10-08 PROCEDURE — 81025 URINE PREGNANCY TEST: CPT | Performed by: STUDENT IN AN ORGANIZED HEALTH CARE EDUCATION/TRAINING PROGRAM

## 2021-10-08 PROCEDURE — 81001 URINALYSIS AUTO W/SCOPE: CPT | Performed by: STUDENT IN AN ORGANIZED HEALTH CARE EDUCATION/TRAINING PROGRAM

## 2021-10-08 PROCEDURE — 80053 COMPREHEN METABOLIC PANEL: CPT | Performed by: STUDENT IN AN ORGANIZED HEALTH CARE EDUCATION/TRAINING PROGRAM

## 2021-10-08 PROCEDURE — 93010 ELECTROCARDIOGRAM REPORT: CPT | Performed by: INTERNAL MEDICINE

## 2021-10-08 PROCEDURE — 25010000002 ONDANSETRON PER 1 MG: Performed by: STUDENT IN AN ORGANIZED HEALTH CARE EDUCATION/TRAINING PROGRAM

## 2021-10-08 PROCEDURE — 85025 COMPLETE CBC W/AUTO DIFF WBC: CPT | Performed by: STUDENT IN AN ORGANIZED HEALTH CARE EDUCATION/TRAINING PROGRAM

## 2021-10-08 PROCEDURE — 96375 TX/PRO/DX INJ NEW DRUG ADDON: CPT

## 2021-10-08 PROCEDURE — 25010000002 DROPERIDOL PER 5 MG: Performed by: STUDENT IN AN ORGANIZED HEALTH CARE EDUCATION/TRAINING PROGRAM

## 2021-10-08 PROCEDURE — 96374 THER/PROPH/DIAG INJ IV PUSH: CPT

## 2021-10-08 PROCEDURE — 93005 ELECTROCARDIOGRAM TRACING: CPT | Performed by: STUDENT IN AN ORGANIZED HEALTH CARE EDUCATION/TRAINING PROGRAM

## 2021-10-08 PROCEDURE — 83690 ASSAY OF LIPASE: CPT | Performed by: STUDENT IN AN ORGANIZED HEALTH CARE EDUCATION/TRAINING PROGRAM

## 2021-10-08 RX ORDER — SODIUM CHLORIDE 0.9 % (FLUSH) 0.9 %
10 SYRINGE (ML) INJECTION AS NEEDED
Status: DISCONTINUED | OUTPATIENT
Start: 2021-10-08 | End: 2021-10-08 | Stop reason: HOSPADM

## 2021-10-08 RX ORDER — DROPERIDOL 2.5 MG/ML
1.25 INJECTION, SOLUTION INTRAMUSCULAR; INTRAVENOUS ONCE
Status: COMPLETED | OUTPATIENT
Start: 2021-10-08 | End: 2021-10-08

## 2021-10-08 RX ORDER — ONDANSETRON 2 MG/ML
4 INJECTION INTRAMUSCULAR; INTRAVENOUS ONCE
Status: COMPLETED | OUTPATIENT
Start: 2021-10-08 | End: 2021-10-08

## 2021-10-08 RX ADMIN — DROPERIDOL 1.25 MG: 2.5 INJECTION, SOLUTION INTRAMUSCULAR; INTRAVENOUS at 01:19

## 2021-10-08 RX ADMIN — SODIUM CHLORIDE, POTASSIUM CHLORIDE, SODIUM LACTATE AND CALCIUM CHLORIDE 1000 ML: 600; 310; 30; 20 INJECTION, SOLUTION INTRAVENOUS at 01:19

## 2021-10-08 RX ADMIN — ONDANSETRON 4 MG: 2 INJECTION INTRAMUSCULAR; INTRAVENOUS at 01:19

## 2021-10-08 NOTE — ED NOTES
Pt is sleeping in her wheelchair in the front lobby at this time, no distress noted      Bailee Higgins RN  10/07/21 2712

## 2021-10-08 NOTE — ED PROVIDER NOTES
"Subjective   Patient is a female presenting to the emergency department with 3 days of right sided abdominal pain that is worsening. She describes her pain as sharp and states that she \"threw up poop\" today. She describes her vomit as a brown liquid. She rates her pain a 10/10. She denies radiation of her pain. she states she has not been able to have a bowel movement or pass gas and is unsure of when her last bowel movement was. She admits to nausea, chills, myalgias, and shortness of breath. She denies fever. She smokes 1/2 pack per day for the past 5 years. She used cocaine a few days ago. She denies alcohol use.  Denies any chest pain, numbness, tingling, headache, neck stiffness or other symptoms.            Review of Systems   All other systems reviewed and are negative.      Past Medical History:   Diagnosis Date   • Abscess of axilla    • Depression    • Hypertension    • Ovarian cyst        Allergies   Allergen Reactions   • Ibuprofen & Acetaminophen Anaphylaxis   • Reglan [Metoclopramide] Irritability   • Sucralfate Nausea And Vomiting   • Tramadol Rash       Past Surgical History:   Procedure Laterality Date   • CHOLECYSTECTOMY     • GALLBLADDER SURGERY  08/2021   • NO PAST SURGERIES         No family history on file.    Social History     Socioeconomic History   • Marital status: Single     Spouse name: Not on file   • Number of children: Not on file   • Years of education: Not on file   • Highest education level: Not on file   Tobacco Use   • Smoking status: Current Every Day Smoker     Packs/day: 0.50     Years: 12.00     Pack years: 6.00     Types: Cigarettes   • Smokeless tobacco: Never Used   Vaping Use   • Vaping Use: Never used   Substance and Sexual Activity   • Alcohol use: No   • Drug use: No   • Sexual activity: Defer           Objective   Physical Exam  Vitals and nursing note reviewed.   Constitutional:       General: She is not in acute distress.     Appearance: She is not toxic-appearing " or diaphoretic.   HENT:      Head: Normocephalic and atraumatic.      Nose: Nose normal.   Eyes:      General:         Right eye: No discharge.         Left eye: No discharge.      Extraocular Movements: Extraocular movements intact.      Conjunctiva/sclera: Conjunctivae normal.      Pupils: Pupils are equal, round, and reactive to light.   Cardiovascular:      Rate and Rhythm: Normal rate and regular rhythm.      Pulses: Normal pulses.   Pulmonary:      Effort: Pulmonary effort is normal. No respiratory distress.      Breath sounds: No stridor. No rhonchi.   Abdominal:      General: Abdomen is flat. Bowel sounds are increased. There is no distension. There are no signs of injury.      Palpations: Abdomen is soft. There is no mass.      Tenderness: There is generalized abdominal tenderness (No tenderness with auscultation with stethoscope and firm pressure.). There is no right CVA tenderness, left CVA tenderness, guarding or rebound. Negative signs include Olivera's sign and Rovsing's sign.      Hernia: No hernia is present.   Musculoskeletal:         General: Normal range of motion.      Cervical back: Normal range of motion and neck supple. No rigidity or tenderness.   Lymphadenopathy:      Cervical: No cervical adenopathy.   Skin:     General: Skin is warm.   Neurological:      General: No focal deficit present.      Mental Status: She is alert and oriented to person, place, and time.      Cranial Nerves: No cranial nerve deficit.      Sensory: No sensory deficit.      Motor: No weakness.   Psychiatric:         Mood and Affect: Mood normal.         Behavior: Behavior normal.         Thought Content: Thought content normal.         Judgment: Judgment normal.         Procedures           ED Course                                           MDM  Number of Diagnoses or Management Options  Generalized abdominal pain  Diagnosis management comments: This is a 31yoF presenting with abdominal pain. Patient arrived  hemodynamically stable and was afebrile. Admits to cocaine use 2 days ago. Abdominal exam without peritoneal signs. No evidence of acute abodmen at this time. Differentials include, but are not limited to constipation, pancreatitis, gastroenteritis, cannabis hypermesis. Presentation not consistent with other acute, emergent causes of abdominal pain at this time.     Plan to obtain cbc, cmp, ua, urine pregnancy, lipase, control pain, and reassess.     The imaging and other workup was reviewed and found to have no acutely actionable abnormalities. I reassessed the patient and discussed the findings of the work up so far. I explained my impression of the workup to her and addressed all of her questions regarding the emergency department evaluation, diagnosis, and treatment plan in plain and simple language that she was able to understand. She was feeling a lot better and has been able to sleep without any emesis or diarrhea. Encouraged her to stop using cocaine.    She voiced agreement with the plan of care so far and had no further questions. I told her that there is always some diagnostic uncertainty in the ER and that her work up, physical exam, and even her current presentation may not always reveal other underlying conditions. I also went over the fact that her condition may change or show itself after being discharged. She expressed understanding and agreed that there are reasonable limitations with the practice of emergency medicine.    I gave her return precautions and told her to return to the emergency department within 24 - 48hrs if she has any new, worsening, or concerning symptoms. I told her that it is VERY IMPORTANT that she follows up (by calling to set up an appointment) with her primary care doctor within the next few days or as soon as reasonably possible so that she can be re-evaluated for improvement in her symptoms or for any other questions. She verbalized understanding of these instructions.      She was discharged in stable condition and was observed ambulating out of the ER.           Amount and/or Complexity of Data Reviewed  Clinical lab tests: reviewed and ordered  Tests in the radiology section of CPT®: ordered and reviewed  Tests in the medicine section of CPT®: reviewed    Risk of Complications, Morbidity, and/or Mortality  Presenting problems: moderate  Diagnostic procedures: moderate  Management options: moderate        Final diagnoses:   Generalized abdominal pain       ED Disposition  ED Disposition     ED Disposition Condition Comment    Discharge Stable           Dandy Manuel MD  [address] [facility] [id number] [phone]    Call in 1 day  As needed, If symptoms worsen         Medication List      No changes were made to your prescriptions during this visit.          Federico Lorenzo MD  10/08/21 0435

## 2021-10-08 NOTE — DISCHARGE INSTRUCTIONS
You were evaluated in the ER for abdominal pain. Your workup showed no indication at this time for admission to the hospital. Please use tylenol, ibuprofen, and zofran for symptomatic improvement. Please refrain from any illicit drug use that may be complicating your health.    It is VERY IMPORTANT that you follow up (call them to set up an appointment) with your primary care doctor* within the next few days or as soon as possible so that you can be re-evaluated for improvement in your symptoms or for any other questions. If you were prescribed any medications, please take them as directed or call us back with any questions.     Return to the ER within 24-48 hours if you have any new symptoms, worsening symptoms, or any other concerns.

## 2021-10-08 NOTE — ED NOTES
"Pt is thrashing around and screaming during triage process stating \"it hurts it hurts\" I informed the pt there was no available bed at the time but we would have one shortly, pt states she was unable to wait, I apologized again and informed her she would indeed need to wait at the moment      Bailee Higgins RN  10/07/21 6465    "

## 2021-11-10 ENCOUNTER — APPOINTMENT (OUTPATIENT)
Dept: GENERAL RADIOLOGY | Facility: HOSPITAL | Age: 31
End: 2021-11-10

## 2021-11-10 ENCOUNTER — HOSPITAL ENCOUNTER (EMERGENCY)
Facility: HOSPITAL | Age: 31
Discharge: HOME | End: 2021-11-10
Admitting: INTERNAL MEDICINE

## 2021-11-10 ENCOUNTER — APPOINTMENT (OUTPATIENT)
Dept: CT IMAGING | Facility: HOSPITAL | Age: 31
End: 2021-11-10

## 2021-11-10 VITALS
TEMPERATURE: 98 F | RESPIRATION RATE: 20 BRPM | SYSTOLIC BLOOD PRESSURE: 146 MMHG | WEIGHT: 224 LBS | OXYGEN SATURATION: 99 % | BODY MASS INDEX: 35.16 KG/M2 | DIASTOLIC BLOOD PRESSURE: 78 MMHG | HEART RATE: 68 BPM | HEIGHT: 67 IN

## 2021-11-10 DIAGNOSIS — S40.851A FOREIGN BODY OF RIGHT UPPER ARM: Primary | ICD-10-CM

## 2021-11-10 DIAGNOSIS — N83.202 CYST OF LEFT OVARY: ICD-10-CM

## 2021-11-10 LAB
ALBUMIN SERPL-MCNC: 4.5 G/DL (ref 3.5–5.2)
ALBUMIN/GLOB SERPL: 1.3 G/DL
ALP SERPL-CCNC: 74 U/L (ref 39–117)
ALT SERPL W P-5'-P-CCNC: 15 U/L (ref 1–33)
ANION GAP SERPL CALCULATED.3IONS-SCNC: 11 MMOL/L (ref 5–15)
AST SERPL-CCNC: 20 U/L (ref 1–32)
B-HCG UR QL: NEGATIVE
BACTERIA UR QL AUTO: ABNORMAL /HPF
BASOPHILS # BLD AUTO: 0.04 10*3/MM3 (ref 0–0.2)
BASOPHILS NFR BLD AUTO: 0.5 % (ref 0–1.5)
BILIRUB SERPL-MCNC: 0.4 MG/DL (ref 0–1.2)
BILIRUB UR QL STRIP: ABNORMAL
BUN SERPL-MCNC: 10 MG/DL (ref 6–20)
BUN/CREAT SERPL: 16.9 (ref 7–25)
CALCIUM SPEC-SCNC: 9.6 MG/DL (ref 8.6–10.5)
CHLORIDE SERPL-SCNC: 100 MMOL/L (ref 98–107)
CLARITY UR: ABNORMAL
CO2 SERPL-SCNC: 26 MMOL/L (ref 22–29)
COLOR UR: ABNORMAL
CREAT SERPL-MCNC: 0.59 MG/DL (ref 0.57–1)
D-LACTATE SERPL-SCNC: 1.4 MMOL/L (ref 0.5–2)
DEPRECATED RDW RBC AUTO: 41.5 FL (ref 37–54)
EOSINOPHIL # BLD AUTO: 0.07 10*3/MM3 (ref 0–0.4)
EOSINOPHIL NFR BLD AUTO: 0.8 % (ref 0.3–6.2)
ERYTHROCYTE [DISTWIDTH] IN BLOOD BY AUTOMATED COUNT: 13.3 % (ref 12.3–15.4)
GFR SERPL CREATININE-BSD FRML MDRD: 144 ML/MIN/1.73
GLOBULIN UR ELPH-MCNC: 3.5 GM/DL
GLUCOSE SERPL-MCNC: 98 MG/DL (ref 65–99)
GLUCOSE UR STRIP-MCNC: NEGATIVE MG/DL
HCT VFR BLD AUTO: 40 % (ref 34–46.6)
HGB BLD-MCNC: 14.3 G/DL (ref 12–15.9)
HGB UR QL STRIP.AUTO: ABNORMAL
HOLD SPECIMEN: NORMAL
HYALINE CASTS UR QL AUTO: ABNORMAL /LPF
IMM GRANULOCYTES # BLD AUTO: 0.02 10*3/MM3 (ref 0–0.05)
IMM GRANULOCYTES NFR BLD AUTO: 0.2 % (ref 0–0.5)
KETONES UR QL STRIP: ABNORMAL
LEUKOCYTE ESTERASE UR QL STRIP.AUTO: ABNORMAL
LIPASE SERPL-CCNC: 29 U/L (ref 13–60)
LYMPHOCYTES # BLD AUTO: 3.33 10*3/MM3 (ref 0.7–3.1)
LYMPHOCYTES NFR BLD AUTO: 39.2 % (ref 19.6–45.3)
MCH RBC QN AUTO: 31 PG (ref 26.6–33)
MCHC RBC AUTO-ENTMCNC: 35.8 G/DL (ref 31.5–35.7)
MCV RBC AUTO: 86.6 FL (ref 79–97)
MONOCYTES # BLD AUTO: 0.49 10*3/MM3 (ref 0.1–0.9)
MONOCYTES NFR BLD AUTO: 5.8 % (ref 5–12)
NEUTROPHILS NFR BLD AUTO: 4.55 10*3/MM3 (ref 1.7–7)
NEUTROPHILS NFR BLD AUTO: 53.5 % (ref 42.7–76)
NITRITE UR QL STRIP: NEGATIVE
NRBC BLD AUTO-RTO: 0 /100 WBC (ref 0–0.2)
PH UR STRIP.AUTO: 6.5 [PH] (ref 5–8)
PLATELET # BLD AUTO: 314 10*3/MM3 (ref 140–450)
PMV BLD AUTO: 9.4 FL (ref 6–12)
POTASSIUM SERPL-SCNC: 3.9 MMOL/L (ref 3.5–5.2)
PROT SERPL-MCNC: 8 G/DL (ref 6–8.5)
PROT UR QL STRIP: ABNORMAL
RBC # BLD AUTO: 4.62 10*6/MM3 (ref 3.77–5.28)
RBC # UR: ABNORMAL /HPF
REF LAB TEST METHOD: ABNORMAL
SODIUM SERPL-SCNC: 137 MMOL/L (ref 136–145)
SP GR UR STRIP: >1.03 (ref 1–1.03)
SQUAMOUS #/AREA URNS HPF: ABNORMAL /HPF
UROBILINOGEN UR QL STRIP: ABNORMAL
WBC # BLD AUTO: 8.5 10*3/MM3 (ref 3.4–10.8)
WBC UR QL AUTO: ABNORMAL /HPF

## 2021-11-10 PROCEDURE — 83605 ASSAY OF LACTIC ACID: CPT | Performed by: NURSE PRACTITIONER

## 2021-11-10 PROCEDURE — 25010000002 IOPAMIDOL 61 % SOLUTION: Performed by: NURSE PRACTITIONER

## 2021-11-10 PROCEDURE — 25010000002 ONDANSETRON PER 1 MG: Performed by: NURSE PRACTITIONER

## 2021-11-10 PROCEDURE — 74177 CT ABD & PELVIS W/CONTRAST: CPT

## 2021-11-10 PROCEDURE — 36415 COLL VENOUS BLD VENIPUNCTURE: CPT

## 2021-11-10 PROCEDURE — 73070 X-RAY EXAM OF ELBOW: CPT

## 2021-11-10 PROCEDURE — 81025 URINE PREGNANCY TEST: CPT | Performed by: NURSE PRACTITIONER

## 2021-11-10 PROCEDURE — 85025 COMPLETE CBC W/AUTO DIFF WBC: CPT | Performed by: NURSE PRACTITIONER

## 2021-11-10 PROCEDURE — 99283 EMERGENCY DEPT VISIT LOW MDM: CPT

## 2021-11-10 PROCEDURE — 96374 THER/PROPH/DIAG INJ IV PUSH: CPT

## 2021-11-10 PROCEDURE — 80053 COMPREHEN METABOLIC PANEL: CPT | Performed by: NURSE PRACTITIONER

## 2021-11-10 PROCEDURE — 81001 URINALYSIS AUTO W/SCOPE: CPT | Performed by: NURSE PRACTITIONER

## 2021-11-10 PROCEDURE — 83690 ASSAY OF LIPASE: CPT | Performed by: NURSE PRACTITIONER

## 2021-11-10 RX ORDER — SODIUM CHLORIDE 0.9 % (FLUSH) 0.9 %
10 SYRINGE (ML) INJECTION AS NEEDED
Status: DISCONTINUED | OUTPATIENT
Start: 2021-11-10 | End: 2021-11-10 | Stop reason: HOSPADM

## 2021-11-10 RX ORDER — ONDANSETRON 2 MG/ML
4 INJECTION INTRAMUSCULAR; INTRAVENOUS ONCE
Status: COMPLETED | OUTPATIENT
Start: 2021-11-10 | End: 2021-11-10

## 2021-11-10 RX ORDER — ONDANSETRON 4 MG/1
4 TABLET, ORALLY DISINTEGRATING ORAL EVERY 6 HOURS PRN
Qty: 8 TABLET | Refills: 0 | Status: SHIPPED | OUTPATIENT
Start: 2021-11-10 | End: 2021-11-12

## 2021-11-10 RX ADMIN — IOPAMIDOL 100 ML: 612 INJECTION, SOLUTION INTRAVENOUS at 18:50

## 2021-11-10 RX ADMIN — ONDANSETRON 4 MG: 2 INJECTION INTRAMUSCULAR; INTRAVENOUS at 17:53

## 2021-11-10 RX ADMIN — SODIUM CHLORIDE, POTASSIUM CHLORIDE, SODIUM LACTATE AND CALCIUM CHLORIDE 1000 ML: 600; 310; 30; 20 INJECTION, SOLUTION INTRAVENOUS at 17:53
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
## 2021-12-02 PROCEDURE — 88300 SURGICAL PATH GROSS: CPT | Performed by: SURGERY

## 2021-12-03 ENCOUNTER — LAB REQUISITION (OUTPATIENT)
Dept: LAB | Facility: HOSPITAL | Age: 31
End: 2021-12-03

## 2021-12-03 DIAGNOSIS — Z00.00 ENCOUNTER FOR GENERAL ADULT MEDICAL EXAMINATION WITHOUT ABNORMAL FINDINGS: ICD-10-CM

## 2021-12-03 LAB
LAB AP CASE REPORT: NORMAL
LAB AP CLINICAL INFORMATION: NORMAL
PATH REPORT.FINAL DX SPEC: NORMAL
PATH REPORT.GROSS SPEC: NORMAL

## 2021-12-30 PROCEDURE — U0004 COV-19 TEST NON-CDC HGH THRU: HCPCS | Performed by: NURSE PRACTITIONER

## 2022-01-21 ENCOUNTER — HOSPITAL ENCOUNTER (EMERGENCY)
Facility: HOSPITAL | Age: 32
Discharge: LEFT BEFORE DISCHARGE | End: 2022-01-21

## 2022-01-21 PROCEDURE — 99211 OFF/OP EST MAY X REQ PHY/QHP: CPT

## 2022-01-22 ENCOUNTER — HOSPITAL ENCOUNTER (EMERGENCY)
Facility: HOSPITAL | Age: 32
Discharge: HOME | End: 2022-01-22
Attending: STUDENT IN AN ORGANIZED HEALTH CARE EDUCATION/TRAINING PROGRAM | Admitting: STUDENT IN AN ORGANIZED HEALTH CARE EDUCATION/TRAINING PROGRAM

## 2022-01-22 ENCOUNTER — APPOINTMENT (OUTPATIENT)
Dept: GENERAL RADIOLOGY | Facility: HOSPITAL | Age: 32
End: 2022-01-22

## 2022-01-22 VITALS
TEMPERATURE: 97.9 F | WEIGHT: 230 LBS | DIASTOLIC BLOOD PRESSURE: 77 MMHG | OXYGEN SATURATION: 98 % | BODY MASS INDEX: 36.1 KG/M2 | HEART RATE: 78 BPM | SYSTOLIC BLOOD PRESSURE: 131 MMHG | HEIGHT: 67 IN | RESPIRATION RATE: 18 BRPM

## 2022-01-22 DIAGNOSIS — B34.9 VIRAL ILLNESS: Primary | ICD-10-CM

## 2022-01-22 LAB
B-HCG UR QL: NEGATIVE
HOLD SPECIMEN: NORMAL
HOLD SPECIMEN: NORMAL
QT INTERVAL: 378 MS
QTC INTERVAL: 389 MS
SARS-COV-2 RNA PNL SPEC NAA+PROBE: NOT DETECTED
WHOLE BLOOD HOLD SPECIMEN: NORMAL
WHOLE BLOOD HOLD SPECIMEN: NORMAL

## 2022-01-22 PROCEDURE — 87635 SARS-COV-2 COVID-19 AMP PRB: CPT | Performed by: STUDENT IN AN ORGANIZED HEALTH CARE EDUCATION/TRAINING PROGRAM

## 2022-01-22 PROCEDURE — 93005 ELECTROCARDIOGRAM TRACING: CPT | Performed by: STUDENT IN AN ORGANIZED HEALTH CARE EDUCATION/TRAINING PROGRAM

## 2022-01-22 PROCEDURE — 93010 ELECTROCARDIOGRAM REPORT: CPT | Performed by: INTERNAL MEDICINE

## 2022-01-22 PROCEDURE — 71045 X-RAY EXAM CHEST 1 VIEW: CPT

## 2022-01-22 PROCEDURE — 81025 URINE PREGNANCY TEST: CPT | Performed by: STUDENT IN AN ORGANIZED HEALTH CARE EDUCATION/TRAINING PROGRAM

## 2022-01-22 PROCEDURE — 99283 EMERGENCY DEPT VISIT LOW MDM: CPT

## 2022-01-22 NOTE — DISCHARGE INSTRUCTIONS
You were evaluated in the ER for covid-19. Your workup showed no indication at this time for admission to the hospital. Please use tylenol, ibuprofen, and plenty of fluids for symptomatic improvement. You will need to test again if you are still having symptoms.    It is VERY IMPORTANT that you follow up (call them to set up an appointment) with your primary care doctor* within the next few days or as soon as possible so that you can be re-evaluated for improvement in your symptoms or for any other questions. If you were prescribed any medications, please take them as directed or call us back with any questions.     Return to the ER within 24-48 hours if you have any new symptoms, worsening symptoms, or any other concerns.    _____  *If you do not have a primary care doctor, follow up with one of the [facility] physician groups below to setup primary care.     If you have trouble making an appointment, please call the [facility] Nurse Line at [phone]    Dr. Tayla Hui DO, Dr. Marcelo Marcelino DO, and Estrella Méndez, DEIDRA  [facility]  [address] [place] [address], [address] [phone]    Dr. Darien Mariscal MD  [facility] - [address] [address], [address], [place] [address] [phone]    Dr. Ketan Blari DO, Dr. Mike Callejas DO,  DEIDRA Acosta, and DEIDRA Tsai  [facility] - [address] [address], [address], [place] [address] [phone]     Dr. Caro Rosenberg MD, and DEIDRA Hanks  [facility] - [address] [address] [phone]    Dr. Carlos Baker MD and Dr. Audie Cherry MD  [facility] [address], [address] [phone]    Dr. Iglesia Alonso MD  [facility] - [address], [address], [place], [place], [address] [phone]    Dr. Kenrick Delgado MD  [facility] - [address], [address] [phone]

## 2022-01-24 ENCOUNTER — APPOINTMENT (OUTPATIENT)
Dept: CT IMAGING | Age: 32
End: 2022-01-24
Payer: COMMERCIAL

## 2022-01-24 ENCOUNTER — HOSPITAL ENCOUNTER (EMERGENCY)
Age: 32
Discharge: HOME | End: 2022-01-24
Attending: EMERGENCY MEDICINE
Payer: COMMERCIAL

## 2022-01-24 VITALS
TEMPERATURE: 98.1 F | WEIGHT: 230 LBS | BODY MASS INDEX: 36.02 KG/M2 | SYSTOLIC BLOOD PRESSURE: 132 MMHG | OXYGEN SATURATION: 98 % | DIASTOLIC BLOOD PRESSURE: 76 MMHG | RESPIRATION RATE: 18 BRPM | HEART RATE: 87 BPM

## 2022-01-24 DIAGNOSIS — R11.2 NON-INTRACTABLE VOMITING WITH NAUSEA, UNSPECIFIED VOMITING TYPE: Primary | ICD-10-CM

## 2022-01-24 DIAGNOSIS — N92.1 MENOMETRORRHAGIA: ICD-10-CM

## 2022-01-24 DIAGNOSIS — K62.89 RECTAL PAIN: ICD-10-CM

## 2022-01-24 DIAGNOSIS — R10.9 ABDOMINAL PAIN, UNSPECIFIED ABDOMINAL LOCATION: ICD-10-CM

## 2022-01-24 LAB
ALBUMIN SERPL-MCNC: 4.3 G/DL (ref 3.5–5.2)
ALP BLD-CCNC: 73 U/L (ref 35–104)
ALT SERPL-CCNC: 15 U/L (ref 5–33)
ANION GAP SERPL CALCULATED.3IONS-SCNC: 10 MMOL/L (ref 7–19)
APTT: 28.9 SEC (ref 26–36.2)
AST SERPL-CCNC: 20 U/L (ref 5–32)
BACTERIA: NEGATIVE /HPF
BASOPHILS ABSOLUTE: 0.1 K/UL (ref 0–0.2)
BASOPHILS RELATIVE PERCENT: 0.6 % (ref 0–1)
BILIRUB SERPL-MCNC: <0.2 MG/DL (ref 0.2–1.2)
BILIRUBIN URINE: NEGATIVE
BLOOD, URINE: ABNORMAL
BUN BLDV-MCNC: 11 MG/DL (ref 6–20)
CALCIUM SERPL-MCNC: 9.1 MG/DL (ref 8.6–10)
CHLORIDE BLD-SCNC: 102 MMOL/L (ref 98–111)
CLARITY: ABNORMAL
CO2: 26 MMOL/L (ref 22–29)
COLOR: ABNORMAL
CREAT SERPL-MCNC: 0.7 MG/DL (ref 0.5–0.9)
CRYSTALS, UA: ABNORMAL /HPF
EOSINOPHILS ABSOLUTE: 0.3 K/UL (ref 0–0.6)
EOSINOPHILS RELATIVE PERCENT: 3.2 % (ref 0–5)
EPITHELIAL CELLS, UA: 5 /HPF (ref 0–5)
GFR AFRICAN AMERICAN: >59
GFR NON-AFRICAN AMERICAN: >60
GLUCOSE BLD-MCNC: 82 MG/DL (ref 74–109)
GLUCOSE URINE: NEGATIVE MG/DL
HCG QUALITATIVE: NEGATIVE
HCT VFR BLD CALC: 42.3 % (ref 37–47)
HEMOGLOBIN: 14.8 G/DL (ref 12–16)
HYALINE CASTS: 7 /HPF (ref 0–8)
IMMATURE GRANULOCYTES #: 0 K/UL
INR BLD: 1 (ref 0.88–1.18)
KETONES, URINE: ABNORMAL MG/DL
LEUKOCYTE ESTERASE, URINE: ABNORMAL
LIPASE: 28 U/L (ref 13–60)
LYMPHOCYTES ABSOLUTE: 3.9 K/UL (ref 1.1–4.5)
LYMPHOCYTES RELATIVE PERCENT: 45.9 % (ref 20–40)
MCH RBC QN AUTO: 31.9 PG (ref 27–31)
MCHC RBC AUTO-ENTMCNC: 35 G/DL (ref 33–37)
MCV RBC AUTO: 91.2 FL (ref 81–99)
MONOCYTES ABSOLUTE: 0.5 K/UL (ref 0–0.9)
MONOCYTES RELATIVE PERCENT: 6.2 % (ref 0–10)
NEUTROPHILS ABSOLUTE: 3.8 K/UL (ref 1.5–7.5)
NEUTROPHILS RELATIVE PERCENT: 43.9 % (ref 50–65)
NITRITE, URINE: NEGATIVE
PDW BLD-RTO: 13.3 % (ref 11.5–14.5)
PH UA: 5.5 (ref 5–8)
PLATELET # BLD: 298 K/UL (ref 130–400)
PMV BLD AUTO: 9.9 FL (ref 9.4–12.3)
POTASSIUM SERPL-SCNC: 3.8 MMOL/L (ref 3.5–5)
PROTEIN UA: 30 MG/DL
PROTHROMBIN TIME: 13.4 SEC (ref 12–14.6)
RBC # BLD: 4.64 M/UL (ref 4.2–5.4)
RBC UA: 440 /HPF (ref 0–4)
SODIUM BLD-SCNC: 138 MMOL/L (ref 136–145)
SPECIFIC GRAVITY UA: 1.04 (ref 1–1.03)
TOTAL PROTEIN: 7.7 G/DL (ref 6.6–8.7)
UROBILINOGEN, URINE: 1 E.U./DL
WBC # BLD: 8.6 K/UL (ref 4.8–10.8)
WBC UA: 7 /HPF (ref 0–5)

## 2022-01-24 PROCEDURE — 2580000003 HC RX 258: Performed by: EMERGENCY MEDICINE

## 2022-01-24 PROCEDURE — 74177 CT ABD & PELVIS W/CONTRAST: CPT

## 2022-01-24 PROCEDURE — 6360000004 HC RX CONTRAST MEDICATION: Performed by: EMERGENCY MEDICINE

## 2022-01-24 PROCEDURE — 81001 URINALYSIS AUTO W/SCOPE: CPT

## 2022-01-24 PROCEDURE — 96375 TX/PRO/DX INJ NEW DRUG ADDON: CPT

## 2022-01-24 PROCEDURE — 85730 THROMBOPLASTIN TIME PARTIAL: CPT

## 2022-01-24 PROCEDURE — 84703 CHORIONIC GONADOTROPIN ASSAY: CPT

## 2022-01-24 PROCEDURE — 36415 COLL VENOUS BLD VENIPUNCTURE: CPT

## 2022-01-24 PROCEDURE — 96374 THER/PROPH/DIAG INJ IV PUSH: CPT

## 2022-01-24 PROCEDURE — 85025 COMPLETE CBC W/AUTO DIFF WBC: CPT

## 2022-01-24 PROCEDURE — 6360000002 HC RX W HCPCS: Performed by: EMERGENCY MEDICINE

## 2022-01-24 PROCEDURE — 99283 EMERGENCY DEPT VISIT LOW MDM: CPT

## 2022-01-24 PROCEDURE — 80053 COMPREHEN METABOLIC PANEL: CPT

## 2022-01-24 PROCEDURE — 85610 PROTHROMBIN TIME: CPT

## 2022-01-24 PROCEDURE — 83690 ASSAY OF LIPASE: CPT

## 2022-01-24 RX ORDER — PROMETHAZINE HYDROCHLORIDE 25 MG/1
25 TABLET ORAL EVERY 6 HOURS PRN
Qty: 30 TABLET | Refills: 0 | Status: SHIPPED | OUTPATIENT
Start: 2022-01-24 | End: 2022-08-12

## 2022-01-24 RX ORDER — OXYCODONE HYDROCHLORIDE 5 MG/1
5 TABLET ORAL EVERY 6 HOURS PRN
Qty: 12 TABLET | Refills: 0 | Status: SHIPPED | OUTPATIENT
Start: 2022-01-24 | End: 2022-01-27

## 2022-01-24 RX ORDER — MORPHINE SULFATE 4 MG/ML
4 INJECTION, SOLUTION INTRAMUSCULAR; INTRAVENOUS ONCE
Status: COMPLETED | OUTPATIENT
Start: 2022-01-24 | End: 2022-01-24

## 2022-01-24 RX ORDER — ONDANSETRON 2 MG/ML
4 INJECTION INTRAMUSCULAR; INTRAVENOUS ONCE
Status: COMPLETED | OUTPATIENT
Start: 2022-01-24 | End: 2022-01-24

## 2022-01-24 RX ORDER — DOCUSATE SODIUM 100 MG/1
100 CAPSULE, LIQUID FILLED ORAL 2 TIMES DAILY
Qty: 30 CAPSULE | Refills: 0 | Status: SHIPPED | OUTPATIENT
Start: 2022-01-24 | End: 2022-08-12

## 2022-01-24 RX ORDER — 0.9 % SODIUM CHLORIDE 0.9 %
1000 INTRAVENOUS SOLUTION INTRAVENOUS ONCE
Status: COMPLETED | OUTPATIENT
Start: 2022-01-24 | End: 2022-01-24

## 2022-01-24 RX ORDER — ONDANSETRON 4 MG/1
4 TABLET, ORALLY DISINTEGRATING ORAL EVERY 8 HOURS PRN
Qty: 20 TABLET | Refills: 0 | Status: SHIPPED | OUTPATIENT
Start: 2022-01-24 | End: 2022-08-12

## 2022-01-24 RX ORDER — SULFAMETHOXAZOLE AND TRIMETHOPRIM 800; 160 MG/1; MG/1
1 TABLET ORAL 2 TIMES DAILY
Qty: 20 TABLET | Refills: 0 | Status: SHIPPED | OUTPATIENT
Start: 2022-01-24 | End: 2022-02-03

## 2022-01-24 RX ADMIN — SODIUM CHLORIDE 1000 ML: 9 INJECTION, SOLUTION INTRAVENOUS at 14:33

## 2022-01-24 RX ADMIN — IOPAMIDOL 70 ML: 755 INJECTION, SOLUTION INTRAVENOUS at 14:51

## 2022-01-24 RX ADMIN — ONDANSETRON 4 MG: 2 INJECTION INTRAMUSCULAR; INTRAVENOUS at 14:38

## 2022-01-24 RX ADMIN — MORPHINE SULFATE 4 MG: 4 INJECTION INTRAVENOUS at 14:38

## 2022-01-24 ASSESSMENT — ENCOUNTER SYMPTOMS
RHINORRHEA: 0
NAUSEA: 1
SHORTNESS OF BREATH: 0
CONSTIPATION: 1
BACK PAIN: 0
VOMITING: 0
RECTAL PAIN: 1
DIARRHEA: 1
ABDOMINAL PAIN: 1
SORE THROAT: 0

## 2022-01-24 ASSESSMENT — PAIN SCALES - GENERAL: PAINLEVEL_OUTOF10: 9

## 2022-01-24 NOTE — ED PROVIDER NOTES
[facility]  eMERGENCY dEPARTMENT eNCOUnter      Pt Name: Blu Cardoza  MRN: [id number]  [place] 1990  Date of evaluation: 1/24/2022  Provider: Lydia Suárez MD    [address] [place] [address]       Chief Complaint   Patient presents with    Nausea & Vomiting     For two days, heavy vaginal bleeding as well         HISTORY OF PRESENT ILLNESS   (Location/Symptom, Timing/Onset,Context/Setting, Quality, Duration, Modifying Factors, Severity)  Note limiting factors. Blu Cardoza is a 28 y.o. female who presents to the emergency department with multiple complaints. Patient states she is had nausea and vomiting for 2 days. She is unable to hold anything down. She has not had a bowel movement for a week. She is not passing gas. She is had a prior cholecystectomy. No prior history of bowel obstruction. She reports sharp stabbing mid abdominal pain. Patient also reports she is having heavy vaginal bleeding. Is not time for her menstrual cycle. Her last menstrual period was on January 13 and lasted 4 days. Patient does have a history of irregular menstrual cycle. The patient also reports that she is having rectal pain. She said she has 2 knot around her rectum that are painful and have had no drainage. Not sure if they are an abscess. HPI    NursingNotes were reviewed. REVIEW OF SYSTEMS    (2-9 systems for level 4, 10 or more for level 5)     Review of Systems   Constitutional: Negative for chills and fever. HENT: Negative for rhinorrhea and sore throat. Respiratory: Negative for shortness of breath. Cardiovascular: Negative for chest pain and leg swelling. Gastrointestinal: Positive for abdominal pain, constipation, diarrhea, nausea and rectal pain. Negative for vomiting. Genitourinary: Positive for vaginal bleeding. Negative for difficulty urinating. Musculoskeletal: Negative for back pain and neck pain. Skin: Negative for rash.    Neurological: Negative for weakness and headaches. Psychiatric/Behavioral: Negative for confusion. A complete review of systems was performed and is negative except as noted above in the HPI. PAST MEDICAL HISTORY     Past Medical History:   Diagnosis Date    Asthma     Obese     Ovarian cyst          SURGICAL HISTORY       Past Surgical History:   Procedure Laterality Date    CHOLECYSTECTOMY, LAPAROSCOPIC N/A 7/26/2021    CHOLECYSTECTOMY LAPAROSCOPIC performed by Daniel Larsen MD at [address] [place] [address] COLONOSCOPY N/A 06/07/2021    Dr Tima craig (age 39) recall    UPPER GASTROINTESTINAL ENDOSCOPY N/A 06/07/2021    Dr Fred Jeong-Gastritis         CURRENT MEDICATIONS       Previous Medications    METOCLOPRAMIDE (REGLAN) 10 MG TABLET    Take 1 tablet by mouth 3 times daily for 3 days    METRONIDAZOLE (FLAGYL) 500 MG TABLET    TAKE 1 TABLET BY MOUTH 2 (TWO) TIMES A DAY FOR 7 DAYS.     NAPROXEN (NAPROSYN) 500 MG TABLET    Take 1 tablet by mouth 2 times daily as needed for Pain Take with food    OMEPRAZOLE (PRILOSEC) 20 MG DELAYED RELEASE CAPSULE    Take 1 capsule by mouth every morning (before breakfast)    QUETIAPINE (SEROQUEL) 300 MG TABLET           ALLERGIES     Other, Tylenol [acetaminophen], Carafate [sucralfate], and Tramadol    FAMILY HISTORY       Family History   Problem Relation Age of Onset    Diabetes Mother     High Blood Pressure Mother     Diabetes Maternal Grandmother     Diabetes Sister     Colon Cancer Neg Hx     Colon Polyps Neg Hx     Esophageal Cancer Neg Hx     Liver Cancer Neg Hx     Rectal Cancer Neg Hx     Stomach Cancer Neg Hx           SOCIAL HISTORY       Social History     Socioeconomic History    Marital status: Single     Spouse name: None    Number of children: None    Years of education: None    Highest education level: None   Occupational History    None   Tobacco Use    Smoking status: Current Every Day Smoker     Packs/day: 0.50     Years: 10.00     Pack years: 5.00     Types: Cigarettes    Smokeless tobacco: Never Used   Vaping Use    Vaping Use: Never used   Substance and Sexual Activity    Alcohol use: Yes     Alcohol/week: 0.0 standard drinks     Comment: occ    Drug use: Yes     Comment: Spice     Sexual activity: Yes     Partners: Male, Female   Other Topics Concern    None   Social History Narrative    None     Social Determinants of Health     Financial Resource Strain:     Difficulty of Paying Living Expenses: Not on file   Food Insecurity:     Worried About Running Out of Food in the Last Year: Not on file    [place] of Food in the Last Year: Not on file   Transportation Needs:     Lack of Transportation (Medical): Not on file    Lack of Transportation (Non-Medical): Not on file   Physical Activity:     Days of Exercise per Week: Not on file    Minutes of Exercise per Session: Not on file   Stress:     Feeling of Stress : Not on file   Social Connections:     Frequency of Communication with Friends and Family: Not on file    Frequency of Social Gatherings with Friends and Family: Not on file    Attends [employer] Services: Not on file    Active Member of [address] [place] [address] or Organizations: Not on file    Attends Club or Organization Meetings: Not on file    Marital Status: Not on file   Intimate Partner Violence:     Fear of Current or Ex-Partner: Not on file    Emotionally Abused: Not on file    Physically Abused: Not on file    Sexually Abused: Not on file   Housing Stability:     Unable to Pay for Housing in the Last Year: Not on file    Number of [place] in the Last Year: Not on file    Unstable Housing in the Last Year: Not on file       SCREENINGS             PHYSICAL EXAM    (up to 7 for level 4, 8 or more for level 5)     ED Triage Vitals [01/24/22 1218]   BP Temp Temp src Pulse Resp SpO2 Height Weight   (!) 140/80 98.1 °F (36.7 °C) -- 85 18 96 % -- 230 lb (104.3 kg)       Physical Exam  Vitals and nursing note reviewed.    Constitutional:       General: She is not in acute distress. Appearance: She is well-developed. She is not diaphoretic. HENT:      Head: Normocephalic and atraumatic. Eyes:      Pupils: Pupils are equal, round, and reactive to light. Cardiovascular:      Rate and Rhythm: Normal rate and regular rhythm. Heart sounds: Normal heart sounds. Pulmonary:      Effort: Pulmonary effort is normal. No respiratory distress. Breath sounds: Normal breath sounds. Abdominal:      General: Bowel sounds are normal. There is no distension. Palpations: Abdomen is soft. Tenderness: There is abdominal tenderness. Genitourinary:      Musculoskeletal:         General: Normal range of motion. Cervical back: Normal range of motion and neck supple. Skin:     General: Skin is warm and dry. Findings: No rash. Neurological:      Mental Status: She is alert and oriented to person, place, and time. Cranial Nerves: No cranial nerve deficit. Motor: No abnormal muscle tone. Coordination: Coordination normal.   Psychiatric:         Behavior: Behavior normal.         DIAGNOSTIC RESULTS     EKG: All EKG's are interpreted by the Emergency Department Physician who either signs or Co-signs this chart in the absence of a cardiologist.      RADIOLOGY:   Non-plain film images such as CT, Ultrasound and MRI are read by the radiologist. Ruiz Joshi images are visualized and preliminarily interpreted by the emergency physician with the below findings:      Interpretation per the Radiologist below, if available at the time of this note:    CT ABDOMEN PELVIS W IV CONTRAST Additional Contrast? None   Final Result   1. No acute intra-abdominal or pelvic abnormalities identified. 2. 3 cm left ovarian cyst is noted.     Signed by Dr Kuldeep Berg            ED BEDSIDE ULTRASOUND:   Performed by ED Physician - none    LABS:  Labs Reviewed   CBC WITH AUTO DIFFERENTIAL - Abnormal; Notable for the following components:       Result Value MCH 31.9 (*)     Neutrophils % 43.9 (*)     Lymphocytes % 45.9 (*)     All other components within normal limits   URINE RT REFLEX TO CULTURE - Abnormal; Notable for the following components:    Color, UA DARK YELLOW (*)     Clarity, UA CLOUDY (*)     Ketones, Urine TRACE (*)     Blood, Urine LARGE (*)     Protein, UA 30 (*)     Leukocyte Esterase, Urine TRACE (*)     All other components within normal limits   MICROSCOPIC URINALYSIS - Abnormal; Notable for the following components:    Bacteria, UA NEGATIVE (*)     Crystals, UA NEG (*)     WBC, UA 7 (*)     RBC,  (*)     All other components within normal limits   COVID-19, RAPID   COMPREHENSIVE METABOLIC PANEL   HCG, SERUM, QUALITATIVE   LIPASE   PROTIME-INR   APTT       All other labs were within normal range or not returned as of this dictation. EMERGENCY DEPARTMENT COURSE and DIFFERENTIALDIAGNOSIS/MDM:   Vitals:    Vitals:    01/24/22 1218   BP: (!) 140/80   Pulse: 85   Resp: 18   Temp: 98.1 °F (36.7 °C)   SpO2: 96%   Weight: 230 lb (104.3 kg)       MDM  The areas of exquisite tenderness do not have any fluctuance for drainage, I think the one close to her rectum may be a hemorrhoid, does not feel like an abscess. Will cover with abx, send pain meds, stool softeners. Strong return precautions given. CONSULTS:  None    PROCEDURES:  Unless otherwise notedbelow, none     Procedures    FINAL IMPRESSION     1. Non-intractable vomiting with nausea, unspecified vomiting type    2. Rectal pain    3. Abdominal pain, unspecified abdominal location    4.  Menometrorrhagia          DISPOSITION/PLAN   DISPOSITION        PATIENT REFERRED TO:  @FUP@    DISCHARGE MEDICATIONS:  New Prescriptions    DOCUSATE SODIUM (COLACE) 100 MG CAPSULE    Take 1 capsule by mouth 2 times daily    ONDANSETRON (ZOFRAN ODT) 4 MG DISINTEGRATING TABLET    Take 1 tablet by mouth every 8 hours as needed for Nausea or Vomiting    OXYCODONE (ROXICODONE) 5 MG IMMEDIATE RELEASE TABLET    Take 1 tablet by mouth every 6 hours as needed for Pain for up to 3 days. Intended supply: 3 days.  Take lowest dose possible to manage pain    SULFAMETHOXAZOLE-TRIMETHOPRIM (BACTRIM DS) 800-160 MG PER TABLET    Take 1 tablet by mouth 2 times daily for 10 days          (Please note that portions of this note were completed with a voice recognition program.  Efforts were made to edit the dictations butoccasionally words are mis-transcribed.)    Tadeo Ortiz MD (electronically signed)  AttendingEmergency Physician         Tadeo Ortiz MD  01/24/22 1674

## 2022-01-26 NOTE — ED PROVIDER NOTES
Subjective   Patient states that she is having chest pain when she coughs. She has been exposed to covid and states that she has having body aches and a cough. Denies any vomiting, hematuria, hematochezia, or other symptoms. States this is what it felt like when she had covid last time.           Review of Systems   All other systems reviewed and are negative.      Past Medical History:   Diagnosis Date   • Abscess of axilla    • Depression    • Hypertension    • Ovarian cyst        Allergies   Allergen Reactions   • Ibuprofen & Acetaminophen Anaphylaxis   • Reglan [Metoclopramide] Irritability   • Sucralfate Nausea And Vomiting   • Tramadol Rash       Past Surgical History:   Procedure Laterality Date   • CHOLECYSTECTOMY     • GALLBLADDER SURGERY  08/2021   • NO PAST SURGERIES         History reviewed. No pertinent family history.    Social History     Socioeconomic History   • Marital status: Single   Tobacco Use   • Smoking status: Current Every Day Smoker     Packs/day: 1.00     Years: 12.00     Pack years: 12.00     Types: Cigarettes   • Smokeless tobacco: Never Used   Vaping Use   • Vaping Use: Never used   Substance and Sexual Activity   • Alcohol use: No   • Drug use: No   • Sexual activity: Defer           Objective   Physical Exam  Vitals and nursing note reviewed.   Constitutional:       General: She is not in acute distress.     Appearance: She is not toxic-appearing or diaphoretic.   HENT:      Head: Normocephalic and atraumatic.      Nose: Nose normal.   Eyes:      General:         Right eye: No discharge.         Left eye: No discharge.      Extraocular Movements: Extraocular movements intact.      Conjunctiva/sclera: Conjunctivae normal.   Cardiovascular:      Rate and Rhythm: Normal rate.   Pulmonary:      Effort: Pulmonary effort is normal. No respiratory distress.      Breath sounds: No stridor. No rhonchi.   Abdominal:      General: Abdomen is flat.   Musculoskeletal:         General: Normal  range of motion.      Cervical back: No rigidity.   Skin:     General: Skin is warm.   Neurological:      General: No focal deficit present.      Mental Status: She is alert and oriented to person, place, and time.      Cranial Nerves: No cranial nerve deficit.      Sensory: No sensory deficit.      Motor: No weakness.   Psychiatric:         Mood and Affect: Mood normal.         Behavior: Behavior normal.         Thought Content: Thought content normal.         Judgment: Judgment normal.         Procedures           ED Course  ED Course as of 01/26/22 1238   Sat Jan 22, 2022   0342 Patient refused troponin after it hemolyzed two times.  She is COVID-negative at this time.  Encouraged her to get retested if she still has symptoms.  She is comfortable being discharged at this time to follow up with her PCP. [NP]      ED Course User Index  [NP] Federico Lorenzo MD                                                 MDM  Number of Diagnoses or Management Options  Viral illness  Diagnosis management comments: This is a 32yoF presenting today with fever. Patient arrived hemodynamically stable and was afebrile.  Patient is well appearing and has a constellation of upper respiratory symptoms. Low concern for meningitis or other CNS infection as there is no evidence of meningismus on exam, no photophobia, no neck stiffness, overlying skin changes, and no meningococcal rash. Low concern for a UTI as patient does not have any dysuria or recent UTIs. Low concern for bacteremia as patient appears well, is not hypoxic, not tachycardic, not hypotensive, and non-toxic. Patient tolerating oral intake and is euvolemic. This is likely an uncomplicated viral illness. Plan to obtain cbc, cmp, ekg, troponin, cxr, viral panel and reassess. Prior to troponin, patient did not want further testing done. Her COVID-19 test was negative but she was encouraged to get re-tested if still symptomatic. I reassessed the patient and discussed the findings  of the work up so far. I explained my impression of the workup to her and addressed all of her questions regarding the emergency department evaluation, diagnosis, and treatment plan in plain and simple language that she was able to understand.     She voiced agreement with the plan of care so far and had no further questions. I told her that there is always some diagnostic uncertainty in the ER and that her work up, physical exam, and even her current presentation may not always reveal other underlying conditions. I also went over the fact that her condition may change or show itself after being discharged. She expressed understanding and agreed that there are reasonable limitations with the practice of emergency medicine.    I gave her return precautions and told her to return to the emergency department within 24 - 48hrs if she has any new, worsening, or concerning symptoms. I told her that it is VERY IMPORTANT that she follows up (by calling to set up an appointment) with her primary care doctor within the next few days or as soon as reasonably possible so that she can be re-evaluated for improvement in her symptoms or for any other questions. She verbalized understanding of these instructions.     She was discharged in stable condition and was observed ambulating out of the ER.           Amount and/or Complexity of Data Reviewed  Clinical lab tests: reviewed and ordered  Tests in the radiology section of CPT®: ordered and reviewed  Tests in the medicine section of CPT®: reviewed    Risk of Complications, Morbidity, and/or Mortality  Presenting problems: low  Diagnostic procedures: low  Management options: low        Final diagnoses:   Viral illness       ED Disposition  ED Disposition     ED Disposition Condition Comment    Discharge Stable           Provider, No Known  [facility]  [place] [address]  [phone]    Call in 1 day  As needed, If symptoms worsen         Medication List      No changes  were made to your prescriptions during this visit.          Federico Lorenzo MD  01/26/22 7704

## 2022-05-24 ENCOUNTER — TELEPHONE (OUTPATIENT)
Dept: OBGYN CLINIC | Age: 32
End: 2022-05-24

## 2022-05-24 NOTE — TELEPHONE ENCOUNTER
Patient states she missed last appointment because she did receive her reminder call and would like to be called ahead of time to remind her of her rescheduled appointment.      Thank you

## 2022-06-16 ENCOUNTER — APPOINTMENT (OUTPATIENT)
Dept: GENERAL RADIOLOGY | Age: 32
End: 2022-06-16
Payer: COMMERCIAL

## 2022-06-16 ENCOUNTER — HOSPITAL ENCOUNTER (EMERGENCY)
Age: 32
Discharge: HOME | End: 2022-06-16
Payer: COMMERCIAL

## 2022-06-16 VITALS
BODY MASS INDEX: 36.02 KG/M2 | OXYGEN SATURATION: 98 % | DIASTOLIC BLOOD PRESSURE: 78 MMHG | HEART RATE: 78 BPM | WEIGHT: 230 LBS | TEMPERATURE: 97.9 F | SYSTOLIC BLOOD PRESSURE: 130 MMHG

## 2022-06-16 DIAGNOSIS — S69.90XA JAMMED FINGER (INTERPHALANGEAL JOINT), INITIAL ENCOUNTER: Primary | ICD-10-CM

## 2022-06-16 PROCEDURE — 6370000000 HC RX 637 (ALT 250 FOR IP): Performed by: PHYSICIAN ASSISTANT

## 2022-06-16 PROCEDURE — 73140 X-RAY EXAM OF FINGER(S): CPT

## 2022-06-16 PROCEDURE — 73140 X-RAY EXAM OF FINGER(S): CPT | Performed by: RADIOLOGY

## 2022-06-16 PROCEDURE — 99283 EMERGENCY DEPT VISIT LOW MDM: CPT

## 2022-06-16 RX ORDER — PREDNISONE 10 MG/1
10 TABLET ORAL DAILY
Qty: 10 TABLET | Refills: 0 | Status: SHIPPED | OUTPATIENT
Start: 2022-06-16 | End: 2022-06-26

## 2022-06-16 RX ORDER — PREDNISONE 1 MG/1
5 TABLET ORAL ONCE
Status: COMPLETED | OUTPATIENT
Start: 2022-06-16 | End: 2022-06-16

## 2022-06-16 RX ADMIN — PREDNISONE 5 MG: 5 TABLET ORAL at 18:00

## 2022-06-16 ASSESSMENT — ENCOUNTER SYMPTOMS
SHORTNESS OF BREATH: 0
APNEA: 0
COUGH: 0
ABDOMINAL PAIN: 0
BACK PAIN: 0
ABDOMINAL DISTENTION: 0
SORE THROAT: 0
RHINORRHEA: 0
EYE PAIN: 0
EYE DISCHARGE: 0
NAUSEA: 0
COLOR CHANGE: 0
PHOTOPHOBIA: 0

## 2022-06-16 ASSESSMENT — PAIN - FUNCTIONAL ASSESSMENT
PAIN_FUNCTIONAL_ASSESSMENT: NONE - DENIES PAIN
PAIN_FUNCTIONAL_ASSESSMENT: NONE - DENIES PAIN

## 2022-06-16 NOTE — ED PROVIDER NOTES
[facility] EMERGENCY DEPT  eMERGENCYdEPARTMENT eNCOUnter      Pt Name: Deloras Kawasaki  MRN: [id number]  [place] 1990  Date of evaluation: 6/16/2022  Provider:MARC Sherman    CHIEF COMPLAINT       Chief Complaint   Patient presents with    Finger Injury     Jammed Right pinky finger at home two hours ago         HISTORY OF PRESENT ILLNESS  (Location/Symptom, Timing/Onset, Context/Setting, Quality, Duration, Modifying Factors, Severity.)   Deloras Kawasaki is a 28 y.o. female who presents to the emergency department with complaints of jamming mechanims right fifth digit mild pain swelling about PIP joints. Able to flex against resistance pain when extension feels weakened no other complaints. No interventions. Accident while try to grab something from her friend. HPI    Nursing Notes were reviewed and I agree. REVIEW OF SYSTEMS    (2-9 systems for level 4, 10 or more for level 5)     Review of Systems   Constitutional: Negative for activity change, appetite change, chills and fever. HENT: Negative for congestion, postnasal drip, rhinorrhea and sore throat. Eyes: Negative for photophobia, pain, discharge and visual disturbance. Respiratory: Negative for apnea, cough and shortness of breath. Cardiovascular: Negative for chest pain and leg swelling. Gastrointestinal: Negative for abdominal distention, abdominal pain and nausea. Genitourinary: Negative for vaginal bleeding. Musculoskeletal: Positive for arthralgias. Negative for back pain, joint swelling, neck pain and neck stiffness. Skin: Negative for color change and rash. Neurological: Negative for dizziness, syncope, facial asymmetry and headaches. Hematological: Negative for adenopathy. Does not bruise/bleed easily. Psychiatric/Behavioral: Negative for agitation, behavioral problems and confusion. Except as noted above the remainder of the review of systems was reviewed and negative.        PAST MEDICAL HISTORY Past Medical History:   Diagnosis Date    Asthma     Obese     Ovarian cyst          SURGICAL HISTORY       Past Surgical History:   Procedure Laterality Date    CHOLECYSTECTOMY, LAPAROSCOPIC N/A 7/26/2021    CHOLECYSTECTOMY LAPAROSCOPIC performed by Tadeo Robin MD at [address] [place] [address] COLONOSCOPY N/A 06/07/2021    Dr Waylon Ramos yr (age 39) recall    UPPER GASTROINTESTINAL ENDOSCOPY N/A 06/07/2021    Dr Lazarus Kempf Jones-Gastritis         CURRENT MEDICATIONS       Previous Medications    DOCUSATE SODIUM (COLACE) 100 MG CAPSULE    Take 1 capsule by mouth 2 times daily    METRONIDAZOLE (FLAGYL) 500 MG TABLET    TAKE 1 TABLET BY MOUTH 2 (TWO) TIMES A DAY FOR 7 DAYS.     OMEPRAZOLE (PRILOSEC) 20 MG DELAYED RELEASE CAPSULE    Take 1 capsule by mouth every morning (before breakfast)    ONDANSETRON (ZOFRAN ODT) 4 MG DISINTEGRATING TABLET    Take 1 tablet by mouth every 8 hours as needed for Nausea or Vomiting    PROMETHAZINE (PHENERGAN) 25 MG TABLET    Take 1 tablet by mouth every 6 hours as needed for Nausea    QUETIAPINE (SEROQUEL) 300 MG TABLET           ALLERGIES     Ibuprofen & acetaminophen, Other, Metoclopramide, Tylenol [acetaminophen], Carafate [sucralfate], and Tramadol    FAMILY HISTORY       Family History   Problem Relation Age of Onset    Diabetes Mother     High Blood Pressure Mother     Diabetes Maternal Grandmother     Diabetes Sister     Colon Cancer Neg Hx     Colon Polyps Neg Hx     Esophageal Cancer Neg Hx     Liver Cancer Neg Hx     Rectal Cancer Neg Hx     Stomach Cancer Neg Hx           SOCIAL HISTORY       Social History     Socioeconomic History    Marital status: Single     Spouse name: None    Number of children: None    Years of education: None    Highest education level: None   Occupational History    None   Tobacco Use    Smoking status: Current Every Day Smoker     Packs/day: 0.50     Years: 10.00     Pack years: 5.00     Types: Cigarettes    Smokeless tobacco: Never Used Vaping Use    Vaping Use: Never used   Substance and Sexual Activity    Alcohol use: Yes     Alcohol/week: 0.0 standard drinks     Comment: occ    Drug use: Yes     Comment: Spice     Sexual activity: Yes     Partners: Male, Female   Other Topics Concern    None   Social History Narrative    None     Social Determinants of Health     Financial Resource Strain:     Difficulty of Paying Living Expenses: Not on file   Food Insecurity:     Worried About Running Out of Food in the Last Year: Not on file    [place] of Food in the Last Year: Not on file   Transportation Needs:     Lack of Transportation (Medical): Not on file    Lack of Transportation (Non-Medical):  Not on file   Physical Activity:     Days of Exercise per Week: Not on file    Minutes of Exercise per Session: Not on file   Stress:     Feeling of Stress : Not on file   Social Connections:     Frequency of Communication with Friends and Family: Not on file    Frequency of Social Gatherings with Friends and Family: Not on file    Attends Hindu Services: Not on file    Active Member of [address] [place] [address] or Organizations: Not on file    Attends Club or Organization Meetings: Not on file    Marital Status: Not on file   Intimate Partner Violence:     Fear of Current or Ex-Partner: Not on file    Emotionally Abused: Not on file    Physically Abused: Not on file    Sexually Abused: Not on file   Housing Stability:     Unable to Pay for Housing in the Last Year: Not on file    Number of [place] in the Last Year: Not on file    Unstable Housing in the Last Year: Not on file       SCREENINGS    Rob Coma Scale  Eye Opening: Spontaneous  Best Verbal Response: Oriented  Best Motor Response: Obeys commands  [place] Coma Scale Score: 15      PHYSICAL EXAM    (up to 7 forlevel 4, 8 or more for level 5)     ED Triage Vitals [06/16/22 1659]   BP Temp Temp Source Heart Rate Resp SpO2 Height Weight   130/78 97.9 °F (36.6 °C) Oral 78 -- 98 % -- 230 findings:      Interpretation per the Radiologist below, if available at the time of this note:    XR FINGER RIGHT (MIN 2 VIEWS)   Final Result   Normal right fifth finger. Recommendation:   Follow up as clinically indicated. Note: Direct correlation with point tenderness and the X-ray images is recommended and does significantly increase the sensitivity of radiographic evaluation. LABS:  Labs Reviewed - No data to display    All other labs were within normal range or notreturned as of this dictation. RE-ASSESSMENT        EMERGENCY DEPARTMENT COURSE and DIFFERENTIAL DIAGNOSIS/MDM:   Vitals:    Vitals:    06/16/22 1659   BP: 130/78   Pulse: 78   Temp: 97.9 °F (36.6 °C)   TempSrc: Oral   SpO2: 98%   Weight: 230 lb (104.3 kg)       MDM  Plan for pain control with finger splint. Giving her number to call Dr Cinthia Chen for close follow. May need MRI for tendo eval.     PROCEDURES:    Splint Application    Date/Time: 6/16/2022 5:47 PM  Performed by: MARC Gallardo  Authorized by: MARC Gallardo     Consent:     Consent obtained:  Verbal    Consent given by:  Patient  Pre-procedure details:     Sensation:  Normal  Procedure details:     Laterality:  Right    Location:  Finger    Finger:  R small finger    Strapping: yes      Cast type:  Finger    Splint type:  Finger    Supplies:  Aluminum splint  Post-procedure details:     Pain:  Improved    Sensation:  Normal    Patient tolerance of procedure: Tolerated well, no immediate complications          FINAL IMPRESSION      1.  Jammed finger (interphalangeal joint), initial encounter          DISPOSITION/PLAN   DISPOSITION Discharge - Pending Orders Complete 06/16/2022 05:43:43 PM      PATIENT REFERRED TO:  [facility] EMERGENCY DEPT  [facility]  [phone]    If symptoms worsen    MD Poonam Landis [phone]    Schedule an appointment as soon as possible for a visit         DISCHARGE MEDICATIONS:  New Prescriptions    PREDNISONE (DELTASONE) 10 MG TABLET    Take 1 tablet by mouth daily for 10 days       (Please note that portions of this note were completed with a voice recognition program.  Efforts were made to edit the dictations but occasionallywords are mis-transcribed.)    Franklin Barkley [address], [place]  06/16/22 1386

## 2022-06-16 NOTE — ED NOTES
ASSESSMENT:    PT ALERT/ORIENTED X4. PUPILS EQUAL/REACTIVE    SKIN:  WARM/DRY PINK CAPILLARY REFILL < 2SECS    CARDIAC:  S1 S2 NOTED     LUNGS: CLEAR UPPER AND LOWER LOBES, RESPIRATIONS EVEN/UNLABORED     ABDOMEN: BOWEL SOUNDS NOTED UPPER AND LOWER QUADRANTS                     SOFT AND NONTENDER. EXTREMITIES:  BILATERAL DP AND PT AND NO EDEMA NOTED. PT JAMMED RIGHT PINKY FINGER AT HOME    NO DISTRESS NOTED. SIDE RAILS UP AND CALL LIGHT IN REACH. WILL CONTINUE TO MONITOR.        Hira Aguero RN  06/16/22 [id number]

## 2022-06-16 NOTE — Clinical Note
Pia Innocent was seen and treated in our emergency department on 6/16/2022. She may return to work on 06/21/2022. If you have any questions or concerns, please don't hesitate to call.       MARC Nagy

## 2022-07-13 ENCOUNTER — APPOINTMENT (OUTPATIENT)
Dept: CT IMAGING | Facility: HOSPITAL | Age: 32
End: 2022-07-13

## 2022-07-13 ENCOUNTER — HOSPITAL ENCOUNTER (EMERGENCY)
Facility: HOSPITAL | Age: 32
Discharge: HOME | End: 2022-07-13
Attending: EMERGENCY MEDICINE | Admitting: EMERGENCY MEDICINE

## 2022-07-13 VITALS
DIASTOLIC BLOOD PRESSURE: 91 MMHG | OXYGEN SATURATION: 98 % | BODY MASS INDEX: 35.64 KG/M2 | SYSTOLIC BLOOD PRESSURE: 137 MMHG | HEART RATE: 77 BPM | TEMPERATURE: 98 F | HEIGHT: 67 IN | RESPIRATION RATE: 20 BRPM | WEIGHT: 227.1 LBS
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
## 2022-07-13 DIAGNOSIS — F41.9 ANXIETY: ICD-10-CM

## 2022-07-13 DIAGNOSIS — R25.2 SPASM: ICD-10-CM

## 2022-07-13 DIAGNOSIS — S80.219A ABRASION OF KNEE, UNSPECIFIED LATERALITY, INITIAL ENCOUNTER: ICD-10-CM

## 2022-07-13 LAB
ALBUMIN SERPL-MCNC: 4.5 G/DL (ref 3.5–5.2)
ALBUMIN/GLOB SERPL: 1.6 G/DL
ALP SERPL-CCNC: 74 U/L (ref 39–117)
ALT SERPL W P-5'-P-CCNC: 14 U/L (ref 1–33)
ANION GAP SERPL CALCULATED.3IONS-SCNC: 12 MMOL/L (ref 5–15)
AST SERPL-CCNC: 21 U/L (ref 1–32)
BASOPHILS # BLD AUTO: 0.02 10*3/MM3 (ref 0–0.2)
BASOPHILS NFR BLD AUTO: 0.3 % (ref 0–1.5)
BILIRUB SERPL-MCNC: 0.3 MG/DL (ref 0–1.2)
BUN SERPL-MCNC: 9 MG/DL (ref 6–20)
BUN/CREAT SERPL: 13 (ref 7–25)
CALCIUM SPEC-SCNC: 9.1 MG/DL (ref 8.6–10.5)
CHLORIDE SERPL-SCNC: 106 MMOL/L (ref 98–107)
CO2 SERPL-SCNC: 20 MMOL/L (ref 22–29)
CREAT SERPL-MCNC: 0.69 MG/DL (ref 0.57–1)
DEPRECATED RDW RBC AUTO: 42.5 FL (ref 37–54)
EGFRCR SERPLBLD CKD-EPI 2021: 118.4 ML/MIN/1.73
EOSINOPHIL # BLD AUTO: 0.1 10*3/MM3 (ref 0–0.4)
EOSINOPHIL NFR BLD AUTO: 1.5 % (ref 0.3–6.2)
ERYTHROCYTE [DISTWIDTH] IN BLOOD BY AUTOMATED COUNT: 13.2 % (ref 12.3–15.4)
ETHANOL UR QL: <0.01 %
GLOBULIN UR ELPH-MCNC: 2.9 GM/DL
GLUCOSE SERPL-MCNC: 105 MG/DL (ref 65–99)
HCG SERPL QL: NEGATIVE
HCT VFR BLD AUTO: 41.2 % (ref 34–46.6)
HGB BLD-MCNC: 14.7 G/DL (ref 12–15.9)
IMM GRANULOCYTES # BLD AUTO: 0.03 10*3/MM3 (ref 0–0.05)
IMM GRANULOCYTES NFR BLD AUTO: 0.4 % (ref 0–0.5)
LYMPHOCYTES # BLD AUTO: 1.27 10*3/MM3 (ref 0.7–3.1)
LYMPHOCYTES NFR BLD AUTO: 18.5 % (ref 19.6–45.3)
MAGNESIUM SERPL-MCNC: 1.9 MG/DL (ref 1.6–2.6)
MCH RBC QN AUTO: 31.3 PG (ref 26.6–33)
MCHC RBC AUTO-ENTMCNC: 35.7 G/DL (ref 31.5–35.7)
MCV RBC AUTO: 87.8 FL (ref 79–97)
MONOCYTES # BLD AUTO: 0.62 10*3/MM3 (ref 0.1–0.9)
MONOCYTES NFR BLD AUTO: 9 % (ref 5–12)
NEUTROPHILS NFR BLD AUTO: 4.82 10*3/MM3 (ref 1.7–7)
NEUTROPHILS NFR BLD AUTO: 70.3 % (ref 42.7–76)
NRBC BLD AUTO-RTO: 0 /100 WBC (ref 0–0.2)
PLATELET # BLD AUTO: 298 10*3/MM3 (ref 140–450)
PMV BLD AUTO: 9.7 FL (ref 6–12)
POTASSIUM SERPL-SCNC: 3.9 MMOL/L (ref 3.5–5.2)
PROT SERPL-MCNC: 7.4 G/DL (ref 6–8.5)
RBC # BLD AUTO: 4.69 10*6/MM3 (ref 3.77–5.28)
SODIUM SERPL-SCNC: 138 MMOL/L (ref 136–145)
WBC NRBC COR # BLD: 6.86 10*3/MM3 (ref 3.4–10.8)

## 2022-07-13 PROCEDURE — 85025 COMPLETE CBC W/AUTO DIFF WBC: CPT | Performed by: EMERGENCY MEDICINE

## 2022-07-13 PROCEDURE — 80053 COMPREHEN METABOLIC PANEL: CPT | Performed by: EMERGENCY MEDICINE

## 2022-07-13 PROCEDURE — 99284 EMERGENCY DEPT VISIT MOD MDM: CPT

## 2022-07-13 PROCEDURE — 36415 COLL VENOUS BLD VENIPUNCTURE: CPT

## 2022-07-13 PROCEDURE — 82077 ASSAY SPEC XCP UR&BREATH IA: CPT | Performed by: EMERGENCY MEDICINE

## 2022-07-13 PROCEDURE — 84703 CHORIONIC GONADOTROPIN ASSAY: CPT | Performed by: EMERGENCY MEDICINE

## 2022-07-13 PROCEDURE — 83735 ASSAY OF MAGNESIUM: CPT | Performed by: EMERGENCY MEDICINE

## 2022-07-13 PROCEDURE — 70450 CT HEAD/BRAIN W/O DYE: CPT

## 2022-07-13 NOTE — ED NOTES
Caro, PCT to room 0125 to obtain urine sample. Pt not in room. Pt returned to room at this time after leaving bathroom. Will attempt again to obtain urine sample.

## 2022-07-13 NOTE — DISCHARGE INSTRUCTIONS
Follow up with one of the [facility] physician groups below to setup primary care. If you have trouble making an appointment, please call the [facility] Nurse Line at [phone]    [facility] - [place]  [address], [place], [place]  [id number] [phone]    [facility] - [place]  [address] [place] [address] [address], [address], [place], [place] [address] [phone]    [facility] - [address], [address], [place] [address] [phone]     [facility] - [place]  [address], [address], [place] [address] [phone]    [facility] - [address], [address] [phone]    [facility] - [address], [place] [address] [phone]    [facility] - [place]  [address] [place], [address] [phone]    [facility] - [place]  [address], [address] [phone]    [facility] - [address] [address] [phone]    [facility] - [place]  [address] [place] [address], [place], [address] [phone]        PEDIATRIC:    [facility] - [place] [address], [address], [place] [address] [phone]            Will require follow-up with neurologist.  No driving or operating machinery until released.  No climbing ladders, work at heights or swimming until released.       Recommended to observe all seizure precautions, including, but not limited to   No driving until seizure free for more than 3 months- as per State driving  regulation / law;   Avoid all high-risk activity,   Take showers instead of baths,   Avoid swimming without observation,   Avoid open heat sources,   Avoid working at heights and   Avoid engaging in all potentially hazardous activities.
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
## 2022-07-30 ENCOUNTER — HOSPITAL ENCOUNTER (EMERGENCY)
Age: 32
Discharge: HOME | End: 2022-07-30
Attending: EMERGENCY MEDICINE
Payer: COMMERCIAL

## 2022-07-30 ENCOUNTER — APPOINTMENT (OUTPATIENT)
Dept: GENERAL RADIOLOGY | Age: 32
End: 2022-07-30
Payer: COMMERCIAL

## 2022-07-30 VITALS
HEART RATE: 73 BPM | RESPIRATION RATE: 17 BRPM | DIASTOLIC BLOOD PRESSURE: 90 MMHG | SYSTOLIC BLOOD PRESSURE: 126 MMHG | TEMPERATURE: 98.2 F | OXYGEN SATURATION: 95 %

## 2022-07-30 DIAGNOSIS — R10.30 LOWER ABDOMINAL PAIN: Primary | ICD-10-CM

## 2022-07-30 DIAGNOSIS — M25.561 ACUTE PAIN OF RIGHT KNEE: ICD-10-CM

## 2022-07-30 DIAGNOSIS — R07.9 CHEST PAIN, UNSPECIFIED TYPE: ICD-10-CM

## 2022-07-30 LAB
ALBUMIN SERPL-MCNC: 4 G/DL (ref 3.5–5.2)
ALP BLD-CCNC: 59 U/L (ref 35–104)
ALT SERPL-CCNC: 17 U/L (ref 5–33)
ANION GAP SERPL CALCULATED.3IONS-SCNC: 10 MMOL/L (ref 7–19)
AST SERPL-CCNC: 18 U/L (ref 5–32)
BASOPHILS ABSOLUTE: 0.1 K/UL (ref 0–0.2)
BASOPHILS RELATIVE PERCENT: 0.6 % (ref 0–1)
BILIRUB SERPL-MCNC: 0.3 MG/DL (ref 0.2–1.2)
BILIRUBIN URINE: NEGATIVE
BLOOD, URINE: NEGATIVE
BUN BLDV-MCNC: 10 MG/DL (ref 6–20)
CALCIUM SERPL-MCNC: 8.9 MG/DL (ref 8.6–10)
CHLORIDE BLD-SCNC: 102 MMOL/L (ref 98–111)
CLARITY: ABNORMAL
CO2: 23 MMOL/L (ref 22–29)
COLOR: YELLOW
CREAT SERPL-MCNC: 0.7 MG/DL (ref 0.5–0.9)
EOSINOPHILS ABSOLUTE: 0.3 K/UL (ref 0–0.6)
EOSINOPHILS RELATIVE PERCENT: 3 % (ref 0–5)
GFR AFRICAN AMERICAN: >59
GFR NON-AFRICAN AMERICAN: >60
GLUCOSE BLD-MCNC: 161 MG/DL (ref 74–109)
GLUCOSE URINE: NEGATIVE MG/DL
HCG QUALITATIVE: NEGATIVE
HCT VFR BLD CALC: 41.5 % (ref 37–47)
HEMOGLOBIN: 14.5 G/DL (ref 12–16)
IMMATURE GRANULOCYTES #: 0 K/UL
KETONES, URINE: NEGATIVE MG/DL
LEUKOCYTE ESTERASE, URINE: NEGATIVE
LIPASE: 29 U/L (ref 13–60)
LYMPHOCYTES ABSOLUTE: 2.8 K/UL (ref 1.1–4.5)
LYMPHOCYTES RELATIVE PERCENT: 30.7 % (ref 20–40)
MCH RBC QN AUTO: 31.3 PG (ref 27–31)
MCHC RBC AUTO-ENTMCNC: 34.9 G/DL (ref 33–37)
MCV RBC AUTO: 89.4 FL (ref 81–99)
MONOCYTES ABSOLUTE: 0.8 K/UL (ref 0–0.9)
MONOCYTES RELATIVE PERCENT: 8.6 % (ref 0–10)
NEUTROPHILS ABSOLUTE: 5.1 K/UL (ref 1.5–7.5)
NEUTROPHILS RELATIVE PERCENT: 56.7 % (ref 50–65)
NITRITE, URINE: NEGATIVE
PDW BLD-RTO: 13.4 % (ref 11.5–14.5)
PH UA: 5 (ref 5–8)
PLATELET # BLD: 316 K/UL (ref 130–400)
PMV BLD AUTO: 9.9 FL (ref 9.4–12.3)
POTASSIUM REFLEX MAGNESIUM: 3.9 MMOL/L (ref 3.5–5)
PROTEIN UA: NEGATIVE MG/DL
RBC # BLD: 4.64 M/UL (ref 4.2–5.4)
SARS-COV-2, NAAT: NOT DETECTED
SODIUM BLD-SCNC: 135 MMOL/L (ref 136–145)
SPECIFIC GRAVITY UA: 1.03 (ref 1–1.03)
TOTAL PROTEIN: 7.3 G/DL (ref 6.6–8.7)
TROPONIN: <0.01 NG/ML (ref 0–0.03)
UROBILINOGEN, URINE: 1 E.U./DL
WBC # BLD: 9 K/UL (ref 4.8–10.8)

## 2022-07-30 PROCEDURE — 6370000000 HC RX 637 (ALT 250 FOR IP): Performed by: EMERGENCY MEDICINE

## 2022-07-30 PROCEDURE — 81003 URINALYSIS AUTO W/O SCOPE: CPT

## 2022-07-30 PROCEDURE — 6360000002 HC RX W HCPCS: Performed by: EMERGENCY MEDICINE

## 2022-07-30 PROCEDURE — 74022 RADEX COMPL AQT ABD SERIES: CPT | Performed by: RADIOLOGY

## 2022-07-30 PROCEDURE — 87635 SARS-COV-2 COVID-19 AMP PRB: CPT

## 2022-07-30 PROCEDURE — 85025 COMPLETE CBC W/AUTO DIFF WBC: CPT

## 2022-07-30 PROCEDURE — 84703 CHORIONIC GONADOTROPIN ASSAY: CPT

## 2022-07-30 PROCEDURE — 83690 ASSAY OF LIPASE: CPT

## 2022-07-30 PROCEDURE — 73560 X-RAY EXAM OF KNEE 1 OR 2: CPT | Performed by: RADIOLOGY

## 2022-07-30 PROCEDURE — 99285 EMERGENCY DEPT VISIT HI MDM: CPT

## 2022-07-30 PROCEDURE — 93005 ELECTROCARDIOGRAM TRACING: CPT | Performed by: EMERGENCY MEDICINE

## 2022-07-30 PROCEDURE — 73560 X-RAY EXAM OF KNEE 1 OR 2: CPT

## 2022-07-30 PROCEDURE — 84484 ASSAY OF TROPONIN QUANT: CPT

## 2022-07-30 PROCEDURE — 80053 COMPREHEN METABOLIC PANEL: CPT

## 2022-07-30 PROCEDURE — 36415 COLL VENOUS BLD VENIPUNCTURE: CPT

## 2022-07-30 PROCEDURE — 96374 THER/PROPH/DIAG INJ IV PUSH: CPT

## 2022-07-30 PROCEDURE — 74022 RADEX COMPL AQT ABD SERIES: CPT

## 2022-07-30 RX ORDER — MORPHINE SULFATE 4 MG/ML
4 INJECTION, SOLUTION INTRAMUSCULAR; INTRAVENOUS ONCE
Status: COMPLETED | OUTPATIENT
Start: 2022-07-30 | End: 2022-07-30

## 2022-07-30 RX ORDER — ONDANSETRON 4 MG/1
4 TABLET, ORALLY DISINTEGRATING ORAL ONCE
Status: COMPLETED | OUTPATIENT
Start: 2022-07-30 | End: 2022-07-30

## 2022-07-30 RX ADMIN — ONDANSETRON 4 MG: 4 TABLET, ORALLY DISINTEGRATING ORAL at 11:20

## 2022-07-30 RX ADMIN — MORPHINE SULFATE 4 MG: 4 INJECTION, SOLUTION INTRAMUSCULAR; INTRAVENOUS at 11:20

## 2022-07-30 ASSESSMENT — ENCOUNTER SYMPTOMS
COUGH: 1
ABDOMINAL PAIN: 1
SHORTNESS OF BREATH: 0
NAUSEA: 0
BACK PAIN: 0
RHINORRHEA: 0
SORE THROAT: 0
DIARRHEA: 0
VOMITING: 0

## 2022-07-30 NOTE — LETTER
[facility] EMERGENCY DEPT  [address]  Phone: [phone]               July 30, 2022    Patient: Saintclair Pagan   YOB: 1990   Date of Visit: 7/30/2022       To Whom It May Concern:    Dariel Mcneil was seen and treated in our emergency department on 7/30/2022. She may return to work on 07/31/22.       Sincerely,       Osiris Linares RN         Signature:__________________________________

## 2022-07-30 NOTE — ED PROVIDER NOTES
Ovarian cyst          SURGICAL HISTORY       Past Surgical History:   Procedure Laterality Date    CHOLECYSTECTOMY, LAPAROSCOPIC N/A 7/26/2021    CHOLECYSTECTOMY LAPAROSCOPIC performed by Kelsey Ward MD at [facility] N/A 06/07/2021    Dr Tammy craig (age 39) recall    UPPER GASTROINTESTINAL ENDOSCOPY N/A 06/07/2021    Dr Darek Jeong-Gastritis         CURRENT MEDICATIONS       Previous Medications    DOCUSATE SODIUM (COLACE) 100 MG CAPSULE    Take 1 capsule by mouth 2 times daily    METRONIDAZOLE (FLAGYL) 500 MG TABLET    TAKE 1 TABLET BY MOUTH 2 (TWO) TIMES A DAY FOR 7 DAYS. OMEPRAZOLE (PRILOSEC) 20 MG DELAYED RELEASE CAPSULE    Take 1 capsule by mouth every morning (before breakfast)    ONDANSETRON (ZOFRAN ODT) 4 MG DISINTEGRATING TABLET    Take 1 tablet by mouth every 8 hours as needed for Nausea or Vomiting    PROMETHAZINE (PHENERGAN) 25 MG TABLET    Take 1 tablet by mouth every 6 hours as needed for Nausea    QUETIAPINE (SEROQUEL) 300 MG TABLET           ALLERGIES     Ibuprofen & acetaminophen, Other, Metoclopramide, Tylenol [acetaminophen], Carafate [sucralfate], and Tramadol    FAMILY HISTORY       Family History   Problem Relation Age of Onset    Diabetes Mother     High Blood Pressure Mother     Diabetes Maternal Grandmother     Diabetes Sister     Colon Cancer Neg Hx     Colon Polyps Neg Hx     Esophageal Cancer Neg Hx     Liver Cancer Neg Hx     Rectal Cancer Neg Hx     Stomach Cancer Neg Hx           SOCIAL HISTORY       Social History     Socioeconomic History    Marital status: Single     Spouse name: None    Number of children: None    Years of education: None    Highest education level: None   Tobacco Use    Smoking status: Every Day     Packs/day: 0.50     Years: 10.00     Pack years: 5.00     Types: Cigarettes    Smokeless tobacco: Never   Vaping Use    Vaping Use: Never used   Substance and Sexual Activity    Alcohol use:  Yes     Alcohol/week: 0.0 standard drinks     Comment: occ    Drug use: Yes     Comment: Spice     Sexual activity: Yes     Partners: Male, Female       SCREENINGS    Blanco Coma Scale  Eye Opening: Spontaneous  Best Verbal Response: Oriented  Best Motor Response: Obeys commands  Rob Coma Scale Score: 15        PHYSICAL EXAM    (up to 7 for level 4, 8 or more for level 5)     ED Triage Vitals [07/30/22 1012]   BP Temp Temp src Heart Rate Resp SpO2 Height Weight   (!) 126/90 98.2 °F (36.8 °C) -- 73 17 95 % -- --       Physical Exam  Constitutional:       General: She is not in acute distress. Appearance: She is well-developed. She is not diaphoretic. HENT:      Head: Normocephalic and atraumatic. Eyes:      Pupils: Pupils are equal, round, and reactive to light. Cardiovascular:      Rate and Rhythm: Normal rate and regular rhythm. Heart sounds: Normal heart sounds. Pulmonary:      Effort: Pulmonary effort is normal. No respiratory distress. Breath sounds: Normal breath sounds. Abdominal:      General: Bowel sounds are normal. There is no distension. Palpations: Abdomen is soft. Tenderness: There is abdominal tenderness in the suprapubic area. Musculoskeletal:         General: Normal range of motion. Cervical back: Normal range of motion and neck supple. Right knee: No swelling, deformity or effusion. Tenderness present. Skin:     General: Skin is warm and dry. Findings: No rash. Neurological:      Mental Status: She is alert and oriented to person, place, and time. Cranial Nerves: No cranial nerve deficit. Motor: No abnormal muscle tone.       Coordination: Coordination normal.   Psychiatric:         Behavior: Behavior normal.       DIAGNOSTIC RESULTS     EKG: All EKG's are interpreted by the Emergency Department Physician who either signs or Co-signs this chart in the absence of a cardiologist.    Sinus rhythm rate 64 no acute ST wave abnormalities     RADIOLOGY:   Non-plain film images such as CT, Ultrasound and MRI are read by the radiologist. Guy Lauren images are visualized and preliminarily interpreted by the emergency physician with the below findings:        Interpretation per the Radiologist below, if available at the time of this note:    XR ACUTE ABD SERIES CHEST 1 VW   Final Result   No acute pulmonary process. Nonspecific nonobstructive bowel gas pattern. Recommendation: Follow up as clinically indicated. Electronically Signed by Monica Rodríguez MD, SA CERTIFIED at [id number] 02:08:32 PM               XR KNEE RIGHT (1-2 VIEWS)   Final Result   No fracture or subluxation. Recommendation:   Follow up as clinically indicated. Electronically Signed by Gelacio Anand MD at 30-Jul-2022 01:02:32 PM                     ED BEDSIDE ULTRASOUND:   Performed by ED Physician - none    LABS:  Labs Reviewed   CBC WITH AUTO DIFFERENTIAL - Abnormal; Notable for the following components:       Result Value    MCH 31.3 (*)     All other components within normal limits   COMPREHENSIVE METABOLIC PANEL W/ REFLEX TO MG FOR LOW K - Abnormal; Notable for the following components:    Sodium 135 (*)     Glucose 161 (*)     All other components within normal limits   URINALYSIS WITH REFLEX TO CULTURE - Abnormal; Notable for the following components:    Clarity, UA CLOUDY (*)     All other components within normal limits   COVID-19, RAPID   C.TRACHOMATIS N.GONORRHOEAE DNA   HCG, SERUM, QUALITATIVE   LIPASE   TROPONIN       All other labs were within normal range or not returned as of this dictation. EMERGENCY DEPARTMENT COURSE and DIFFERENTIALDIAGNOSIS/MDM:   Vitals:    Vitals:    07/30/22 1012   BP: (!) 126/90   Pulse: 73   Resp: 17   Temp: 98.2 °F (36.8 °C)   SpO2: 95%       MDM  PERC negative. Pain improved at this time. Labs and imaging unremarkable. Pt elected to wait for gc/chlamydia testing rather than empiric treatment.        CONSULTS:  None    PROCEDURES:  Unless otherwise notedbelow, none Procedures    FINAL IMPRESSION     1. Lower abdominal pain    2. Chest pain, unspecified type    3.  Acute pain of right knee          DISPOSITION/PLAN   DISPOSITION Decision To Discharge 07/30/2022 01:29:40 PM      PATIENT REFERRED TO:  @FUP@    DISCHARGE MEDICATIONS:  New Prescriptions    No medications on file          (Please note that portions of this note were completed with a voice recognition program.  Efforts were made to edit the dictations butoccasionally words are mis-transcribed.)    Donnella Favre, MD (electronically signed)  AttendingEmergency Physician         Donnella Favre, MD  07/30/22 5380

## 2022-08-01 LAB
EKG P AXIS: 21 DEGREES
EKG P-R INTERVAL: 184 MS
EKG Q-T INTERVAL: 404 MS
EKG QRS DURATION: 84 MS
EKG QTC CALCULATION (BAZETT): 411 MS
EKG T AXIS: 38 DEGREES

## 2022-08-01 PROCEDURE — 93010 ELECTROCARDIOGRAM REPORT: CPT | Performed by: INTERNAL MEDICINE

## 2022-08-12 ENCOUNTER — OFFICE VISIT (OUTPATIENT)
Dept: OBGYN CLINIC | Age: 32
End: 2022-08-12

## 2022-08-12 VITALS
SYSTOLIC BLOOD PRESSURE: 135 MMHG | HEIGHT: 67 IN | BODY MASS INDEX: 37.2 KG/M2 | WEIGHT: 237 LBS | DIASTOLIC BLOOD PRESSURE: 86 MMHG | HEART RATE: 74 BPM

## 2022-08-12 DIAGNOSIS — Z11.51 SCREENING FOR HPV (HUMAN PAPILLOMAVIRUS): ICD-10-CM

## 2022-08-12 DIAGNOSIS — Z11.3 SCREEN FOR STD (SEXUALLY TRANSMITTED DISEASE): ICD-10-CM

## 2022-08-12 DIAGNOSIS — Z01.419 WELL WOMAN EXAM: Primary | ICD-10-CM

## 2022-08-12 DIAGNOSIS — Z12.4 SCREENING FOR CERVICAL CANCER: ICD-10-CM

## 2022-08-12 DIAGNOSIS — Z31.69 PRE-CONCEPTION COUNSELING: ICD-10-CM

## 2022-08-12 LAB
HAV IGM SER IA-ACNC: NORMAL
HEPATITIS B CORE IGM ANTIBODY: NORMAL
HEPATITIS B SURFACE ANTIGEN INTERPRETATION: NORMAL
HEPATITIS C ANTIBODY INTERPRETATION: NORMAL
HIV-1 P24 AG: NORMAL
RAPID HIV 1&2: NORMAL

## 2022-08-12 ASSESSMENT — ENCOUNTER SYMPTOMS
RESPIRATORY NEGATIVE: 1
ALLERGIC/IMMUNOLOGIC NEGATIVE: 1
GASTROINTESTINAL NEGATIVE: 1
EYES NEGATIVE: 1

## 2022-08-12 NOTE — PROGRESS NOTES
Andrea Dalal is a 28 y.o. female who presents today for her medical conditions/ complaints as noted below. Andrea Dalal is c/o of Annual Exam        HPI  Pt presents today for pap smear and breast exam.  Patient wants to discuss steps to getting pregnant. Has been trying for a while with no success. Partner has children, his youngest is 9 yr old. She has regular monthly periods. Thinks it's her issue bc she hasn't ever been pregnant. States hx of sti's over her life. Requests full sti check    Last mammogram:  never  Last pap smear:  2020  Contraception:  none  :  0  Para:  0  AB:  0  Last bone density:  never  Last colonoscopy: never    Patient's last menstrual period was 2022.     Past Medical History:   Diagnosis Date    Asthma     Obese     Ovarian cyst      Past Surgical History:   Procedure Laterality Date    CHOLECYSTECTOMY, LAPAROSCOPIC N/A 2021    CHOLECYSTECTOMY LAPAROSCOPIC performed by Shayan Rust MD at [facility] N/A 2021    Dr Clarine Klinefelter yr (age 39) recall    UPPER GASTROINTESTINAL ENDOSCOPY N/A 2021    Dr Beatriz Martinez     Family History   Problem Relation Age of Onset    Diabetes Mother     High Blood Pressure Mother     Diabetes Maternal Grandmother     Diabetes Sister     Colon Cancer Neg Hx     Colon Polyps Neg Hx     Esophageal Cancer Neg Hx     Liver Cancer Neg Hx     Rectal Cancer Neg Hx     Stomach Cancer Neg Hx      Social History     Tobacco Use    Smoking status: Every Day     Packs/day: 0.50     Years: 10.00     Pack years: 5.00     Types: Cigarettes    Smokeless tobacco: Never   Substance Use Topics    Alcohol use: Yes     Alcohol/week: 0.0 standard drinks     Comment: occ       No current outpatient medications on file. No current facility-administered medications for this visit.      Allergies   Allergen Reactions    Ibuprofen & Acetaminophen Anaphylaxis    Other Anaphylaxis     onions Metoclopramide Other (See Comments)    Tylenol [Acetaminophen]     Carafate [Sucralfate] Nausea And Vomiting    Tramadol Rash     Vitals:    08/12/22 1314   BP: 135/86   Pulse: 74     Body mass index is 37.12 kg/m². Review of Systems   Constitutional: Negative. HENT: Negative. Eyes: Negative. Respiratory: Negative. Cardiovascular: Negative. Gastrointestinal: Negative. Endocrine: Negative. Genitourinary: Negative. Negative for difficulty urinating, dyspareunia, dysuria, enuresis, frequency, hematuria, menstrual problem, pelvic pain, urgency and vaginal discharge. Musculoskeletal: Negative. Skin: Negative. Allergic/Immunologic: Negative. Neurological: Negative. Hematological: Negative. Psychiatric/Behavioral: Negative. Physical Exam  Vitals and nursing note reviewed. Constitutional:       General: She is not in acute distress. Appearance: She is well-developed. She is not diaphoretic. HENT:      Head: Normocephalic and atraumatic. Right Ear: External ear normal.      Left Ear: External ear normal.      Nose: Nose normal.      Mouth/Throat:      Mouth: Mucous membranes are moist.      Pharynx: Oropharynx is clear. Eyes:      General: Lids are normal.         Right eye: No discharge. Left eye: No discharge. Conjunctiva/sclera: Conjunctivae normal.      Pupils: Pupils are equal, round, and reactive to light. Neck:      Thyroid: No thyroid mass or thyromegaly. Trachea: No tracheal deviation. Cardiovascular:      Rate and Rhythm: Normal rate and regular rhythm. Heart sounds: Normal heart sounds. No murmur heard. Pulmonary:      Effort: Pulmonary effort is normal.      Breath sounds: Normal breath sounds. No wheezing. Chest:   Breasts:     Breasts are symmetrical.      Right: No inverted nipple, mass, nipple discharge, skin change, tenderness or supraclavicular adenopathy.       Left: No inverted nipple, mass, nipple discharge, skin change, tenderness or supraclavicular adenopathy. Abdominal:      General: Bowel sounds are normal. There is no distension. Palpations: Abdomen is soft. There is no mass. Tenderness: There is no abdominal tenderness. Hernia: There is no hernia in the left inguinal area or right inguinal area. Genitourinary:     General: Normal vulva. Labia:         Right: No rash, tenderness, lesion or injury. Left: No rash, tenderness, lesion or injury. Urethra: No prolapse, urethral pain, urethral swelling or urethral lesion. Vagina: Normal. No vaginal discharge or tenderness. Cervix: Normal. Discharge: normal cervical mucosa. Uterus: Normal. Not enlarged and not tender. Adnexa: Right adnexa normal and left adnexa normal.        Right: No mass, tenderness or fullness. Left: No mass, tenderness or fullness. Rectum: Normal. No mass, anal fissure or external hemorrhoid. Comments: Pap collected for cervical cytology and sti collected  Musculoskeletal:         General: No tenderness. Normal range of motion. Cervical back: Normal range of motion and neck supple. Lymphadenopathy:      Cervical:      Right cervical: No superficial, deep or posterior cervical adenopathy. Left cervical: No superficial, deep or posterior cervical adenopathy. Upper Body:      Right upper body: No supraclavicular, pectoral or epitrochlear adenopathy. Left upper body: No supraclavicular, pectoral or epitrochlear adenopathy. Lower Body: No right inguinal adenopathy. No left inguinal adenopathy. Skin:     General: Skin is warm and dry. Capillary Refill: Capillary refill takes 2 to 3 seconds. Findings: No rash. Neurological:      Mental Status: She is alert and oriented to person, place, and time. She is not disoriented. Sensory: No sensory deficit.       Coordination: Coordination normal.      Gait: Gait normal.      Deep Tendon Reflexes: Reflexes are normal and symmetric. Psychiatric:         Speech: Speech normal.         Behavior: Behavior normal.         Thought Content: Thought content normal.         Judgment: Judgment normal.        Diagnosis Orders   1. Well woman exam  PAP SMEAR    Human papillomavirus (HPV) DNA probe thin prep high risk      2. Screening for cervical cancer  PAP SMEAR      3. Screening for HPV (human papillomavirus)  Human papillomavirus (HPV) DNA probe thin prep high risk      4. Screen for STD (sexually transmitted disease)  C.trachomatis, N.gonorrhoeae, T.vaginalis Molecular, Thin Prep    RPR    HIV Rapid 1&2    Hepatitis Panel, Acute      5. Pre-conception counseling            MEDICATIONS:  No orders of the defined types were placed in this encounter. ORDERS:  Orders Placed This Encounter   Procedures    C.trachomatis, N.gonorrhoeae, T.vaginalis Molecular, Thin Prep    PAP SMEAR    Human papillomavirus (HPV) DNA probe thin prep high risk    RPR    HIV Rapid 1&2    Hepatitis Panel, Acute       PLAN:  Pap and std collected  Lab for sti too  We discussed fertile window and timed conception. Declines SO having semen analysis. Wants to have hsg after discussion. To call on first day of period for hsg. There are no Patient Instructions on file for this visit.

## 2022-08-15 LAB — RPR: NORMAL

## 2022-08-16 LAB
C. TRACHOMATIS DNA,THIN PREP: NOT DETECTED
N. GONORRHOEAE DNA, THIN PREP: NOT DETECTED
TRICHOMONAS VAGINALIS DNA: NOT DETECTED

## 2022-08-19 RX ORDER — METRONIDAZOLE 500 MG/1
500 TABLET ORAL 2 TIMES DAILY WITH MEALS
Qty: 14 TABLET | Refills: 0 | Status: SHIPPED | OUTPATIENT
Start: 2022-08-19 | End: 2022-08-26

## 2022-08-22 ENCOUNTER — TELEPHONE (OUTPATIENT)
Dept: OBGYN CLINIC | Age: 32
End: 2022-08-22

## 2022-08-22 NOTE — TELEPHONE ENCOUNTER
Patient called for her lab and pap smear results. Informed patient of results and she states that she already went and picked up her medication.

## 2022-09-01 ENCOUNTER — OFFICE VISIT (OUTPATIENT)
Dept: OBGYN CLINIC | Age: 32
End: 2022-09-01
Payer: COMMERCIAL

## 2022-09-01 ENCOUNTER — TELEPHONE (OUTPATIENT)
Dept: OBGYN CLINIC | Age: 32
End: 2022-09-01

## 2022-09-01 VITALS
WEIGHT: 228 LBS | BODY MASS INDEX: 35.79 KG/M2 | HEIGHT: 67 IN | SYSTOLIC BLOOD PRESSURE: 149 MMHG | HEART RATE: 73 BPM | DIASTOLIC BLOOD PRESSURE: 91 MMHG

## 2022-09-01 DIAGNOSIS — N97.9 INFERTILITY, FEMALE, PRIMARY: ICD-10-CM

## 2022-09-01 DIAGNOSIS — N92.6 IRREGULAR MENSES: ICD-10-CM

## 2022-09-01 DIAGNOSIS — Z31.69 PRE-CONCEPTION COUNSELING: ICD-10-CM

## 2022-09-01 DIAGNOSIS — N92.6 IRREGULAR MENSES: Primary | ICD-10-CM

## 2022-09-01 LAB
FOLLICLE STIMULATING HORMONE: 3.8 MIU/ML
LUTEINIZING HORMONE: 5.2 MIU/ML
PROLACTIN: 18.77 NG/ML (ref 4.79–23.3)
T4 FREE: 1.42 NG/DL (ref 0.93–1.7)
TESTOSTERONE TOTAL: 36.6 NG/DL (ref 8.4–48.1)
TSH SERPL DL<=0.05 MIU/L-ACNC: 2.58 UIU/ML (ref 0.27–4.2)

## 2022-09-01 PROCEDURE — 99213 OFFICE O/P EST LOW 20 MIN: CPT | Performed by: NURSE PRACTITIONER

## 2022-09-01 ASSESSMENT — ENCOUNTER SYMPTOMS
RESPIRATORY NEGATIVE: 1
GASTROINTESTINAL NEGATIVE: 1
DIARRHEA: 0
ALLERGIC/IMMUNOLOGIC NEGATIVE: 1
EYES NEGATIVE: 1
CONSTIPATION: 0

## 2022-09-01 NOTE — TELEPHONE ENCOUNTER
Aniya called requesting status of their referral.  Per pt someone was to call her today to schedule. Please return call to update her on status. The best time to call her to accommodate their needs is Anytime    Thank you.

## 2022-09-01 NOTE — PROGRESS NOTES
Ricco Che is a 28 y.o. female who presents today for her medical conditions/ complaints as noted below. Ricco Che is c/o of Infertility        HPI  Pt presents for follow up on fertility. Had annual exam and pap smear. Had full STD testing- negative. Was treated for BV. Periods have been somewhat irregular. Partner has a child from previous relationship, pt has never been pregnant and has been having unprotected sex for years. History of STDs and was discussed previously that she may need HSG for further diagnostics. Currently on her period. Patient's last menstrual period was 2022 (exact date).     Past Medical History:   Diagnosis Date    Asthma     Obese     Ovarian cyst      Past Surgical History:   Procedure Laterality Date    CHOLECYSTECTOMY, LAPAROSCOPIC N/A 2021    CHOLECYSTECTOMY LAPAROSCOPIC performed by Joseph Mcclendon MD at [facility] N/A 2021    Dr Anne Alt yr (age 39) recall    UPPER GASTROINTESTINAL ENDOSCOPY N/A 2021    Dr Karli Hanson     Family History   Problem Relation Age of Onset    Diabetes Mother     High Blood Pressure Mother     Diabetes Maternal Grandmother     Diabetes Sister     Colon Cancer Neg Hx     Colon Polyps Neg Hx     Esophageal Cancer Neg Hx     Liver Cancer Neg Hx     Rectal Cancer Neg Hx     Stomach Cancer Neg Hx      Social History     Tobacco Use    Smoking status: Every Day     Packs/day: 0.50     Years: 10.00     Pack years: 5.00     Types: Cigarettes    Smokeless tobacco: Never   Substance Use Topics    Alcohol use: Yes     Alcohol/week: 0.0 standard drinks     Comment: occ       No current outpatient medications on file. No current facility-administered medications for this visit.      Allergies   Allergen Reactions    Ibuprofen & Acetaminophen Anaphylaxis    Other Anaphylaxis     onions    Metoclopramide Other (See Comments)    Tylenol [Acetaminophen]     Carafate [Sucralfate] Nausea And Vomiting    Tramadol Rash     Vitals:    09/01/22 0949   BP: (!) 149/91   Pulse: 73     Body mass index is 35.71 kg/m². Review of Systems   Constitutional: Negative. HENT: Negative. Eyes: Negative. Respiratory: Negative. Cardiovascular: Negative. Gastrointestinal: Negative. Negative for constipation and diarrhea. Endocrine: Negative. Genitourinary:  Positive for menstrual problem. Negative for frequency and urgency. Musculoskeletal: Negative. Skin: Negative. Allergic/Immunologic: Negative. Neurological: Negative. Hematological: Negative. Psychiatric/Behavioral: Negative. All other systems reviewed and are negative. Physical Exam  Vitals and nursing note reviewed. Constitutional:       Appearance: She is well-developed. HENT:      Head: Normocephalic. Right Ear: External ear normal.      Left Ear: External ear normal.      Nose: Nose normal.   Musculoskeletal:         General: Normal range of motion. Cervical back: Normal range of motion. Skin:     General: Skin is warm and dry. Neurological:      Mental Status: She is alert and oriented to person, place, and time. Psychiatric:         Attention and Perception: Attention normal.         Mood and Affect: Mood normal.         Speech: Speech normal.         Behavior: Behavior normal.         Thought Content: Thought content normal.         Cognition and Memory: Cognition normal.         Judgment: Judgment normal.        Diagnosis Orders   1. Irregular menses  Follicle Stimulating Hormone    Luteinizing Hormone    Prolactin    TSH    T4, Free    Testosterone      2. Pre-conception counseling  XR HYSTEROSALPINGOGRAPHY S&I      3. Infertility, female, primary            MEDICATIONS:  No orders of the defined types were placed in this encounter.       ORDERS:  Orders Placed This Encounter   Procedures    XR HYSTEROSALPINGOGRAPHY S&I    Follicle Stimulating Hormone    Luteinizing Hormone    Prolactin TSH    T4, Free    Testosterone       PLAN:  Labs pending  Discussed HSG and orders placed  Phone number given to call for CD 10 scheduling- pt states understanding    There are no Patient Instructions on file for this visit.

## 2022-09-07 ENCOUNTER — TELEPHONE (OUTPATIENT)
Dept: OBGYN CLINIC | Age: 32
End: 2022-09-07

## 2022-09-07 NOTE — TELEPHONE ENCOUNTER
Patient called in stating she never got a returned call to schedule hsg and she is now off her period for this month.   Please contact patient back ASAP at # [phone]

## 2022-09-07 NOTE — TELEPHONE ENCOUNTER
Aniya requests that Hina Williamson return their call. The best time to reach her is Anytime. Pt. Hasn't been scheduled for hs. Pt. Requests that she be called. Pt. Can't get into [place] right now due to phone issues. Can be called at either [phone] or [phone]    Thank you.

## 2022-09-07 NOTE — TELEPHONE ENCOUNTER
Spoke with pt she is calling scheduling to set appt up informed to let them know the order is in (was given number to scheduling) and she is on day 9 with period and was told test has to be done between 7-10 days with period. She states she was calling now.  Sylvie/ROBBY

## 2022-09-12 ENCOUNTER — TELEPHONE (OUTPATIENT)
Dept: OBGYN CLINIC | Age: 32
End: 2022-09-12

## 2022-09-12 NOTE — TELEPHONE ENCOUNTER
Yeny Corbett called  to inform office that her phone is back on. Per pt she was being referred for an appt with [facility]. Wanted office to let them know her phone is working so they can call with appt.

## 2022-09-14 ENCOUNTER — TRANSCRIBE ORDERS (OUTPATIENT)
Dept: ADMINISTRATIVE | Facility: HOSPITAL | Age: 32
End: 2022-09-14

## 2022-09-14 DIAGNOSIS — Z31.69 PRE-CONCEPTION COUNSELING: Primary | ICD-10-CM

## 2022-09-26 NOTE — TELEPHONE ENCOUNTER
Aniya called this morning to advise that she is to have testing done while she is on her cycle, she started her period last night. Patient requested a call back as she can not currently get into her MyChart. Thank you.

## 2022-10-03 ENCOUNTER — TELEPHONE (OUTPATIENT)
Dept: GENERAL RADIOLOGY | Age: 32
End: 2022-10-03

## 2022-10-03 NOTE — TELEPHONE ENCOUNTER
Spoke to patient- told her we needed to reschedule appt- We do not have a Radiologist available to do procedure today. Rescheduled for tomorrow 10/4 at 1300.

## 2022-10-04 ENCOUNTER — HOSPITAL ENCOUNTER (OUTPATIENT)
Dept: GENERAL RADIOLOGY | Age: 32
Discharge: HOME | End: 2022-10-04
Payer: COMMERCIAL

## 2022-10-04 ENCOUNTER — TELEPHONE (OUTPATIENT)
Dept: OBGYN CLINIC | Age: 32
End: 2022-10-04

## 2022-10-04 DIAGNOSIS — Z31.69 PRE-CONCEPTION COUNSELING: ICD-10-CM

## 2022-10-04 PROCEDURE — 6360000004 HC RX CONTRAST MEDICATION: Performed by: NURSE PRACTITIONER

## 2022-10-04 PROCEDURE — 74740 X-RAY FEMALE GENITAL TRACT: CPT

## 2022-10-04 RX ADMIN — IOPAMIDOL 20 ML: 408 INJECTION, SOLUTION INTRATHECAL at 14:03

## 2022-10-04 NOTE — TELEPHONE ENCOUNTER
Tried to call pt; no answer and no VM box set up. Sent [facility] message explaining results haven't resulted yet and Nanci Jin is out of office but would follow up once she reviewed results.

## 2022-10-05 ENCOUNTER — TELEPHONE (OUTPATIENT)
Dept: OBGYN CLINIC | Age: 32
End: 2022-10-05

## 2022-10-05 NOTE — TELEPHONE ENCOUNTER
Pt called the office requesting an appt because she reports \"they were unable to inflate the balloon\" during the test yesterday. She had an HSG, and results were not ready. I told her that once results were posted, [facility] would review them and make recommendations. I told her [facility] would be back next week, she v/u.

## 2022-10-10 NOTE — TELEPHONE ENCOUNTER
Patient calling back in requesting call back with results  Please contact back this afternoon  Thank you

## 2022-10-11 ENCOUNTER — TELEPHONE (OUTPATIENT)
Dept: OBGYN CLINIC | Age: 32
End: 2022-10-11

## 2022-10-11 NOTE — TELEPHONE ENCOUNTER
Informed pt that Dr. Mckenzie Chun recommend D&C. Surgery aminata'd on 10/28 and preop on 10/26.  Pt VU

## 2022-10-11 NOTE — TELEPHONE ENCOUNTER
----- Message from Ly Lal RN sent at 10/11/2022  9:11 AM CDT -----  Per AW needs D&C  ----- Message -----  From: Ly Lal RN  Sent: 10/10/2022  12:55 PM CDT  To: Jeremías Nguyen MD    Needs D&C?  ----- Message -----  From: ZACKERY Azul CNP  Sent: 10/10/2022  12:45 PM CDT  To: Ly Lal RN    Pt is wanting to get pregnant, hx of STDs. Tried to get HSG but cervix unable to dilate. See if she needs dilitation and hysteroscopy? I think they can do the dye test under anesthesia?  Thanks ML    ----- Message -----  From: Domo Cheney Incoming Radiology Results From [facility]  Sent: 10/6/2022   8:31 AM CDT  To: ZACKERY Azul CNP

## 2022-10-26 ENCOUNTER — OFFICE VISIT (OUTPATIENT)
Dept: OBGYN CLINIC | Age: 32
End: 2022-10-26

## 2022-10-26 ENCOUNTER — HOSPITAL ENCOUNTER (OUTPATIENT)
Dept: PREADMISSION TESTING | Age: 32
Discharge: HOME | End: 2022-10-30
Payer: COMMERCIAL

## 2022-10-26 VITALS
WEIGHT: 228 LBS | DIASTOLIC BLOOD PRESSURE: 91 MMHG | HEIGHT: 67 IN | SYSTOLIC BLOOD PRESSURE: 137 MMHG | BODY MASS INDEX: 35.79 KG/M2 | HEART RATE: 75 BPM

## 2022-10-26 VITALS — BODY MASS INDEX: 36.1 KG/M2 | HEIGHT: 67 IN | WEIGHT: 230 LBS

## 2022-10-26 DIAGNOSIS — N97.9 INFERTILITY, FEMALE, PRIMARY: ICD-10-CM

## 2022-10-26 DIAGNOSIS — N88.2 CERVICAL STENOSIS (UTERINE CERVIX): Primary | ICD-10-CM

## 2022-10-26 LAB
BASOPHILS ABSOLUTE: 0.1 K/UL (ref 0–0.2)
BASOPHILS RELATIVE PERCENT: 0.7 % (ref 0–1)
EOSINOPHILS ABSOLUTE: 0.3 K/UL (ref 0–0.6)
EOSINOPHILS RELATIVE PERCENT: 2.5 % (ref 0–5)
HCT VFR BLD CALC: 44.2 % (ref 37–47)
HEMOGLOBIN: 15.8 G/DL (ref 12–16)
IMMATURE GRANULOCYTES #: 0 K/UL
LYMPHOCYTES ABSOLUTE: 3.1 K/UL (ref 1.1–4.5)
LYMPHOCYTES RELATIVE PERCENT: 31 % (ref 20–40)
MCH RBC QN AUTO: 32.4 PG (ref 27–31)
MCHC RBC AUTO-ENTMCNC: 35.7 G/DL (ref 33–37)
MCV RBC AUTO: 90.6 FL (ref 81–99)
MONOCYTES ABSOLUTE: 1 K/UL (ref 0–0.9)
MONOCYTES RELATIVE PERCENT: 10.3 % (ref 0–10)
NEUTROPHILS ABSOLUTE: 5.4 K/UL (ref 1.5–7.5)
NEUTROPHILS RELATIVE PERCENT: 55.1 % (ref 50–65)
PDW BLD-RTO: 13.5 % (ref 11.5–14.5)
PLATELET # BLD: 309 K/UL (ref 130–400)
PMV BLD AUTO: 10.2 FL (ref 9.4–12.3)
RBC # BLD: 4.88 M/UL (ref 4.2–5.4)
WBC # BLD: 9.8 K/UL (ref 4.8–10.8)

## 2022-10-26 PROCEDURE — 85025 COMPLETE CBC W/AUTO DIFF WBC: CPT

## 2022-10-26 PROCEDURE — PREOPEXAM PRE-OP EXAM: Performed by: OBSTETRICS & GYNECOLOGY

## 2022-10-26 ASSESSMENT — ENCOUNTER SYMPTOMS
EYES NEGATIVE: 1
RESPIRATORY NEGATIVE: 1
GASTROINTESTINAL NEGATIVE: 1

## 2022-10-26 NOTE — DISCHARGE INSTRUCTIONS
The day before your surgery, you will receive a phone call from the surgery nurse, to let you know what time to arrive on the day of surgery. This call will usually be between 2-4 PM.  If you do not receive a phone call by 4 PM the day before your surgery, please call [phone] and let them know you have not received an arrival time. If your surgery is on Monday, your call will be on the Friday before your Monday surgery. PREOPERATIVE GUIDELINES WHEN RECEIVING ANESTHESIA    Do not eat or drink anything after midnight, the night before your surgery. This is extremely important for your safety. Take a bath (or shower) the night before your surgery and you may brush your teeth the morning of your surgery. You will be scheduled to arrive at the hospital 2 hours before your surgery, or follow your surgeon's instructions. Dress comfortably. Wear loose clothing that will be easy to remove and comfortable for your trip home. You may wear eyeglasses or contacts but bring your cases with you as they must be remove before your surgery. Hearing aids and dentures will need to be removed before your surgery. Do not wear any jewelry, including body jewelry. All jewelry will need to be removed prior to your surgery. Do not wear fingernail polish or make-up. It is best not to bring any valuables with you. If you are to stay in the hospital overnight, bring your robe, slippers and personal toiletries that you may need. POSTOPERATIVE GUIDELINES AFTER RECEIVING ANESTHESIA    If you are to go home after your surgery, you will need a responsible adult to drive you home. You will not be able to take public transportation after your discharge from the Operative Care Unit unless you are accompanied by a        responsible adult. On returning home, be sure to follow your physician's orders regarding diet, activity and medications.     Remember, surgery with general anesthesia or sedation may leave you sleepy, very tired and with a decreased appetite for 12 to 24 hours. If you develop any post-surgical complications or problems, call your surgeon or [facility] ([phone]). [address] [place] [address] for Surgery Patients-Revised 6-    Visitors for surgery patients are essential for the patient's emotional well-being and care       post operatively. 2.   Visitor Expectations and Limitations        VISITORS MUST WEAR MASKS AT ALL TIMES. NO Cloth masks allowed. 3.  One visitor allowed with patients in the preop/postop rooms. 4.  A second visitor may sit in the waiting area. 5.  No children under 13 allowed in the pre-post op areas unless they are the patient. 6.  Two people may be with an underage surgical/procedural patient in preop/postop        room. 7.  If you are admitted to the hospital post operatively, there are NO RESTRICTIONS on       the floor at this time. 8.  If you are admitted to ICU postoperatively, you may have one visitor in the room from        7A-7P. A second visitor may sit in the ICU waiting room.   There can be no overnight

## 2022-10-26 NOTE — PROGRESS NOTES
iCnthya SALINAS RN, am scribing for and in the presence of Dr. Anjelica Harrell 10/26/2022/11:23 AM/sign         10/26/2022    Moises Tillman (:  1990) is a 28 y.o. female, here for a preop visit. She is scheduled to have a D&C, hysteroscopy on 10/28/2022 for cervical stenosis seen on HSG. Pt is desiring pregnancy and has been trying to get pregnant for the past couple of years. HSG was ordered and showed   Unsuccessful hysterosalpingogram with findings consistent with   cervical stenosis. Multiple attempts cervical dilatation were   unsuccessful. Contrast injection within the cervical os demonstrates   no communication with the endometrial cavity. Direct visualization,   cervical dilatation and follow-up study recommended if clinically   indicated. She is a G0. Her partner does have one child from a previous relationship. Patient Active Problem List   Diagnosis    Infertility, female, primary    Hidradenitis suppurativa of right axilla       Review of Systems   Constitutional: Negative. HENT: Negative. Eyes: Negative. Respiratory: Negative. Cardiovascular: Negative. Gastrointestinal: Negative. Genitourinary:  Negative for difficulty urinating, dyspareunia, dysuria, enuresis, frequency, hematuria, menstrual problem, pelvic pain, urgency and vaginal discharge. Musculoskeletal: Negative. Skin: Negative. Neurological: Negative. Psychiatric/Behavioral: Negative.        Prior to Visit Medications    Not on File        Allergies   Allergen Reactions    Ibuprofen & Acetaminophen Anaphylaxis    Other Anaphylaxis     onions    Metoclopramide Other (See Comments)    Tylenol [Acetaminophen]     Carafate [Sucralfate] Nausea And Vomiting    Tramadol Rash       Past Medical History:   Diagnosis Date    Asthma     Obese     Ovarian cyst        Past Surgical History:   Procedure Laterality Date    CHOLECYSTECTOMY, LAPAROSCOPIC N/A 2021    CHOLECYSTECTOMY LAPAROSCOPIC performed by Maryam Tamayo MD at [facility] N/A 06/07/2021    Dr Susan Coleman yr (age 39) recall    UPPER GASTROINTESTINAL ENDOSCOPY N/A 06/07/2021    Dr Lindsey Jordan       Social History     Socioeconomic History    Marital status: Single     Spouse name: Not on file    Number of children: Not on file    Years of education: Not on file    Highest education level: Not on file   Occupational History    Not on file   Tobacco Use    Smoking status: Every Day     Packs/day: 0.50     Years: 10.00     Pack years: 5.00     Types: Cigarettes    Smokeless tobacco: Never   Vaping Use    Vaping Use: Never used   Substance and Sexual Activity    Alcohol use: Yes     Alcohol/week: 0.0 standard drinks     Comment: occ    Drug use: Yes     Comment: Spice     Sexual activity: Yes     Partners: Male, Female   Other Topics Concern    Not on file   Social History Narrative    Not on file     Social Determinants of Health     Financial Resource Strain: Not on file   Food Insecurity: Not on file   Transportation Needs: Not on file   Physical Activity: Not on file   Stress: Not on file   Social Connections: Not on file   Intimate Partner Violence: Not on file   Housing Stability: Not on file        Family History   Problem Relation Age of Onset    Diabetes Mother     High Blood Pressure Mother     Diabetes Maternal Grandmother     Diabetes Sister     Colon Cancer Neg Hx     Colon Polyps Neg Hx     Esophageal Cancer Neg Hx     Liver Cancer Neg Hx     Rectal Cancer Neg Hx     Stomach Cancer Neg Hx        ADVANCE DIRECTIVE: N, <no information>    Vitals:    10/26/22 0927   BP: (!) 137/91   Site: Left Upper Arm   Position: Sitting   Cuff Size: Medium Adult   Pulse: 75   Weight: 228 lb (103.4 kg)   Height: 5' 7\" (1.702 m)     Estimated body mass index is 35.71 kg/m² as calculated from the following:    Height as of this encounter: 5' 7\" (1.702 m). Weight as of this encounter: 228 lb (103.4 kg).     Physical Exam  Vitals and nursing note reviewed. Constitutional:       General: She is not in acute distress. Appearance: She is well-developed. She is not diaphoretic. HENT:      Head: Normocephalic and atraumatic. Eyes:      Conjunctiva/sclera: Conjunctivae normal.      Pupils: Pupils are equal, round, and reactive to light. Pulmonary:      Effort: Pulmonary effort is normal.   Abdominal:      Tenderness: There is no guarding. Musculoskeletal:         General: Normal range of motion. Cervical back: Normal range of motion. Comments: Normal ROM in all 4 extremities; normal gait   Skin:     General: Skin is warm and dry. Neurological:      Mental Status: She is alert and oriented to person, place, and time. Motor: No abnormal muscle tone. Coordination: Coordination normal.   Psychiatric:         Behavior: Behavior normal.       No flowsheet data found. Lab Results   Component Value Date/Time    GLUCOSE 161 07/30/2022 10:27 AM       The ASCVD Risk score (Jermaine WOODS, et al., 2019) failed to calculate for the following reasons:     The 2019 ASCVD risk score is only valid for ages 36 to 78    Immunization History   Administered Date(s) Administered    COVID-19, PFIZER GRAY top, DO NOT Dilute, (age 15 y+), IM, 30 mcg/0.3 mL 02/08/2022, 03/09/2022    Influenza Virus Vaccine 09/15/2020    Tdap (Boostrix, Adacel) 09/05/2020       Health Maintenance   Topic Date Due    Varicella vaccine (1 of 2 - 2-dose childhood series) Never done    Pneumococcal 0-64 years Vaccine (1 - PCV) Never done    Depression Screen  Never done    COVID-19 Vaccine (3 - Booster for Pfizer series) 05/04/2022    Flu vaccine (1) 08/01/2022    Cervical cancer screen  08/12/2027    DTaP/Tdap/Td vaccine (3 - Td or Tdap) 09/05/2030    Colorectal Cancer Screen  06/07/2035    Hepatitis C screen  Completed    HIV screen  Completed    Hepatitis A vaccine  Aged Out    Hib vaccine  Aged Out    Meningococcal (ACWY) vaccine  Aged Out       Assessment & Plan   Cervical stenosis (uterine cervix)  Infertility, female, primary    Counseling was offered and accepted by patient. Discussed at length the risks, benefits and alternatives to surgery including medical management and no intervention. Discussed HSG and pt's desire for pregnancy. Discussed laparoscopic Chromotubation as the same pt and pt is in agreement. Will add to surgery. Some patients will need a full medical clearance. Preop testing ordered. Consents for surgery signed and all questions  proceeding. All questions answered and  patient voiced understanding of above. Return if symptoms worsen or fail to improve. Alondra Medina MD, personally performed services described in this document as scribed by Prema Hutton RN in my presence, and it is both accurate and complete.

## 2022-10-27 ENCOUNTER — TELEPHONE (OUTPATIENT)
Dept: OBGYN CLINIC | Age: 32
End: 2022-10-27

## 2022-10-27 NOTE — DISCHARGE INSTRUCTIONS
Nothing in the vagina for 2 weeks. No sex, no tampons, no bath, no douche. No heavy lifting or straining. Vaginal bleeding may occur while healing. If like a period or heavier, you are doing too much. Rest with your feet elevated for next 48 hours and it should taper off. Call MD with fever > 101, foul smelling vaginal discharge or any other questions or concerns.

## 2022-10-27 NOTE — TELEPHONE ENCOUNTER
Called passport and CPT code 65614 is still pending. Informed pt that she wants to cancel that procedure and still have the D&C. Called surgery and informed them.  Dr. Catalino Salinas informed

## 2022-10-28 ENCOUNTER — ANESTHESIA EVENT (OUTPATIENT)
Dept: OPERATING ROOM | Age: 32
End: 2022-10-28
Payer: COMMERCIAL

## 2022-10-28 ENCOUNTER — HOSPITAL ENCOUNTER (OUTPATIENT)
Age: 32
Setting detail: OUTPATIENT SURGERY
Discharge: HOME | End: 2022-10-28
Attending: OBSTETRICS & GYNECOLOGY | Admitting: OBSTETRICS & GYNECOLOGY
Payer: COMMERCIAL

## 2022-10-28 ENCOUNTER — ANESTHESIA (OUTPATIENT)
Dept: OPERATING ROOM | Age: 32
End: 2022-10-28
Payer: COMMERCIAL

## 2022-10-28 VITALS
SYSTOLIC BLOOD PRESSURE: 127 MMHG | TEMPERATURE: 96.9 F | OXYGEN SATURATION: 100 % | HEIGHT: 67 IN | WEIGHT: 233 LBS | HEART RATE: 63 BPM | BODY MASS INDEX: 36.57 KG/M2 | DIASTOLIC BLOOD PRESSURE: 89 MMHG | RESPIRATION RATE: 18 BRPM

## 2022-10-28 DIAGNOSIS — N88.2 STENOSIS OF CERVIX: ICD-10-CM

## 2022-10-28 LAB — HCG(URINE) PREGNANCY TEST: NEGATIVE

## 2022-10-28 PROCEDURE — 6360000002 HC RX W HCPCS: Performed by: OBSTETRICS & GYNECOLOGY

## 2022-10-28 PROCEDURE — 88305 TISSUE EXAM BY PATHOLOGIST: CPT

## 2022-10-28 PROCEDURE — 6370000000 HC RX 637 (ALT 250 FOR IP): Performed by: ANESTHESIOLOGY

## 2022-10-28 PROCEDURE — 2500000003 HC RX 250 WO HCPCS: Performed by: NURSE ANESTHETIST, CERTIFIED REGISTERED

## 2022-10-28 PROCEDURE — 6360000002 HC RX W HCPCS: Performed by: ANESTHESIOLOGY

## 2022-10-28 PROCEDURE — 7100000010 HC PHASE II RECOVERY - FIRST 15 MIN: Performed by: OBSTETRICS & GYNECOLOGY

## 2022-10-28 PROCEDURE — 3700000000 HC ANESTHESIA ATTENDED CARE: Performed by: OBSTETRICS & GYNECOLOGY

## 2022-10-28 PROCEDURE — 6360000002 HC RX W HCPCS: Performed by: NURSE ANESTHETIST, CERTIFIED REGISTERED

## 2022-10-28 PROCEDURE — 3600000004 HC SURGERY LEVEL 4 BASE: Performed by: OBSTETRICS & GYNECOLOGY

## 2022-10-28 PROCEDURE — 2580000003 HC RX 258: Performed by: ANESTHESIOLOGY

## 2022-10-28 PROCEDURE — 3700000001 HC ADD 15 MINUTES (ANESTHESIA): Performed by: OBSTETRICS & GYNECOLOGY

## 2022-10-28 PROCEDURE — 7100000000 HC PACU RECOVERY - FIRST 15 MIN: Performed by: OBSTETRICS & GYNECOLOGY

## 2022-10-28 PROCEDURE — 84703 CHORIONIC GONADOTROPIN ASSAY: CPT

## 2022-10-28 PROCEDURE — 58558 HYSTEROSCOPY BIOPSY: CPT | Performed by: OBSTETRICS & GYNECOLOGY

## 2022-10-28 PROCEDURE — 7100000011 HC PHASE II RECOVERY - ADDTL 15 MIN: Performed by: OBSTETRICS & GYNECOLOGY

## 2022-10-28 PROCEDURE — 7100000001 HC PACU RECOVERY - ADDTL 15 MIN: Performed by: OBSTETRICS & GYNECOLOGY

## 2022-10-28 PROCEDURE — 2580000003 HC RX 258: Performed by: OBSTETRICS & GYNECOLOGY

## 2022-10-28 PROCEDURE — 3600000014 HC SURGERY LEVEL 4 ADDTL 15MIN: Performed by: OBSTETRICS & GYNECOLOGY

## 2022-10-28 PROCEDURE — 2709999900 HC NON-CHARGEABLE SUPPLY: Performed by: OBSTETRICS & GYNECOLOGY

## 2022-10-28 RX ORDER — SODIUM CHLORIDE 9 MG/ML
INJECTION, SOLUTION INTRAVENOUS PRN
Status: DISCONTINUED | OUTPATIENT
Start: 2022-10-28 | End: 2022-10-28 | Stop reason: HOSPADM

## 2022-10-28 RX ORDER — FENTANYL CITRATE 50 UG/ML
25 INJECTION, SOLUTION INTRAMUSCULAR; INTRAVENOUS
Status: DISCONTINUED | OUTPATIENT
Start: 2022-10-28 | End: 2022-10-28 | Stop reason: HOSPADM

## 2022-10-28 RX ORDER — APREPITANT 40 MG/1
40 CAPSULE ORAL ONCE
Status: COMPLETED | OUTPATIENT
Start: 2022-10-28 | End: 2022-10-28

## 2022-10-28 RX ORDER — FENTANYL CITRATE 50 UG/ML
25 INJECTION, SOLUTION INTRAMUSCULAR; INTRAVENOUS EVERY 5 MIN PRN
Status: DISCONTINUED | OUTPATIENT
Start: 2022-10-28 | End: 2022-10-28 | Stop reason: HOSPADM

## 2022-10-28 RX ORDER — DIPHENHYDRAMINE HYDROCHLORIDE 50 MG/ML
12.5 INJECTION INTRAMUSCULAR; INTRAVENOUS
Status: DISCONTINUED | OUTPATIENT
Start: 2022-10-28 | End: 2022-10-28 | Stop reason: HOSPADM

## 2022-10-28 RX ORDER — FAMOTIDINE 20 MG/1
20 TABLET, FILM COATED ORAL ONCE
Status: COMPLETED | OUTPATIENT
Start: 2022-10-28 | End: 2022-10-28

## 2022-10-28 RX ORDER — PROCHLORPERAZINE EDISYLATE 5 MG/ML
5 INJECTION INTRAMUSCULAR; INTRAVENOUS
Status: DISCONTINUED | OUTPATIENT
Start: 2022-10-28 | End: 2022-10-28 | Stop reason: HOSPADM

## 2022-10-28 RX ORDER — SODIUM CHLORIDE, SODIUM LACTATE, POTASSIUM CHLORIDE, CALCIUM CHLORIDE 600; 310; 30; 20 MG/100ML; MG/100ML; MG/100ML; MG/100ML
INJECTION, SOLUTION INTRAVENOUS CONTINUOUS
Status: DISCONTINUED | OUTPATIENT
Start: 2022-10-28 | End: 2022-10-28 | Stop reason: HOSPADM

## 2022-10-28 RX ORDER — DEXAMETHASONE SODIUM PHOSPHATE 10 MG/ML
INJECTION, SOLUTION INTRAMUSCULAR; INTRAVENOUS PRN
Status: DISCONTINUED | OUTPATIENT
Start: 2022-10-28 | End: 2022-10-28 | Stop reason: SDUPTHER

## 2022-10-28 RX ORDER — MIDAZOLAM HYDROCHLORIDE 1 MG/ML
INJECTION INTRAMUSCULAR; INTRAVENOUS PRN
Status: DISCONTINUED | OUTPATIENT
Start: 2022-10-28 | End: 2022-10-28 | Stop reason: SDUPTHER

## 2022-10-28 RX ORDER — LIDOCAINE HYDROCHLORIDE 10 MG/ML
1 INJECTION, SOLUTION EPIDURAL; INFILTRATION; INTRACAUDAL; PERINEURAL
Status: DISCONTINUED | OUTPATIENT
Start: 2022-10-28 | End: 2022-10-28 | Stop reason: HOSPADM

## 2022-10-28 RX ORDER — MIDAZOLAM HYDROCHLORIDE 2 MG/2ML
2 INJECTION, SOLUTION INTRAMUSCULAR; INTRAVENOUS
Status: DISCONTINUED | OUTPATIENT
Start: 2022-10-28 | End: 2022-10-28 | Stop reason: HOSPADM

## 2022-10-28 RX ORDER — ONDANSETRON 2 MG/ML
4 INJECTION INTRAMUSCULAR; INTRAVENOUS
Status: DISCONTINUED | OUTPATIENT
Start: 2022-10-28 | End: 2022-10-28 | Stop reason: HOSPADM

## 2022-10-28 RX ORDER — SODIUM CHLORIDE 0.9 % (FLUSH) 0.9 %
5-40 SYRINGE (ML) INJECTION PRN
Status: DISCONTINUED | OUTPATIENT
Start: 2022-10-28 | End: 2022-10-28 | Stop reason: HOSPADM

## 2022-10-28 RX ORDER — LIDOCAINE HYDROCHLORIDE 10 MG/ML
INJECTION, SOLUTION INFILTRATION; PERINEURAL PRN
Status: DISCONTINUED | OUTPATIENT
Start: 2022-10-28 | End: 2022-10-28 | Stop reason: SDUPTHER

## 2022-10-28 RX ORDER — ONDANSETRON 2 MG/ML
INJECTION INTRAMUSCULAR; INTRAVENOUS PRN
Status: DISCONTINUED | OUTPATIENT
Start: 2022-10-28 | End: 2022-10-28 | Stop reason: SDUPTHER

## 2022-10-28 RX ORDER — SODIUM CHLORIDE 0.9 % (FLUSH) 0.9 %
5-40 SYRINGE (ML) INJECTION EVERY 12 HOURS SCHEDULED
Status: DISCONTINUED | OUTPATIENT
Start: 2022-10-28 | End: 2022-10-28 | Stop reason: HOSPADM

## 2022-10-28 RX ORDER — SODIUM CHLORIDE 9 MG/ML
INJECTION, SOLUTION INTRAVENOUS CONTINUOUS
Status: DISCONTINUED | OUTPATIENT
Start: 2022-10-28 | End: 2022-10-28 | Stop reason: HOSPADM

## 2022-10-28 RX ORDER — PROPOFOL 10 MG/ML
INJECTION, EMULSION INTRAVENOUS PRN
Status: DISCONTINUED | OUTPATIENT
Start: 2022-10-28 | End: 2022-10-28 | Stop reason: SDUPTHER

## 2022-10-28 RX ORDER — MEPERIDINE HYDROCHLORIDE 25 MG/ML
12.5 INJECTION INTRAMUSCULAR; INTRAVENOUS; SUBCUTANEOUS EVERY 5 MIN PRN
Status: DISCONTINUED | OUTPATIENT
Start: 2022-10-28 | End: 2022-10-28 | Stop reason: HOSPADM

## 2022-10-28 RX ORDER — FENTANYL CITRATE 50 UG/ML
50 INJECTION, SOLUTION INTRAMUSCULAR; INTRAVENOUS EVERY 5 MIN PRN
Status: DISCONTINUED | OUTPATIENT
Start: 2022-10-28 | End: 2022-10-28 | Stop reason: HOSPADM

## 2022-10-28 RX ORDER — DEXMEDETOMIDINE HYDROCHLORIDE 100 UG/ML
INJECTION, SOLUTION INTRAVENOUS PRN
Status: DISCONTINUED | OUTPATIENT
Start: 2022-10-28 | End: 2022-10-28 | Stop reason: SDUPTHER

## 2022-10-28 RX ORDER — FENTANYL CITRATE 50 UG/ML
INJECTION, SOLUTION INTRAMUSCULAR; INTRAVENOUS PRN
Status: DISCONTINUED | OUTPATIENT
Start: 2022-10-28 | End: 2022-10-28 | Stop reason: SDUPTHER

## 2022-10-28 RX ORDER — FENTANYL CITRATE 50 UG/ML
50 INJECTION, SOLUTION INTRAMUSCULAR; INTRAVENOUS
Status: DISCONTINUED | OUTPATIENT
Start: 2022-10-28 | End: 2022-10-28 | Stop reason: HOSPADM

## 2022-10-28 RX ADMIN — FENTANYL CITRATE 50 MCG: 50 INJECTION, SOLUTION INTRAMUSCULAR; INTRAVENOUS at 13:33

## 2022-10-28 RX ADMIN — APREPITANT 40 MG: 40 CAPSULE ORAL at 12:34

## 2022-10-28 RX ADMIN — ONDANSETRON 4 MG: 2 INJECTION INTRAMUSCULAR; INTRAVENOUS at 13:35

## 2022-10-28 RX ADMIN — SODIUM CHLORIDE, SODIUM LACTATE, POTASSIUM CHLORIDE, AND CALCIUM CHLORIDE: 600; 310; 30; 20 INJECTION, SOLUTION INTRAVENOUS at 12:35

## 2022-10-28 RX ADMIN — FENTANYL CITRATE 50 MCG: 50 INJECTION, SOLUTION INTRAMUSCULAR; INTRAVENOUS at 13:24

## 2022-10-28 RX ADMIN — MIDAZOLAM 2 MG: 1 INJECTION INTRAMUSCULAR; INTRAVENOUS at 13:20

## 2022-10-28 RX ADMIN — CEFAZOLIN SODIUM 1000 MG: 1 INJECTION, POWDER, FOR SOLUTION INTRAMUSCULAR; INTRAVENOUS at 13:35

## 2022-10-28 RX ADMIN — LIDOCAINE HYDROCHLORIDE 50 MG: 10 INJECTION, SOLUTION INFILTRATION; PERINEURAL at 13:24

## 2022-10-28 RX ADMIN — FENTANYL CITRATE 50 MCG: 50 INJECTION, SOLUTION INTRAMUSCULAR; INTRAVENOUS at 14:30

## 2022-10-28 RX ADMIN — PROPOFOL 200 MG: 10 INJECTION, EMULSION INTRAVENOUS at 13:24

## 2022-10-28 RX ADMIN — FAMOTIDINE 20 MG: 20 TABLET ORAL at 12:34

## 2022-10-28 RX ADMIN — DEXAMETHASONE SODIUM PHOSPHATE 10 MG: 10 INJECTION, SOLUTION INTRAMUSCULAR; INTRAVENOUS at 13:35

## 2022-10-28 RX ADMIN — SODIUM CHLORIDE, SODIUM LACTATE, POTASSIUM CHLORIDE, AND CALCIUM CHLORIDE: 600; 310; 30; 20 INJECTION, SOLUTION INTRAVENOUS at 12:08

## 2022-10-28 RX ADMIN — DEXMEDETOMIDINE HYDROCHLORIDE 40 MCG: 100 INJECTION, SOLUTION, CONCENTRATE INTRAVENOUS at 14:14

## 2022-10-28 RX ADMIN — PROPOFOL 50 MG: 10 INJECTION, EMULSION INTRAVENOUS at 13:33

## 2022-10-28 ASSESSMENT — PAIN DESCRIPTION - LOCATION
LOCATION: ABDOMEN

## 2022-10-28 ASSESSMENT — PAIN DESCRIPTION - DESCRIPTORS
DESCRIPTORS: CRAMPING

## 2022-10-28 ASSESSMENT — PAIN SCALES - GENERAL
PAINLEVEL_OUTOF10: 6
PAINLEVEL_OUTOF10: 8
PAINLEVEL_OUTOF10: 7

## 2022-10-28 ASSESSMENT — PAIN - FUNCTIONAL ASSESSMENT
PAIN_FUNCTIONAL_ASSESSMENT: 0-10
PAIN_FUNCTIONAL_ASSESSMENT: PREVENTS OR INTERFERES WITH ALL ACTIVE AND SOME PASSIVE ACTIVITIES
PAIN_FUNCTIONAL_ASSESSMENT: ACTIVITIES ARE NOT PREVENTED

## 2022-10-28 ASSESSMENT — PAIN DESCRIPTION - PAIN TYPE
TYPE: SURGICAL PAIN
TYPE: SURGICAL PAIN

## 2022-10-28 ASSESSMENT — PAIN DESCRIPTION - ORIENTATION
ORIENTATION: LOWER
ORIENTATION: LOWER

## 2022-10-28 ASSESSMENT — PAIN DESCRIPTION - FREQUENCY
FREQUENCY: CONTINUOUS
FREQUENCY: CONTINUOUS

## 2022-10-28 ASSESSMENT — LIFESTYLE VARIABLES: SMOKING_STATUS: 1

## 2022-10-28 NOTE — ANESTHESIA PRE PROCEDURE
Department of Anesthesiology  Preprocedure Note       Name:  Nikko Dooley   Age:  28 y.o.  :  1990                                          MRN:  [id number]         Date:  10/28/2022      Surgeon: Caio Damon):  Som Gutiérrez MD    Procedure: Procedure(s):  DILATATION AND CURETTAGE HYSTEROSCOPY,    Medications prior to admission:   Prior to Admission medications    Not on File       Current medications:    No current facility-administered medications for this visit. No current outpatient medications on file. Facility-Administered Medications Ordered in Other Visits   Medication Dose Route Frequency Provider Last Rate Last Admin    lactated ringers infusion   IntraVENous Continuous Leigha Lerner  mL/hr at 10/28/22 1208 New Bag at 10/28/22 1208    ceFAZolin (ANCEF) 1,000 mg in sterile water 10 mL IV syringe  1,000 mg IntraVENous Once Som Gutiérrez MD           Allergies:     Allergies   Allergen Reactions    Ibuprofen & Acetaminophen Anaphylaxis    Other Anaphylaxis     onions    Metoclopramide Other (See Comments)    Tylenol [Acetaminophen]     Carafate [Sucralfate] Nausea And Vomiting    Tramadol Rash       Problem List:    Patient Active Problem List   Diagnosis Code    Infertility, female, primary N97.9    Hidradenitis suppurativa of right axilla L73.2       Past Medical History:        Diagnosis Date    Asthma     Obese     Ovarian cyst        Past Surgical History:        Procedure Laterality Date    CHOLECYSTECTOMY, LAPAROSCOPIC N/A 2021    CHOLECYSTECTOMY LAPAROSCOPIC performed by Bel Weaver MD at [address] [place] [address] COLONOSCOPY N/A 2021    Dr Orozco Quiet yr (age 39) recall    UPPER GASTROINTESTINAL ENDOSCOPY N/A 2021    Dr [place]       Social History:    Social History     Tobacco Use    Smoking status: Every Day     Packs/day: 0.50     Years: 10.00     Pack years: 5.00     Types: Cigarettes    Smokeless tobacco: Never   Substance Use Topics    Alcohol use: Yes     Alcohol/week: 0.0 standard drinks     Comment: occ                                Ready to quit: Not Answered  Counseling given: Not Answered      Vital Signs (Current): There were no vitals filed for this visit. BP Readings from Last 3 Encounters:   10/28/22 130/81   10/26/22 (!) 137/91   09/01/22 (!) 149/91       NPO Status:                                                                                 BMI:   Wt Readings from Last 3 Encounters:   10/28/22 233 lb (105.7 kg)   10/26/22 228 lb (103.4 kg)   10/26/22 230 lb (104.3 kg)     There is no height or weight on file to calculate BMI.    CBC:   Lab Results   Component Value Date/Time    WBC 9.8 10/26/2022 10:10 AM    RBC 4.88 10/26/2022 10:10 AM    HGB 15.8 10/26/2022 10:10 AM    HCT 44.2 10/26/2022 10:10 AM    HCT 36.9 07/23/2011 08:10 PM    MCV 90.6 10/26/2022 10:10 AM    RDW 13.5 10/26/2022 10:10 AM     10/26/2022 10:10 AM     07/23/2011 08:10 PM       CMP:   Lab Results   Component Value Date/Time     07/30/2022 10:27 AM     07/23/2011 08:10 PM    K 3.9 07/30/2022 10:27 AM    K 3.9 07/23/2011 08:10 PM     07/30/2022 10:27 AM     07/23/2011 08:10 PM    CO2 23 07/30/2022 10:27 AM    BUN 10 07/30/2022 10:27 AM    CREATININE 0.7 07/30/2022 10:27 AM    CREATININE 0.8 07/23/2011 08:10 PM    GFRAA >59 07/30/2022 10:27 AM    LABGLOM >60 07/30/2022 10:27 AM    GLUCOSE 161 07/30/2022 10:27 AM    PROT 7.3 07/30/2022 10:27 AM    CALCIUM 8.9 07/30/2022 10:27 AM    BILITOT 0.3 07/30/2022 10:27 AM    ALKPHOS 59 07/30/2022 10:27 AM    AST 18 07/30/2022 10:27 AM    ALT 17 07/30/2022 10:27 AM       POC Tests: No results for input(s): POCGLU, POCNA, POCK, POCCL, POCBUN, POCHEMO, POCHCT in the last 72 hours.     Coags:   Lab Results   Component Value Date/Time    PROTIME 13.4 01/24/2022 02:12 PM    INR 1.00 01/24/2022 02:12 PM    APTT 28.9 01/24/2022 02:12 PM HCG (If Applicable):   Lab Results   Component Value Date    PREGTESTUR Negative 10/28/2022        ABGs: No results found for: PHART, PO2ART, CYZ3JOS, PHY1AMR, BEART, X6BEWULU     Type & Screen (If Applicable):  No results found for: LABABO, LABRH    Drug/Infectious Status (If Applicable):  No results found for: HIV, HEPCAB    COVID-19 Screening (If Applicable):   Lab Results   Component Value Date/Time    COVID19 Not Detected 07/30/2022 11:23 AM    COVID19 Not Detected 07/22/2021 11:23 AM           Anesthesia Evaluation  Patient summary reviewed and Nursing notes reviewed  Airway: Mallampati: II  TM distance: >3 FB   Neck ROM: full  Mouth opening: < 3 FB   Dental: normal exam         Pulmonary:normal exam    (+) asthma: current smoker          Patient smoked on day of surgery. Cardiovascular:  Exercise tolerance: good (>4 METS),       (-) pacemaker, past MI, CAD, CABG/stent and dysrhythmias       Beta Blocker:  Not on Beta Blocker         Neuro/Psych:      (-) seizures and CVA           GI/Hepatic/Renal:        (-) GERD, liver disease and no renal disease      ROS comment: dysphaiga  etoh use per chart  BMI 38. Endo/Other:    (+) no malignancy/cancer. (-) diabetes mellitus, blood dyscrasia, no malignancy/cancer               Abdominal:   (+) obese,           Vascular:     - DVT and PE. Other Findings:             Anesthesia Plan      general     ASA 2     (Emend and pepcid in preop.)  Induction: intravenous. Anesthetic plan and risks discussed with patient.                     Check HCG    Kinza Carson DO   10/28/2022

## 2022-10-28 NOTE — PROGRESS NOTES
Dr. Isaac Alexander returns call, she states she didn't send any pain medication in d/t several allergies listed in chart. Attempted to explained this to patient. She has tylenol and ibuprofen listed as anaphylactic reaction. The patient states she wasn't know what that means. I explained to patient this is a life threatening allergy. The patient usually stops breathing. She states \"I ain't ever stopped breathing, so people are making up things to put in my chart. I've gotta go home without any pain meds so that means I'll have to go to the ER for pain meds or get it from the streets\". Patient demands to go home. Walks out in the [place] cussing about the circumstances. She wants the excuse for today. Refuses wheelchair for discharge. Walks out with family member.   Electronically signed by Magnolia Moore RN on 10/28/2022 at 5:17 PM

## 2022-10-28 NOTE — PROGRESS NOTES
Placed phone call to Dr. Margo Blizzard (no answer). Left message for her to return call.   Electronically signed by Carl Chaudhari RN on 10/28/2022 at 5:09 PM

## 2022-10-28 NOTE — H&P
Liliana Childers (:  1990) is a 28 y.o. female, here for a D&C, hysteroscopy on 10/28/2022 for cervical stenosis seen on HSG. Pt is desiring pregnancy and has been trying to get pregnant for the past couple of years. HSG was ordered and showed   Unsuccessful hysterosalpingogram with findings consistent with   cervical stenosis. Multiple attempts cervical dilatation were   unsuccessful. Contrast injection within the cervical os demonstrates   no communication with the endometrial cavity. Direct visualization,   cervical dilatation and follow-up study recommended if clinically   indicated. She is a G0. Her partner does have one child from a previous relationship. Patient Active Problem List   Diagnosis    Infertility, female, primary    Hidradenitis suppurativa of right axilla         Review of Systems   Constitutional: Negative. HENT: Negative. Eyes: Negative. Respiratory: Negative. Cardiovascular: Negative. Gastrointestinal: Negative. Genitourinary:  Negative for difficulty urinating, dyspareunia, dysuria, enuresis, frequency, hematuria, menstrual problem, pelvic pain, urgency and vaginal discharge. Musculoskeletal: Negative. Skin: Negative. Neurological: Negative. Psychiatric/Behavioral: Negative.         Prior to Visit Medications    Not on File               Allergies   Allergen Reactions    Ibuprofen & Acetaminophen Anaphylaxis    Other Anaphylaxis       onions    Metoclopramide Other (See Comments)    Tylenol [Acetaminophen]      Carafate [Sucralfate] Nausea And Vomiting    Tramadol Rash         Past Medical History        Past Medical History:   Diagnosis Date    Asthma      Obese      Ovarian cyst              Past Surgical History         Past Surgical History:   Procedure Laterality Date    CHOLECYSTECTOMY, LAPAROSCOPIC N/A 2021     CHOLECYSTECTOMY LAPAROSCOPIC [facility] by Landon Hammans[facility] at [facility]    COLONOSCOPY N/A 2021     Dr Pamela Cintron Jean-Paul-12 yr (age 39) recall    UPPER GASTROINTESTINAL ENDOSCOPY N/A 06/07/2021     Dr Taylor Blood            Social History               Socioeconomic History    Marital status: Single       Spouse name: Not on file    Number of children: Not on file    Years of education: Not on file    Highest education level: Not on file   Occupational History    Not on file   Tobacco Use    Smoking status: Every Day       Packs/day: 0.50       Years: 10.00       Pack years: 5.00       Types: Cigarettes    Smokeless tobacco: Never   Vaping Use    Vaping Use: Never used   Substance and Sexual Activity    Alcohol use: Yes       Alcohol/week: 0.0 standard drinks       Comment: occ    Drug use: Yes       Comment: Spice     Sexual activity: Yes       Partners: Male, Female   Other Topics Concern    Not on file   Social History Narrative    Not on file      Social Determinants of Health      Financial Resource Strain: Not on file   Food Insecurity: Not on file   Transportation Needs: Not on file   Physical Activity: Not on file   Stress: Not on file   Social Connections: Not on file   Intimate Partner Violence: Not on file   Housing Stability: Not on file            Family History         Family History   Problem Relation Age of Onset    Diabetes Mother      High Blood Pressure Mother      Diabetes Maternal Grandmother      Diabetes Sister      Colon Cancer Neg Hx      Colon Polyps Neg Hx      Esophageal Cancer Neg Hx      Liver Cancer Neg Hx      Rectal Cancer Neg Hx      Stomach Cancer Neg Hx            /81   Pulse 70   Temp 97.2 °F (36.2 °C) (Temporal)   Resp 14   Ht 5' 7\" (1.702 m)   Wt 233 lb (105.7 kg)   LMP 10/26/2022 (Exact Date)   SpO2 100%   BMI 36.49 kg/m²     Physical Exam  Vitals and nursing note reviewed. Constitutional:       General: She is not in acute distress. Appearance: She is well-developed. She is not diaphoretic. HENT:      Head: Normocephalic and atraumatic.    Eyes: Conjunctiva/sclera: Conjunctivae normal.      Pupils: Pupils are equal, round, and reactive to light. Pulmonary:      Effort: Pulmonary effort is normal.   Abdominal:      Tenderness: There is no guarding. Musculoskeletal:         General: Normal range of motion. Cervical back: Normal range of motion. Comments: Normal ROM in all 4 extremities; normal gait   Skin:     General: Skin is warm and dry. Neurological:      Mental Status: She is alert and oriented to person, place, and time. Motor: No abnormal muscle tone. Coordination: Coordination normal.   Psychiatric:         Behavior: Behavior normal.         No flowsheet data found. Lab Results   Component Value Date/Time     GLUCOSE 161 07/30/2022 10:27 AM         The ASCVD Risk score (Jermaine DK, et al., 2019) failed to calculate for the following reasons:     The 2019 ASCVD risk score is only valid for ages 36 to 78          Immunization History   Administered Date(s) Administered    COVID-19, PFIZER GRAY top, DO NOT Dilute, (age 15 y+), IM, 30 mcg/0.3 mL 02/08/2022, 03/09/2022    Influenza Virus Vaccine 09/15/2020    Tdap (Boostrix, Adacel) 09/05/2020              Health Maintenance   Topic Date Due    Varicella vaccine (1 of 2 - 2-dose childhood series) Never done    Pneumococcal 0-64 years Vaccine (1 - PCV) Never done    Depression Screen  Never done    COVID-19 Vaccine (3 - Booster for Pfizer series) 05/04/2022    Flu vaccine (1) 08/01/2022    Cervical cancer screen  08/12/2027    DTaP/Tdap/Td vaccine (3 - Td or Tdap) 09/05/2030    Colorectal Cancer Screen  06/07/2035    Hepatitis C screen  Completed    HIV screen  Completed    Hepatitis A vaccine  Aged Out    Hib vaccine  Aged Out    Meningococcal (ACWY) vaccine  Aged Out         Assessment & Plan   Cervical stenosis (uterine cervix)  Infertility, female, primary     Dilation & Curettage, Hysteroscopy Offered chromotubation however unsure if insurance will cover so will wait prior authorization. Encouraged smoking cessation, diet and exercise for fertility benefits  Counseling was offered and accepted by patient. Discussed at length the risks, benefits and alternatives to surgery including medical management and no intervention. Pt is in agreement with surgery. Consents for surgery signed. All questions answered and patient voiced understanding of above.

## 2022-10-28 NOTE — ANESTHESIA POSTPROCEDURE EVALUATION
Department of Anesthesiology  Postprocedure Note    Patient: Wilbert Hilliard  MRN: [id number]  YOB: 1990  Date of evaluation: 10/28/2022      Procedure Summary     Date: 10/28/22 Room / Location: [address]    Anesthesia Start: 1320 Anesthesia Stop: 1408    Procedure: DILATATION AND CURETTAGE HYSTEROSCOPY, Diagnosis:       Stenosis of cervix      (Stenosis of cervix [N88.2])    Surgeons: Gino Baptiste MD Responsible Provider: ZACKERY Padgett CRNA    Anesthesia Type: general ASA Status: 2          Anesthesia Type: No value filed. Orville Phase I: Orville Score: 10    Orville Phase II:        Anesthesia Post Evaluation    Patient location during evaluation: PACU  Patient participation: complete - patient participated  Level of consciousness: anxious  Pain score: 0  Airway patency: patent  Nausea & Vomiting: no nausea and no vomiting  Complications: no  Cardiovascular status: hemodynamically stable  Respiratory status: acceptable, spontaneous ventilation and room air  Hydration status: euvolemic  Comments: 40 mcq of Precedex given in recovery due to pt being noncompliant and trying to get out of bed. Pt crying, stating that she needs to get up to use the restroom.

## 2022-10-28 NOTE — PROGRESS NOTES
While explaining discharge instructions to family member and patient, the patient was asking about pain medication and her work excuse. I explained to patient that Dr. Isaac Alexander didn't write her any pain medication. The patient stated that Dr. Isaac Alexander told her that she would give her some pain medication. Explained to patient that I would have to make contact with Dr. Isaac Alexander regarding pain medication and she could call her office on Monday morning to get a work excuse. I could give her an excuse for today only. She immediately got angry stating that she was ready to go and shouldn't have to wait for these things.

## 2022-10-28 NOTE — OP NOTE
OPERATIVE NOTE        Date of Service: 10/28/22     Pre-operative Diagnosis: Stenosis of cervix [N88.2]  Failed HSG    Post-operative Diagnosis:  Same    Procedure: Procedure(s):  DILATATION AND CURETTAGE HYSTEROSCOPY,    Anesthesia: General    Surgeon: Maximo Mesa MD    Assistants: Scrub Person First[facility]  Scrub Person Second: Fatuma Reisler    Procedure Note:  After informed consents were obtained, the patient was taken to the operating  room where she was placed in the dorsal supine position. After adequate  anesthesia, she was placed in the dorsal lithotomy position, prepped and  draped in the usual sterile fashion for a vaginal procedure. Exam under anesthesia revealed normal female genitalia. Retractor was  then placed in the anterior and posterior wall of the vagina and the anterior lip  of the cervix was grasped with a single-tooth tenaculum. Uterus was sounded to 7cm. The cervix was serially dilated and the hysteroscope was inserted. Hysteroscopy revealed endometrial cavity normal in contour without mass or polyp. Following hysteroscopy, sharp curettage was performed. This tissue was sent  to pathology for analysis. The tenaculum was removed from the anterior lip of the cervix and all the  retractors were removed from the vagina. Good hemostasis was observed. The patient tolerated the procedure well. There were no complications. She  was awakened from anesthesia, transferred to the recovery room in stable  condition. Sponge lap and needle counts were correct x2.       Estimated Blood Loss: 2 mL    Complications: None    Specimens:   ID Type Source Tests Collected by Time Destination   A : endometrial curettings Tissue Cervix SURGICAL PATHOLOGY Maximo Mesa MD 10/28/2022 [id number] [facility] [place] Leonard MD  10/28/22  1:58 PM

## 2022-10-31 ENCOUNTER — TELEPHONE (OUTPATIENT)
Dept: OBGYN CLINIC | Age: 32
End: 2022-10-31

## 2022-10-31 NOTE — TELEPHONE ENCOUNTER
Patient states she was given no pain meds after procedure and needs something for pain. She also states she needs work release, because she has been unable to work and cant until she gets a letter from our office. She said when she asked the nurse for pain meds after procedure on Friday she was told AW had already left so she would have to wait until Monday to call the office.

## 2022-10-31 NOTE — TELEPHONE ENCOUNTER
Work excuse letter printed and pt will . She will go back to work tomorrow. Pt is allergic to Tylenol and Motrin.  Informed pt that I will have to ask Dr. Rome Singh

## 2022-11-01 DIAGNOSIS — Z98.890 STATUS POST DILATION AND CURETTAGE: Primary | ICD-10-CM

## 2022-11-01 RX ORDER — OXYCODONE HYDROCHLORIDE 5 MG/1
5 TABLET ORAL EVERY 6 HOURS PRN
Qty: 12 TABLET | Refills: 0 | Status: SHIPPED | OUTPATIENT
Start: 2022-11-01 | End: 2022-11-04

## 2022-12-14 ENCOUNTER — TELEPHONE (OUTPATIENT)
Dept: OBGYN CLINIC | Age: 32
End: 2022-12-14

## 2022-12-14 NOTE — TELEPHONE ENCOUNTER
Patient request an appointment asap to come in and be checked, patient states since she had procedure back in October she has not followed up and would like to make sure she is healed and ready to start trying for kids.   Also wanting to know if husbands sperm count can be checked states they were given a sample cup in the past to have his levels checked  [facility] unable to accommodate  Please call back ASAP  Thank you

## 2022-12-14 NOTE — TELEPHONE ENCOUNTER
Need to informed pt that appt was moved to a NP schedule. If she wants to see Dr. Queenie Reid then will need to be scheduled in end of April. Also cannot order semen analysis with name and  of partner.  No  set up

## 2022-12-15 ENCOUNTER — TELEPHONE (OUTPATIENT)
Dept: OBGYN CLINIC | Age: 32
End: 2022-12-15

## 2022-12-18 ENCOUNTER — HOSPITAL ENCOUNTER (EMERGENCY)
Age: 32
Discharge: HOME | End: 2022-12-18
Attending: EMERGENCY MEDICINE
Payer: COMMERCIAL

## 2022-12-18 ENCOUNTER — APPOINTMENT (OUTPATIENT)
Dept: GENERAL RADIOLOGY | Age: 32
End: 2022-12-18
Payer: COMMERCIAL

## 2022-12-18 VITALS
SYSTOLIC BLOOD PRESSURE: 129 MMHG | RESPIRATION RATE: 16 BRPM | WEIGHT: 230 LBS | DIASTOLIC BLOOD PRESSURE: 84 MMHG | BODY MASS INDEX: 36.1 KG/M2 | OXYGEN SATURATION: 96 % | HEART RATE: 68 BPM | TEMPERATURE: 98.2 F | HEIGHT: 67 IN

## 2022-12-18 DIAGNOSIS — R07.89 CHEST WALL PAIN: Primary | ICD-10-CM

## 2022-12-18 LAB
ALBUMIN SERPL-MCNC: 3.9 G/DL (ref 3.5–5.2)
ALP BLD-CCNC: 65 U/L (ref 35–104)
ALT SERPL-CCNC: 15 U/L (ref 5–33)
ANION GAP SERPL CALCULATED.3IONS-SCNC: 8 MMOL/L (ref 7–19)
AST SERPL-CCNC: 19 U/L (ref 5–32)
BASOPHILS ABSOLUTE: 0.1 K/UL (ref 0–0.2)
BASOPHILS RELATIVE PERCENT: 0.6 % (ref 0–1)
BILIRUB SERPL-MCNC: <0.2 MG/DL (ref 0.2–1.2)
BUN BLDV-MCNC: 10 MG/DL (ref 6–20)
CALCIUM SERPL-MCNC: 8.9 MG/DL (ref 8.6–10)
CHLORIDE BLD-SCNC: 104 MMOL/L (ref 98–111)
CO2: 25 MMOL/L (ref 22–29)
CREAT SERPL-MCNC: 0.6 MG/DL (ref 0.5–0.9)
D DIMER: 0.56 UG/ML FEU (ref 0–0.48)
EOSINOPHILS ABSOLUTE: 0.2 K/UL (ref 0–0.6)
EOSINOPHILS RELATIVE PERCENT: 1.9 % (ref 0–5)
GFR SERPL CREATININE-BSD FRML MDRD: >60 ML/MIN/{1.73_M2}
GLUCOSE BLD-MCNC: 118 MG/DL (ref 74–109)
HCG QUALITATIVE: NEGATIVE
HCT VFR BLD CALC: 39.9 % (ref 37–47)
HEMOGLOBIN: 14.5 G/DL (ref 12–16)
IMMATURE GRANULOCYTES #: 0 K/UL
LYMPHOCYTES ABSOLUTE: 4.1 K/UL (ref 1.1–4.5)
LYMPHOCYTES RELATIVE PERCENT: 39.2 % (ref 20–40)
MCH RBC QN AUTO: 33.5 PG (ref 27–31)
MCHC RBC AUTO-ENTMCNC: 36.3 G/DL (ref 33–37)
MCV RBC AUTO: 92.1 FL (ref 81–99)
MONOCYTES ABSOLUTE: 0.8 K/UL (ref 0–0.9)
MONOCYTES RELATIVE PERCENT: 7.2 % (ref 0–10)
NEUTROPHILS ABSOLUTE: 5.3 K/UL (ref 1.5–7.5)
NEUTROPHILS RELATIVE PERCENT: 50.7 % (ref 50–65)
PDW BLD-RTO: 13.2 % (ref 11.5–14.5)
PLATELET # BLD: 323 K/UL (ref 130–400)
PMV BLD AUTO: 9.6 FL (ref 9.4–12.3)
POTASSIUM SERPL-SCNC: 3.6 MMOL/L (ref 3.5–5)
RBC # BLD: 4.33 M/UL (ref 4.2–5.4)
SODIUM BLD-SCNC: 137 MMOL/L (ref 136–145)
TOTAL PROTEIN: 7.1 G/DL (ref 6.6–8.7)
TROPONIN: <0.01 NG/ML (ref 0–0.03)
WBC # BLD: 10.5 K/UL (ref 4.8–10.8)

## 2022-12-18 PROCEDURE — 80053 COMPREHEN METABOLIC PANEL: CPT

## 2022-12-18 PROCEDURE — 84703 CHORIONIC GONADOTROPIN ASSAY: CPT

## 2022-12-18 PROCEDURE — 6360000002 HC RX W HCPCS: Performed by: EMERGENCY MEDICINE

## 2022-12-18 PROCEDURE — 96374 THER/PROPH/DIAG INJ IV PUSH: CPT

## 2022-12-18 PROCEDURE — 85025 COMPLETE CBC W/AUTO DIFF WBC: CPT

## 2022-12-18 PROCEDURE — 36415 COLL VENOUS BLD VENIPUNCTURE: CPT

## 2022-12-18 PROCEDURE — 96375 TX/PRO/DX INJ NEW DRUG ADDON: CPT

## 2022-12-18 PROCEDURE — 93005 ELECTROCARDIOGRAM TRACING: CPT | Performed by: EMERGENCY MEDICINE

## 2022-12-18 PROCEDURE — 96376 TX/PRO/DX INJ SAME DRUG ADON: CPT

## 2022-12-18 PROCEDURE — 85379 FIBRIN DEGRADATION QUANT: CPT

## 2022-12-18 PROCEDURE — 84484 ASSAY OF TROPONIN QUANT: CPT

## 2022-12-18 PROCEDURE — 71045 X-RAY EXAM CHEST 1 VIEW: CPT

## 2022-12-18 PROCEDURE — 99285 EMERGENCY DEPT VISIT HI MDM: CPT | Performed by: EMERGENCY MEDICINE

## 2022-12-18 RX ORDER — METHYLPREDNISOLONE 4 MG/1
TABLET ORAL
Qty: 1 KIT | Refills: 0 | Status: SHIPPED | OUTPATIENT
Start: 2022-12-18 | End: 2022-12-24

## 2022-12-18 RX ORDER — HYDROMORPHONE HYDROCHLORIDE 1 MG/ML
1 INJECTION, SOLUTION INTRAMUSCULAR; INTRAVENOUS; SUBCUTANEOUS
Status: COMPLETED | OUTPATIENT
Start: 2022-12-18 | End: 2022-12-18

## 2022-12-18 RX ORDER — OXYCODONE HYDROCHLORIDE 5 MG/1
5 TABLET ORAL EVERY 6 HOURS PRN
Qty: 12 TABLET | Refills: 0 | Status: SHIPPED | OUTPATIENT
Start: 2022-12-18 | End: 2022-12-21

## 2022-12-18 RX ORDER — ONDANSETRON 2 MG/ML
4 INJECTION INTRAMUSCULAR; INTRAVENOUS ONCE
Status: COMPLETED | OUTPATIENT
Start: 2022-12-18 | End: 2022-12-18

## 2022-12-18 RX ADMIN — HYDROMORPHONE HYDROCHLORIDE 1 MG: 1 INJECTION, SOLUTION INTRAMUSCULAR; INTRAVENOUS; SUBCUTANEOUS at 22:06

## 2022-12-18 RX ADMIN — HYDROMORPHONE HYDROCHLORIDE 1 MG: 1 INJECTION, SOLUTION INTRAMUSCULAR; INTRAVENOUS; SUBCUTANEOUS at 20:48

## 2022-12-18 RX ADMIN — ONDANSETRON 4 MG: 2 INJECTION INTRAMUSCULAR; INTRAVENOUS at 20:47

## 2022-12-18 ASSESSMENT — PAIN SCALES - GENERAL
PAINLEVEL_OUTOF10: 8
PAINLEVEL_OUTOF10: 10
PAINLEVEL_OUTOF10: 10

## 2022-12-18 ASSESSMENT — ENCOUNTER SYMPTOMS
DIARRHEA: 0
BLOOD IN STOOL: 0
SINUS PRESSURE: 0
ABDOMINAL PAIN: 0
EYE DISCHARGE: 0
CONSTIPATION: 0
NAUSEA: 0
SHORTNESS OF BREATH: 1
CHOKING: 0
APNEA: 0
VOICE CHANGE: 0
SORE THROAT: 0
COUGH: 0
FACIAL SWELLING: 0

## 2022-12-18 ASSESSMENT — PAIN - FUNCTIONAL ASSESSMENT: PAIN_FUNCTIONAL_ASSESSMENT: 0-10

## 2022-12-18 NOTE — Clinical Note
Ranae Skiff was seen and treated in our emergency department on 12/18/2022. She may return to work on 12/22/2022. If you have any questions or concerns, please don't hesitate to call.       Tej Sawant MD

## 2022-12-19 LAB
EKG P AXIS: 18 DEGREES
EKG P-R INTERVAL: 172 MS
EKG Q-T INTERVAL: 372 MS
EKG QRS DURATION: 84 MS
EKG QTC CALCULATION (BAZETT): 387 MS
EKG T AXIS: 46 DEGREES

## 2022-12-19 PROCEDURE — 93010 ELECTROCARDIOGRAM REPORT: CPT | Performed by: INTERNAL MEDICINE

## 2022-12-19 NOTE — ED PROVIDER NOTES
[facility] DE[facility]  eMERGENCY dEPARTMENT eNCOUnter      Pt Name: Yury West  MRN: [id number]  [place] 1990  Date of evaluation: 12/18/2022  Provider: Nusrat Childs MD    [address] [place] [address]       Chief Complaint   Patient presents with    Chest Pain     Mid chest pain that radiates to right arm, onset yesterday    Shortness of Breath    Nausea         HISTORY OF PRESENT ILLNESS   (Location/Symptom, Timing/Onset,Context/Setting, Quality, Duration, Modifying Factors, Severity)  Note limiting factors. Yury West is a 28 y.o. female who presents to the emergency department for evaluation of chest pain. 51-year-old female presents with right upper chest pain that radiates to her right arm. Shortness of breath or painful breathing. It started yesterday while she was at work but she denies a work-related injury. 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 She denies any fever chills or new cough. She smokes. She denies pregnancy she is on her period now. She had surgery on her cervix 2 months ago. No history of DVT PE. No cardiopulmonary history. And she is on prenatal vitamins hoping to get pregnant. The history is provided by the patient. NursingNotes were reviewed. REVIEW OF SYSTEMS    (2-9 systems for level 4, 10 or more for level 5)     Review of Systems   Constitutional:  Negative for chills and fever. HENT:  Negative for congestion, drooling, facial swelling, nosebleeds, sinus pressure, sore throat and voice change. Eyes:  Negative for discharge. Respiratory:  Positive for shortness of breath (Painful breathing). Negative for apnea, cough and choking. Cardiovascular:  Positive for chest pain. Negative for palpitations and leg swelling. Gastrointestinal:  Negative for abdominal pain, blood in stool, constipation, diarrhea and nausea. Genitourinary:  Negative for dysuria and enuresis. Musculoskeletal:  Negative for joint swelling.    Skin:  Negative for rash and wound. Neurological:  Negative for seizures and syncope. Psychiatric/Behavioral:  Negative for behavioral problems, hallucinations and suicidal ideas. All other systems reviewed and are negative. A complete review of systems was performed and is negative except as noted above in the HPI. PAST MEDICAL HISTORY     Past Medical History:   Diagnosis Date    Asthma     Obese     Ovarian cyst          SURGICAL HISTORY       Past Surgical History:   Procedure Laterality Date    CERVIX SURGERY      CHOLECYSTECTOMY, LAPAROSCOPIC N/A 07/26/2021    CHOLECYSTECTOMY LAPAROSCOPIC performed by Cordelia Menjivar MD at [facility] N/A 06/07/2021    Dr Cotton Grade yr (age 39) recall    DILATION AND CURETTAGE OF UTERUS N/A 10/28/2022    DILATATION AND CURETTAGE HYSTEROSCOPY, performed by Tommie Bamberger, MD at [address] [place] [address] ENDOSCOPY N/A 06/07/2021    Dr Jyothi Jeong-Gastritis         CURRENT MEDICATIONS       Previous Medications    No medications on file       ALLERGIES     Ibuprofen & acetaminophen, Other, Metoclopramide, Tylenol [acetaminophen], Carafate [sucralfate], and Tramadol    FAMILY HISTORY       Family History   Problem Relation Age of Onset    Diabetes Mother     High Blood Pressure Mother     Diabetes Maternal Grandmother     Diabetes Sister     Colon Cancer Neg Hx     Colon Polyps Neg Hx     Esophageal Cancer Neg Hx     Liver Cancer Neg Hx     Rectal Cancer Neg Hx     Stomach Cancer Neg Hx           SOCIAL HISTORY       Social History     Socioeconomic History    Marital status: Single     Spouse name: None    Number of children: None    Years of education: None    Highest education level: None   Tobacco Use    Smoking status: Every Day     Packs/day: 0.50     Years: 10.00     Pack years: 5.00     Types: Cigarettes    Smokeless tobacco: Never   Vaping Use    Vaping Use: Never used   Substance and Sexual Activity    Alcohol use:  Yes     Alcohol/week: 0.0 standard drinks Comment: occ    Drug use: Yes     Types: Marijuana Garthmichelle Jessieminda)     Comment: Spice     Sexual activity: Yes     Partners: Male, Female       SCREENINGS    [place] Coma Scale  Eye Opening: Spontaneous  Best Verbal Response: Oriented  Best Motor Response: Obeys commands  [place] Coma Scale Score: 15        PHYSICAL EXAM    (up to 7 for level 4, 8 or more for level 5)     ED Triage Vitals [12/18/22 1956]   BP Temp Temp src Heart Rate Resp SpO2 Height Weight   (!) 139/91 98.2 °F (36.8 °C) -- 71 18 99 % 5' 7\" (1.702 m) 230 lb (104.3 kg)       Physical Exam  Vitals and nursing note reviewed. Constitutional:       General: She is not in acute distress. Appearance: She is well-developed. HENT:      Head: Normocephalic and atraumatic. Right Ear: External ear normal.      Left Ear: External ear normal.      Nose: Nose normal.      Mouth/Throat:      Pharynx: Oropharynx is clear. Eyes:      General: No scleral icterus. Conjunctiva/sclera: Conjunctivae normal.      Pupils: Pupils are equal, round, and reactive to light. Cardiovascular:      Rate and Rhythm: Normal rate and regular rhythm. Pulses: Normal pulses. Heart sounds: Normal heart sounds. No murmur heard. Pulmonary:      Effort: Pulmonary effort is normal. No respiratory distress. Breath sounds: Normal breath sounds. No wheezing or rales. Chest:      Chest wall: Tenderness (I can aggravate and reproduce the patient's pain with palpation of the upper chest in the midclavicular line on the right side. I see no obvious swelling mass fluid collection or rash) present. Abdominal:      General: Bowel sounds are normal.      Palpations: Abdomen is soft. Musculoskeletal:         General: No swelling, tenderness, deformity or signs of injury. Normal range of motion. Cervical back: Normal range of motion and neck supple. Comments: Palpation and movement of her right shoulder and arm does not elicit any pain response.   The extremity has good pulses distally   Skin:     General: Skin is warm and dry. Findings: No erythema, lesion or rash. Neurological:      General: No focal deficit present. Mental Status: She is alert and oriented to person, place, and time. Psychiatric:         Mood and Affect: Mood normal.         Behavior: Behavior normal.       DIAGNOSTIC RESULTS     EKG: All EKG's are interpreted by the Emergency Department Physician who either signs or Co-signs this chart in the absence of a cardiologist.    Sinus rhythm rate 70. OR interval 175. QTc 413. No ST abnormality to suggest ischemia. RADIOLOGY:   Non-plain film images such as CT, Ultrasound and MRI are read by the radiologist. Plainradiographic images are visualized and preliminarily interpreted by the emergency physician with the below findings:    I have reviewed the images and relay results. Interpretation per the Radiologist below, if available at the time of this note:    XR CHEST PORTABLE   Final Result   No acute abnormal cardiopulmonary finding. ED BEDSIDE ULTRASOUND:   Performed by ED Physician - none    LABS:  Labs Reviewed   CBC WITH AUTO DIFFERENTIAL - Abnormal; Notable for the following components:       Result Value    MCH 33.5 (*)     All other components within normal limits   COMPREHENSIVE METABOLIC PANEL - Abnormal; Notable for the following components:    Glucose 118 (*)     All other components within normal limits   D-DIMER, QUANTITATIVE - Abnormal; Notable for the following components:    D-Dimer, Quant 0.56 (*)     All other components within normal limits   HCG, SERUM, QUALITATIVE   TROPONIN       All other labs were within normal range or not returned as of this dictation.     EMERGENCY DEPARTMENT COURSE and DIFFERENTIALDIAGNOSIS/MDM:   Vitals:    Vitals:    12/18/22 1956 12/18/22 2009 12/18/22 2032 12/18/22 2102   BP: (!) 139/91  (!) 130/96 (!) 144/95   Pulse: 71 71 68 74   Resp: 18 14 18   Temp: 98.2 °F (36.8 °C)      SpO2: 99%  94% 91%   Weight: 230 lb (104.3 kg)      Height: 5' 7\" (1.702 m)          MDM  Number of Diagnoses or Management Options  Chest wall pain  Diagnosis management comments: Patient remained stable. Work-up benign. I think with her repetitive use injury at her job explains why she is having this inflammation in her right chest.  With her allergies its difficult to treat she accepts treat with steroid pack and some oxycodone for relief of pain and a work excuse. She does not have a primary care provider to follow-up with of given her [facility] number for future reference. She is more than welcome return if symptoms worsen or change or follow-up with urgent care. She remained stable seedlike another pain shot before she is discharged she is not driving. CONSULTS:  None    PROCEDURES:  Unless otherwise notedbelow, none     Procedures    FINAL IMPRESSION     1. Chest wall pain          DISPOSITION/PLAN   DISPOSITION Decision To Discharge 12/18/2022 09:47:28 PM      PATIENT REFERRED TO:  @FUP@    DISCHARGE MEDICATIONS:  New Prescriptions    METHYLPREDNISOLONE (MEDROL, VIANNEY,) 4 MG TABLET    Take by mouth. OXYCODONE (ROXICODONE) 5 MG IMMEDIATE RELEASE TABLET    Take 1 tablet by mouth every 6 hours as needed for Pain for up to 3 days. Intended supply: 3 days.  Take lowest dose possible to manage pain          (Please note that portions of this note were completed with a voice recognition program.  Efforts were made to edit the dictations butoccasionally words are mis-transcribed.)    Samir Teresa MD (electronically signed)  AttendingEmerSt. Anthony's Healthcare Center Physician          Rafa Mistry MD  12/18/22 4355

## 2023-01-08 ENCOUNTER — APPOINTMENT (OUTPATIENT)
Dept: CT IMAGING | Age: 33
End: 2023-01-08
Payer: COMMERCIAL

## 2023-01-08 ENCOUNTER — APPOINTMENT (OUTPATIENT)
Dept: GENERAL RADIOLOGY | Age: 33
End: 2023-01-08
Payer: COMMERCIAL

## 2023-01-08 ENCOUNTER — HOSPITAL ENCOUNTER (EMERGENCY)
Age: 33
Discharge: HOME | End: 2023-01-08
Payer: COMMERCIAL

## 2023-01-08 VITALS
RESPIRATION RATE: 18 BRPM | TEMPERATURE: 98.4 F | DIASTOLIC BLOOD PRESSURE: 91 MMHG | HEART RATE: 80 BPM | SYSTOLIC BLOOD PRESSURE: 140 MMHG | OXYGEN SATURATION: 96 % | WEIGHT: 230 LBS | BODY MASS INDEX: 36.02 KG/M2
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
## 2023-01-08 DIAGNOSIS — S60.211A CONTUSION OF RIGHT WRIST, INITIAL ENCOUNTER: ICD-10-CM

## 2023-01-08 PROCEDURE — 70486 CT MAXILLOFACIAL W/O DYE: CPT

## 2023-01-08 PROCEDURE — 96372 THER/PROPH/DIAG INJ SC/IM: CPT

## 2023-01-08 PROCEDURE — 93005 ELECTROCARDIOGRAM TRACING: CPT | Performed by: EMERGENCY MEDICINE

## 2023-01-08 PROCEDURE — 70486 CT MAXILLOFACIAL W/O DYE: CPT | Performed by: RADIOLOGY

## 2023-01-08 PROCEDURE — 6360000002 HC RX W HCPCS: Performed by: NURSE PRACTITIONER

## 2023-01-08 PROCEDURE — 99284 EMERGENCY DEPT VISIT MOD MDM: CPT

## 2023-01-08 PROCEDURE — 73110 X-RAY EXAM OF WRIST: CPT | Performed by: RADIOLOGY

## 2023-01-08 PROCEDURE — 73110 X-RAY EXAM OF WRIST: CPT

## 2023-01-08 RX ORDER — HYDROMORPHONE HYDROCHLORIDE 1 MG/ML
0.5 INJECTION, SOLUTION INTRAMUSCULAR; INTRAVENOUS; SUBCUTANEOUS ONCE
Status: COMPLETED | OUTPATIENT
Start: 2023-01-08 | End: 2023-01-08

## 2023-01-08 RX ADMIN — HYDROMORPHONE HYDROCHLORIDE 0.5 MG: 1 INJECTION, SOLUTION INTRAMUSCULAR; INTRAVENOUS; SUBCUTANEOUS at 13:08

## 2023-01-08 ASSESSMENT — ENCOUNTER SYMPTOMS
ABDOMINAL PAIN: 0
EYE PAIN: 1
NAUSEA: 0
DIARRHEA: 0
SHORTNESS OF BREATH: 0
VOMITING: 0

## 2023-01-08 ASSESSMENT — PAIN DESCRIPTION - LOCATION: LOCATION: GENERALIZED

## 2023-01-08 ASSESSMENT — PAIN SCALES - GENERAL: PAINLEVEL_OUTOF10: 10

## 2023-01-08 ASSESSMENT — PAIN DESCRIPTION - DESCRIPTORS: DESCRIPTORS: ACHING

## 2023-01-08 NOTE — ED PROVIDER NOTES
[facility] EMERGENCY DEPT  EMERGENCY DEPARTMENT ENCOUNTER      Pt Name: Lore Gasca  MRN: [id number]  [place] 1990  Date of evaluation: 3/0/3748  Provider: ZACKERY Tello    CHIEF COMPLAINT       Chief Complaint   Patient presents with    Fall     Patient fell face first on to concrete last night, complaining of pain in face, chest, right arm          HISTORY OF PRESENT ILLNESS   (Location/Symptom, Timing/Onset, Context/Setting, Quality, Duration, Modifying Factors, Severity)  Note limiting factors. Lore Gasca is a 28 y.o. female who presents to the emergency department     Patient presents today for evaluation of fall. She reports she tripped and fell face forward onto concrete last night; she complains of right wrist pain and facial pain. Facial pain is around left eye and cheek. She denies headache or visual changes. She has taken ibuprofen for symptoms without relief. Pain \"10/10\". HPI    Nursing Notes were reviewed. REVIEW OF SYSTEMS    (2-9 systems for level 4, 10 or more for level 5)     Review of Systems   Constitutional:  Negative for chills. Eyes:  Positive for pain (facial, periorbital left side). Respiratory:  Negative for shortness of breath. Cardiovascular:  Negative for chest pain. Gastrointestinal:  Negative for abdominal pain, diarrhea, nausea and vomiting. Musculoskeletal:  Positive for joint swelling (right wrist). All other systems reviewed and are negative. Except as noted above the remainder of the review of systems was reviewed and negative.        PAST MEDICAL HISTORY     Past Medical History:   Diagnosis Date    Asthma     Obese     Ovarian cyst          SURGICAL HISTORY       Past Surgical History:   Procedure Laterality Date    CERVIX SURGERY      CHOLECYSTECTOMY, LAPAROSCOPIC N/A 07/26/2021    CHOLECYSTECTOMY LAPAROSCOPIC performed by Benrice León MD at [facility] N/A 06/07/2021    Dr Haydee Kussmaul yr (age 39) recall DILATION AND CURETTAGE OF UTERUS N/A 10/28/2022    DILATATION AND CURETTAGE HYSTEROSCOPY, performed by Jacob Nugent MD at [facility] [address] N/A 06/07/2021    Dr Zaid Jeong-Gastritis         CURRENT MEDICATIONS       There are no discharge medications for this patient. ALLERGIES     Ibuprofen & acetaminophen, Other, Metoclopramide, Tylenol [acetaminophen], Carafate [sucralfate], and Tramadol    FAMILY HISTORY       Family History   Problem Relation Age of Onset    Diabetes Mother     High Blood Pressure Mother     Diabetes Maternal Grandmother     Diabetes Sister     Colon Cancer Neg Hx     Colon Polyps Neg Hx     Esophageal Cancer Neg Hx     Liver Cancer Neg Hx     Rectal Cancer Neg Hx     Stomach Cancer Neg Hx           SOCIAL HISTORY       Social History     Socioeconomic History    Marital status: Single     Spouse name: None    Number of children: None    Years of education: None    Highest education level: None   Tobacco Use    Smoking status: Every Day     Packs/day: 0.50     Years: 10.00     Pack years: 5.00     Types: Cigarettes    Smokeless tobacco: Never   Vaping Use    Vaping Use: Never used   Substance and Sexual Activity    Alcohol use: Yes     Alcohol/week: 0.0 standard drinks     Comment: occ    Drug use: Yes     Types: Marijuana Simmie Lang)     Comment: Spice     Sexual activity: Yes     Partners: Male, Female       SCREENINGS         [place] Coma Scale  Eye Opening: Spontaneous  Best Verbal Response: Oriented  Best Motor Response: Obeys commands  [place] Coma Scale Score: 15                     CIWA Assessment  BP: (!) 140/91  Heart Rate: 80                 PHYSICAL EXAM    (up to 7 for level 4, 8 or more for level 5)     ED Triage Vitals [01/08/23 1129]   BP Temp Temp Source Heart Rate Resp SpO2 Height Weight   (!) 140/91 98.4 °F (36.9 °C) Oral 80 18 96 % -- 230 lb (104.3 kg)       Physical Exam  Vitals and nursing note reviewed. HENT:      Head: Atraumatic. Mouth/Throat:      Mouth: Mucous membranes are not dry. Pharynx: No posterior oropharyngeal erythema. Eyes:      General: No scleral icterus. Pupils: Pupils are equal, round, and reactive to light. Neck:      Trachea: No tracheal deviation. Cardiovascular:      Rate and Rhythm: Normal rate and regular rhythm. Heart sounds: Normal heart sounds. No murmur heard. Pulmonary:      Effort: Pulmonary effort is normal. No respiratory distress. Breath sounds: Normal breath sounds. No stridor. Abdominal:      General: There is no distension. Palpations: Abdomen is soft. Tenderness: There is no abdominal tenderness. There is no guarding. Musculoskeletal:      Right wrist: Swelling and tenderness present. Decreased range of motion. Left wrist: Normal.   Skin:     Coloration: Skin is not pale. Findings: No rash. Neurological:      Mental Status: She is alert and oriented to person, place, and time. Psychiatric:         Behavior: Behavior is cooperative. DIAGNOSTIC RESULTS     EKG: All EKG's are interpreted by the Emergency Department Physician who either signs or Co-signs this chart in the absence of a cardiologist.        RADIOLOGY:   Non-plain film images such as CT, Ultrasound and MRI are read by the radiologist. Plain radiographic images are visualized and preliminarily interpreted by the emergency physician with the below findings:        Interpretation per the Radiologist below, if available at the time of this note:    CT Facial Bones WO Contrast   Final Result       1. No acute facial bone abnormalities. Recommendation:   Follow up as clinically indicated. All CT scans at this facility utilize dose modulation, iterative reconstruction, and/or weight based dosing when appropriate to reduce radiation dose to as low as reasonably achievable.    Dictated and Electronically Signed by Riccardo Gomez MD at 08-Jan-2023 04:37:16 PM               XR WRIST RIGHT (MIN 3 VIEWS)   Final Result   NO EVIDENCE OF FRACTURE OR DISLOCATION. ED BEDSIDE ULTRASOUND:   Performed by ED Physician - none    LABS:  Labs Reviewed - No data to display    All other labs were within normal range or not returned as of this dictation. EMERGENCY DEPARTMENT COURSE and DIFFERENTIAL DIAGNOSIS/MDM:   Vitals:    Vitals:    01/08/23 1129   BP: (!) 140/91   Pulse: 80   Resp: 18   Temp: 98.4 °F (36.9 °C)   TempSrc: Oral   SpO2: 96%   Weight: 230 lb (104.3 kg)           Medical Decision Making  Amount and/or Complexity of Data Reviewed  Radiology: ordered. Risk  Prescription drug management. REASSESSMENT        Dilaudid 0.5 mg given with relief. Wrist xray negative. CT facial bones negative. Discussed pain control with ibuprofen/tylenol. CRITICAL CARE TIME   Total Critical Care time was 0 minutes, excluding separately reportable procedures. There was a high probability of clinically significant/life threatening deterioration in the patient's condition which required my urgent intervention. CONSULTS:  None    PROCEDURES:  Unless otherwise noted below, none     Procedures        FINAL IMPRESSION      1. Fall, initial encounter    2. Contusion of right wrist, initial encounter          DISPOSITION/PLAN   DISPOSITION Decision To Discharge 01/08/2023 03:49:17 PM      PATIENT REFERRED TO:  No follow-up provider specified. DISCHARGE MEDICATIONS:  There are no discharge medications for this patient. Controlled Substances Monitoring:     RX Monitoring 12/5/2018   Attestation The Prescription Monitoring Report for this patient was reviewed today. Periodic Controlled Substance Monitoring No signs of potential drug abuse or diversion identified.        (Please note that portions of this note were completed with a voice recognition program.  Efforts were made to edit the dictations but occasionally words are mis-transcribed.)    ZACKERY Sethi (electronically signed)  Attending Emergency Physician           Kizzy Mosquera, APRN  01/08/23 3142

## 2023-01-09 ENCOUNTER — OFFICE VISIT (OUTPATIENT)
Dept: OBGYN CLINIC | Age: 33
End: 2023-01-09
Payer: COMMERCIAL

## 2023-01-09 VITALS
SYSTOLIC BLOOD PRESSURE: 133 MMHG | WEIGHT: 235 LBS | DIASTOLIC BLOOD PRESSURE: 83 MMHG | HEART RATE: 81 BPM | BODY MASS INDEX: 36.88 KG/M2 | HEIGHT: 67 IN

## 2023-01-09 DIAGNOSIS — Z11.3 SCREEN FOR STD (SEXUALLY TRANSMITTED DISEASE): Primary | ICD-10-CM

## 2023-01-09 DIAGNOSIS — Z11.3 SCREEN FOR STD (SEXUALLY TRANSMITTED DISEASE): ICD-10-CM

## 2023-01-09 DIAGNOSIS — N76.4 VULVAR BOIL: ICD-10-CM

## 2023-01-09 LAB
EKG P AXIS: 50 DEGREES
EKG P-R INTERVAL: 160 MS
EKG Q-T INTERVAL: 366 MS
EKG QRS DURATION: 82 MS
EKG QTC CALCULATION (BAZETT): 402 MS
EKG T AXIS: 31 DEGREES
HAV IGM SER IA-ACNC: NORMAL
HEPATITIS B CORE IGM ANTIBODY: NORMAL
HEPATITIS B SURFACE ANTIGEN INTERPRETATION: NORMAL
HEPATITIS C ANTIBODY INTERPRETATION: NORMAL
HIV-1 P24 AG: NORMAL
RAPID HIV 1&2: NORMAL
RPR: NORMAL

## 2023-01-09 PROCEDURE — 93010 ELECTROCARDIOGRAM REPORT: CPT | Performed by: INTERNAL MEDICINE

## 2023-01-09 PROCEDURE — 99213 OFFICE O/P EST LOW 20 MIN: CPT | Performed by: NURSE PRACTITIONER

## 2023-01-09 ASSESSMENT — ENCOUNTER SYMPTOMS
RESPIRATORY NEGATIVE: 1
ALLERGIC/IMMUNOLOGIC NEGATIVE: 1
EYES NEGATIVE: 1
GASTROINTESTINAL NEGATIVE: 1

## 2023-01-09 NOTE — PROGRESS NOTES
Mariella Carrillo is a 28 y.o. female who presents today for her medical conditions/ complaints as noted below. Mariella Carrillo is c/o of Other        HPI  Patient presents today for std testing , no symptoms. She also has two \"knots\" she would like checked. Is breaking up w sign other. Found out he is unfaithful    Patient's last menstrual period was 2022 (exact date).     Past Medical History:   Diagnosis Date    Asthma     Obese     Ovarian cyst      Past Surgical History:   Procedure Laterality Date    CERVIX SURGERY      CHOLECYSTECTOMY, LAPAROSCOPIC N/A 2021    CHOLECYSTECTOMY LAPAROSCOPIC performed by Danielle Gasca MD at [facility] N/A 2021    Dr Baron Roper yr (age 39) recall    DILATION AND CURETTAGE OF UTERUS N/A 10/28/2022    DILATATION AND CURETTAGE HYSTEROSCOPY, performed by Katlyn Perez MD at [address] [place] [address] ENDOSCOPY N/A 2021    Dr Ayaka Jasso     Family History   Problem Relation Age of Onset    Diabetes Mother     High Blood Pressure Mother     Diabetes Maternal Grandmother     Diabetes Sister     Colon Cancer Neg Hx     Colon Polyps Neg Hx     Esophageal Cancer Neg Hx     Liver Cancer Neg Hx     Rectal Cancer Neg Hx     Stomach Cancer Neg Hx      Social History     Tobacco Use    Smoking status: Every Day     Packs/day: 0.50     Years: 10.00     Pack years: 5.00     Types: Cigarettes    Smokeless tobacco: Never   Substance Use Topics    Alcohol use: Yes     Alcohol/week: 0.0 standard drinks     Comment: occ       No current outpatient medications on file. No current facility-administered medications for this visit.      Allergies   Allergen Reactions    Ibuprofen & Acetaminophen Anaphylaxis    Other Anaphylaxis     onions    Metoclopramide Other (See Comments)    Tylenol [Acetaminophen]     Carafate [Sucralfate] Nausea And Vomiting    Tramadol Rash     Vitals:    23 1104   BP: 133/83   Pulse: 81     Body mass index is 36.81 kg/m². Review of Systems   Constitutional: Negative. HENT: Negative. Eyes: Negative. Respiratory: Negative. Cardiovascular: Negative. Gastrointestinal: Negative. Endocrine: Negative. Genitourinary:  Positive for vaginal pain (where the boil is). Negative for difficulty urinating, dyspareunia, dysuria, enuresis, frequency, hematuria, menstrual problem, pelvic pain, urgency and vaginal discharge. Musculoskeletal: Negative. Skin: Negative. Allergic/Immunologic: Negative. Neurological: Negative. Hematological: Negative. Psychiatric/Behavioral: Negative. Physical Exam  Vitals and nursing note reviewed. Constitutional:       General: She is not in acute distress. Appearance: She is well-developed. She is not diaphoretic. HENT:      Head: Normocephalic and atraumatic. Eyes:      Conjunctiva/sclera: Conjunctivae normal.      Pupils: Pupils are equal, round, and reactive to light. Pulmonary:      Effort: Pulmonary effort is normal.   Abdominal:      Tenderness: There is no guarding. Genitourinary:         Comments: Area marked tender to touch and swollen, no head noted to the boil, not warm or red  [facility] vaginal swab collected    Musculoskeletal:         General: Normal range of motion. Cervical back: Normal range of motion. Comments: Normal ROM in all 4 extremities; normal gait   Skin:     General: Skin is warm and dry. Neurological:      Mental Status: She is alert and oriented to person, place, and time. Motor: No abnormal muscle tone. Coordination: Coordination normal.   Psychiatric:         Behavior: Behavior normal.        Diagnosis Orders   1. Screen for STD (sexually transmitted disease)  RPR Reflex to Titer and TPPA    HIV Rapid 1&2    Hepatitis Panel, Acute    Miscellaneous Sendout 2      2.  Vulvar boil            MEDICATIONS:  No orders of the defined types were placed in this encounter. ORDERS:  Orders Placed This Encounter   Procedures    RPR Reflex to Titer and TPPA    HIV Rapid 1&2    Hepatitis Panel, Acute    Miscellaneous Sendout 2       PLAN:  [facility] sent and to have labs  To soak in bath 4-5 times daily. If area worsens instead of resolving to call for abx  Patient Instructions   Patient Education       Safer Sex: Care Instructions  Overview  Safer sex is a way to reduce your risk of getting a sexually transmitted infection (STI). It can also help prevent pregnancy. Several products can help you practice safer sex and reduce your chance of STIs. One of the best is a condom. There are internal and external condoms. You can use a special rubber sheet (dental dam) for protection during oral sex. Disposable gloves can keep your hands from touching blood, semen, or other body fluids that can carry infections. Remember that birth control methods such as diaphragms, IUDs, foams, and birth control pills do not stop you from getting STIs. Follow-up care is a key part of your treatment and safety. Be sure to make and go to all appointments, and call your doctor if you are having problems. It's also a good idea to know your test results and keep a list of the medicines you take. How can you care for yourself at home? Think about getting vaccinated to help prevent hepatitis A, hepatitis B, and human papillomavirus (HPV). They can be spread through sex. Use a condom every time you have sex. Use an external condom, which goes on the penis. Or use an internal condom, which goes into the vagina or anus. Make sure you use the right size external condom. A condom that's too small can break easily. A condom that's too big can slip off during sex. Use a new condom each time you have sex. Be careful not to poke a hole in the condom when you open the wrapper. Don't use an internal condom and an external condom at the same time.   Never use petroleum jelly (such as Vaseline), grease, hand lotion, baby oil, or anything with oil in it. These products can make holes in the condom. After intercourse, hold the edge of the condom as you remove it. This will help keep semen from spilling out of the condom. Do not have sex with anyone who has symptoms of an STI, such as sores on the genitals or mouth. Do not drink a lot of alcohol or use drugs before sex. Limit your sex partners. Sex with one partner who has sex only with you can reduce your risk of getting an STI. Don't share sex toys. But if you do share them, use a condom and clean the sex toys between each use. Talk to your partner(s) before you have sex. Talk about what you feel comfortable with and whether you have any boundaries with sex. And find out if your partner(s) may be at risk for any STI. Keep in mind that a person may be able to spread an STI even if they do not have symptoms. You and your partner(s) may want to get tested for STIs. Where can you learn more? Go to http://www.woods.com/ and enter B608 to learn more about \"Safer Sex: Care Instructions. \"  Current as of: August 2, 2022               Content Version: 13.5  © 2006-2022 Healthwise, Incorporated. Care instructions adapted under license by [facility] [place] ([facility]). If you have questions about a medical condition or this instruction, always ask your healthcare professional. [place] [address] any warranty or liability for your use of this information.

## 2023-01-09 NOTE — PATIENT INSTRUCTIONS
Patient Education        Safer Sex: Care Instructions  Overview  Safer sex is a way to reduce your risk of getting a sexually transmitted infection (STI). It can also help prevent pregnancy. Several products can help you practice safer sex and reduce your chance of STIs. One of the best is a condom. There are internal and external condoms. You can use a special rubber sheet (dental dam) for protection during oral sex. Disposable gloves can keep your hands from touching blood, semen, or other body fluids that can carry infections. Remember that birth control methods such as diaphragms, IUDs, foams, and birth control pills do not stop you from getting STIs. Follow-up care is a key part of your treatment and safety. Be sure to make and go to all appointments, and call your doctor if you are having problems. It's also a good idea to know your test results and keep a list of the medicines you take. How can you care for yourself at home? Think about getting vaccinated to help prevent hepatitis A, hepatitis B, and human papillomavirus (HPV). They can be spread through sex. Use a condom every time you have sex. Use an external condom, which goes on the penis. Or use an internal condom, which goes into the vagina or anus. Make sure you use the right size external condom. A condom that's too small can break easily. A condom that's too big can slip off during sex. Use a new condom each time you have sex. Be careful not to poke a hole in the condom when you open the wrapper. Don't use an internal condom and an external condom at the same time. Never use petroleum jelly (such as Vaseline), grease, hand lotion, baby oil, or anything with oil in it. These products can make holes in the condom. After intercourse, hold the edge of the condom as you remove it. This will help keep semen from spilling out of the condom. Do not have sex with anyone who has symptoms of an STI, such as sores on the genitals or mouth.   Do not drink a lot of alcohol or use drugs before sex. Limit your sex partners. Sex with one partner who has sex only with you can reduce your risk of getting an STI. Don't share sex toys. But if you do share them, use a condom and clean the sex toys between each use. Talk to your partner(s) before you have sex. Talk about what you feel comfortable with and whether you have any boundaries with sex. And find out if your partner(s) may be at risk for any STI. Keep in mind that a person may be able to spread an STI even if they do not have symptoms. You and your partner(s) may want to get tested for STIs. Where can you learn more? Go to http://www.woods.com/ and enter B608 to learn more about \"Safer Sex: Care Instructions. \"  Current as of: August 2, 2022               Content Version: 13.5  © 2006-2022 Healthwise, Incorporated. Care instructions adapted under license by [facility] ([facility]). If you have questions about a medical condition or this instruction, always ask your healthcare professional. [address] any warranty or liability for your use of this information.

## 2023-01-13 RX ORDER — METRONIDAZOLE 500 MG/1
500 TABLET ORAL 2 TIMES DAILY WITH MEALS
Qty: 14 TABLET | Refills: 0 | Status: SHIPPED | OUTPATIENT
Start: 2023-01-13 | End: 2023-01-20

## 2023-01-13 RX ORDER — DOXYCYCLINE HYCLATE 100 MG
100 TABLET ORAL 2 TIMES DAILY
Qty: 14 TABLET | Refills: 0 | Status: SHIPPED | OUTPATIENT
Start: 2023-01-13 | End: 2023-01-20

## 2023-01-17 ENCOUNTER — TELEPHONE (OUTPATIENT)
Dept: OBGYN CLINIC | Age: 33
End: 2023-01-17

## 2023-01-17 NOTE — TELEPHONE ENCOUNTER
Patient called and states that she thinks she may have a tampon stuck in her. She said she has been on her period for seven days and thinks that she may have forgotten that she had a tampon in over the weekend because she was also wearing a pad. She states that her birthday was yesterday and she used a douche and thinks that maybe she pushed it farther inside. She cannot feel any strings. And she said she is feeling very weird. I told her I would talk to a provider and see what she thinks we should do.  Marcia Corbett

## 2023-01-18 ENCOUNTER — OFFICE VISIT (OUTPATIENT)
Dept: OBGYN CLINIC | Age: 33
End: 2023-01-18

## 2023-01-18 VITALS
WEIGHT: 237 LBS | SYSTOLIC BLOOD PRESSURE: 132 MMHG | BODY MASS INDEX: 37.12 KG/M2 | HEART RATE: 84 BPM | DIASTOLIC BLOOD PRESSURE: 86 MMHG

## 2023-01-18 DIAGNOSIS — N76.0 ACUTE VAGINITIS: Primary | ICD-10-CM

## 2023-01-18 DIAGNOSIS — T19.2XXA VAGINAL FOREIGN OBJECT, INITIAL ENCOUNTER: ICD-10-CM

## 2023-01-18 NOTE — PROGRESS NOTES
[facility]  CNM Office Note    Ca Acosta is a 35 y.o. female who presents today for her medical conditions/ complaints as noted below. Chief Complaint   Patient presents with    Establish Care     Pt is here she believes she has a tampon still inside, she mentions she douched Monday and now feels weird. HPI  Pt presents with concern of tampon stuck in vagina. She doesn't routinely use tampons but due to birthday she used and doesn't remember taking out. She also used duche' on birthday as well. Menses stopped on Monday and afraid she has a tampon stuck inside  Patient Active Problem List   Diagnosis    Infertility, female, primary    Hidradenitis suppurativa of right axilla    Stenosis of cervix       Patient's last menstrual period was 2022.     Past Medical History:   Diagnosis Date    Asthma     Obese     Ovarian cyst      Past Surgical History:   Procedure Laterality Date    CERVIX SURGERY      CHOLECYSTECTOMY, LAPAROSCOPIC N/A 2021    CHOLECYSTECTOMY LAPAROSCOPIC performed by Edi Quiñones MD at [facility] N/A 2021    Dr Velma Franklin yr (age 39) recall    DILATION AND CURETTAGE OF UTERUS N/A 10/28/2022    DILATATION AND CURETTAGE HYSTEROSCOPY, performed by Daphney Clemens MD at [address] [place] [address] ENDOSCOPY N/A 2021    Dr Jess Franz     Family History   Problem Relation Age of Onset    Diabetes Mother     High Blood Pressure Mother     Diabetes Maternal Grandmother     Diabetes Sister     Colon Cancer Neg Hx     Colon Polyps Neg Hx     Esophageal Cancer Neg Hx     Liver Cancer Neg Hx     Rectal Cancer Neg Hx     Stomach Cancer Neg Hx      Social History     Tobacco Use    Smoking status: Every Day     Packs/day: 0.50     Years: 10.00     Pack years: 5.00     Types: Cigarettes    Smokeless tobacco: Never   Substance Use Topics    Alcohol use:  Yes     Alcohol/week: 0.0 standard drinks     Comment: occ No current outpatient medications on file. No current facility-administered medications for this visit. Allergies   Allergen Reactions    Ibuprofen & Acetaminophen Anaphylaxis    Other Anaphylaxis     onions    Metoclopramide Other (See Comments)    Tylenol [Acetaminophen]     Carafate [Sucralfate] Nausea And Vomiting    Tramadol Rash     Vitals:    01/18/23 1342   BP: 132/86   Pulse: 84     Body mass index is 37.12 kg/m². Review of Systems   Constitutional: Negative. HENT: Negative. Eyes: Negative. Respiratory: Negative. Cardiovascular: Negative. Gastrointestinal: Negative. Endocrine: Negative. Genitourinary:  Positive for vaginal pain. Negative for dyspareunia, dysuria, flank pain, menstrual problem, pelvic pain, vaginal bleeding and vaginal discharge. Musculoskeletal: Negative. Skin: Negative. Allergic/Immunologic: Negative. Neurological: Negative. Hematological: Negative. Psychiatric/Behavioral: Negative. Physical Exam  Vitals and nursing note reviewed. Constitutional:       Appearance: Normal appearance. She is well-developed. She is not diaphoretic. HENT:      Head: Normocephalic and atraumatic. Nose: Nose normal.   Eyes:      Extraocular Movements: Extraocular movements intact. Conjunctiva/sclera: Conjunctivae normal.      Pupils: Pupils are equal, round, and reactive to light. Neck:      Thyroid: No thyromegaly. Trachea: No tracheal deviation. Pulmonary:      Effort: Pulmonary effort is normal. No respiratory distress. Genitourinary:     General: Normal vulva. Vagina: Tenderness present. No vaginal discharge, erythema, bleeding, lesions or prolapsed vaginal walls. Comments: No tampon noted on exam  Musculoskeletal:         General: Normal range of motion. Cervical back: Normal range of motion and neck supple. Comments: Normal ROM for upper and lower extremities. Gait steady.    Skin:     General: Skin is warm and dry. Findings: No lesion or rash. Neurological:      Mental Status: She is alert and oriented to person, place, and time. Psychiatric:         Mood and Affect: Mood normal.         Speech: Speech normal.         Behavior: Behavior normal.        Diagnosis Orders   1. Acute vaginitis        2. Vaginal foreign object, initial encounter            MEDICATIONS:  No orders of the defined types were placed in this encounter. ORDERS:  No orders of the defined types were placed in this encounter. PLAN:  Acute Vaginitis- discussed no further usage of duche' as strips normal vaginal tila.  [facility] collected and will tx pending results

## 2023-01-18 NOTE — PATIENT INSTRUCTIONS
Patient Education        A Healthy Lifestyle: Care Instructions  A healthy lifestyle can help you feel good, have more energy, and stay at a weight that's healthy for you. You can share a healthy lifestyle with your friends and family. And you can do it on your own. Eat meals with your friends or family. You could try cooking together. Plan activities with other people. Go for a walk with a friend, try a free online fitness class, or join a sports league. Eat a variety of healthy foods. These include fruits, vegetables, whole grains, low-fat dairy, and lean protein. Choose healthy portions of food. You can use the Nutrition Facts label on food packages as a guide. Eat more fruits and vegetables. You could add vegetables to sandwiches or add fruit to cereal.   Drink water when you are thirsty. Limit soda, juice, and sports drinks. Try to exercise most days. Aim for at least 2½ hours of exercise each week. Keep moving. Work in the garden or take your dog on a walk. Use the stairs instead of the elevator. If you use tobacco or nicotine, try to quit. Ask your doctor about programs and medicines to help you quit. Limit alcohol. Men should have no more than 2 drinks a day. Women should have no more than 1. For some people, no alcohol is the best choice. Follow-up care is a key part of your treatment and safety. Be sure to make and go to all appointments, and call your doctor if you are having problems. It's also a good idea to know your test results and keep a list of the medicines you take. Where can you learn more? Go to http://www.alvarado.com/ and enter U807 to learn more about \"A Healthy Lifestyle: Care Instructions. \"  Current as of: March 9, 2022               Content Version: 13.5  © 2825-5982 Healthwise, [employer]. Care instructions adapted under license by [facility] ([facility]).  If you have questions about a medical condition or this instruction, always ask your healthcare professional. [address] any warranty or liability for your use of this information.

## 2023-01-20 RX ORDER — FLUCONAZOLE 150 MG/1
150 TABLET ORAL
Qty: 2 TABLET | Refills: 0 | Status: SHIPPED | OUTPATIENT
Start: 2023-01-20 | End: 2023-01-26

## 2023-01-26 PROCEDURE — 96376 TX/PRO/DX INJ SAME DRUG ADON: CPT

## 2023-01-26 PROCEDURE — 96374 THER/PROPH/DIAG INJ IV PUSH: CPT

## 2023-01-26 PROCEDURE — 99285 EMERGENCY DEPT VISIT HI MDM: CPT

## 2023-01-27 ENCOUNTER — APPOINTMENT (OUTPATIENT)
Dept: CT IMAGING | Age: 33
End: 2023-01-27
Payer: COMMERCIAL

## 2023-01-27 ENCOUNTER — HOSPITAL ENCOUNTER (EMERGENCY)
Age: 33
Discharge: HOME | End: 2023-01-27
Attending: EMERGENCY MEDICINE
Payer: COMMERCIAL

## 2023-01-27 ENCOUNTER — APPOINTMENT (OUTPATIENT)
Dept: GENERAL RADIOLOGY | Age: 33
End: 2023-01-27
Payer: COMMERCIAL

## 2023-01-27 VITALS
WEIGHT: 235 LBS | TEMPERATURE: 98.9 F | HEART RATE: 68 BPM | OXYGEN SATURATION: 99 % | HEIGHT: 67 IN | RESPIRATION RATE: 18 BRPM | BODY MASS INDEX: 36.88 KG/M2 | DIASTOLIC BLOOD PRESSURE: 77 MMHG | SYSTOLIC BLOOD PRESSURE: 147 MMHG

## 2023-01-27 DIAGNOSIS — R07.9 CHEST PAIN, UNSPECIFIED TYPE: ICD-10-CM

## 2023-01-27 DIAGNOSIS — B37.31 VAGINAL CANDIDIASIS: ICD-10-CM

## 2023-01-27 DIAGNOSIS — N83.201 CYST OF RIGHT OVARY: ICD-10-CM

## 2023-01-27 DIAGNOSIS — E87.1 HYPONATREMIA: ICD-10-CM

## 2023-01-27 DIAGNOSIS — L02.31 LEFT BUTTOCK ABSCESS: Primary | ICD-10-CM

## 2023-01-27 LAB
ALBUMIN SERPL-MCNC: 3.7 G/DL (ref 3.5–5.2)
ALP BLD-CCNC: 62 U/L (ref 35–104)
ALT SERPL-CCNC: 20 U/L (ref 5–33)
ANION GAP SERPL CALCULATED.3IONS-SCNC: 7 MMOL/L (ref 7–19)
AST SERPL-CCNC: 23 U/L (ref 5–32)
BACTERIA WET PREP: ABNORMAL
BILIRUB SERPL-MCNC: <0.2 MG/DL (ref 0.2–1.2)
BUN BLDV-MCNC: 10 MG/DL (ref 6–20)
C. TRACHOMATIS DNA ,URINE: NOT DETECTED
CALCIUM SERPL-MCNC: 9 MG/DL (ref 8.6–10)
CHLORIDE BLD-SCNC: 101 MMOL/L (ref 98–111)
CLUE CELLS: ABNORMAL
CO2: 26 MMOL/L (ref 22–29)
CREAT SERPL-MCNC: 0.8 MG/DL (ref 0.5–0.9)
D DIMER: 0.61 UG/ML FEU (ref 0–0.48)
EPITHELIAL CELLS WET PREP: ABNORMAL
GFR SERPL CREATININE-BSD FRML MDRD: >60 ML/MIN/{1.73_M2}
GLUCOSE BLD-MCNC: 103 MG/DL (ref 74–109)
HCG QUALITATIVE: NEGATIVE
HCT VFR BLD CALC: 42.2 % (ref 37–47)
HEMOGLOBIN: 15.2 G/DL (ref 12–16)
KOH PREP: NORMAL
MCH RBC QN AUTO: 32.8 PG (ref 27–31)
MCHC RBC AUTO-ENTMCNC: 36 G/DL (ref 33–37)
MCV RBC AUTO: 90.9 FL (ref 81–99)
N. GONORRHOEAE DNA, URINE: NOT DETECTED
PDW BLD-RTO: 13.1 % (ref 11.5–14.5)
PLATELET # BLD: 247 K/UL (ref 130–400)
PMV BLD AUTO: 9.8 FL (ref 9.4–12.3)
POTASSIUM REFLEX MAGNESIUM: 4.3 MMOL/L (ref 3.5–5)
RBC # BLD: 4.64 M/UL (ref 4.2–5.4)
RBC WET PREP: ABNORMAL
SODIUM BLD-SCNC: 134 MMOL/L (ref 136–145)
SOURCE WET PREP: ABNORMAL
TOTAL PROTEIN: 7.2 G/DL (ref 6.6–8.7)
TRICHOMONAS PREP: ABNORMAL
TRICHOMONAS VAGINALIS DNA, URINE: NOT DETECTED
TROPONIN: <0.01 NG/ML (ref 0–0.03)
WBC # BLD: 14 K/UL (ref 4.8–10.8)
WBC WET PREP: ABNORMAL
YEAST WET PREP: ABNORMAL

## 2023-01-27 PROCEDURE — 6360000002 HC RX W HCPCS: Performed by: EMERGENCY MEDICINE

## 2023-01-27 PROCEDURE — 87591 N.GONORRHOEAE DNA AMP PROB: CPT

## 2023-01-27 PROCEDURE — 93005 ELECTROCARDIOGRAM TRACING: CPT | Performed by: EMERGENCY MEDICINE

## 2023-01-27 PROCEDURE — 71045 X-RAY EXAM CHEST 1 VIEW: CPT | Performed by: RADIOLOGY

## 2023-01-27 PROCEDURE — 84484 ASSAY OF TROPONIN QUANT: CPT

## 2023-01-27 PROCEDURE — 87491 CHLMYD TRACH DNA AMP PROBE: CPT

## 2023-01-27 PROCEDURE — 71045 X-RAY EXAM CHEST 1 VIEW: CPT

## 2023-01-27 PROCEDURE — 87661 TRICHOMONAS VAGINALIS AMPLIF: CPT

## 2023-01-27 PROCEDURE — 74177 CT ABD & PELVIS W/CONTRAST: CPT

## 2023-01-27 PROCEDURE — 85027 COMPLETE CBC AUTOMATED: CPT

## 2023-01-27 PROCEDURE — 71275 CT ANGIOGRAPHY CHEST: CPT

## 2023-01-27 PROCEDURE — 80053 COMPREHEN METABOLIC PANEL: CPT

## 2023-01-27 PROCEDURE — 6360000004 HC RX CONTRAST MEDICATION: Performed by: EMERGENCY MEDICINE

## 2023-01-27 PROCEDURE — 36415 COLL VENOUS BLD VENIPUNCTURE: CPT

## 2023-01-27 PROCEDURE — 85379 FIBRIN DEGRADATION QUANT: CPT

## 2023-01-27 PROCEDURE — 84703 CHORIONIC GONADOTROPIN ASSAY: CPT

## 2023-01-27 RX ORDER — MORPHINE SULFATE 4 MG/ML
4 INJECTION, SOLUTION INTRAMUSCULAR; INTRAVENOUS ONCE
Status: COMPLETED | OUTPATIENT
Start: 2023-01-27 | End: 2023-01-27

## 2023-01-27 RX ORDER — MORPHINE SULFATE 2 MG/ML
2 INJECTION, SOLUTION INTRAMUSCULAR; INTRAVENOUS ONCE
Status: COMPLETED | OUTPATIENT
Start: 2023-01-27 | End: 2023-01-27

## 2023-01-27 RX ADMIN — IOPAMIDOL 90 ML: 755 INJECTION, SOLUTION INTRAVENOUS at 02:15

## 2023-01-27 RX ADMIN — MORPHINE SULFATE 2 MG: 2 INJECTION, SOLUTION INTRAMUSCULAR; INTRAVENOUS at 01:53

## 2023-01-27 RX ADMIN — MORPHINE SULFATE 4 MG: 4 INJECTION, SOLUTION INTRAMUSCULAR; INTRAVENOUS at 03:26

## 2023-01-27 ASSESSMENT — ENCOUNTER SYMPTOMS
EYE PAIN: 0
VOMITING: 0
SHORTNESS OF BREATH: 0
DIARRHEA: 0
BACK PAIN: 0
ABDOMINAL PAIN: 0

## 2023-01-27 ASSESSMENT — PAIN DESCRIPTION - ORIENTATION: ORIENTATION: LEFT

## 2023-01-27 ASSESSMENT — PAIN - FUNCTIONAL ASSESSMENT: PAIN_FUNCTIONAL_ASSESSMENT: 0-10

## 2023-01-27 ASSESSMENT — PAIN SCALES - GENERAL
PAINLEVEL_OUTOF10: 10
PAINLEVEL_OUTOF10: 10
PAINLEVEL_OUTOF10: 9

## 2023-01-27 ASSESSMENT — PAIN DESCRIPTION - LOCATION: LOCATION: GROIN

## 2023-01-27 NOTE — ED NOTES
Pt states that she went to Urgent care yesterday and an I&D of abscess was attempted. Pt states it was too painful and asked them to stop, pt was sent home with an antibiotic prescription. Pt states that she has intermittent stabbing in right side of chest mostly in the morning and with movement.      Teresa Emerson RN  01/27/23 0117

## 2023-01-27 NOTE — ED PROVIDER NOTES
[facility] EMERGENCY DEPT  eMERGENCY dEPARTMENT eNCOUnter      Pt Name: Ascencion Vázquez  MRN: [id number]  [place] 1990  Date of evaluation: 1/26/2023  Provider: Júnior Collins MD    CHIEF COMPLAINT       Chief Complaint   Patient presents with    Chest Pain     Right sided. Started yesterday, Thursday. Abscess     To left groin. Getting larger. Went to [facility] on Wednesday and was prescribed antibiotics. Increased pain. HISTORY OF PRESENT ILLNESS   (Location/Symptom, Timing/Onset,Context/Setting, Quality, Duration, Modifying Factors, Severity)  Note limiting factors. Ascencion Vázquez is a 35 y.o. female who presents to the emergency department complaining of abscess to the left upper/inner thigh. Patient states states that over the last few days she has noticed swelling to her left inner thigh. Was seen at [address] [place] [address] for this yesterday where they attempted to do an I&D which patient was unable to tolerate and was discharged on antibiotics. Swelling increased today with increasing pain which is what prompted visit here. Has also had some intermittent chest pain for the past couple of days. Pain in the right side of chest.  Pain better when she is lying down and worse when she is sitting up. No pleuritic chest pain or hemoptysis. No unilateral leg swelling or pain. No history of DVT or PE. Additionally, patient says she is had some vaginal discharge for the past few days. Sexually active with multiple partners and does not use protection. HPI    NursingNotes were reviewed. REVIEW OF SYSTEMS    (2-9 systems for level 4, 10 or more for level 5)     Review of Systems   Constitutional:  Negative for fever. Eyes:  Negative for pain. Respiratory:  Negative for shortness of breath. Cardiovascular:  Positive for chest pain (none currently). Negative for palpitations and leg swelling. Gastrointestinal:  Negative for abdominal pain, diarrhea and vomiting.    Genitourinary: Positive for vaginal discharge. Negative for dysuria, vaginal bleeding and vaginal pain. Musculoskeletal:  Negative for back pain and neck pain. Skin:  Negative for rash. Neurological:  Negative for weakness, numbness and headaches. All other systems reviewed and are negative. A complete review of systems was performed and is negative except as noted above in the HPI.        PAST MEDICAL HISTORY     Past Medical History:   Diagnosis Date    Asthma     Obese     Ovarian cyst          SURGICAL HISTORY       Past Surgical History:   Procedure Laterality Date    CERVIX SURGERY      CHOLECYSTECTOMY, LAPAROSCOPIC N/A 07/26/2021    CHOLECYSTECTOMY LAPAROSCOPIC performed by Paolo Espinosa MD at [facility] N/A 06/07/2021    Dr Sharonda Ayala yr (age 39) recall    DILATION AND CURETTAGE OF UTERUS N/A 10/28/2022    DILATATION AND CURETTAGE HYSTEROSCOPY, performed by Jacob Nugent MD at [address] [place] [address] 06/07/2021    Dr Mohini Sequeira       Discharge Medication List as of 1/27/2023  4:23 AM          ALLERGIES     Ibuprofen & acetaminophen, Other, Metoclopramide, Tylenol [acetaminophen], Carafate [sucralfate], and Tramadol    FAMILY HISTORY       Family History   Problem Relation Age of Onset    Diabetes Mother     High Blood Pressure Mother     Diabetes Maternal Grandmother     Diabetes Sister     Colon Cancer Neg Hx     Colon Polyps Neg Hx     Esophageal Cancer Neg Hx     Liver Cancer Neg Hx     Rectal Cancer Neg Hx     Stomach Cancer Neg Hx           SOCIAL HISTORY       Social History     Socioeconomic History    Marital status: Single     Spouse name: None    Number of children: None    Years of education: None    Highest education level: None   Tobacco Use    Smoking status: Every Day     Packs/day: 0.50     Years: 10.00     Pack years: 5.00     Types: Cigarettes    Smokeless tobacco: Never   Vaping Use    Vaping Use: Never used Substance and Sexual Activity    Alcohol use: Yes     Alcohol/week: 0.0 standard drinks     Comment: occ    Drug use: Yes     Types: Marijuana Keli Mejia)     Comment: Spice     Sexual activity: Yes     Partners: Male, Female       SCREENINGS             PHYSICAL EXAM    (up to 7 for level 4, 8 or more for level 5)     ED Triage Vitals [01/27/23 0021]   BP Temp Temp Source Heart Rate Resp SpO2 Height Weight   (!) 147/77 98.9 °F (37.2 °C) Oral 72 20 98 % 5' 7\" (1.702 m) 235 lb (106.6 kg)       Physical Exam  Vitals reviewed. Exam conducted with a chaperone present. Constitutional:       General: She is not in acute distress. Appearance: She is well-developed. HENT:      Head: Normocephalic and atraumatic. Mouth/Throat:      Mouth: Mucous membranes are moist.      Pharynx: Oropharynx is clear. Eyes:      General: No scleral icterus. Right eye: No discharge. Left eye: No discharge. Extraocular Movements: Extraocular movements intact. Conjunctiva/sclera: Conjunctivae normal.      Pupils: Pupils are equal, round, and reactive to light. Neck:      Vascular: No JVD. Cardiovascular:      Rate and Rhythm: Normal rate and regular rhythm. Pulses: Normal pulses. Heart sounds: Normal heart sounds. No murmur heard. No friction rub. No gallop. Pulmonary:      Effort: Pulmonary effort is normal. No respiratory distress. Breath sounds: Normal breath sounds. Abdominal:      General: There is no distension. Palpations: Abdomen is soft. Tenderness: There is no abdominal tenderness. There is no guarding or rebound. Genitourinary:     Exam position: Supine. Pubic Area: No rash. Labia:         Right: No rash, tenderness or lesion. Left: No rash, tenderness or lesion. Vagina: No foreign body. Vaginal discharge present. No erythema, bleeding or lesions. Cervix: Discharge (white discharge in vagina/cervix) present.  No cervical motion tenderness, friability, lesion, erythema or cervical bleeding. Adnexa: Right adnexa normal and left adnexa normal.       Musculoskeletal:         General: No tenderness. Normal range of motion. Cervical back: Normal range of motion and neck supple. Right lower leg: No edema. Left lower leg: No edema. Skin:     General: Skin is warm and dry. Capillary Refill: Capillary refill takes less than 2 seconds. Neurological:      General: No focal deficit present. Mental Status: She is alert and oriented to person, place, and time. Psychiatric:         Mood and Affect: Mood normal.         Behavior: Behavior normal.       DIAGNOSTIC RESULTS     EKG: All EKG's are interpreted by the Emergency Department Physician who either signs or Co-signs this chart in the absence of a cardiologist.    Normal sinus rhythm. Normal QT. Borderline ST changes. No signs of acute ischemia when compared to EKG from 1/8/2023    RADIOLOGY:   Non-plain film images such as CT, Ultrasound and MRI are read by the radiologist. Plainradiographic images are visualized and preliminarily interpreted by the emergency physician with the below findings:        Interpretation per the Radiologist below, if available at the time of this note:    CTA PULMONARY W CONTRAST   Final Result   1. No pulmonary embolus identified. 2. No aortic aneurysm or dissection. 3. No acute pulmonary parenchymal abnormality identified. Electronically signed by Nel Avendano M.D. on 01-27-23 at [address] Additional Contrast? None   Final Result   1. Abscess in the left inferior medial gluteal soft tissues measuring approximately 2.9 x 1.6 x 2.3 cm. Surrounding mild fat stranding. 2. Nonspecific 1.1 cm small hypodense structure along the right aspect of the opening of the vaginal    cavity. Question cyst versus small abscess. 3.   Fluid and gas-filled small bowel loops may represent enteritis in the appropriate clinical setting. 4. Right ovarian cyst could be further evaluated with ultrasound if clinically indicated. Electronically    signed by Miriam Grigsby M.D. on 01-27-23 at [id number]      XR CHEST PORTABLE   Final Result   No acute disease identified. Electronically signed by Miriam Grigsby M.D. on 01-27-23 at [id number]            ED BEDSIDE ULTRASOUND:   Performed by ED Physician - none    LABS:  Labs Reviewed   WET PREP, GENITAL - Abnormal; Notable for the following components:       Result Value    Yeast, Wet Prep 1+ (*)     All other components within normal limits   CBC - Abnormal; Notable for the following components:    WBC 14.0 (*)     MCH 32.8 (*)     All other components within normal limits   COMPREHENSIVE METABOLIC PANEL W/ REFLEX TO MG FOR LOW K - Abnormal; Notable for the following components:    Sodium 134 (*)     All other components within normal limits   D-DIMER, QUANTITATIVE - Abnormal; Notable for the following components:    D-Dimer, Quant 0.61 (*)     All other components within normal limits   KOH (VAGINAL, RESPIRATORY)    Narrative:     ORDER#: [id number]                          ORDERED BY: Momo Rothman  SOURCE: Vagina                             COLLECTED:  01/27/23 02:59  ANTIBIOTICS AT BUZZ.:                      RECEIVED :  01/27/23 03:02   CHLAMYDIA/N. GONORRHOEAE/T. VAGINALIS, AMPLIFIED PROBE(UR)   HCG, SERUM, QUALITATIVE   TROPONIN       All other labs were within normal range or not returned as of this dictation. EMERGENCY DEPARTMENT COURSE and DIFFERENTIALDIAGNOSIS/MDM:   Vitals:    Vitals:    01/27/23 0021 01/27/23 0415   BP: (!) 147/77    Pulse: 72 68   Resp: 20 18   Temp: 98.9 °F (37.2 °C)    TempSrc: Oral    SpO2: 98% 99%   Weight: 235 lb (106.6 kg)    Height: 5' 7\" (1.702 m)        MDM    Patient feeling much better after abscess drained. No chest pain at this time. Low risk heart score. Notes indication for admission for this.   Patient is already on Bactrim that she was prescribed for the abscess. Told her to continue this. Has some whitish discharge from her vagina that looks like candidiasis and has 1+ yeast on wet prep and budding yeast on KOH prep. Patient tells me that she was just given a prescription for Diflucan for vaginal yeast infection. Took her last dose of Diflucan today. With this in mind, he will see indication to prescribe more Diflucan given that she just had a dose today. Patient stable for discharge and see no indication for admission. Reviewed all of her results with her including incidental findings from imaging. No right adnexal pain. I think the ovarian cyst is likely an incidental finding. Did not visualize any kind of abscess on vaginal exam that correlates with the small hypodensity in the right aspect of the opening of the vaginal cavity seen on CT. There is question of enteritis on abdominal CT but patient's not having vomiting or diarrhea. Told patient to follow-up with her gynecology provider and primary care for further evaluation. Told return to the ER for change worsening symptoms or new concerns. Patient agreeable plan.     CONSULTS:  None    PROCEDURES:  Unless otherwise notedbelow, none     Incision/Drainage    Date/Time: 1/27/2023 3:58 AM  Performed by: Cluadia García MD  Authorized by: Claudia García MD     Consent:     Consent obtained:  Verbal    Consent given by:  Patient    Risks, benefits, and alternatives were discussed: yes      Risks discussed:  Bleeding, damage to other organs, infection, incomplete drainage and pain    Alternatives discussed:  No treatment and delayed treatment  Universal protocol:     Procedure explained and questions answered to patient or proxy's satisfaction: yes      Test results available : yes      Imaging studies available: yes      Required blood products, implants, devices, and special equipment available: yes    Location:     Type:  Abscess    Size:  5 cm    Location:  Lower extremity Lower extremity location:  Buttock    Buttock location:  L buttock  Pre-procedure details:     Skin preparation:  Povidone-iodine  Sedation:     Sedation type:  None  Anesthesia:     Anesthesia method:  Local infiltration    Local anesthetic:  Lidocaine 1% w/o epi  Procedure type:     Complexity:  Simple  Procedure details:     Ultrasound guidance: no      Needle aspiration: yes      Needle size:  22 G    Incision types:  Stab incision    Incision depth:  Dermal    Drainage:  Purulent and bloody    Drainage amount: Moderate    Wound treatment:  Wound left open    Packing materials:  None  Post-procedure details:     Procedure completion:  Tolerated well, no immediate complications    FINAL IMPRESSION     1. Left buttock abscess    2. Chest pain, unspecified type    3. Vaginal candidiasis    4. Hyponatremia    5.  Cyst of right ovary          DISPOSITION/PLAN   DISPOSITION Decision To Discharge 01/27/2023 04:08:26 AM      PATIENT REFERRED TO:  @FUP@    DISCHARGE MEDICATIONS:  Discharge Medication List as of 1/27/2023  4:23 AM             (Please note that portions of this note were completed with a voice recognition program.  Efforts were made to edit the dictations butoccasionally words are mis-transcribed.)    Barrington Benson MD (electronically signed)  AttendingEmergency Physician          Barrington Benson MD  01/27/23 8547

## 2023-01-29 LAB
EKG P AXIS: 15 DEGREES
EKG P-R INTERVAL: 186 MS
EKG Q-T INTERVAL: 408 MS
EKG QRS DURATION: 80 MS
EKG QTC CALCULATION (BAZETT): 411 MS
EKG T AXIS: 45 DEGREES

## 2023-01-31 ASSESSMENT — ENCOUNTER SYMPTOMS
EYES NEGATIVE: 1
ALLERGIC/IMMUNOLOGIC NEGATIVE: 1
RESPIRATORY NEGATIVE: 1
GASTROINTESTINAL NEGATIVE: 1

## 2023-04-24 ENCOUNTER — TELEPHONE (OUTPATIENT)
Dept: OBGYN CLINIC | Age: 33
End: 2023-04-24

## 2023-04-24 NOTE — TELEPHONE ENCOUNTER
Aniya requests that a nurse return their call. The best time to reach her is Anytime. Anni Reyes is calling complaining of bloody stools for the past 3 days and is getting worse. Please contact patient to advise or accommodate an office visit. Thank you.

## 2023-04-24 NOTE — TELEPHONE ENCOUNTER
Left message informing patient she should contact pcp regarding issues. Will send mychart also.      TRUDY Camara

## 2023-05-01 ENCOUNTER — OFFICE VISIT (OUTPATIENT)
Dept: OBGYN CLINIC | Age: 33
End: 2023-05-01
Payer: COMMERCIAL

## 2023-05-01 VITALS
WEIGHT: 221 LBS | BODY MASS INDEX: 34.69 KG/M2 | SYSTOLIC BLOOD PRESSURE: 153 MMHG | DIASTOLIC BLOOD PRESSURE: 96 MMHG | HEART RATE: 80 BPM | HEIGHT: 67 IN

## 2023-05-01 DIAGNOSIS — Z72.51 HIGH RISK HETEROSEXUAL BEHAVIOR: Primary | ICD-10-CM

## 2023-05-01 DIAGNOSIS — R10.2 PELVIC PAIN: ICD-10-CM

## 2023-05-01 DIAGNOSIS — N92.1 MENORRHAGIA WITH IRREGULAR CYCLE: ICD-10-CM

## 2023-05-01 DIAGNOSIS — Z11.3 SCREENING EXAMINATION FOR STD (SEXUALLY TRANSMITTED DISEASE): ICD-10-CM

## 2023-05-01 LAB
HCV AB SERPL QL IA: NORMAL
HIV-1 P24 AG: NORMAL
HIV1+2 AB SERPLBLD QL IA.RAPID: NORMAL

## 2023-05-01 PROCEDURE — 99213 OFFICE O/P EST LOW 20 MIN: CPT | Performed by: NURSE PRACTITIONER

## 2023-05-01 ASSESSMENT — ENCOUNTER SYMPTOMS
EYES NEGATIVE: 1
GASTROINTESTINAL NEGATIVE: 1
CONSTIPATION: 0
ALLERGIC/IMMUNOLOGIC NEGATIVE: 1
RESPIRATORY NEGATIVE: 1
DIARRHEA: 0

## 2023-05-01 NOTE — PROGRESS NOTES
Pt is wanting std testing and is having abdominal pain.
Body mass index is 34.61 kg/m². Review of Systems   Constitutional: Negative. HENT: Negative. Eyes: Negative. Respiratory: Negative. Cardiovascular: Negative. Gastrointestinal: Negative. Negative for constipation and diarrhea. Endocrine: Negative. Genitourinary:  Positive for menstrual problem and pelvic pain. Negative for frequency and urgency. Musculoskeletal: Negative. Skin: Negative. Allergic/Immunologic: Negative. Neurological: Negative. Hematological: Negative. Psychiatric/Behavioral: Negative. All other systems reviewed and are negative. Physical Exam  Vitals and nursing note reviewed. Constitutional:       Appearance: She is well-developed. HENT:      Head: Normocephalic. Right Ear: External ear normal.      Left Ear: External ear normal.      Nose: Nose normal.   Genitourinary:     General: Normal vulva. Vagina: Tenderness and bleeding present. Cervix: Cervical motion tenderness and cervical bleeding present. Comments: Dark red menstrual blood noted  [employer] pending  Musculoskeletal:         General: Normal range of motion. Cervical back: Normal range of motion. Skin:     General: Skin is warm and dry. Neurological:      Mental Status: She is alert and oriented to person, place, and time. Psychiatric:         Attention and Perception: Attention normal.         Mood and Affect: Mood normal.         Speech: Speech normal.         Behavior: Behavior normal.         Thought Content: Thought content normal.         Cognition and Memory: Cognition normal.         Judgment: Judgment normal.            Diagnosis Orders   1. High risk heterosexual behavior        2. Screening examination for STD (sexually transmitted disease)  Miscellaneous Sendout 2    RPR    HIV Screen    Hepatitis C Antibody      3. Pelvic pain  US NON OB TRANSVAGINAL      4.  Menorrhagia with irregular cycle  US NON OB TRANSVAGINAL

## 2023-05-02 DIAGNOSIS — Z11.3 SCREENING EXAMINATION FOR STD (SEXUALLY TRANSMITTED DISEASE): ICD-10-CM

## 2023-05-04 LAB — RPR SER QL: NORMAL

## 2023-05-15 ENCOUNTER — HOSPITAL ENCOUNTER (EMERGENCY)
Age: 33
Discharge: HOME | End: 2023-05-15
Attending: EMERGENCY MEDICINE
Payer: COMMERCIAL

## 2023-05-15 ENCOUNTER — APPOINTMENT (OUTPATIENT)
Dept: CT IMAGING | Age: 33
End: 2023-05-15
Payer: COMMERCIAL

## 2023-05-15 VITALS
SYSTOLIC BLOOD PRESSURE: 143 MMHG | DIASTOLIC BLOOD PRESSURE: 87 MMHG | HEART RATE: 71 BPM | OXYGEN SATURATION: 97 % | BODY MASS INDEX: 36.1 KG/M2 | RESPIRATION RATE: 18 BRPM | WEIGHT: 230 LBS | TEMPERATURE: 98.6 F | HEIGHT: 67 IN

## 2023-05-15 DIAGNOSIS — L02.31 LEFT BUTTOCK ABSCESS: Primary | ICD-10-CM

## 2023-05-15 LAB
ALBUMIN SERPL-MCNC: 3.7 G/DL (ref 3.5–5.2)
ALP SERPL-CCNC: 77 U/L (ref 35–104)
ALT SERPL-CCNC: 10 U/L (ref 5–33)
ANION GAP SERPL CALCULATED.3IONS-SCNC: 11 MMOL/L (ref 7–19)
AST SERPL-CCNC: 13 U/L (ref 5–32)
BASOPHILS # BLD: 0.1 K/UL (ref 0–0.2)
BASOPHILS NFR BLD: 0.5 % (ref 0–1)
BILIRUB SERPL-MCNC: 0.3 MG/DL (ref 0.2–1.2)
BUN SERPL-MCNC: 10 MG/DL (ref 6–20)
CALCIUM SERPL-MCNC: 9.2 MG/DL (ref 8.6–10)
CHLORIDE SERPL-SCNC: 102 MMOL/L (ref 98–111)
CO2 SERPL-SCNC: 23 MMOL/L (ref 22–29)
CREAT SERPL-MCNC: 0.7 MG/DL (ref 0.5–0.9)
EOSINOPHIL # BLD: 0.2 K/UL (ref 0–0.6)
EOSINOPHIL NFR BLD: 1.3 % (ref 0–5)
ERYTHROCYTE [DISTWIDTH] IN BLOOD BY AUTOMATED COUNT: 13.2 % (ref 11.5–14.5)
GLUCOSE SERPL-MCNC: 103 MG/DL (ref 74–109)
HCG SERPL QL: NEGATIVE
HCT VFR BLD AUTO: 43.1 % (ref 37–47)
HGB BLD-MCNC: 15.2 G/DL (ref 12–16)
IMM GRANULOCYTES # BLD: 0.1 K/UL
LACTATE BLDV-SCNC: 0.8 MG/DL (ref 0.5–1.9)
LYMPHOCYTES # BLD: 2.3 K/UL (ref 1.1–4.5)
LYMPHOCYTES NFR BLD: 13.7 % (ref 20–40)
MCH RBC QN AUTO: 33 PG (ref 27–31)
MCHC RBC AUTO-ENTMCNC: 35.3 G/DL (ref 33–37)
MCV RBC AUTO: 93.7 FL (ref 81–99)
MONOCYTES # BLD: 1.4 K/UL (ref 0–0.9)
MONOCYTES NFR BLD: 8.6 % (ref 0–10)
NEUTROPHILS # BLD: 12.4 K/UL (ref 1.5–7.5)
NEUTS SEG NFR BLD: 75.2 % (ref 50–65)
PLATELET # BLD AUTO: 301 K/UL (ref 130–400)
PMV BLD AUTO: 9.6 FL (ref 9.4–12.3)
POTASSIUM SERPL-SCNC: 4.1 MMOL/L (ref 3.5–5)
PROT SERPL-MCNC: 7.3 G/DL (ref 6.6–8.7)
RBC # BLD AUTO: 4.6 M/UL (ref 4.2–5.4)
SODIUM SERPL-SCNC: 136 MMOL/L (ref 136–145)
WBC # BLD AUTO: 16.4 K/UL (ref 4.8–10.8)

## 2023-05-15 PROCEDURE — 36415 COLL VENOUS BLD VENIPUNCTURE: CPT

## 2023-05-15 PROCEDURE — 74177 CT ABD & PELVIS W/CONTRAST: CPT

## 2023-05-15 PROCEDURE — 84703 CHORIONIC GONADOTROPIN ASSAY: CPT

## 2023-05-15 PROCEDURE — 96374 THER/PROPH/DIAG INJ IV PUSH: CPT

## 2023-05-15 PROCEDURE — 85025 COMPLETE CBC W/AUTO DIFF WBC: CPT

## 2023-05-15 PROCEDURE — 10060 I&D ABSCESS SIMPLE/SINGLE: CPT

## 2023-05-15 PROCEDURE — 96375 TX/PRO/DX INJ NEW DRUG ADDON: CPT

## 2023-05-15 PROCEDURE — 83605 ASSAY OF LACTIC ACID: CPT

## 2023-05-15 PROCEDURE — 2500000003 HC RX 250 WO HCPCS: Performed by: EMERGENCY MEDICINE

## 2023-05-15 PROCEDURE — 6360000004 HC RX CONTRAST MEDICATION: Performed by: EMERGENCY MEDICINE

## 2023-05-15 PROCEDURE — 80053 COMPREHEN METABOLIC PANEL: CPT

## 2023-05-15 PROCEDURE — 99285 EMERGENCY DEPT VISIT HI MDM: CPT

## 2023-05-15 PROCEDURE — 6360000002 HC RX W HCPCS: Performed by: EMERGENCY MEDICINE

## 2023-05-15 RX ORDER — LORAZEPAM 2 MG/ML
2 INJECTION INTRAMUSCULAR ONCE
Status: COMPLETED | OUTPATIENT
Start: 2023-05-15 | End: 2023-05-15

## 2023-05-15 RX ORDER — HYDROCODONE BITARTRATE AND ACETAMINOPHEN 5; 325 MG/1; MG/1
1 TABLET ORAL EVERY 6 HOURS PRN
Qty: 10 TABLET | Refills: 0 | Status: SHIPPED | OUTPATIENT
Start: 2023-05-15 | End: 2023-05-22

## 2023-05-15 RX ORDER — MORPHINE SULFATE 4 MG/ML
4 INJECTION, SOLUTION INTRAMUSCULAR; INTRAVENOUS ONCE
Status: COMPLETED | OUTPATIENT
Start: 2023-05-15 | End: 2023-05-15

## 2023-05-15 RX ORDER — SULFAMETHOXAZOLE AND TRIMETHOPRIM 800; 160 MG/1; MG/1
1 TABLET ORAL 2 TIMES DAILY
Qty: 14 TABLET | Refills: 0 | Status: SHIPPED | OUTPATIENT
Start: 2023-05-15 | End: 2023-05-22

## 2023-05-15 RX ORDER — LIDOCAINE HYDROCHLORIDE AND EPINEPHRINE 10; 10 MG/ML; UG/ML
20 INJECTION, SOLUTION INFILTRATION; PERINEURAL ONCE
Status: COMPLETED | OUTPATIENT
Start: 2023-05-15 | End: 2023-05-15

## 2023-05-15 RX ORDER — HYDROMORPHONE HYDROCHLORIDE 1 MG/ML
1 INJECTION, SOLUTION INTRAMUSCULAR; INTRAVENOUS; SUBCUTANEOUS ONCE
Status: COMPLETED | OUTPATIENT
Start: 2023-05-15 | End: 2023-05-15

## 2023-05-15 RX ADMIN — HYDROMORPHONE HYDROCHLORIDE 1 MG: 1 INJECTION, SOLUTION INTRAMUSCULAR; INTRAVENOUS; SUBCUTANEOUS at 13:03

## 2023-05-15 RX ADMIN — MORPHINE SULFATE 4 MG: 4 INJECTION, SOLUTION INTRAMUSCULAR; INTRAVENOUS at 10:58

## 2023-05-15 RX ADMIN — LIDOCAINE HYDROCHLORIDE,EPINEPHRINE BITARTRATE 20 ML: 10; .01 INJECTION, SOLUTION INFILTRATION; PERINEURAL at 14:00

## 2023-05-15 RX ADMIN — IOPAMIDOL 70 ML: 755 INJECTION, SOLUTION INTRAVENOUS at 12:05

## 2023-05-15 RX ADMIN — LORAZEPAM 2 MG: 2 INJECTION INTRAMUSCULAR; INTRAVENOUS at 13:06

## 2023-05-15 ASSESSMENT — PAIN DESCRIPTION - LOCATION: LOCATION: MOUTH

## 2023-05-15 ASSESSMENT — PAIN DESCRIPTION - PAIN TYPE: TYPE: ACUTE PAIN

## 2023-05-15 ASSESSMENT — ENCOUNTER SYMPTOMS
VOMITING: 0
DIARRHEA: 0
ABDOMINAL PAIN: 0
SHORTNESS OF BREATH: 0
RECTAL PAIN: 1

## 2023-05-15 ASSESSMENT — PAIN SCALES - GENERAL
PAINLEVEL_OUTOF10: 10

## 2023-05-15 ASSESSMENT — PAIN DESCRIPTION - DESCRIPTORS: DESCRIPTORS: ACHING;SHARP;SHOOTING

## 2023-05-15 ASSESSMENT — PAIN DESCRIPTION - FREQUENCY: FREQUENCY: CONTINUOUS

## 2023-05-15 ASSESSMENT — PAIN DESCRIPTION - ORIENTATION: ORIENTATION: LEFT

## 2023-05-15 ASSESSMENT — PAIN - FUNCTIONAL ASSESSMENT: PAIN_FUNCTIONAL_ASSESSMENT: 0-10

## 2023-05-15 NOTE — ED PROVIDER NOTES
[facility] EMERGENCY DEPT  eMERGENCY dEPARTMENT eNCOUnter      Pt Name: Joel Cottrell  MRN: [id number]  [place] 1990  Date of evaluation: 5/15/2023  Provider: Sin Goldberg MD    [address] [place] [address]       Chief Complaint   Patient presents with    Abscess     Pt no longer states it was swelling of inside of mouth, but her left buttocks instead. HISTORY OF PRESENT ILLNESS   (Location/Symptom, Timing/Onset,Context/Setting, Quality, Duration, Modifying Factors, Severity)  Note limiting factors. Joel Cottrell is a 35 y.o. female who presents to the emergency department for evaluation regarding left-sided back area. Patient states that for the past 3 to 4 days she has noticed what feels to be swelling and tenderness. She denies any drainage. States that she not had fever or chills. Denies any pain down her groin area. Pain described as sharp in nature and without radiation. No prior history of similar symptoms. States that she not been on recent oral antibiotics. HPI    NursingNotes were reviewed. REVIEW OF SYSTEMS    (2-9 systems for level 4, 10 or more for level 5)     Review of Systems   Constitutional:  Negative for chills and fever. Respiratory:  Negative for shortness of breath. Cardiovascular:  Negative for chest pain. Gastrointestinal:  Positive for rectal pain. Negative for abdominal pain, diarrhea and vomiting. Skin:  Positive for wound (buttock). All other systems reviewed and are negative.          PAST MEDICALHISTORY     Past Medical History:   Diagnosis Date    Asthma     Obese     Ovarian cyst          SURGICAL HISTORY       Past Surgical History:   Procedure Laterality Date    CERVIX SURGERY      CHOLECYSTECTOMY, LAPAROSCOPIC N/A 07/26/2021    CHOLECYSTECTOMY LAPAROSCOPIC performed by Aide Bernstein MD at [facility] N/A 06/07/2021    Dr Sunita Robert yr (age 39) recall    DILATION AND CURETTAGE OF UTERUS N/A 10/28/2022    DILATATION AND CURETTAGE

## 2023-06-20 ENCOUNTER — APPOINTMENT (OUTPATIENT)
Dept: GENERAL RADIOLOGY | Age: 33
End: 2023-06-20
Payer: COMMERCIAL

## 2023-06-20 ENCOUNTER — HOSPITAL ENCOUNTER (EMERGENCY)
Age: 33
Discharge: HOME | End: 2023-06-20
Attending: EMERGENCY MEDICINE
Payer: COMMERCIAL

## 2023-06-20 VITALS
WEIGHT: 225 LBS | BODY MASS INDEX: 35.24 KG/M2 | DIASTOLIC BLOOD PRESSURE: 88 MMHG | SYSTOLIC BLOOD PRESSURE: 142 MMHG | HEART RATE: 65 BPM | OXYGEN SATURATION: 98 % | TEMPERATURE: 98.6 F | RESPIRATION RATE: 16 BRPM

## 2023-06-20 DIAGNOSIS — S80.11XA CONTUSION OF RIGHT LOWER EXTREMITY, INITIAL ENCOUNTER: Primary | ICD-10-CM

## 2023-06-20 LAB
ALBUMIN SERPL-MCNC: 4 G/DL (ref 3.5–5.2)
ALP SERPL-CCNC: 73 U/L (ref 35–104)
ALT SERPL-CCNC: 20 U/L (ref 5–33)
ANION GAP SERPL CALCULATED.3IONS-SCNC: 8 MMOL/L (ref 7–19)
AST SERPL-CCNC: 27 U/L (ref 5–32)
BASOPHILS # BLD: 0.1 K/UL (ref 0–0.2)
BASOPHILS NFR BLD: 0.9 % (ref 0–1)
BILIRUB SERPL-MCNC: 0.4 MG/DL (ref 0.2–1.2)
BILIRUB UR QL STRIP: NEGATIVE
BUN SERPL-MCNC: 17 MG/DL (ref 6–20)
CALCIUM SERPL-MCNC: 8.8 MG/DL (ref 8.6–10)
CHLORIDE SERPL-SCNC: 101 MMOL/L (ref 98–111)
CLARITY UR: ABNORMAL
CO2 SERPL-SCNC: 27 MMOL/L (ref 22–29)
COLOR UR: YELLOW
CREAT SERPL-MCNC: 0.8 MG/DL (ref 0.5–0.9)
EOSINOPHIL # BLD: 0.3 K/UL (ref 0–0.6)
EOSINOPHIL NFR BLD: 3 % (ref 0–5)
ERYTHROCYTE [DISTWIDTH] IN BLOOD BY AUTOMATED COUNT: 13.2 % (ref 11.5–14.5)
GLUCOSE SERPL-MCNC: 103 MG/DL (ref 74–109)
GLUCOSE UR STRIP.AUTO-MCNC: NEGATIVE MG/DL
HCG SERPL QL: NEGATIVE
HCT VFR BLD AUTO: 40.8 % (ref 37–47)
HGB BLD-MCNC: 14.5 G/DL (ref 12–16)
HGB UR STRIP.AUTO-MCNC: NEGATIVE MG/L
IMM GRANULOCYTES # BLD: 0 K/UL
KETONES UR STRIP.AUTO-MCNC: ABNORMAL MG/DL
LEUKOCYTE ESTERASE UR QL STRIP.AUTO: NEGATIVE
LIPASE SERPL-CCNC: 42 U/L (ref 13–60)
LYMPHOCYTES # BLD: 3.3 K/UL (ref 1.1–4.5)
LYMPHOCYTES NFR BLD: 38.5 % (ref 20–40)
MCH RBC QN AUTO: 32.6 PG (ref 27–31)
MCHC RBC AUTO-ENTMCNC: 35.5 G/DL (ref 33–37)
MCV RBC AUTO: 91.7 FL (ref 81–99)
MONOCYTES # BLD: 0.8 K/UL (ref 0–0.9)
MONOCYTES NFR BLD: 9.5 % (ref 0–10)
NEUTROPHILS # BLD: 4.1 K/UL (ref 1.5–7.5)
NEUTS SEG NFR BLD: 47.8 % (ref 50–65)
NITRITE UR QL STRIP.AUTO: NEGATIVE
PH UR STRIP.AUTO: 6 [PH] (ref 5–8)
PLATELET # BLD AUTO: 283 K/UL (ref 130–400)
PMV BLD AUTO: 9.5 FL (ref 9.4–12.3)
POTASSIUM SERPL-SCNC: 4.2 MMOL/L (ref 3.5–5)
PROT SERPL-MCNC: 7.2 G/DL (ref 6.6–8.7)
PROT UR STRIP.AUTO-MCNC: NEGATIVE MG/DL
RBC # BLD AUTO: 4.45 M/UL (ref 4.2–5.4)
SODIUM SERPL-SCNC: 136 MMOL/L (ref 136–145)
SP GR UR STRIP.AUTO: 1.04 (ref 1–1.03)
UROBILINOGEN UR STRIP.AUTO-MCNC: 1 E.U./DL
WBC # BLD AUTO: 8.7 K/UL (ref 4.8–10.8)

## 2023-06-20 PROCEDURE — 6370000000 HC RX 637 (ALT 250 FOR IP): Performed by: EMERGENCY MEDICINE

## 2023-06-20 PROCEDURE — 84703 CHORIONIC GONADOTROPIN ASSAY: CPT

## 2023-06-20 PROCEDURE — 85025 COMPLETE CBC W/AUTO DIFF WBC: CPT

## 2023-06-20 PROCEDURE — 80053 COMPREHEN METABOLIC PANEL: CPT

## 2023-06-20 PROCEDURE — 83690 ASSAY OF LIPASE: CPT

## 2023-06-20 PROCEDURE — 81003 URINALYSIS AUTO W/O SCOPE: CPT

## 2023-06-20 PROCEDURE — 73552 X-RAY EXAM OF FEMUR 2/>: CPT

## 2023-06-20 PROCEDURE — 99284 EMERGENCY DEPT VISIT MOD MDM: CPT

## 2023-06-20 PROCEDURE — 36415 COLL VENOUS BLD VENIPUNCTURE: CPT

## 2023-06-20 RX ORDER — HYDROCODONE BITARTRATE AND ACETAMINOPHEN 7.5; 325 MG/1; MG/1
1 TABLET ORAL ONCE
Status: COMPLETED | OUTPATIENT
Start: 2023-06-20 | End: 2023-06-20

## 2023-06-20 RX ADMIN — HYDROCODONE BITARTRATE AND ACETAMINOPHEN 1 TABLET: 7.5; 325 TABLET ORAL at 03:38

## 2023-06-20 ASSESSMENT — PAIN DESCRIPTION - ORIENTATION: ORIENTATION: RIGHT

## 2023-06-20 ASSESSMENT — PAIN - FUNCTIONAL ASSESSMENT: PAIN_FUNCTIONAL_ASSESSMENT: 0-10

## 2023-06-20 ASSESSMENT — PAIN SCALES - GENERAL: PAINLEVEL_OUTOF10: 10

## 2023-06-20 ASSESSMENT — PAIN DESCRIPTION - DESCRIPTORS: DESCRIPTORS: ACHING

## 2023-06-20 ASSESSMENT — PAIN DESCRIPTION - LOCATION: LOCATION: ABDOMEN;LEG

## 2023-06-20 NOTE — ED PROVIDER NOTES
[facility] EMERGENCY DEPT  eMERGENCY dEPARTMENT eNCOUnter      Pt Name: Amber Davis  MRN: [id number]  [place] 1990  Date of evaluation: 6/20/2023  Provider: Jose Kline MD    [address] [place] [address]       Chief Complaint   Patient presents with    Abdominal Pain     Patient reports abd pain x 2 days. She states she has burning with urination      Leg Pain     Patient reports her right leg hurts following a fall 4 days ago         HISTORY OF PRESENT ILLNESS   (Location/Symptom, Timing/Onset,Context/Setting, Quality, Duration, Modifying Factors, Severity)  Note limiting factors. Amber Davis is a 35 y.o. female who presents to the emergency department ***     HPI    NursingNotes were reviewed.     REVIEW OF SYSTEMS    (2-9 systems for level 4, 10 or more for level 5)     Review of Systems         PAST MEDICALHISTORY     Past Medical History:   Diagnosis Date    Asthma     Obese     Ovarian cyst          SURGICAL HISTORY       Past Surgical History:   Procedure Laterality Date    CERVIX SURGERY      CHOLECYSTECTOMY, LAPAROSCOPIC N/A 07/26/2021    CHOLECYSTECTOMY LAPAROSCOPIC performed by Teresa Whitney MD at [address] [place] [address] N/A 06/07/2021    Dr Hetal Guallpa yr (age 39) recall    DILATION AND CURETTAGE OF UTERUS N/A 10/28/2022    DILATATION AND CURETTAGE HYSTEROSCOPY, performed by Kaylan Alexandra MD at [facility] ENDOSCOPY N/A 06/07/2021    Dr Key Jeong-Gastritis         CURRENT MEDICATIONS     Previous Medications    No medications on file       ALLERGIES     Ibuprofen-acetaminophen, Other, Metoclopramide, Carafate [sucralfate], and Tramadol    FAMILY HISTORY       Family History   Problem Relation Age of Onset    Diabetes Mother     High Blood Pressure Mother     Diabetes Maternal Grandmother     Diabetes Sister     Colon Cancer Neg Hx     Colon Polyps Neg Hx     Esophageal Cancer Neg Hx     Liver Cancer Neg Hx     Rectal Cancer Neg Hx     Stomach Cancer Neg

## 2023-06-29 ASSESSMENT — ENCOUNTER SYMPTOMS
ABDOMINAL PAIN: 0
BACK PAIN: 0
SHORTNESS OF BREATH: 0

## 2023-07-14 ENCOUNTER — TELEPHONE (OUTPATIENT)
Dept: OBGYN CLINIC | Age: 33
End: 2023-07-14

## 2023-07-14 NOTE — TELEPHONE ENCOUNTER
Called and spoke with pt, per AMY Irving she recommends pt to use hydrocortisone cream on the outside of labia & try a cool bath. If this doesn't help pt should call next wk for an apt. Pt voiced understanding. Mariano Cespedes.  MA

## 2023-07-14 NOTE — TELEPHONE ENCOUNTER
Patient states she would like to schedule appointment with the provider, she states she used some type of tissue that irritated her vagina. Please return call to patient to discuss.     Thank you

## 2023-08-14 ENCOUNTER — OFFICE VISIT (OUTPATIENT)
Dept: OBGYN CLINIC | Age: 33
End: 2023-08-14
Payer: COMMERCIAL

## 2023-08-14 VITALS
DIASTOLIC BLOOD PRESSURE: 82 MMHG | HEART RATE: 70 BPM | SYSTOLIC BLOOD PRESSURE: 131 MMHG | BODY MASS INDEX: 35.31 KG/M2 | HEIGHT: 67 IN | WEIGHT: 225 LBS

## 2023-08-14 DIAGNOSIS — Z11.3 SCREEN FOR STD (SEXUALLY TRANSMITTED DISEASE): ICD-10-CM

## 2023-08-14 DIAGNOSIS — Z12.4 SCREENING FOR CERVICAL CANCER: ICD-10-CM

## 2023-08-14 DIAGNOSIS — Z11.51 SCREENING FOR HPV (HUMAN PAPILLOMAVIRUS): ICD-10-CM

## 2023-08-14 DIAGNOSIS — Z01.419 WELL WOMAN EXAM WITH ROUTINE GYNECOLOGICAL EXAM: Primary | ICD-10-CM

## 2023-08-14 LAB
HAV IGM SERPL QL IA: NORMAL
HBV CORE IGM SERPL QL IA: NORMAL
HBV SURFACE AG SERPL QL IA: NORMAL
HCV AB SERPL QL IA: NORMAL
HIV-1 P24 AG: NORMAL
HIV1+2 AB SERPLBLD QL IA.RAPID: NORMAL

## 2023-08-14 PROCEDURE — 99395 PREV VISIT EST AGE 18-39: CPT | Performed by: NURSE PRACTITIONER

## 2023-08-14 NOTE — PROGRESS NOTES
Gait normal.      Deep Tendon Reflexes: Reflexes are normal and symmetric. Psychiatric:         Speech: Speech normal.         Behavior: Behavior normal.         Thought Content: Thought content normal.         Judgment: Judgment normal.         Diagnosis Orders   1. Well woman exam with routine gynecological exam        2. Screen for STD (sexually transmitted disease)  C.trachomatis, N.gonorrhoeae, T.vaginalis Molecular, Thin Prep    HIV Rapid 1&2    Hepatitis Panel, Acute    RPR Reflex to Titer and TPPA      3. Screening for cervical cancer  PAP SMEAR      4. Screening for HPV (human papillomavirus)  Human papillomavirus (HPV) DNA Probe Thin Prep High Risk          MEDICATIONS:  No orders of the defined types were placed in this encounter. ORDERS:  Orders Placed This Encounter   Procedures    C.trachomatis, N.gonorrhoeae, T.vaginalis Molecular, Thin Prep    PAP SMEAR    HIV Rapid 1&2    Hepatitis Panel, Acute    RPR Reflex to Titer and TPPA    Human papillomavirus (HPV) DNA Probe Thin Prep High Risk       PLAN:  Pap and sti screens today  Rtc in 1 year or prn  There are no Patient Instructions on file for this visit.

## 2023-08-15 LAB
C TRACH DNA CVX QL NAA+PROBE: NOT DETECTED
N GONORRHOEA DNA SPEC QL NAA+PROBE: NOT DETECTED
TRICHOMONAS VAGINALIS DNA: NOT DETECTED

## 2023-08-17 LAB
HPV HR 12 DNA SPEC QL NAA+PROBE: NOT DETECTED
HPV16 DNA SPEC QL NAA+PROBE: NOT DETECTED
HPV16+18+H RISK 12 DNA SPEC-IMP: NORMAL
HPV18 DNA SPEC QL NAA+PROBE: NOT DETECTED
RPR SER QL: NORMAL

## 2023-09-13 ENCOUNTER — HOSPITAL ENCOUNTER (EMERGENCY)
Age: 33
Discharge: HOME | End: 2023-09-14
Attending: EMERGENCY MEDICINE
Payer: COMMERCIAL

## 2023-09-13 DIAGNOSIS — R11.2 NAUSEA AND VOMITING, UNSPECIFIED VOMITING TYPE: ICD-10-CM

## 2023-09-13 DIAGNOSIS — Z90.49 HISTORY OF CHOLECYSTECTOMY: ICD-10-CM

## 2023-09-13 DIAGNOSIS — R10.13 ABDOMINAL PAIN, EPIGASTRIC: Primary | ICD-10-CM

## 2023-09-13 PROCEDURE — 99284 EMERGENCY DEPT VISIT MOD MDM: CPT

## 2023-09-14 VITALS
HEART RATE: 65 BPM | RESPIRATION RATE: 22 BRPM | DIASTOLIC BLOOD PRESSURE: 74 MMHG | TEMPERATURE: 97.5 F | OXYGEN SATURATION: 94 % | SYSTOLIC BLOOD PRESSURE: 129 MMHG

## 2023-09-14 LAB
ALBUMIN SERPL-MCNC: 4.1 G/DL (ref 3.5–5.2)
ALP SERPL-CCNC: 55 U/L (ref 35–104)
ALT SERPL-CCNC: 14 U/L (ref 5–33)
ANION GAP SERPL CALCULATED.3IONS-SCNC: 11 MMOL/L (ref 7–19)
AST SERPL-CCNC: 20 U/L (ref 5–32)
BASOPHILS # BLD: 0.1 K/UL (ref 0–0.2)
BASOPHILS NFR BLD: 0.8 % (ref 0–1)
BILIRUB SERPL-MCNC: 0.3 MG/DL (ref 0.2–1.2)
BUN SERPL-MCNC: 13 MG/DL (ref 6–20)
CALCIUM SERPL-MCNC: 9.1 MG/DL (ref 8.6–10)
CHLORIDE SERPL-SCNC: 104 MMOL/L (ref 98–111)
CO2 SERPL-SCNC: 24 MMOL/L (ref 22–29)
CREAT SERPL-MCNC: 0.7 MG/DL (ref 0.5–0.9)
EOSINOPHIL # BLD: 0.3 K/UL (ref 0–0.6)
EOSINOPHIL NFR BLD: 3.4 % (ref 0–5)
ERYTHROCYTE [DISTWIDTH] IN BLOOD BY AUTOMATED COUNT: 13.2 % (ref 11.5–14.5)
GLUCOSE SERPL-MCNC: 83 MG/DL (ref 74–109)
HCT VFR BLD AUTO: 40.7 % (ref 37–47)
HGB BLD-MCNC: 14.4 G/DL (ref 12–16)
IMM GRANULOCYTES # BLD: 0 K/UL
LIPASE SERPL-CCNC: 31 U/L (ref 13–60)
LYMPHOCYTES # BLD: 2.6 K/UL (ref 1.1–4.5)
LYMPHOCYTES NFR BLD: 35.2 % (ref 20–40)
MCH RBC QN AUTO: 33.3 PG (ref 27–31)
MCHC RBC AUTO-ENTMCNC: 35.4 G/DL (ref 33–37)
MCV RBC AUTO: 94 FL (ref 81–99)
MONOCYTES # BLD: 0.6 K/UL (ref 0–0.9)
MONOCYTES NFR BLD: 7.7 % (ref 0–10)
NEUTROPHILS # BLD: 3.8 K/UL (ref 1.5–7.5)
NEUTS SEG NFR BLD: 52.5 % (ref 50–65)
PLATELET # BLD AUTO: 377 K/UL (ref 130–400)
PMV BLD AUTO: 10.1 FL (ref 9.4–12.3)
POTASSIUM SERPL-SCNC: 4.3 MMOL/L (ref 3.5–5)
PROT SERPL-MCNC: 6.8 G/DL (ref 6.6–8.7)
RBC # BLD AUTO: 4.33 M/UL (ref 4.2–5.4)
REASON FOR REJECTION: NORMAL
REJECTED TEST: NORMAL
SODIUM SERPL-SCNC: 139 MMOL/L (ref 136–145)
WBC # BLD AUTO: 7.3 K/UL (ref 4.8–10.8)

## 2023-09-14 PROCEDURE — 85025 COMPLETE CBC W/AUTO DIFF WBC: CPT

## 2023-09-14 PROCEDURE — 83690 ASSAY OF LIPASE: CPT

## 2023-09-14 PROCEDURE — 6360000002 HC RX W HCPCS: Performed by: EMERGENCY MEDICINE

## 2023-09-14 PROCEDURE — 96374 THER/PROPH/DIAG INJ IV PUSH: CPT

## 2023-09-14 PROCEDURE — 2580000003 HC RX 258: Performed by: EMERGENCY MEDICINE

## 2023-09-14 PROCEDURE — 96375 TX/PRO/DX INJ NEW DRUG ADDON: CPT

## 2023-09-14 PROCEDURE — 80053 COMPREHEN METABOLIC PANEL: CPT

## 2023-09-14 PROCEDURE — 36415 COLL VENOUS BLD VENIPUNCTURE: CPT

## 2023-09-14 RX ORDER — DIPHENHYDRAMINE HYDROCHLORIDE 50 MG/ML
25 INJECTION INTRAMUSCULAR; INTRAVENOUS ONCE
Status: COMPLETED | OUTPATIENT
Start: 2023-09-14 | End: 2023-09-14

## 2023-09-14 RX ORDER — SODIUM CHLORIDE, SODIUM LACTATE, POTASSIUM CHLORIDE, AND CALCIUM CHLORIDE .6; .31; .03; .02 G/100ML; G/100ML; G/100ML; G/100ML
1000 INJECTION, SOLUTION INTRAVENOUS ONCE
Status: COMPLETED | OUTPATIENT
Start: 2023-09-14 | End: 2023-09-14

## 2023-09-14 RX ORDER — DROPERIDOL 2.5 MG/ML
0.62 INJECTION, SOLUTION INTRAMUSCULAR; INTRAVENOUS ONCE
Status: COMPLETED | OUTPATIENT
Start: 2023-09-14 | End: 2023-09-14

## 2023-09-14 RX ADMIN — DROPERIDOL 0.62 MG: 2.5 INJECTION, SOLUTION INTRAMUSCULAR; INTRAVENOUS at 00:50

## 2023-09-14 RX ADMIN — SODIUM CHLORIDE, POTASSIUM CHLORIDE, SODIUM LACTATE AND CALCIUM CHLORIDE 1000 ML: 600; 310; 30; 20 INJECTION, SOLUTION INTRAVENOUS at 01:01

## 2023-09-14 RX ADMIN — DIPHENHYDRAMINE HYDROCHLORIDE 25 MG: 50 INJECTION INTRAMUSCULAR; INTRAVENOUS at 01:18

## 2023-09-14 ASSESSMENT — ENCOUNTER SYMPTOMS
NAUSEA: 1
RESPIRATORY NEGATIVE: 1
EYES NEGATIVE: 1
ABDOMINAL PAIN: 1
VOMITING: 1

## 2023-09-25 ENCOUNTER — TELEPHONE (OUTPATIENT)
Dept: OBGYN CLINIC | Age: 33
End: 2023-09-25

## 2023-09-25 NOTE — TELEPHONE ENCOUNTER
Patient requesting return call from nurse. Patient called in requesting appointment for rectal bleeding. Patient is established with gastro office, scheduled appointment with Ericka Rincon 09/27/23 @ 11:15am  Patient still requesting appointment to follow up with OBGYN.    Please advise  Thank you

## 2023-09-25 NOTE — TELEPHONE ENCOUNTER
Left message for patient informing her that rectal bleeding is not something we would follow up on but if she is having GYN issues she can call back to schedule.

## 2023-09-27 ENCOUNTER — TELEPHONE (OUTPATIENT)
Dept: OBGYN CLINIC | Age: 33
End: 2023-09-27

## 2023-09-27 ENCOUNTER — OFFICE VISIT (OUTPATIENT)
Dept: GASTROENTEROLOGY | Age: 33
End: 2023-09-27
Payer: COMMERCIAL

## 2023-09-27 VITALS
HEART RATE: 76 BPM | HEIGHT: 67 IN | OXYGEN SATURATION: 98 % | DIASTOLIC BLOOD PRESSURE: 80 MMHG | SYSTOLIC BLOOD PRESSURE: 125 MMHG | BODY MASS INDEX: 35.94 KG/M2 | WEIGHT: 229 LBS

## 2023-09-27 DIAGNOSIS — K62.5 RECTAL BLEEDING: Primary | ICD-10-CM

## 2023-09-27 DIAGNOSIS — K64.9 HEMORRHOIDS, UNSPECIFIED HEMORRHOID TYPE: ICD-10-CM

## 2023-09-27 DIAGNOSIS — K58.1 IRRITABLE BOWEL SYNDROME WITH CONSTIPATION: ICD-10-CM

## 2023-09-27 PROCEDURE — 99214 OFFICE O/P EST MOD 30 MIN: CPT | Performed by: NURSE PRACTITIONER

## 2023-09-27 RX ORDER — HYDROCORTISONE 25 MG/G
CREAM TOPICAL
Qty: 1 EACH | Refills: 1 | Status: SHIPPED | OUTPATIENT
Start: 2023-09-27

## 2023-09-27 ASSESSMENT — ENCOUNTER SYMPTOMS
TROUBLE SWALLOWING: 0
DIARRHEA: 0
ABDOMINAL DISTENTION: 1
COUGH: 0
CHOKING: 0
CONSTIPATION: 1
ANAL BLEEDING: 1
ABDOMINAL PAIN: 1
VOMITING: 0
SHORTNESS OF BREATH: 0
NAUSEA: 0
BLOOD IN STOOL: 1
RECTAL PAIN: 1

## 2023-09-27 NOTE — TELEPHONE ENCOUNTER
Aniya called to schedule a  appt for STD check . Per has current symptoms. States it doesn't feel right    Please be advised that the best time to call her to accommodate their needs is Anytime. Thank you.

## 2023-09-27 NOTE — TELEPHONE ENCOUNTER
Aniya called to schedule a  appt. For checkup/screening. .     Please be advised that the best time to call her to accommodate their needs is Anytime. Thank you.

## 2023-09-27 NOTE — PROGRESS NOTES
Subjective:     Patient ID: Ronaldo Deluca is a 35 y.o. female  PCP: No primary care provider on file. Referring Provider: No ref. provider found    HPI  Patient presents to the office today with the following complaints: Rectal Bleeding      Pt seen today for c/o rectal bleeding. She states symptoms began about a month ago. She reports seeing bright red blood in toilet water, in stool, and on toilet tissue. She c/o painful BMs. BM once a week. She c/o abdominal pain and bloating as well. She reports known hemorrhoids          Last EGD 6/2021 - gastritis   Last Colonoscopy 6/7/2021 - 14 year recall   Denies any family hx colon cancer or colon polyps    Assessment:     1. Rectal bleeding    2. Hemorrhoids, unspecified hemorrhoid type    3. Irritable bowel syndrome with constipation            Plan:   - Trial of Linzess 145 mcg daily, samples provided   - Trial of Recticare prn, samples provided   - Begin Anusol rectal cream BID x 14 days   - Follow up in 4-6 weeks to check symptoms  - Consider repeat Colonoscopy should bleeding continue       Orders  No orders of the defined types were placed in this encounter.     Medications  Orders Placed This Encounter   Medications    linaclotide (LINZESS) 145 MCG capsule     Sig: Take 1 capsule by mouth every morning (before breakfast)     Dispense:  30 capsule     Refill:  5    hydrocortisone (ANUSOL-HC) 2.5 % CREA rectal cream     Sig: Apply rectally twice a day x 14 days, may repeat if needed     Dispense:  1 each     Refill:  1         Patient History:     Past Medical History:   Diagnosis Date    Asthma     Obese     Ovarian cyst        Past Surgical History:   Procedure Laterality Date    CERVIX SURGERY      CHOLECYSTECTOMY, LAPAROSCOPIC N/A 07/26/2021    CHOLECYSTECTOMY LAPAROSCOPIC performed by Meredith Bird MD at [facility] N/A 06/07/2021    Dr Kaylene Ontiveros yr (age 39) recall    DILATION AND CURETTAGE OF UTERUS N/A 10/28/2022    DILATATION

## 2023-09-29 ENCOUNTER — OFFICE VISIT (OUTPATIENT)
Dept: OBGYN CLINIC | Age: 33
End: 2023-09-29
Payer: COMMERCIAL

## 2023-09-29 VITALS
BODY MASS INDEX: 34.69 KG/M2 | HEIGHT: 67 IN | SYSTOLIC BLOOD PRESSURE: 139 MMHG | HEART RATE: 82 BPM | WEIGHT: 221 LBS | DIASTOLIC BLOOD PRESSURE: 78 MMHG

## 2023-09-29 DIAGNOSIS — N89.8 VAGINAL DISCHARGE: Primary | ICD-10-CM

## 2023-09-29 DIAGNOSIS — Z11.3 SCREEN FOR STD (SEXUALLY TRANSMITTED DISEASE): ICD-10-CM

## 2023-09-29 DIAGNOSIS — R30.0 DYSURIA: ICD-10-CM

## 2023-09-29 LAB
BILIRUB UR QL STRIP: NEGATIVE
CLARITY UR: ABNORMAL
COLOR UR: YELLOW
GLUCOSE UR STRIP.AUTO-MCNC: NEGATIVE MG/DL
HAV IGM SERPL QL IA: NORMAL
HBV CORE IGM SERPL QL IA: NORMAL
HBV SURFACE AG SERPL QL IA: NORMAL
HCV AB SERPL QL IA: NORMAL
HGB UR STRIP.AUTO-MCNC: NEGATIVE MG/L
HIV-1 P24 AG: NORMAL
HIV1+2 AB SERPLBLD QL IA.RAPID: NORMAL
KETONES UR STRIP.AUTO-MCNC: NEGATIVE MG/DL
LEUKOCYTE ESTERASE UR QL STRIP.AUTO: NEGATIVE
NITRITE UR QL STRIP.AUTO: NEGATIVE
PH UR STRIP.AUTO: 5.5 [PH] (ref 5–8)
PROT UR STRIP.AUTO-MCNC: ABNORMAL MG/DL
SP GR UR STRIP.AUTO: 1.03 (ref 1–1.03)
UROBILINOGEN UR STRIP.AUTO-MCNC: 1 E.U./DL

## 2023-09-29 PROCEDURE — 99214 OFFICE O/P EST MOD 30 MIN: CPT | Performed by: NURSE PRACTITIONER

## 2023-09-29 ASSESSMENT — ENCOUNTER SYMPTOMS
RESPIRATORY NEGATIVE: 1
EYES NEGATIVE: 1
GASTROINTESTINAL NEGATIVE: 1
ALLERGIC/IMMUNOLOGIC NEGATIVE: 1

## 2023-09-29 NOTE — PROGRESS NOTES
Asia Patten is a 35 y.o. female who presents today for her medical conditions/ complaints as noted below. Asia Patten is c/o of Vaginal Discharge        HPI  Pt states some vaginal discharge clear in color and also some vaginal and urinary irritation ,pt also states she would like STD testing. Wants serum sti as well. Patient's last menstrual period was 2023.     Past Medical History:   Diagnosis Date    Asthma     Obese     Ovarian cyst      Past Surgical History:   Procedure Laterality Date    CERVIX SURGERY      CHOLECYSTECTOMY, LAPAROSCOPIC N/A 2021    CHOLECYSTECTOMY LAPAROSCOPIC performed by Dmitry Ordoñez MD at [address] N/A 2021    Dr Rohit Leblanc yr (age 39) recall    DILATION AND CURETTAGE OF UTERUS N/A 10/28/2022    DILATATION AND CURETTAGE HYSTEROSCOPY, performed by Kerri Garibay MD at [address] [facility] ENDOSCOPY N/A 2021    Dr Lorrie Lauren     Family History   Problem Relation Age of Onset    Diabetes Mother     High Blood Pressure Mother     Diabetes Sister     Diabetes Maternal Grandmother     Colon Cancer Neg Hx     Colon Polyps Neg Hx     Esophageal Cancer Neg Hx     Liver Cancer Neg Hx     Rectal Cancer Neg Hx     Stomach Cancer Neg Hx      Social History     Tobacco Use    Smoking status: Every Day     Packs/day: 0.50     Years: 10.00     Additional pack years: 0.00     Total pack years: 5.00     Types: Cigarettes    Smokeless tobacco: Never   Substance Use Topics    Alcohol use:  Yes     Alcohol/week: 0.0 standard drinks of alcohol     Comment: occ       Current Outpatient Medications   Medication Sig Dispense Refill    linaclotide (LINZESS) 145 MCG capsule Take 1 capsule by mouth every morning (before breakfast) 30 capsule 5    hydrocortisone (ANUSOL-HC) 2.5 % CREA rectal cream Apply rectally twice a day x 14 days, may repeat if needed 1 each 1     No current facility-administered medications for this

## 2023-10-01 LAB — BACTERIA UR CULT: NORMAL

## 2023-10-02 LAB — RPR SER QL: NORMAL

## 2023-10-02 RX ORDER — METRONIDAZOLE 500 MG/1
500 TABLET ORAL 2 TIMES DAILY WITH MEALS
Qty: 14 TABLET | Refills: 0 | Status: SHIPPED | OUTPATIENT
Start: 2023-10-02 | End: 2023-10-09

## 2023-10-02 RX ORDER — DOXYCYCLINE HYCLATE 100 MG
100 TABLET ORAL 2 TIMES DAILY
Qty: 14 TABLET | Refills: 0 | Status: SHIPPED | OUTPATIENT
Start: 2023-10-02 | End: 2023-10-09

## 2023-11-29 ENCOUNTER — OFFICE VISIT (OUTPATIENT)
Dept: OBGYN CLINIC | Age: 33
End: 2023-11-29
Payer: COMMERCIAL

## 2023-11-29 VITALS
SYSTOLIC BLOOD PRESSURE: 140 MMHG | HEART RATE: 74 BPM | BODY MASS INDEX: 37.35 KG/M2 | DIASTOLIC BLOOD PRESSURE: 87 MMHG | HEIGHT: 67 IN | WEIGHT: 238 LBS

## 2023-11-29 DIAGNOSIS — Z11.3 SCREEN FOR STD (SEXUALLY TRANSMITTED DISEASE): Primary | ICD-10-CM

## 2023-11-29 DIAGNOSIS — Z31.9 INFERTILITY MANAGEMENT: ICD-10-CM

## 2023-11-29 PROCEDURE — 99214 OFFICE O/P EST MOD 30 MIN: CPT | Performed by: NURSE PRACTITIONER

## 2023-11-29 ASSESSMENT — ENCOUNTER SYMPTOMS
EYES NEGATIVE: 1
ALLERGIC/IMMUNOLOGIC NEGATIVE: 1
GASTROINTESTINAL NEGATIVE: 1
RESPIRATORY NEGATIVE: 1

## 2023-11-29 NOTE — PROGRESS NOTES
Sarah Zelaya is a 35 y.o. female who presents today for her medical conditions/ complaints as noted below. Sarah Zelaya is c/o of Other (Std swab and discuss next steps in fertility )        HPI  Patient presents today with c/o \"feeling weird when having intercourse\"   She has a spot she would like looked at. She desires STD swab today   She states shes still trying to conceive. Had hsg this year, wanting to know next steps. Has never been pregnant. No LMP recorded.     Past Medical History:   Diagnosis Date    Asthma     Obese     Ovarian cyst      Past Surgical History:   Procedure Laterality Date    CERVIX SURGERY      CHOLECYSTECTOMY, LAPAROSCOPIC N/A 2021    CHOLECYSTECTOMY LAPAROSCOPIC performed by Aurora Sheth MD at [address] N/A 2021    Dr Reeves Europe yr (age 39) recall    DILATION AND CURETTAGE OF UTERUS N/A 10/28/2022    DILATATION AND CURETTAGE HYSTEROSCOPY, performed by Pavan De La Vega MD at [address] [facility] ENDOSCOPY N/A 2021    Dr Nano Lord     Family History   Problem Relation Age of Onset    Diabetes Mother     High Blood Pressure Mother     Diabetes Sister     Diabetes Maternal Grandmother     Colon Cancer Neg Hx     Colon Polyps Neg Hx     Esophageal Cancer Neg Hx     Liver Cancer Neg Hx     Rectal Cancer Neg Hx     Stomach Cancer Neg Hx      Social History     Tobacco Use    Smoking status: Every Day     Packs/day: 0.50     Years: 10.00     Additional pack years: 0.00     Total pack years: 5.00     Types: Cigarettes    Smokeless tobacco: Never   Substance Use Topics    Alcohol use:  Yes     Alcohol/week: 0.0 standard drinks of alcohol     Comment: occ       Current Outpatient Medications   Medication Sig Dispense Refill    metFORMIN (GLUCOPHAGE) 500 MG tablet Take 1 tablet by mouth 3 times daily (with meals) Gradually increase as tolerated to three times daily w meals 60 tablet 0     No current

## 2023-12-01 ENCOUNTER — TELEPHONE (OUTPATIENT)
Dept: OBGYN CLINIC | Age: 33
End: 2023-12-01

## 2023-12-20 PROBLEM — R09.1 PLEURISY: Status: ACTIVE | Noted: 2023-12-20

## 2023-12-20 PROBLEM — R10.9 STOMACH ACHE: Status: ACTIVE | Noted: 2023-12-20

## 2023-12-20 PROBLEM — J06.9 VIRAL URI: Status: ACTIVE | Noted: 2023-12-20

## 2024-01-29 ENCOUNTER — OFFICE VISIT (OUTPATIENT)
Dept: OBGYN CLINIC | Age: 34
End: 2024-01-29
Payer: COMMERCIAL

## 2024-01-29 VITALS
BODY MASS INDEX: 37.04 KG/M2 | WEIGHT: 236 LBS | DIASTOLIC BLOOD PRESSURE: 99 MMHG | HEIGHT: 67 IN | SYSTOLIC BLOOD PRESSURE: 141 MMHG | HEART RATE: 70 BPM

## 2024-01-29 DIAGNOSIS — Z31.9 INFERTILITY MANAGEMENT: ICD-10-CM

## 2024-01-29 DIAGNOSIS — Z20.2 POSSIBLE EXPOSURE TO STD: Primary | ICD-10-CM

## 2024-01-29 DIAGNOSIS — N89.8 VAGINAL IRRITATION: ICD-10-CM

## 2024-01-29 PROCEDURE — 99214 OFFICE O/P EST MOD 30 MIN: CPT | Performed by: NURSE PRACTITIONER

## 2024-01-29 NOTE — PATIENT INSTRUCTIONS
We are committed to providing you with the best care possible.  In order to help us achieve these goals please remember to bring all medications, herbal products, and over the counter supplements with you to each visit.    If your provider has ordered testing for you, please be sure to follow up with our office if you have not received results within 7 days after testing took place.    *If you receive a survey after visiting one of our offices, please take the time to share your experience concerning your physician office visit. These surveys are confidential and no health information about you is shared. We are eager to improve for you and we are counting on your feedback to help make that happen.

## 2024-01-29 NOTE — PROGRESS NOTES
Aniya Alfaro is a 34 y.o. female who presents today for her medical conditions/ complaints as noted below. Aniya Alfaro is c/o of Follow-up (Fertility issues )        HPI  Patient presents today to follow up on fertility issues. She desires swab today bc SO hasn't been faithful.   Using metformin.   In past had cervical stenosis but Dr Perez dilated and used hysteroscope to eval uterus, normal and D&C done  Ready to move forward  Having monthly periods  Patient's last menstrual period was 2024 (approximate).      Past Medical History:   Diagnosis Date    Asthma     Obese     Ovarian cyst      Past Surgical History:   Procedure Laterality Date    CERVIX SURGERY      CHOLECYSTECTOMY, LAPAROSCOPIC N/A 2021    CHOLECYSTECTOMY LAPAROSCOPIC performed by April Gillespie MD at [facility] OR    COLONOSCOPY N/A 2021    Dr KARELY Jeong-14 yr (age 45) recall    DILATION AND CURETTAGE OF UTERUS N/A 10/28/2022    DILATATION AND CURETTAGE HYSTEROSCOPY, performed by Belinda Perez MD at [facility] OR    UPPER GASTROINTESTINAL ENDOSCOPY N/A 2021    Dr KARELY Jeong-Gastritis     Family History   Problem Relation Age of Onset    Diabetes Mother     High Blood Pressure Mother     Diabetes Sister     Diabetes Maternal Grandmother     Colon Cancer Neg Hx     Colon Polyps Neg Hx     Esophageal Cancer Neg Hx     Liver Cancer Neg Hx     Rectal Cancer Neg Hx     Stomach Cancer Neg Hx      Social History     Tobacco Use    Smoking status: Every Day     Current packs/day: 0.50     Average packs/day: 0.5 packs/day for 10.0 years (5.0 ttl pk-yrs)     Types: Cigarettes    Smokeless tobacco: Never   Substance Use Topics    Alcohol use: Yes     Alcohol/week: 0.0 standard drinks of alcohol     Comment: occ       Current Outpatient Medications   Medication Sig Dispense Refill    metFORMIN (GLUCOPHAGE) 500 MG tablet Take 1 tablet by mouth 3 times daily (with meals) Gradually increase as tolerated to three times daily w

## 2024-01-30 RX ORDER — DOXYCYCLINE HYCLATE 100 MG
100 TABLET ORAL 2 TIMES DAILY
Qty: 14 TABLET | Refills: 0 | Status: SHIPPED | OUTPATIENT
Start: 2024-01-30 | End: 2024-02-06

## 2024-01-30 RX ORDER — FLUCONAZOLE 150 MG/1
TABLET ORAL
Qty: 2 TABLET | Refills: 0 | Status: SHIPPED | OUTPATIENT
Start: 2024-01-30

## 2024-01-30 RX ORDER — METRONIDAZOLE 500 MG/1
500 TABLET ORAL 2 TIMES DAILY WITH MEALS
Qty: 14 TABLET | Refills: 0 | Status: SHIPPED | OUTPATIENT
Start: 2024-01-30 | End: 2024-02-06

## 2024-02-02 ENCOUNTER — TELEPHONE (OUTPATIENT)
Dept: OBGYN CLINIC | Age: 34
End: 2024-02-02

## 2024-02-02 NOTE — TELEPHONE ENCOUNTER
----- Message from ZACKERY Chiang sent at 1/30/2024  1:36 PM CST -----  Please let pt know + yeast and bacteria and I'm sending in meds

## 2024-02-03 ENCOUNTER — HOSPITAL ENCOUNTER (EMERGENCY)
Age: 34
Discharge: HOME | End: 2024-02-03
Payer: COMMERCIAL

## 2024-02-03 VITALS
RESPIRATION RATE: 18 BRPM | TEMPERATURE: 98.1 F | SYSTOLIC BLOOD PRESSURE: 145 MMHG | HEART RATE: 70 BPM | DIASTOLIC BLOOD PRESSURE: 77 MMHG | BODY MASS INDEX: 37.58 KG/M2 | WEIGHT: 240 LBS | OXYGEN SATURATION: 95 %

## 2024-02-03 DIAGNOSIS — H60.502 ACUTE OTITIS EXTERNA OF LEFT EAR, UNSPECIFIED TYPE: Primary | ICD-10-CM

## 2024-02-03 PROCEDURE — 99283 EMERGENCY DEPT VISIT LOW MDM: CPT

## 2024-02-03 PROCEDURE — 6370000000 HC RX 637 (ALT 250 FOR IP): Performed by: PHYSICIAN ASSISTANT

## 2024-02-03 RX ORDER — METHYLPREDNISOLONE 4 MG/1
TABLET ORAL
Qty: 1 KIT | Refills: 0 | Status: SHIPPED | OUTPATIENT
Start: 2024-02-03 | End: 2024-02-09

## 2024-02-03 RX ORDER — CIPROFLOXACIN AND DEXAMETHASONE 3; 1 MG/ML; MG/ML
4 SUSPENSION/ DROPS AURICULAR (OTIC) 2 TIMES DAILY
Status: DISCONTINUED | OUTPATIENT
Start: 2024-02-03 | End: 2024-02-03 | Stop reason: HOSPADM

## 2024-02-03 RX ORDER — CIPROFLOXACIN AND DEXAMETHASONE 3; 1 MG/ML; MG/ML
4 SUSPENSION/ DROPS AURICULAR (OTIC) 2 TIMES DAILY
Status: DISCONTINUED | OUTPATIENT
Start: 2024-02-03 | End: 2024-02-03

## 2024-02-03 RX ADMIN — CIPROFLOXACIN AND DEXAMETHASONE 4 DROP: 3; 1 SUSPENSION/ DROPS AURICULAR (OTIC) at 19:05

## 2024-02-03 ASSESSMENT — ENCOUNTER SYMPTOMS
RHINORRHEA: 0
COUGH: 0
COLOR CHANGE: 0
APNEA: 0
SHORTNESS OF BREATH: 0
ABDOMINAL DISTENTION: 0
ABDOMINAL PAIN: 0
SORE THROAT: 0
PHOTOPHOBIA: 0
EYE DISCHARGE: 0
EYE PAIN: 0
BACK PAIN: 0
NAUSEA: 0

## 2024-02-04 NOTE — ED PROVIDER NOTES
[facility] EMERGENCY DEPT  eMERGENCYdEPARTMENT eNCOUnter      Pt Name: Aniya Alfaro  MRN: [id number]  Birthdate 1990  Date of evaluation: 2/3/2024  Provider:MARC Sherman    CHIEF COMPLAINT       Chief Complaint   Patient presents with    Otalgia     Left ear pressure and drainage for 3 days         HISTORY OF PRESENT ILLNESS  (Location/Symptom, Timing/Onset, Context/Setting, Quality, Duration, Modifying Factors, Severity.)   Aniya Alfaro is a 34 y.o. female who presents to the emergency department complaints of mild ear pain and some discharge noted today the pain started 3 days ago denies any fever or chills has mild headache but not the worst she is ever experienced.  Denies any hearing loss.  She denies any vision changes or fever.    [facility]    Nursing Notes were reviewed and I agree.    REVIEW OF SYSTEMS    (2-9 systems for level 4, 10 or more for level 5)     Review of Systems   Constitutional:  Negative for activity change, appetite change, chills and fever.   HENT:  Positive for ear discharge and ear pain. Negative for congestion, postnasal drip, rhinorrhea and sore throat.    Eyes:  Negative for photophobia, pain, discharge and visual disturbance.   Respiratory:  Negative for apnea, cough and shortness of breath.    Cardiovascular:  Negative for chest pain and leg swelling.   Gastrointestinal:  Negative for abdominal distention, abdominal pain and nausea.   Genitourinary:  Negative for vaginal bleeding.   Musculoskeletal:  Negative for arthralgias, back pain, joint swelling, neck pain and neck stiffness.   Skin:  Negative for color change and rash.   Neurological:  Negative for dizziness, syncope, facial asymmetry and headaches.   Hematological:  Negative for adenopathy. Does not bruise/bleed easily.   Psychiatric/Behavioral:  Negative for agitation, behavioral problems and confusion.         Except as noted above the remainder of the review of systems was reviewed and negative.       PAST

## 2024-02-12 ENCOUNTER — TELEPHONE (OUTPATIENT)
Dept: OBGYN CLINIC | Age: 34
End: 2024-02-12

## 2024-02-12 DIAGNOSIS — Z31.9 INFERTILITY MANAGEMENT: Primary | ICD-10-CM

## 2024-02-12 NOTE — TELEPHONE ENCOUNTER
Pt called in stating that per Mandi pt needs to come in on 1st day of period for an U/S. Let pt know the U/S order was being placed and [facility] can [facility] to get this appointment scheduled. 2/12/24 AM

## 2024-02-13 ENCOUNTER — TELEPHONE (OUTPATIENT)
Dept: OBGYN CLINIC | Age: 34
End: 2024-02-13

## 2024-02-13 ENCOUNTER — HOSPITAL ENCOUNTER (OUTPATIENT)
Dept: ULTRASOUND IMAGING | Age: 34
Discharge: HOME | End: 2024-02-13
Payer: COMMERCIAL

## 2024-02-13 DIAGNOSIS — Z31.9 INFERTILITY MANAGEMENT: ICD-10-CM

## 2024-02-13 PROCEDURE — 76830 TRANSVAGINAL US NON-OB: CPT

## 2024-02-16 ENCOUNTER — TELEPHONE (OUTPATIENT)
Dept: OBGYN CLINIC | Age: 34
End: 2024-02-16

## 2024-02-16 NOTE — TELEPHONE ENCOUNTER
----- Message from ZACKERY Chiang sent at 2/16/2024  3:10 PM CST -----  Let pt know we cannot do fertility med this month. There is already a large cyst to the right ovary. I am recommending sex starting now, every other day for 7-10 days to try to conceive. Let me know if she starts period and we can retry for next month. The cyst seen on the US can be a dominant follicle the egg will release from, that is good and means she may not need the fertility pills anyway this month. Continue metformin.   Also there is a phone message, use that number to call her back with this result. Thanks   Warm

## 2024-02-23 PROCEDURE — 87798 DETECT AGENT NOS DNA AMP: CPT | Performed by: NURSE PRACTITIONER

## 2024-02-23 PROCEDURE — 87255 GENET VIRUS ISOLATE HSV: CPT | Performed by: NURSE PRACTITIONER

## 2024-02-23 PROCEDURE — 87491 CHLMYD TRACH DNA AMP PROBE: CPT | Performed by: NURSE PRACTITIONER

## 2024-02-23 PROCEDURE — 87661 TRICHOMONAS VAGINALIS AMPLIF: CPT | Performed by: NURSE PRACTITIONER

## 2024-02-23 PROCEDURE — 87591 N.GONORRHOEAE DNA AMP PROB: CPT | Performed by: NURSE PRACTITIONER

## 2024-02-23 PROCEDURE — 87801 DETECT AGNT MULT DNA AMPLI: CPT | Performed by: NURSE PRACTITIONER

## 2024-04-18 ENCOUNTER — APPOINTMENT (OUTPATIENT)
Dept: GENERAL RADIOLOGY | Facility: HOSPITAL | Age: 34
End: 2024-04-18
Payer: MEDICAID

## 2024-04-18 PROCEDURE — 87086 URINE CULTURE/COLONY COUNT: CPT | Performed by: NURSE PRACTITIONER

## 2024-04-18 PROCEDURE — 74018 RADEX ABDOMEN 1 VIEW: CPT

## 2024-04-22 ENCOUNTER — ANESTHESIA EVENT (OUTPATIENT)
Dept: OPERATING ROOM | Age: 34
DRG: 743 | End: 2024-04-22
Payer: MEDICAID

## 2024-04-22 ENCOUNTER — ANESTHESIA (OUTPATIENT)
Dept: OPERATING ROOM | Age: 34
DRG: 743 | End: 2024-04-22
Payer: MEDICAID

## 2024-04-22 ENCOUNTER — HOSPITAL ENCOUNTER (INPATIENT)
Age: 34
LOS: 1 days | Discharge: HOME | DRG: 743 | End: 2024-04-23
Attending: OBSTETRICS & GYNECOLOGY | Admitting: OBSTETRICS & GYNECOLOGY
Payer: MEDICAID

## 2024-04-22 ENCOUNTER — APPOINTMENT (OUTPATIENT)
Dept: CT IMAGING | Age: 34
DRG: 743 | End: 2024-04-22
Payer: MEDICAID

## 2024-04-22 ENCOUNTER — APPOINTMENT (OUTPATIENT)
Dept: ULTRASOUND IMAGING | Age: 34
DRG: 743 | End: 2024-04-22
Payer: MEDICAID

## 2024-04-22 DIAGNOSIS — R10.2 PELVIC PAIN: ICD-10-CM

## 2024-04-22 DIAGNOSIS — R19.00 PELVIC MASS: Primary | ICD-10-CM

## 2024-04-22 DIAGNOSIS — Z98.890 S/P LAPAROSCOPY: ICD-10-CM

## 2024-04-22 PROBLEM — N70.93 TOA (TUBO-OVARIAN ABSCESS): Status: ACTIVE | Noted: 2024-04-22

## 2024-04-22 LAB
ALBUMIN SERPL-MCNC: 3.7 G/DL (ref 3.5–5.2)
ALP SERPL-CCNC: 54 U/L (ref 35–104)
ALT SERPL-CCNC: 18 U/L (ref 5–33)
ANION GAP SERPL CALCULATED.3IONS-SCNC: 14 MMOL/L (ref 7–19)
AST SERPL-CCNC: 33 U/L (ref 5–32)
BACTERIA SPEC QL WET PREP: ABNORMAL
BASOPHILS # BLD: 0.1 K/UL (ref 0–0.2)
BASOPHILS NFR BLD: 0.3 % (ref 0–1)
BILIRUB SERPL-MCNC: 0.4 MG/DL (ref 0.2–1.2)
BILIRUB UR STRIP.AUTO-MCNC: NEGATIVE MG/DL
BUN SERPL-MCNC: 6 MG/DL (ref 6–20)
CALCIUM SERPL-MCNC: 8.9 MG/DL (ref 8.6–10)
CANCER AG125 SERPL-ACNC: 35 U/ML (ref 0–38)
CHLORIDE SERPL-SCNC: 101 MMOL/L (ref 98–111)
CLARITY UR: CLEAR
CLUE CELLS VAG QL WET PREP: ABNORMAL
CO2 SERPL-SCNC: 20 MMOL/L (ref 22–29)
COLOR UR: YELLOW
CREAT SERPL-MCNC: 0.7 MG/DL (ref 0.5–0.9)
EOSINOPHIL # BLD: 0 K/UL (ref 0–0.6)
EOSINOPHIL NFR BLD: 0 % (ref 0–5)
EPI CELLS VAG QL WET PREP: ABNORMAL
ERYTHROCYTE [DISTWIDTH] IN BLOOD BY AUTOMATED COUNT: 12.5 % (ref 11.5–14.5)
GLUCOSE SERPL-MCNC: 117 MG/DL (ref 74–109)
GLUCOSE UR STRIP.AUTO-MCNC: NEGATIVE MG/DL
HCG SERPL QL: NEGATIVE
HCT VFR BLD AUTO: 39 % (ref 37–47)
HGB BLD-MCNC: 13.9 G/DL (ref 12–16)
HGB UR STRIP.AUTO-MCNC: NEGATIVE MG/L
IMM GRANULOCYTES # BLD: 0.2 K/UL
KETONES UR STRIP.AUTO-MCNC: NEGATIVE MG/DL
LEUKOCYTE ESTERASE UR QL STRIP.AUTO: NEGATIVE
LIPASE SERPL-CCNC: 14 U/L (ref 13–60)
LYMPHOCYTES # BLD: 1.1 K/UL (ref 1.1–4.5)
LYMPHOCYTES NFR BLD: 4.6 % (ref 20–40)
MCH RBC QN AUTO: 31.3 PG (ref 27–31)
MCHC RBC AUTO-ENTMCNC: 35.6 G/DL (ref 33–37)
MCV RBC AUTO: 87.8 FL (ref 81–99)
MONOCYTES # BLD: 1.2 K/UL (ref 0–0.9)
MONOCYTES NFR BLD: 5.1 % (ref 0–10)
NEUTROPHILS # BLD: 21.5 K/UL (ref 1.5–7.5)
NEUTS SEG NFR BLD: 89.2 % (ref 50–65)
NITRITE UR QL STRIP.AUTO: NEGATIVE
PH UR STRIP.AUTO: 6 [PH] (ref 5–8)
PLATELET # BLD AUTO: 348 K/UL (ref 130–400)
PMV BLD AUTO: 9.4 FL (ref 9.4–12.3)
POTASSIUM SERPL-SCNC: 4.4 MMOL/L (ref 3.5–5)
PROT SERPL-MCNC: 7.4 G/DL (ref 6.6–8.7)
PROT UR STRIP.AUTO-MCNC: NEGATIVE MG/DL
RBC # BLD AUTO: 4.44 M/UL (ref 4.2–5.4)
RBC VAG QL: ABNORMAL
SODIUM SERPL-SCNC: 135 MMOL/L (ref 136–145)
SP GR UR STRIP.AUTO: 1.01 (ref 1–1.03)
SPECIMEN SOURCE FLD: ABNORMAL
T VAGINALIS VAG QL WET PREP: ABNORMAL
URN SPEC COLLECT METH UR: ABNORMAL
UROBILINOGEN UR STRIP.AUTO-MCNC: 2 E.U./DL
WBC # BLD AUTO: 24.2 K/UL (ref 4.8–10.8)
WBC VAG QL WET PREP: ABNORMAL
YEAST VAG QL WET PREP: ABNORMAL

## 2024-04-22 PROCEDURE — 2709999900 HC NON-CHARGEABLE SUPPLY: Performed by: OBSTETRICS & GYNECOLOGY

## 2024-04-22 PROCEDURE — 96376 TX/PRO/DX INJ SAME DRUG ADON: CPT

## 2024-04-22 PROCEDURE — 2720000010 HC SURG SUPPLY STERILE: Performed by: OBSTETRICS & GYNECOLOGY

## 2024-04-22 PROCEDURE — 99223 1ST HOSP IP/OBS HIGH 75: CPT | Performed by: OBSTETRICS & GYNECOLOGY

## 2024-04-22 PROCEDURE — 74177 CT ABD & PELVIS W/CONTRAST: CPT

## 2024-04-22 PROCEDURE — 36415 COLL VENOUS BLD VENIPUNCTURE: CPT

## 2024-04-22 PROCEDURE — 6360000002 HC RX W HCPCS: Performed by: NURSE ANESTHETIST, CERTIFIED REGISTERED

## 2024-04-22 PROCEDURE — 2500000003 HC RX 250 WO HCPCS: Performed by: OBSTETRICS & GYNECOLOGY

## 2024-04-22 PROCEDURE — 3600000014 HC SURGERY LEVEL 4 ADDTL 15MIN: Performed by: OBSTETRICS & GYNECOLOGY

## 2024-04-22 PROCEDURE — 6370000000 HC RX 637 (ALT 250 FOR IP): Performed by: OBSTETRICS & GYNECOLOGY

## 2024-04-22 PROCEDURE — 86304 IMMUNOASSAY TUMOR CA 125: CPT

## 2024-04-22 PROCEDURE — 7100000001 HC PACU RECOVERY - ADDTL 15 MIN: Performed by: OBSTETRICS & GYNECOLOGY

## 2024-04-22 PROCEDURE — G0378 HOSPITAL OBSERVATION PER HR: HCPCS

## 2024-04-22 PROCEDURE — 88305 TISSUE EXAM BY PATHOLOGIST: CPT

## 2024-04-22 PROCEDURE — 0UT64ZZ RESECTION OF LEFT FALLOPIAN TUBE, PERCUTANEOUS ENDOSCOPIC APPROACH: ICD-10-PCS | Performed by: OBSTETRICS & GYNECOLOGY

## 2024-04-22 PROCEDURE — 6360000002 HC RX W HCPCS: Performed by: OBSTETRICS & GYNECOLOGY

## 2024-04-22 PROCEDURE — 96374 THER/PROPH/DIAG INJ IV PUSH: CPT

## 2024-04-22 PROCEDURE — 2500000003 HC RX 250 WO HCPCS: Performed by: NURSE ANESTHETIST, CERTIFIED REGISTERED

## 2024-04-22 PROCEDURE — 2580000003 HC RX 258: Performed by: OBSTETRICS & GYNECOLOGY

## 2024-04-22 PROCEDURE — 3600000004 HC SURGERY LEVEL 4 BASE: Performed by: OBSTETRICS & GYNECOLOGY

## 2024-04-22 PROCEDURE — 96375 TX/PRO/DX INJ NEW DRUG ADDON: CPT

## 2024-04-22 PROCEDURE — 85025 COMPLETE CBC W/AUTO DIFF WBC: CPT

## 2024-04-22 PROCEDURE — 3700000000 HC ANESTHESIA ATTENDED CARE: Performed by: OBSTETRICS & GYNECOLOGY

## 2024-04-22 PROCEDURE — 81003 URINALYSIS AUTO W/O SCOPE: CPT

## 2024-04-22 PROCEDURE — C9290 INJ, BUPIVACAINE LIPOSOME: HCPCS | Performed by: OBSTETRICS & GYNECOLOGY

## 2024-04-22 PROCEDURE — 83690 ASSAY OF LIPASE: CPT

## 2024-04-22 PROCEDURE — 0UT14ZZ RESECTION OF LEFT OVARY, PERCUTANEOUS ENDOSCOPIC APPROACH: ICD-10-PCS | Performed by: OBSTETRICS & GYNECOLOGY

## 2024-04-22 PROCEDURE — 1200000000 HC SEMI PRIVATE

## 2024-04-22 PROCEDURE — 2580000003 HC RX 258: Performed by: PHYSICIAN ASSISTANT

## 2024-04-22 PROCEDURE — 80053 COMPREHEN METABOLIC PANEL: CPT

## 2024-04-22 PROCEDURE — 6360000002 HC RX W HCPCS: Performed by: PHYSICIAN ASSISTANT

## 2024-04-22 PROCEDURE — 2580000003 HC RX 258: Performed by: NURSE ANESTHETIST, CERTIFIED REGISTERED

## 2024-04-22 PROCEDURE — 3700000001 HC ADD 15 MINUTES (ANESTHESIA): Performed by: OBSTETRICS & GYNECOLOGY

## 2024-04-22 PROCEDURE — 8E0W4CZ ROBOTIC ASSISTED PROCEDURE OF TRUNK REGION, PERCUTANEOUS ENDOSCOPIC APPROACH: ICD-10-PCS | Performed by: OBSTETRICS & GYNECOLOGY

## 2024-04-22 PROCEDURE — 76830 TRANSVAGINAL US NON-OB: CPT

## 2024-04-22 PROCEDURE — 99285 EMERGENCY DEPT VISIT HI MDM: CPT

## 2024-04-22 PROCEDURE — 84703 CHORIONIC GONADOTROPIN ASSAY: CPT

## 2024-04-22 PROCEDURE — C1765 ADHESION BARRIER: HCPCS | Performed by: OBSTETRICS & GYNECOLOGY

## 2024-04-22 PROCEDURE — 6360000004 HC RX CONTRAST MEDICATION: Performed by: PHYSICIAN ASSISTANT

## 2024-04-22 PROCEDURE — 7100000000 HC PACU RECOVERY - FIRST 15 MIN: Performed by: OBSTETRICS & GYNECOLOGY

## 2024-04-22 RX ORDER — HYDROMORPHONE HYDROCHLORIDE 1 MG/ML
0.5 INJECTION, SOLUTION INTRAMUSCULAR; INTRAVENOUS; SUBCUTANEOUS EVERY 5 MIN PRN
Status: DISCONTINUED | OUTPATIENT
Start: 2024-04-22 | End: 2024-04-22 | Stop reason: HOSPADM

## 2024-04-22 RX ORDER — EPINEPHRINE 1 MG/ML
INJECTION, SOLUTION, CONCENTRATE INTRAVENOUS PRN
Status: DISCONTINUED | OUTPATIENT
Start: 2024-04-22 | End: 2024-04-22 | Stop reason: ALTCHOICE

## 2024-04-22 RX ORDER — SODIUM CHLORIDE 9 MG/ML
INJECTION, SOLUTION INTRAVENOUS PRN
Status: DISCONTINUED | OUTPATIENT
Start: 2024-04-22 | End: 2024-04-22 | Stop reason: HOSPADM

## 2024-04-22 RX ORDER — ONDANSETRON 2 MG/ML
INJECTION INTRAMUSCULAR; INTRAVENOUS PRN
Status: DISCONTINUED | OUTPATIENT
Start: 2024-04-22 | End: 2024-04-22 | Stop reason: SDUPTHER

## 2024-04-22 RX ORDER — MIDAZOLAM HYDROCHLORIDE 1 MG/ML
INJECTION INTRAMUSCULAR; INTRAVENOUS PRN
Status: DISCONTINUED | OUTPATIENT
Start: 2024-04-22 | End: 2024-04-22 | Stop reason: SDUPTHER

## 2024-04-22 RX ORDER — DEXAMETHASONE SODIUM PHOSPHATE 4 MG/ML
4 INJECTION, SOLUTION INTRA-ARTICULAR; INTRALESIONAL; INTRAMUSCULAR; INTRAVENOUS; SOFT TISSUE ONCE
Status: DISCONTINUED | OUTPATIENT
Start: 2024-04-22 | End: 2024-04-22 | Stop reason: HOSPADM

## 2024-04-22 RX ORDER — NALOXONE HYDROCHLORIDE 0.4 MG/ML
INJECTION, SOLUTION INTRAMUSCULAR; INTRAVENOUS; SUBCUTANEOUS PRN
Status: DISCONTINUED | OUTPATIENT
Start: 2024-04-22 | End: 2024-04-22 | Stop reason: HOSPADM

## 2024-04-22 RX ORDER — SODIUM CHLORIDE 0.9 % (FLUSH) 0.9 %
5-40 SYRINGE (ML) INJECTION PRN
Status: DISCONTINUED | OUTPATIENT
Start: 2024-04-22 | End: 2024-04-23 | Stop reason: HOSPADM

## 2024-04-22 RX ORDER — HYDROMORPHONE HYDROCHLORIDE 1 MG/ML
0.5 INJECTION, SOLUTION INTRAMUSCULAR; INTRAVENOUS; SUBCUTANEOUS
Status: DISCONTINUED | OUTPATIENT
Start: 2024-04-22 | End: 2024-04-23 | Stop reason: HOSPADM

## 2024-04-22 RX ORDER — SODIUM CHLORIDE 0.9 % (FLUSH) 0.9 %
5-40 SYRINGE (ML) INJECTION EVERY 12 HOURS SCHEDULED
Status: DISCONTINUED | OUTPATIENT
Start: 2024-04-22 | End: 2024-04-22 | Stop reason: HOSPADM

## 2024-04-22 RX ORDER — SODIUM CHLORIDE 9 MG/ML
INJECTION, SOLUTION INTRAVENOUS PRN
Status: DISCONTINUED | OUTPATIENT
Start: 2024-04-22 | End: 2024-04-23 | Stop reason: HOSPADM

## 2024-04-22 RX ORDER — DEXMEDETOMIDINE HYDROCHLORIDE 100 UG/ML
INJECTION, SOLUTION INTRAVENOUS PRN
Status: DISCONTINUED | OUTPATIENT
Start: 2024-04-22 | End: 2024-04-22 | Stop reason: SDUPTHER

## 2024-04-22 RX ORDER — OXYCODONE HYDROCHLORIDE 10 MG/1
10 TABLET ORAL EVERY 4 HOURS PRN
Status: DISCONTINUED | OUTPATIENT
Start: 2024-04-22 | End: 2024-04-23 | Stop reason: HOSPADM

## 2024-04-22 RX ORDER — HYDROMORPHONE HYDROCHLORIDE 1 MG/ML
0.25 INJECTION, SOLUTION INTRAMUSCULAR; INTRAVENOUS; SUBCUTANEOUS
Status: DISCONTINUED | OUTPATIENT
Start: 2024-04-22 | End: 2024-04-23 | Stop reason: HOSPADM

## 2024-04-22 RX ORDER — 0.9 % SODIUM CHLORIDE 0.9 %
1000 INTRAVENOUS SOLUTION INTRAVENOUS ONCE
Status: COMPLETED | OUTPATIENT
Start: 2024-04-22 | End: 2024-04-22

## 2024-04-22 RX ORDER — SODIUM CHLORIDE, SODIUM LACTATE, POTASSIUM CHLORIDE, CALCIUM CHLORIDE 600; 310; 30; 20 MG/100ML; MG/100ML; MG/100ML; MG/100ML
INJECTION, SOLUTION INTRAVENOUS CONTINUOUS PRN
Status: DISCONTINUED | OUTPATIENT
Start: 2024-04-22 | End: 2024-04-22 | Stop reason: SDUPTHER

## 2024-04-22 RX ORDER — FENTANYL CITRATE 50 UG/ML
INJECTION, SOLUTION INTRAMUSCULAR; INTRAVENOUS PRN
Status: DISCONTINUED | OUTPATIENT
Start: 2024-04-22 | End: 2024-04-22 | Stop reason: SDUPTHER

## 2024-04-22 RX ORDER — SODIUM CHLORIDE 0.9 % (FLUSH) 0.9 %
5-40 SYRINGE (ML) INJECTION EVERY 12 HOURS SCHEDULED
Status: DISCONTINUED | OUTPATIENT
Start: 2024-04-22 | End: 2024-04-23 | Stop reason: HOSPADM

## 2024-04-22 RX ORDER — ROCURONIUM BROMIDE 10 MG/ML
INJECTION, SOLUTION INTRAVENOUS PRN
Status: DISCONTINUED | OUTPATIENT
Start: 2024-04-22 | End: 2024-04-22 | Stop reason: SDUPTHER

## 2024-04-22 RX ORDER — PROPOFOL 10 MG/ML
INJECTION, EMULSION INTRAVENOUS PRN
Status: DISCONTINUED | OUTPATIENT
Start: 2024-04-22 | End: 2024-04-22 | Stop reason: SDUPTHER

## 2024-04-22 RX ORDER — SODIUM CHLORIDE 0.9 % (FLUSH) 0.9 %
5-40 SYRINGE (ML) INJECTION PRN
Status: DISCONTINUED | OUTPATIENT
Start: 2024-04-22 | End: 2024-04-22 | Stop reason: HOSPADM

## 2024-04-22 RX ORDER — MORPHINE SULFATE 4 MG/ML
4 INJECTION, SOLUTION INTRAMUSCULAR; INTRAVENOUS ONCE
Status: COMPLETED | OUTPATIENT
Start: 2024-04-22 | End: 2024-04-22

## 2024-04-22 RX ORDER — METRONIDAZOLE 500 MG/100ML
500 INJECTION, SOLUTION INTRAVENOUS EVERY 12 HOURS
Status: COMPLETED | OUTPATIENT
Start: 2024-04-22 | End: 2024-04-23

## 2024-04-22 RX ORDER — ONDANSETRON 4 MG/1
4 TABLET, ORALLY DISINTEGRATING ORAL EVERY 8 HOURS PRN
Status: DISCONTINUED | OUTPATIENT
Start: 2024-04-22 | End: 2024-04-23 | Stop reason: HOSPADM

## 2024-04-22 RX ORDER — HYDROMORPHONE HYDROCHLORIDE 1 MG/ML
0.25 INJECTION, SOLUTION INTRAMUSCULAR; INTRAVENOUS; SUBCUTANEOUS EVERY 5 MIN PRN
Status: DISCONTINUED | OUTPATIENT
Start: 2024-04-22 | End: 2024-04-22 | Stop reason: HOSPADM

## 2024-04-22 RX ORDER — POLYETHYLENE GLYCOL 3350 17 G/17G
17 POWDER, FOR SOLUTION ORAL DAILY
Status: DISCONTINUED | OUTPATIENT
Start: 2024-04-23 | End: 2024-04-23 | Stop reason: HOSPADM

## 2024-04-22 RX ORDER — ONDANSETRON 2 MG/ML
4 INJECTION INTRAMUSCULAR; INTRAVENOUS
Status: DISCONTINUED | OUTPATIENT
Start: 2024-04-22 | End: 2024-04-22 | Stop reason: HOSPADM

## 2024-04-22 RX ORDER — OXYCODONE HYDROCHLORIDE 5 MG/1
5 TABLET ORAL EVERY 4 HOURS PRN
Status: DISCONTINUED | OUTPATIENT
Start: 2024-04-22 | End: 2024-04-23 | Stop reason: HOSPADM

## 2024-04-22 RX ORDER — BUPIVACAINE HYDROCHLORIDE AND EPINEPHRINE 2.5; 5 MG/ML; UG/ML
INJECTION, SOLUTION INFILTRATION; PERINEURAL PRN
Status: DISCONTINUED | OUTPATIENT
Start: 2024-04-22 | End: 2024-04-22 | Stop reason: ALTCHOICE

## 2024-04-22 RX ORDER — ONDANSETRON 2 MG/ML
4 INJECTION INTRAMUSCULAR; INTRAVENOUS EVERY 6 HOURS PRN
Status: DISCONTINUED | OUTPATIENT
Start: 2024-04-22 | End: 2024-04-23 | Stop reason: HOSPADM

## 2024-04-22 RX ORDER — ONDANSETRON 2 MG/ML
4 INJECTION INTRAMUSCULAR; INTRAVENOUS ONCE
Status: COMPLETED | OUTPATIENT
Start: 2024-04-22 | End: 2024-04-22

## 2024-04-22 RX ORDER — SODIUM CHLORIDE, SODIUM LACTATE, POTASSIUM CHLORIDE, CALCIUM CHLORIDE 600; 310; 30; 20 MG/100ML; MG/100ML; MG/100ML; MG/100ML
INJECTION, SOLUTION INTRAVENOUS CONTINUOUS
Status: DISCONTINUED | OUTPATIENT
Start: 2024-04-22 | End: 2024-04-22 | Stop reason: HOSPADM

## 2024-04-22 RX ORDER — LIDOCAINE HYDROCHLORIDE 10 MG/ML
INJECTION, SOLUTION INFILTRATION; PERINEURAL PRN
Status: DISCONTINUED | OUTPATIENT
Start: 2024-04-22 | End: 2024-04-22 | Stop reason: SDUPTHER

## 2024-04-22 RX ADMIN — FENTANYL CITRATE 50 MCG: 0.05 INJECTION, SOLUTION INTRAMUSCULAR; INTRAVENOUS at 18:36

## 2024-04-22 RX ADMIN — PROPOFOL 200 MG: 10 INJECTION, EMULSION INTRAVENOUS at 18:09

## 2024-04-22 RX ADMIN — SUGAMMADEX 300 MG: 100 INJECTION, SOLUTION INTRAVENOUS at 20:18

## 2024-04-22 RX ADMIN — SODIUM CHLORIDE, SODIUM LACTATE, POTASSIUM CHLORIDE, AND CALCIUM CHLORIDE: 600; 310; 30; 20 INJECTION, SOLUTION INTRAVENOUS at 18:04

## 2024-04-22 RX ADMIN — FENTANYL CITRATE 100 MCG: 0.05 INJECTION, SOLUTION INTRAMUSCULAR; INTRAVENOUS at 18:08

## 2024-04-22 RX ADMIN — HYDROMORPHONE HYDROCHLORIDE 1 MG: 1 INJECTION, SOLUTION INTRAMUSCULAR; INTRAVENOUS; SUBCUTANEOUS at 20:09

## 2024-04-22 RX ADMIN — IOPAMIDOL 90 ML: 755 INJECTION, SOLUTION INTRAVENOUS at 11:38

## 2024-04-22 RX ADMIN — METRONIDAZOLE 500 MG: 500 INJECTION, SOLUTION INTRAVENOUS at 17:57

## 2024-04-22 RX ADMIN — ONDANSETRON 4 MG: 2 INJECTION INTRAMUSCULAR; INTRAVENOUS at 21:42

## 2024-04-22 RX ADMIN — ROCURONIUM BROMIDE 50 MG: 10 INJECTION, SOLUTION INTRAVENOUS at 18:10

## 2024-04-22 RX ADMIN — SODIUM CHLORIDE, SODIUM LACTATE, POTASSIUM CHLORIDE, AND CALCIUM CHLORIDE: 600; 310; 30; 20 INJECTION, SOLUTION INTRAVENOUS at 20:18

## 2024-04-22 RX ADMIN — WATER 1000 MG: 1 INJECTION INTRAMUSCULAR; INTRAVENOUS; SUBCUTANEOUS at 21:48

## 2024-04-22 RX ADMIN — ONDANSETRON 4 MG: 2 INJECTION INTRAMUSCULAR; INTRAVENOUS at 12:34

## 2024-04-22 RX ADMIN — OXYCODONE 5 MG: 5 TABLET ORAL at 21:38

## 2024-04-22 RX ADMIN — FENTANYL CITRATE 50 MCG: 0.05 INJECTION, SOLUTION INTRAMUSCULAR; INTRAVENOUS at 19:01

## 2024-04-22 RX ADMIN — DEXMEDETOMIDINE HYDROCHLORIDE 20 MCG: 100 INJECTION, SOLUTION, CONCENTRATE INTRAVENOUS at 18:33

## 2024-04-22 RX ADMIN — MORPHINE SULFATE 4 MG: 4 INJECTION, SOLUTION INTRAMUSCULAR; INTRAVENOUS at 15:16

## 2024-04-22 RX ADMIN — ONDANSETRON 4 MG: 2 INJECTION INTRAMUSCULAR; INTRAVENOUS at 19:27

## 2024-04-22 RX ADMIN — MORPHINE SULFATE 4 MG: 4 INJECTION, SOLUTION INTRAMUSCULAR; INTRAVENOUS at 12:34

## 2024-04-22 RX ADMIN — MIDAZOLAM 2 MG: 1 INJECTION INTRAMUSCULAR; INTRAVENOUS at 18:04

## 2024-04-22 RX ADMIN — LIDOCAINE HYDROCHLORIDE 50 MG: 10 INJECTION, SOLUTION INFILTRATION; PERINEURAL at 18:08

## 2024-04-22 RX ADMIN — SODIUM CHLORIDE 1000 ML: 9 INJECTION, SOLUTION INTRAVENOUS at 12:37

## 2024-04-22 RX ADMIN — DOXYCYCLINE 100 MG: 100 INJECTION, POWDER, LYOPHILIZED, FOR SOLUTION INTRAVENOUS at 21:59

## 2024-04-22 RX ADMIN — FENTANYL CITRATE 50 MCG: 0.05 INJECTION, SOLUTION INTRAMUSCULAR; INTRAVENOUS at 19:45

## 2024-04-22 RX ADMIN — CEFAZOLIN 2000 MG: 2 INJECTION, POWDER, FOR SOLUTION INTRAMUSCULAR; INTRAVENOUS at 18:29

## 2024-04-22 RX ADMIN — ROCURONIUM BROMIDE 20 MG: 10 INJECTION, SOLUTION INTRAVENOUS at 18:47

## 2024-04-22 SDOH — ECONOMIC STABILITY: FOOD INSECURITY: WITHIN THE PAST 12 MONTHS, YOU WORRIED THAT YOUR FOOD WOULD RUN OUT BEFORE YOU GOT MONEY TO BUY MORE.: NEVER TRUE

## 2024-04-22 SDOH — ECONOMIC STABILITY: INCOME INSECURITY: HOW HARD IS IT FOR YOU TO PAY FOR THE VERY BASICS LIKE FOOD, HOUSING, MEDICAL CARE, AND HEATING?: NOT HARD AT ALL

## 2024-04-22 SDOH — ECONOMIC STABILITY: INCOME INSECURITY: IN THE PAST 12 MONTHS, HAS THE ELECTRIC, GAS, OIL, OR WATER COMPANY THREATENED TO SHUT OFF SERVICE IN YOUR HOME?: NO

## 2024-04-22 ASSESSMENT — ENCOUNTER SYMPTOMS
RESPIRATORY NEGATIVE: 1
ABDOMINAL PAIN: 0
NAUSEA: 1
ABDOMINAL PAIN: 1
EYES NEGATIVE: 1
DIARRHEA: 0
ALLERGIC/IMMUNOLOGIC NEGATIVE: 1
CONSTIPATION: 1
BACK PAIN: 1
VOMITING: 1
SHORTNESS OF BREATH: 0

## 2024-04-22 ASSESSMENT — PAIN SCALES - GENERAL
PAINLEVEL_OUTOF10: 10

## 2024-04-22 ASSESSMENT — LIFESTYLE VARIABLES
HOW MANY STANDARD DRINKS CONTAINING ALCOHOL DO YOU HAVE ON A TYPICAL DAY: PATIENT DOES NOT DRINK
HOW OFTEN DO YOU HAVE A DRINK CONTAINING ALCOHOL: NEVER
SMOKING_STATUS: 1

## 2024-04-22 ASSESSMENT — PAIN DESCRIPTION - LOCATION
LOCATION: ABDOMEN;HEAD
LOCATION: ABDOMEN

## 2024-04-22 ASSESSMENT — PAIN DESCRIPTION - DESCRIPTORS
DESCRIPTORS: ACHING
DESCRIPTORS: ACHING;DISCOMFORT
DESCRIPTORS: SHARP

## 2024-04-22 ASSESSMENT — PATIENT HEALTH QUESTIONNAIRE - PHQ9
2. FEELING DOWN, DEPRESSED OR HOPELESS: NOT AT ALL
SUM OF ALL RESPONSES TO PHQ9 QUESTIONS 1 & 2: 0
SUM OF ALL RESPONSES TO PHQ QUESTIONS 1-9: 0
1. LITTLE INTEREST OR PLEASURE IN DOING THINGS: NOT AT ALL
SUM OF ALL RESPONSES TO PHQ QUESTIONS 1-9: 0

## 2024-04-22 ASSESSMENT — PAIN - FUNCTIONAL ASSESSMENT
PAIN_FUNCTIONAL_ASSESSMENT: 0-10
PAIN_FUNCTIONAL_ASSESSMENT: NONE - DENIES PAIN
PAIN_FUNCTIONAL_ASSESSMENT: 0-10

## 2024-04-22 ASSESSMENT — PAIN DESCRIPTION - PAIN TYPE: TYPE: ACUTE PAIN

## 2024-04-22 ASSESSMENT — PAIN DESCRIPTION - ORIENTATION
ORIENTATION: LOWER;MID
ORIENTATION: MID

## 2024-04-22 ASSESSMENT — PAIN DESCRIPTION - FREQUENCY: FREQUENCY: CONTINUOUS

## 2024-04-22 NOTE — ANESTHESIA PRE PROCEDURE
Answered      Vital Signs (Current):   Vitals:    04/22/24 1033 04/22/24 1239 04/22/24 1522   BP: (!) 121/105 130/85 110/72   Pulse: (!) 114 (!) 101 92   Resp: 24 22 20   Temp: 98.8 °F (37.1 °C)     SpO2: 97% 97% 97%                                              BP Readings from Last 3 Encounters:   04/22/24 110/72   02/03/24 (!) 145/77   01/29/24 (!) 141/99       NPO Status:                                                                                 BMI:   Wt Readings from Last 3 Encounters:   02/03/24 108.9 kg (240 lb)   01/29/24 107 kg (236 lb)   11/29/23 108 kg (238 lb)     There is no height or weight on file to calculate BMI.    CBC:   Lab Results   Component Value Date/Time    WBC 24.2 04/22/2024 11:06 AM    RBC 4.44 04/22/2024 11:06 AM    HGB 13.9 04/22/2024 11:06 AM    HCT 39.0 04/22/2024 11:06 AM    HCT 36.9 07/23/2011 08:10 PM    MCV 87.8 04/22/2024 11:06 AM    RDW 12.5 04/22/2024 11:06 AM     04/22/2024 11:06 AM     07/23/2011 08:10 PM       CMP:   Lab Results   Component Value Date/Time     04/22/2024 11:06 AM     07/23/2011 08:10 PM    K 4.4 04/22/2024 11:06 AM    K 4.3 01/27/2023 01:36 AM    K 3.9 07/23/2011 08:10 PM     04/22/2024 11:06 AM     07/23/2011 08:10 PM    CO2 20 04/22/2024 11:06 AM    BUN 6 04/22/2024 11:06 AM    CREATININE 0.7 04/22/2024 11:06 AM    CREATININE 0.8 07/23/2011 08:10 PM    GFRAA >59 07/30/2022 10:27 AM    LABGLOM >90 04/22/2024 11:06 AM    GLUCOSE 117 04/22/2024 11:06 AM    PROT 7.4 04/22/2024 11:06 AM    CALCIUM 8.9 04/22/2024 11:06 AM    BILITOT 0.4 04/22/2024 11:06 AM    ALKPHOS 54 04/22/2024 11:06 AM    AST 33 04/22/2024 11:06 AM    ALT 18 04/22/2024 11:06 AM       POC Tests: No results for input(s): \"POCGLU\", \"POCNA\", \"POCK\", \"POCCL\", \"POCBUN\", \"POCHEMO\", \"POCHCT\" in the last 72 hours.    Coags:   Lab Results   Component Value Date/Time    PROTIME 13.4 01/24/2022 02:12 PM    INR 1.00 01/24/2022 02:12 PM    APTT 28.9 01/24/2022

## 2024-04-22 NOTE — H&P
HISTORY AND PHYSICAL             Date: 4/22/2024        Patient Name: Aniya Alfaro     YOB: 1990      Age:  34 y.o.    Chief Complaint     Chief Complaint   Patient presents with    Abdominal Pain    Pelvic Pain     Since Friday.     Constipation          History Obtained From   patient    History of Present Illness   Patient presents with c/o abdominal pain that began on Friday and has increased.  Patient states she couldn't sleep last night.  Pain is severe.  She has had no relief with multiple doses of morphine.  Pain is worse with movement.  No alleviating measures.    Past Medical History     Past Medical History:   Diagnosis Date    Asthma     Obese     Ovarian cyst         Past Surgical History     Past Surgical History:   Procedure Laterality Date    CERVIX SURGERY      CHOLECYSTECTOMY, LAPAROSCOPIC N/A 07/26/2021    CHOLECYSTECTOMY LAPAROSCOPIC performed by April Gillespie MD at [facility] OR    COLONOSCOPY N/A 06/07/2021    Dr KARELY Jeong-14 yr (age 45) recall    DILATION AND CURETTAGE OF UTERUS N/A 10/28/2022    DILATATION AND CURETTAGE HYSTEROSCOPY, performed by Belinda Perez MD at [facility] OR    UPPER GASTROINTESTINAL ENDOSCOPY N/A 06/07/2021    Dr KARELY Jeong-Gastritis        Medications Prior to Admission     Prior to Admission medications    Not on File        Allergies   Ibuprofen-acetaminophen, Other, Metoclopramide, Carafate [sucralfate], and Tramadol    Social History     Social History       Tobacco History       Smoking Status  Every Day Current Packs/Day  0.5 packs/day Average Packs/Day  0.5 packs/day for 10.0 years (5.0 ttl pk-yrs) Smoking Tobacco Type  Cigarettes   Pack Year History     Packs/Day From To Years    0.5   10.0      Smokeless Tobacco Use  Never              Alcohol History       Alcohol Use Status  Yes Drinks/Week  0 Standard drinks or equivalent per week Amount  0.0 standard drinks of alcohol/wk Comment  occ              Drug Use       Drug Use Status  Yes

## 2024-04-22 NOTE — ED PROVIDER NOTES
[facility] EMERGENCY DEPT  eMERGENCY dEPARTMENT eNCOUnter      Pt Name: Aniya Alfaro  MRN: [id number]  Birthdate 1990  Date of evaluation: 4/22/2024  Provider: MARC Medrano    CHIEF COMPLAINT       Chief Complaint   Patient presents with    Abdominal Pain    Pelvic Pain     Since Friday.     Constipation         HISTORY OF PRESENT ILLNESS   (Location/Symptom, Timing/Onset,Context/Setting, Quality, Duration, Modifying Factors, Severity)  Note limiting factors.   Aniya Alfaro is a G0 34 y.o. female with history including obesity, asthma, and ovarian cyst who presents to the emergency department with complaint of abdominal pain and pelvic pain.  Her symptoms began on Friday 3 days ago.  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  She states that while she was in correction she was unable to eat or tolerate food due to the severity of the pain.  She has had episodes of vomiting.  She states her last oral intake was yesterday.  The pain is worse on the left lower side but does also note pain in the pelvis that feels like it radiates to her back.  She states she has issues with constipation.  She denies any associated vaginal bleeding or discharge.  She denies any fever.  She denies any URI symptoms.  Surgical history of the abdomen includes D&C of the uterus, cholecystectomy, and a cervical surgery.    NursingNotes were reviewed.    REVIEW OF SYSTEMS    (2-9 systems for level 4, 10 or more for level 5)     Review of Systems   Constitutional:  Negative for chills and fever.   Respiratory:  Negative for shortness of breath.    Cardiovascular:  Negative for chest pain.   Gastrointestinal:  Positive for constipation, nausea and vomiting. Negative for abdominal pain and diarrhea.   Genitourinary:  Positive for pelvic pain. Negative for dysuria, flank pain, frequency, hematuria, vaginal bleeding and vaginal discharge.   Musculoskeletal:  Positive for back pain.   Neurological:  Negative for dizziness  continued to have pain even with first dose of medication so another dose of morphine was given in the ER.  She does have significant leukocytosis with a left shift.  Mild electrolyte disturbance without TAMMI.  Urinalysis unremarkable for infection or hematuria.  Her hCG is negative ruling out any pregnancy related differentials.  She does have clue cells and the wet prep concerning for BV.  CT was obtained and does show a complex multiple loculated cystic mass in the pelvis. Due to this finding, US also obtained. Normal left ovary cannot be found but 9.4 cm mass can be seen in the left pelvis. There does appear to be blood flow to the mass. Discussed case with Dr Philippe, OB/Gyn on call. She recommends keeping patient NPO and adding a CA-125 antigen test.  She will be admitted to OB/GYN.  I have gone over the plan as well as the result so far and patient is agreeable.  All of her questions were answered.    CONSULTS:  Dr Philippe, OB/Gyn    PROCEDURES:  Unless otherwise noted below, none     Procedures    FINAL IMPRESSION      1. Pelvic mass          DISPOSITION/PLAN   DISPOSITION Decision To Admit 04/22/2024 03:14:49 PM      PATIENT REFERRED TO:  No follow-up provider specified.    DISCHARGE MEDICATIONS:  New Prescriptions    No medications on file          (Please note that portions of this note were completed with a voice recognition program.  Efforts were made to edit thedictations but occasionally words are mis-transcribed.)    MARC Medrano (electronically signed)       Murtaza Reveles PA  04/22/24 [id number]

## 2024-04-22 NOTE — ED NOTES
Patient has returned from ultrasound and she is moaning and states that her abdominal pain starts at her belly button and extends throughout her pelvis region ,she describes her pain as a 10 out of 10. With a headache as well .

## 2024-04-23 ENCOUNTER — TELEPHONE (OUTPATIENT)
Dept: OBGYN CLINIC | Age: 34
End: 2024-04-23

## 2024-04-23 VITALS
TEMPERATURE: 98.1 F | RESPIRATION RATE: 16 BRPM | SYSTOLIC BLOOD PRESSURE: 108 MMHG | HEART RATE: 76 BPM | OXYGEN SATURATION: 100 % | DIASTOLIC BLOOD PRESSURE: 90 MMHG

## 2024-04-23 LAB
ANION GAP SERPL CALCULATED.3IONS-SCNC: 13 MMOL/L (ref 7–19)
BUN SERPL-MCNC: 7 MG/DL (ref 6–20)
C TRACH DNA UR QL NAA+PROBE: NOT DETECTED
CALCIUM SERPL-MCNC: 8.7 MG/DL (ref 8.6–10)
CHLORIDE SERPL-SCNC: 101 MMOL/L (ref 98–111)
CO2 SERPL-SCNC: 21 MMOL/L (ref 22–29)
CREAT SERPL-MCNC: 0.6 MG/DL (ref 0.5–0.9)
ERYTHROCYTE [DISTWIDTH] IN BLOOD BY AUTOMATED COUNT: 12.6 % (ref 11.5–14.5)
GLUCOSE SERPL-MCNC: 147 MG/DL (ref 74–109)
HCT VFR BLD AUTO: 37.3 % (ref 37–47)
HGB BLD-MCNC: 13.2 G/DL (ref 12–16)
MCH RBC QN AUTO: 31.6 PG (ref 27–31)
MCHC RBC AUTO-ENTMCNC: 35.4 G/DL (ref 33–37)
MCV RBC AUTO: 89.2 FL (ref 81–99)
N GONORRHOEA DNA UR QL NAA+PROBE: NOT DETECTED
PLATELET # BLD AUTO: 323 K/UL (ref 130–400)
PMV BLD AUTO: 9.7 FL (ref 9.4–12.3)
POTASSIUM SERPL-SCNC: 4.1 MMOL/L (ref 3.5–5)
RBC # BLD AUTO: 4.18 M/UL (ref 4.2–5.4)
SODIUM SERPL-SCNC: 135 MMOL/L (ref 136–145)
T VAGINALIS DNA UR QL NAA+PROBE: NOT DETECTED
WBC # BLD AUTO: 17.1 K/UL (ref 4.8–10.8)

## 2024-04-23 PROCEDURE — 87591 N.GONORRHOEAE DNA AMP PROB: CPT

## 2024-04-23 PROCEDURE — 87491 CHLMYD TRACH DNA AMP PROBE: CPT

## 2024-04-23 PROCEDURE — 6360000002 HC RX W HCPCS: Performed by: OBSTETRICS & GYNECOLOGY

## 2024-04-23 PROCEDURE — 94760 N-INVAS EAR/PLS OXIMETRY 1: CPT

## 2024-04-23 PROCEDURE — 80048 BASIC METABOLIC PNL TOTAL CA: CPT

## 2024-04-23 PROCEDURE — 94150 VITAL CAPACITY TEST: CPT

## 2024-04-23 PROCEDURE — G0378 HOSPITAL OBSERVATION PER HR: HCPCS

## 2024-04-23 PROCEDURE — 6370000000 HC RX 637 (ALT 250 FOR IP): Performed by: OBSTETRICS & GYNECOLOGY

## 2024-04-23 PROCEDURE — 2580000003 HC RX 258: Performed by: OBSTETRICS & GYNECOLOGY

## 2024-04-23 PROCEDURE — 36415 COLL VENOUS BLD VENIPUNCTURE: CPT

## 2024-04-23 PROCEDURE — 87661 TRICHOMONAS VAGINALIS AMPLIF: CPT

## 2024-04-23 PROCEDURE — 96365 THER/PROPH/DIAG IV INF INIT: CPT

## 2024-04-23 PROCEDURE — 2500000003 HC RX 250 WO HCPCS: Performed by: OBSTETRICS & GYNECOLOGY

## 2024-04-23 PROCEDURE — 85027 COMPLETE CBC AUTOMATED: CPT

## 2024-04-23 PROCEDURE — 99024 POSTOP FOLLOW-UP VISIT: CPT | Performed by: NURSE PRACTITIONER

## 2024-04-23 PROCEDURE — 96367 TX/PROPH/DG ADDL SEQ IV INF: CPT

## 2024-04-23 RX ORDER — DOXYCYCLINE HYCLATE 100 MG
100 TABLET ORAL 2 TIMES DAILY
Qty: 14 TABLET | Refills: 0 | Status: SHIPPED | OUTPATIENT
Start: 2024-04-23 | End: 2024-04-30

## 2024-04-23 RX ORDER — IBUPROFEN 400 MG/1
400 TABLET ORAL EVERY 6 HOURS PRN
Status: DISCONTINUED | OUTPATIENT
Start: 2024-04-23 | End: 2024-04-23 | Stop reason: HOSPADM

## 2024-04-23 RX ORDER — IBUPROFEN 400 MG/1
400 TABLET ORAL EVERY 6 HOURS PRN
Qty: 30 TABLET | Refills: 3 | Status: SHIPPED | OUTPATIENT
Start: 2024-04-23

## 2024-04-23 RX ORDER — OXYCODONE HYDROCHLORIDE 5 MG/1
5 TABLET ORAL EVERY 4 HOURS PRN
Qty: 18 TABLET | Refills: 0 | Status: SHIPPED | OUTPATIENT
Start: 2024-04-23 | End: 2024-04-26

## 2024-04-23 RX ORDER — DIPHENHYDRAMINE HCL 25 MG
25 TABLET ORAL EVERY 6 HOURS PRN
Qty: 30 TABLET | Refills: 0 | Status: SHIPPED | OUTPATIENT
Start: 2024-04-23

## 2024-04-23 RX ORDER — METRONIDAZOLE 500 MG/1
500 TABLET ORAL 2 TIMES DAILY
Qty: 14 TABLET | Refills: 0 | Status: SHIPPED | OUTPATIENT
Start: 2024-04-23 | End: 2024-04-30

## 2024-04-23 RX ORDER — DIPHENHYDRAMINE HCL 25 MG
25 TABLET ORAL EVERY 6 HOURS PRN
Status: DISCONTINUED | OUTPATIENT
Start: 2024-04-23 | End: 2024-04-23 | Stop reason: HOSPADM

## 2024-04-23 RX ORDER — FLUCONAZOLE 150 MG/1
TABLET ORAL
Qty: 2 TABLET | Refills: 0 | Status: SHIPPED | OUTPATIENT
Start: 2024-04-23

## 2024-04-23 RX ADMIN — OXYCODONE 5 MG: 5 TABLET ORAL at 08:07

## 2024-04-23 RX ADMIN — OXYCODONE 5 MG: 5 TABLET ORAL at 03:02

## 2024-04-23 RX ADMIN — DOXYCYCLINE 100 MG: 100 INJECTION, POWDER, LYOPHILIZED, FOR SOLUTION INTRAVENOUS at 09:20

## 2024-04-23 RX ADMIN — OXYCODONE 5 MG: 5 TABLET ORAL at 12:12

## 2024-04-23 RX ADMIN — METRONIDAZOLE 500 MG: 500 INJECTION, SOLUTION INTRAVENOUS at 04:51

## 2024-04-23 RX ADMIN — POLYETHYLENE GLYCOL 3350 17 G: 17 POWDER, FOR SOLUTION ORAL at 08:08

## 2024-04-23 RX ADMIN — METFORMIN HYDROCHLORIDE 500 MG: 500 TABLET ORAL at 09:15

## 2024-04-23 ASSESSMENT — PAIN DESCRIPTION - LOCATION: LOCATION: ABDOMEN

## 2024-04-23 ASSESSMENT — PAIN SCALES - GENERAL: PAINLEVEL_OUTOF10: 9

## 2024-04-23 NOTE — DISCHARGE SUMMARY
Post-Op Day 1: Diagnostic laparoscopy, Left salpingectomy/oophorectomy, cyst removal     The patient feels well. Pt is sitting up in bed, SO at bedside. The patient denies emotional concerns. Pain is moderately controlled with current medications. Taking Roxicodone and feels like \"it wears off quickly.\" Has allergy to Tylenol and Motrin- pt reports as \"rash.\" States \"I would be fine with a rash if my pain was better.\" Urinary output is adequate. The patient is ambulating well. The patient is tolerating a normal diet. Flatus has been passed.    Objective:      Patient Vitals for the past 8 hrs:  Vitals:    04/23/24 0759   BP:    Pulse:    Resp:    Temp:    SpO2: 100%       General:    alert, appears stated age, and cooperative   Bowel Sounds:  active   Vaginal bleeding:  scant   Lungs:   CTAB   Incision:  healing well, no significant drainage, no dehiscence, no significant erythema, lap sites with steristrips intact   DVT Evaluation:  No evidence of DVT seen on physical exam.     Lab Results   Component Value Date    WBC 17.1 (H) 04/23/2024    HGB 13.2 04/23/2024    HCT 37.3 04/23/2024    MCV 89.2 04/23/2024     04/23/2024     Assessment:     Status post laparoscopy, salpingectomy, oophorectomy, cyst removal. Doing well postoperatively.     Plan:     Continue current care. Discussed case with Dr Philippe. Discussed possibly starting Tylenol or Motrin prn and add Benadryl to help with side effects. Pt agreeable and states she would like to try Ibuprofen and Benadryl if possible. Ok to discharge home with standard precautions and can f/u in 1 week in office, sooner if any problems.

## 2024-04-23 NOTE — PROGRESS NOTES
Patient transported to room by 2 rn family and receiving  rn at bedside.  Electronically signed by David Burrows RN on 4/22/2024 at 9:27 PM

## 2024-04-23 NOTE — PROGRESS NOTES
4 Eyes Skin Assessment     NAME:  Aniya Alfaro  YOB: 1990  MEDICAL RECORD NUMBER:  [id number]    The patient is being assessed for  Admission    I agree that at least one RN has performed a thorough Head to Toe Skin Assessment on the patient. ALL assessment sites listed below have been assessed.      Areas assessed by both nurses:    Head, Face, Ears, Shoulders, Back, Chest, Arms, Elbows, Hands, Sacrum. Buttock, Coccyx, Ischium, and Legs. Feet and Heels        Does the Patient have a Wound? No noted wound(s)       Kiran Prevention initiated by RN: No  Wound Care Orders initiated by RN: No    Pressure Injury (Stage 3,4, Unstageable, DTI, NWPT, and Complex wounds) if present, place Wound referral order by RN under : No    New Ostomies, if present place, Ostomy referral order under : No     Nurse 1 eSignature: Electronically signed by Nayely Guillermo RN on 4/22/24 at 10:38 PM CDT    **SHARE this note so that the co-signing nurse can place an eSignature**    Nurse 2 eSignature: Electronically signed by Zoya Smith RN on 4/23/24 at 1:11 AM CDT

## 2024-04-23 NOTE — PROGRESS NOTES
RN asked pt about her allergy to ibuprofen and spoke with her about the doctors recommendations for ibuprofen/benadryl in conjunction with oxycodone. Pt agitated, stating that \"ibuprofen isn't going to help with the pain and I just need something for pain\". RN explained that she would still be receiving the oxycodone, the ibuprofen would just be given along with the oxycodone to help alleviate the pain and the benadryl is just to help prevent a reaction, such as hives. Pt says \"I was supposed to get Lortabs, but I don't care just give me all of it\".

## 2024-04-23 NOTE — ANESTHESIA POSTPROCEDURE EVALUATION
Department of Anesthesiology  Postprocedure Note    Patient: Aniya Alfaro  MRN: [id number]  YOB: 1990  Date of evaluation: 4/22/2024    Procedure Summary       Date: 04/22/24 Room / Location: [address]    Anesthesia Start: 1804 Anesthesia Stop: 2044    Procedure: DIAGNOSTIC ROBOTIC LAPAROSCOPY, LEFT SALPINGECTOMY/OOPHORECTOMY, CYST REMOVAL Diagnosis:       Pelvic pain      (Pelvic pain [R10.2])    Surgeons: Katie Boone MD Responsible Provider: Jeffrey Mg APRN - CRNA    Anesthesia Type: general ASA Status: 2 - Emergent            Anesthesia Type: No value filed.    Orville Phase I: Orville Score: 10    Orville Phase II:      Anesthesia Post Evaluation    Patient location during evaluation: PACU  Patient participation: complete - patient participated  Level of consciousness: sleepy but conscious  Pain score: 0  Airway patency: patent  Nausea & Vomiting: no nausea and no vomiting  Cardiovascular status: hemodynamically stable and blood pressure returned to baseline  Respiratory status: acceptable, spontaneous ventilation, nonlabored ventilation and room air  Hydration status: stable  Comments: BP (!) 141/79   Pulse (!) 104   Temp 97.6 °F (36.4 °C) (Temporal)   Resp 20   LMP 03/23/2024   SpO2 95%     Pain management: adequate    No notable events documented.

## 2024-04-23 NOTE — BRIEF OP NOTE
Brief Postoperative Note      Patient: Aniya Alfaro  YOB: 1990  MRN: [id number]    Date of Procedure: 4/22/2024    Pre-Op Diagnosis Codes:     * Pelvic pain [R10.2]    Post-Op Diagnosis: Same and TOA       Procedure(s):  DIAGNOSTIC ROBOTIC LAPAROSCOPY, LEFT SALPINGECTOMY/OOPHORECTOMY, CYST REMOVAL    Surgeon(s):  Katie Boone MD    Assistant:  * No surgical staff found *    Anesthesia: General    Estimated Blood Loss (mL): 500    Complications: None    Specimens:   ID Type Source Tests Collected by Time Destination   A : LEFT fallopian tube and ovary Tissue Fallopian Tube SURGICAL PATHOLOGY Katie Boone MD 4/22/2024 1927        Implants:  * No implants in log *      Drains:   [REMOVED] Urinary Catheter 04/22/24 Elizabeth (Removed)       Findings:  Infection Present At Time Of Surgery (PATOS) (choose all levels that have infection present):  - Organ Space infection (below fascia) present as evidenced by abscess and pus  Other Findings: Walled off abscess posterior to the uterus and involving the left adnexa  This procedure was not performed to treat colon cancer through resection    Electronically signed by Katie Boone MD on 4/22/2024 at 8:46 PM

## 2024-04-23 NOTE — OP NOTE
Operative Note      Patient: Aniya DICKERSON Kerens  YOB: 1990  MRN: [id number]    Date of Procedure: 4/22/2024    Pre-Op Diagnosis Codes:     * Pelvic pain [R10.2]  Adnexal mass    Post-Op Diagnosis: Same and TOA, pelvic adhesive disease       Procedure(s):  DIAGNOSTIC ROBOTIC LAPAROSCOPY, LEFT SALPINGECTOMY/OOPHORECTOMY, CYST REMOVAL    Surgeon(s):  Katie Boone MD    Assistant:   * No surgical staff found *    Anesthesia: General    Estimated Blood Loss (mL): 500    Complications: None    Specimens:   ID Type Source Tests Collected by Time Destination   A : LEFT fallopian tube and ovary Tissue Fallopian Tube SURGICAL PATHOLOGY Katie Boone MD 4/22/2024 1927        Implants:  * No implants in log *      Drains:   [REMOVED] Urinary Catheter 04/22/24 Elizabeth (Removed)   Output (mL) 200 mL 04/23/24 0035       Findings  Infection Present At Time Of Surgery (PATOS) (choose all levels that have infection present):  - Organ Space infection (below fascia) present as evidenced by abscess and pus  Other Findings:  Walled off abscess posterior to the uterus and involving the left adnexa  This procedure was not performed to treat colon cancer through resection      Detailed Description of Procedure:   The patient was taken to the operating room where she was placed under general anesthesia.  She was positioned in dorsal lithotomy, prepped and draped in the usual sterile fashion.  A weighted speculum was then placed into the vagina, and the anterior lip of the cervix was grasped with a single-tooth tenaculum.  The cervix was serially dilated to accommodate a  Uterine Manipulator, which was placed without complication.      Attention was then turned to the abdomen where a supraumbilical incision was made.  Veress needle was placed.  Placement was confirmed with saline drop testand aspiration of air.  Pneumoperitoneum was created.  Two 8-mm ports were placed, 9 cm on each side of the umbilicus.  An

## 2024-04-23 NOTE — DISCHARGE INSTR - ACTIVITY
No lifting greater than 15 pounds for 2 weeks  Nothing in the vagina for 6 weeks  No lotions to dermabond or steristrips  Shower only for 6 weeks  No driving for 2 weeks.

## 2024-04-23 NOTE — PROGRESS NOTES
Discharge paperwork provided to & reviewed with pt. Pt was encouraged to wait for transportation to take her down via wheelchair, but insisted on walking out. Pt ambulated out with significant other.

## 2024-04-23 NOTE — TELEPHONE ENCOUNTER
Marta called to schedule 1wk post op with Dr. Katie Philippe, [facility] unable to schedule within time frame, unable to reach office. Please contact pt to discuss, thank you.

## 2024-04-29 ENCOUNTER — OFFICE VISIT (OUTPATIENT)
Dept: OBGYN CLINIC | Age: 34
End: 2024-04-29

## 2024-04-29 VITALS
HEIGHT: 67 IN | SYSTOLIC BLOOD PRESSURE: 133 MMHG | BODY MASS INDEX: 37.51 KG/M2 | DIASTOLIC BLOOD PRESSURE: 86 MMHG | HEART RATE: 73 BPM | WEIGHT: 239 LBS

## 2024-04-29 DIAGNOSIS — Z98.890 S/P LAPAROSCOPY: ICD-10-CM

## 2024-04-29 DIAGNOSIS — Z48.89 POSTOPERATIVE VISIT: Primary | ICD-10-CM

## 2024-04-29 LAB
ERYTHROCYTE [DISTWIDTH] IN BLOOD BY AUTOMATED COUNT: 12.7 % (ref 11.5–14.5)
HCT VFR BLD AUTO: 35.8 % (ref 37–47)
HGB BLD-MCNC: 12.5 G/DL (ref 12–16)
MCH RBC QN AUTO: 31.6 PG (ref 27–31)
MCHC RBC AUTO-ENTMCNC: 34.9 G/DL (ref 33–37)
MCV RBC AUTO: 90.4 FL (ref 81–99)
PLATELET # BLD AUTO: 491 K/UL (ref 130–400)
PMV BLD AUTO: 9.6 FL (ref 9.4–12.3)
RBC # BLD AUTO: 3.96 M/UL (ref 4.2–5.4)
WBC # BLD AUTO: 11.7 K/UL (ref 4.8–10.8)

## 2024-04-29 PROCEDURE — 99024 POSTOP FOLLOW-UP VISIT: CPT | Performed by: OBSTETRICS & GYNECOLOGY

## 2024-04-29 RX ORDER — OXYCODONE HYDROCHLORIDE AND ACETAMINOPHEN 5; 325 MG/1; MG/1
1 TABLET ORAL EVERY 4 HOURS PRN
Qty: 18 TABLET | Refills: 0 | Status: SHIPPED | OUTPATIENT
Start: 2024-04-29 | End: 2024-05-02

## 2024-04-29 NOTE — PROGRESS NOTES
Pt is here today for a  1 wk postop visit following a DIAGNOSTIC ROBOTIC LAPAROSCOPY, LEFT SALPINGECTOMY/OOPHORECTOMY, CYST REMOVAL on 04/22/24, Pt states she is having left sided pain, pain meds - ineffective   
work or from getting things needed for daily living?: No   Physical Activity: Unknown (4/22/2024)    Physical Activity ([facility] HRSN)     In the last 30 days, other than the activities you did for work, on average, how many days per week did you engage in moderate exercise (like walking fast, running, jogging, dancing, swimming, biking, or other similar activites)?: 1     Number of minutes of exercise per week:: 30   Stress: Not on file   Social Connections: Socially Integrated (4/22/2024)    Social Connections ([facility])     If for any reason you need help with day-to-day activities such as bathing, preparing meals, shopping, managing finances, etc., do you get the help you need?: I don't need any help   Intimate Partner Violence: Not on file   Housing Stability: Not on file (4/22/2024)         Current Outpatient Medications:     ibuprofen (ADVIL;MOTRIN) 400 MG tablet, Take 1 tablet by mouth every 6 hours as needed for Pain, Disp: 30 tablet, Rfl: 3    metFORMIN (GLUCOPHAGE) 500 MG tablet, Take 1 tablet by mouth 2 times daily (with meals), Disp: 60 tablet, Rfl: 3    diphenhydrAMINE (BENADRYL) 25 MG tablet, Take 1 tablet by mouth every 6 hours as needed for Itching or Allergies (to be given with Ibuprofen), Disp: 30 tablet, Rfl: 0    fluconazole (DIFLUCAN) 150 MG tablet, Take 1 now and 1 in three days., Disp: 2 tablet, Rfl: 0    Allergies   Allergen Reactions    Ibuprofen-Acetaminophen Hives    Onion Anaphylaxis    Other Anaphylaxis     onions    Metoclopramide Other (See Comments)    Carafate [Sucralfate] Nausea And Vomiting    Tramadol Rash       /86 (Site: Left Upper Arm, Position: Sitting, Cuff Size: Large Adult)   Pulse 73   Ht 1.702 m (5' 7\")   Wt 108.4 kg (239 lb)   LMP 03/23/2024   BMI 37.43 kg/m²   Physical Exam  Constitutional:       General: She is not in acute distress.     Appearance: She is well-developed.   HENT:      Head: Normocephalic and atraumatic.   Eyes:      Conjunctiva/sclera:

## 2024-07-13 ENCOUNTER — HOSPITAL ENCOUNTER (EMERGENCY)
Age: 34
Discharge: HOME | End: 2024-07-13
Attending: EMERGENCY MEDICINE
Payer: MEDICAID

## 2024-07-13 ENCOUNTER — APPOINTMENT (OUTPATIENT)
Dept: GENERAL RADIOLOGY | Age: 34
End: 2024-07-13
Payer: MEDICAID

## 2024-07-13 VITALS
HEART RATE: 68 BPM | RESPIRATION RATE: 17 BRPM | DIASTOLIC BLOOD PRESSURE: 75 MMHG | BODY MASS INDEX: 37.59 KG/M2 | SYSTOLIC BLOOD PRESSURE: 122 MMHG | WEIGHT: 240 LBS | TEMPERATURE: 97.1 F | OXYGEN SATURATION: 97 %

## 2024-07-13 DIAGNOSIS — R11.2 NAUSEA AND VOMITING, UNSPECIFIED VOMITING TYPE: ICD-10-CM

## 2024-07-13 DIAGNOSIS — R07.9 CHEST PAIN, UNSPECIFIED TYPE: Primary | ICD-10-CM

## 2024-07-13 DIAGNOSIS — R06.00 DYSPNEA, UNSPECIFIED TYPE: ICD-10-CM

## 2024-07-13 LAB
ALBUMIN SERPL-MCNC: 3.6 G/DL (ref 3.5–5.2)
ALP SERPL-CCNC: 63 U/L (ref 35–104)
ALT SERPL-CCNC: 14 U/L (ref 5–33)
ANION GAP SERPL CALCULATED.3IONS-SCNC: 8 MMOL/L (ref 7–19)
AST SERPL-CCNC: 17 U/L (ref 5–32)
BASOPHILS # BLD: 0.1 K/UL (ref 0–0.2)
BASOPHILS NFR BLD: 0.7 % (ref 0–1)
BILIRUB SERPL-MCNC: 0.2 MG/DL (ref 0.2–1.2)
BUN SERPL-MCNC: 10 MG/DL (ref 6–20)
CALCIUM SERPL-MCNC: 8.5 MG/DL (ref 8.6–10)
CHLORIDE SERPL-SCNC: 102 MMOL/L (ref 98–111)
CO2 SERPL-SCNC: 26 MMOL/L (ref 22–29)
CREAT SERPL-MCNC: 0.7 MG/DL (ref 0.5–0.9)
D DIMER PPP FEU-MCNC: 0.46 UG/ML FEU (ref 0–0.48)
EKG P AXIS: 15 DEGREES
EKG P-R INTERVAL: 172 MS
EKG Q-T INTERVAL: 390 MS
EKG QRS DURATION: 84 MS
EKG QTC CALCULATION (BAZETT): 390 MS
EKG T AXIS: 52 DEGREES
EOSINOPHIL # BLD: 0.3 K/UL (ref 0–0.6)
EOSINOPHIL NFR BLD: 3.5 % (ref 0–5)
ERYTHROCYTE [DISTWIDTH] IN BLOOD BY AUTOMATED COUNT: 13.2 % (ref 11.5–14.5)
GLUCOSE SERPL-MCNC: 131 MG/DL (ref 74–109)
HCG SERPL QL: NEGATIVE
HCT VFR BLD AUTO: 38.8 % (ref 37–47)
HGB BLD-MCNC: 13.1 G/DL (ref 12–16)
IMM GRANULOCYTES # BLD: 0 K/UL
LYMPHOCYTES # BLD: 3.2 K/UL (ref 1.1–4.5)
LYMPHOCYTES NFR BLD: 38.9 % (ref 20–40)
MCH RBC QN AUTO: 30.3 PG (ref 27–31)
MCHC RBC AUTO-ENTMCNC: 33.8 G/DL (ref 33–37)
MCV RBC AUTO: 89.6 FL (ref 81–99)
MONOCYTES # BLD: 0.4 K/UL (ref 0–0.9)
MONOCYTES NFR BLD: 5.2 % (ref 0–10)
NEUTROPHILS # BLD: 4.3 K/UL (ref 1.5–7.5)
NEUTS SEG NFR BLD: 51.3 % (ref 50–65)
PLATELET # BLD AUTO: 296 K/UL (ref 130–400)
PMV BLD AUTO: 9.6 FL (ref 9.4–12.3)
POTASSIUM SERPL-SCNC: 4 MMOL/L (ref 3.5–5)
PROT SERPL-MCNC: 6.3 G/DL (ref 6.6–8.7)
RBC # BLD AUTO: 4.33 M/UL (ref 4.2–5.4)
SODIUM SERPL-SCNC: 136 MMOL/L (ref 136–145)
TROPONIN, HIGH SENSITIVITY: <6 NG/L (ref 0–14)
TROPONIN, HIGH SENSITIVITY: <6 NG/L (ref 0–14)
WBC # BLD AUTO: 8.3 K/UL (ref 4.8–10.8)

## 2024-07-13 PROCEDURE — 93005 ELECTROCARDIOGRAM TRACING: CPT | Performed by: EMERGENCY MEDICINE

## 2024-07-13 PROCEDURE — 36415 COLL VENOUS BLD VENIPUNCTURE: CPT

## 2024-07-13 PROCEDURE — 85379 FIBRIN DEGRADATION QUANT: CPT

## 2024-07-13 PROCEDURE — 71045 X-RAY EXAM CHEST 1 VIEW: CPT

## 2024-07-13 PROCEDURE — 96375 TX/PRO/DX INJ NEW DRUG ADDON: CPT

## 2024-07-13 PROCEDURE — 96374 THER/PROPH/DIAG INJ IV PUSH: CPT

## 2024-07-13 PROCEDURE — 85025 COMPLETE CBC W/AUTO DIFF WBC: CPT

## 2024-07-13 PROCEDURE — 84703 CHORIONIC GONADOTROPIN ASSAY: CPT

## 2024-07-13 PROCEDURE — 99285 EMERGENCY DEPT VISIT HI MDM: CPT

## 2024-07-13 PROCEDURE — 6360000002 HC RX W HCPCS: Performed by: EMERGENCY MEDICINE

## 2024-07-13 PROCEDURE — 84484 ASSAY OF TROPONIN QUANT: CPT

## 2024-07-13 PROCEDURE — 80053 COMPREHEN METABOLIC PANEL: CPT

## 2024-07-13 RX ORDER — ORPHENADRINE CITRATE 30 MG/ML
60 INJECTION INTRAMUSCULAR; INTRAVENOUS ONCE
Status: COMPLETED | OUTPATIENT
Start: 2024-07-13 | End: 2024-07-13

## 2024-07-13 RX ORDER — MORPHINE SULFATE 4 MG/ML
4 INJECTION, SOLUTION INTRAMUSCULAR; INTRAVENOUS ONCE
Status: COMPLETED | OUTPATIENT
Start: 2024-07-13 | End: 2024-07-13

## 2024-07-13 RX ORDER — ONDANSETRON 2 MG/ML
4 INJECTION INTRAMUSCULAR; INTRAVENOUS ONCE
Status: COMPLETED | OUTPATIENT
Start: 2024-07-13 | End: 2024-07-13

## 2024-07-13 RX ORDER — ONDANSETRON 4 MG/1
4 TABLET, ORALLY DISINTEGRATING ORAL 3 TIMES DAILY PRN
Qty: 10 TABLET | Refills: 0 | Status: SHIPPED | OUTPATIENT
Start: 2024-07-13

## 2024-07-13 RX ADMIN — ONDANSETRON 4 MG: 2 INJECTION INTRAMUSCULAR; INTRAVENOUS at 19:14

## 2024-07-13 RX ADMIN — ORPHENADRINE CITRATE 60 MG: 60 INJECTION INTRAMUSCULAR; INTRAVENOUS at 21:06

## 2024-07-13 RX ADMIN — MORPHINE SULFATE 4 MG: 4 INJECTION, SOLUTION INTRAMUSCULAR; INTRAVENOUS at 19:14

## 2024-07-13 ASSESSMENT — ENCOUNTER SYMPTOMS
EYE PAIN: 0
VOMITING: 1
SHORTNESS OF BREATH: 1
DIARRHEA: 0
NAUSEA: 1
ABDOMINAL PAIN: 0

## 2024-07-13 ASSESSMENT — PAIN SCALES - GENERAL: PAINLEVEL_OUTOF10: 10

## 2024-07-13 NOTE — ED PROVIDER NOTES
DISPOSITION/PLAN   DISPOSITION Decision To Discharge 07/13/2024 09:23:28 PM      PATIENT REFERRED TO:  @FUP@    DISCHARGE MEDICATIONS:  New Prescriptions    ONDANSETRON (ZOFRAN-ODT) 4 MG DISINTEGRATING TABLET    Take 1 tablet by mouth 3 times daily as needed for Nausea or Vomiting          (Please note that portions of this note were completed with a voice recognition program.  Efforts were made to edit the dictations butoccasionally words are mis-transcribed.)    Lee Webb MD (electronically signed)  AttendingEmergency Physician          Lee Webb MD  07/13/24 7345

## 2024-07-15 LAB
EKG P AXIS: 15 DEGREES
EKG P-R INTERVAL: 172 MS
EKG Q-T INTERVAL: 390 MS
EKG QRS DURATION: 84 MS
EKG QTC CALCULATION (BAZETT): 390 MS
EKG T AXIS: 52 DEGREES

## 2024-07-15 PROCEDURE — 93010 ELECTROCARDIOGRAM REPORT: CPT | Performed by: INTERNAL MEDICINE

## 2024-08-01 NOTE — TELEPHONE ENCOUNTER
Attempted to call patient regarding results and recommendations. Left a voicemail for patient to return call.       Work excuse faxd 8/6/21

## 2024-08-11 NOTE — ED NOTES
Discussed with Dr. Lorenzo and patient about the blood that has been hemolyzed. Patient stated that she does not want to be stuck again and is okay with being discharged without a troponin.     Dandy Santos RN  01/22/22 0358     No

## 2024-08-23 ENCOUNTER — HOSPITAL ENCOUNTER (EMERGENCY)
Age: 34
Discharge: HOME | End: 2024-08-24
Attending: PEDIATRICS
Payer: MEDICAID

## 2024-08-23 ENCOUNTER — APPOINTMENT (OUTPATIENT)
Dept: GENERAL RADIOLOGY | Age: 34
End: 2024-08-23
Payer: MEDICAID

## 2024-08-23 DIAGNOSIS — R07.9 CHEST PAIN, UNSPECIFIED TYPE: Primary | ICD-10-CM

## 2024-08-23 LAB
ALBUMIN SERPL-MCNC: 4.1 G/DL (ref 3.5–5.2)
ALP SERPL-CCNC: 70 U/L (ref 35–104)
ALT SERPL-CCNC: 20 U/L (ref 5–33)
ANION GAP SERPL CALCULATED.3IONS-SCNC: 10 MMOL/L (ref 7–19)
AST SERPL-CCNC: <5 U/L (ref 5–32)
BASOPHILS # BLD: 0.1 K/UL (ref 0–0.2)
BASOPHILS NFR BLD: 0.8 % (ref 0–1)
BILIRUB SERPL-MCNC: 0.2 MG/DL (ref 0.2–1.2)
BUN SERPL-MCNC: 9 MG/DL (ref 6–20)
CALCIUM SERPL-MCNC: 8.9 MG/DL (ref 8.6–10)
CHLORIDE SERPL-SCNC: 103 MMOL/L (ref 98–111)
CO2 SERPL-SCNC: 25 MMOL/L (ref 22–29)
CREAT SERPL-MCNC: 0.7 MG/DL (ref 0.5–0.9)
EKG P AXIS: 39 DEGREES
EKG P-R INTERVAL: 146 MS
EKG Q-T INTERVAL: 388 MS
EKG QRS DURATION: 82 MS
EKG QTC CALCULATION (BAZETT): 389 MS
EKG T AXIS: 47 DEGREES
EOSINOPHIL # BLD: 0.2 K/UL (ref 0–0.6)
EOSINOPHIL NFR BLD: 2.3 % (ref 0–5)
ERYTHROCYTE [DISTWIDTH] IN BLOOD BY AUTOMATED COUNT: 13.4 % (ref 11.5–14.5)
GLUCOSE SERPL-MCNC: 78 MG/DL (ref 70–99)
HCG SERPL QL: NEGATIVE
HCT VFR BLD AUTO: 42.8 % (ref 37–47)
HGB BLD-MCNC: 15.3 G/DL (ref 12–16)
IMM GRANULOCYTES # BLD: 0 K/UL
LYMPHOCYTES # BLD: 3.2 K/UL (ref 1.1–4.5)
LYMPHOCYTES NFR BLD: 39.8 % (ref 20–40)
MCH RBC QN AUTO: 31.4 PG (ref 27–31)
MCHC RBC AUTO-ENTMCNC: 35.7 G/DL (ref 33–37)
MCV RBC AUTO: 87.7 FL (ref 81–99)
MONOCYTES # BLD: 0.8 K/UL (ref 0–0.9)
MONOCYTES NFR BLD: 9.9 % (ref 0–10)
NEUTROPHILS # BLD: 3.7 K/UL (ref 1.5–7.5)
NEUTS SEG NFR BLD: 46.8 % (ref 50–65)
PLATELET # BLD AUTO: 317 K/UL (ref 130–400)
PMV BLD AUTO: 9.9 FL (ref 9.4–12.3)
POTASSIUM SERPL-SCNC: 4.4 MMOL/L (ref 3.5–5)
PROT SERPL-MCNC: 7.3 G/DL (ref 6.6–8.7)
RBC # BLD AUTO: 4.88 M/UL (ref 4.2–5.4)
SODIUM SERPL-SCNC: 138 MMOL/L (ref 136–145)
TROPONIN, HIGH SENSITIVITY: <6 NG/L (ref 0–14)
WBC # BLD AUTO: 8 K/UL (ref 4.8–10.8)

## 2024-08-23 PROCEDURE — 93005 ELECTROCARDIOGRAM TRACING: CPT | Performed by: PEDIATRICS

## 2024-08-23 PROCEDURE — 71045 X-RAY EXAM CHEST 1 VIEW: CPT

## 2024-08-23 PROCEDURE — 99285 EMERGENCY DEPT VISIT HI MDM: CPT

## 2024-08-23 ASSESSMENT — PAIN SCALES - GENERAL: PAINLEVEL_OUTOF10: 10

## 2024-08-23 ASSESSMENT — PAIN DESCRIPTION - LOCATION: LOCATION: CHEST

## 2024-08-23 ASSESSMENT — PAIN DESCRIPTION - ORIENTATION: ORIENTATION: LEFT;MID

## 2024-08-23 ASSESSMENT — PAIN - FUNCTIONAL ASSESSMENT: PAIN_FUNCTIONAL_ASSESSMENT: 0-10

## 2024-08-23 ASSESSMENT — PAIN DESCRIPTION - DESCRIPTORS: DESCRIPTORS: HEAVINESS

## 2024-08-24 VITALS
WEIGHT: 245 LBS | HEART RATE: 74 BPM | RESPIRATION RATE: 18 BRPM | OXYGEN SATURATION: 99 % | BODY MASS INDEX: 38.37 KG/M2 | TEMPERATURE: 98.2 F | DIASTOLIC BLOOD PRESSURE: 78 MMHG | SYSTOLIC BLOOD PRESSURE: 126 MMHG

## 2024-08-24 LAB — TROPONIN, HIGH SENSITIVITY: <6 NG/L (ref 0–14)

## 2024-08-24 PROCEDURE — 85025 COMPLETE CBC W/AUTO DIFF WBC: CPT

## 2024-08-24 PROCEDURE — 6370000000 HC RX 637 (ALT 250 FOR IP): Performed by: PEDIATRICS

## 2024-08-24 PROCEDURE — 84484 ASSAY OF TROPONIN QUANT: CPT

## 2024-08-24 PROCEDURE — 80053 COMPREHEN METABOLIC PANEL: CPT

## 2024-08-24 PROCEDURE — 84703 CHORIONIC GONADOTROPIN ASSAY: CPT

## 2024-08-24 PROCEDURE — 6360000002 HC RX W HCPCS: Performed by: PEDIATRICS

## 2024-08-24 PROCEDURE — 36415 COLL VENOUS BLD VENIPUNCTURE: CPT

## 2024-08-24 PROCEDURE — 96374 THER/PROPH/DIAG INJ IV PUSH: CPT

## 2024-08-24 PROCEDURE — 96375 TX/PRO/DX INJ NEW DRUG ADDON: CPT

## 2024-08-24 RX ORDER — ASPIRIN 81 MG/1
324 TABLET, CHEWABLE ORAL ONCE
Status: COMPLETED | OUTPATIENT
Start: 2024-08-24 | End: 2024-08-24

## 2024-08-24 RX ORDER — ONDANSETRON 2 MG/ML
4 INJECTION INTRAMUSCULAR; INTRAVENOUS ONCE
Status: COMPLETED | OUTPATIENT
Start: 2024-08-24 | End: 2024-08-24

## 2024-08-24 RX ORDER — MORPHINE SULFATE 4 MG/ML
4 INJECTION, SOLUTION INTRAMUSCULAR; INTRAVENOUS ONCE
Status: COMPLETED | OUTPATIENT
Start: 2024-08-24 | End: 2024-08-24

## 2024-08-24 RX ADMIN — LIDOCAINE HYDROCHLORIDE: 20 SOLUTION ORAL at 02:12

## 2024-08-24 RX ADMIN — ASPIRIN 81 MG CHEWABLE TABLET 324 MG: 81 TABLET CHEWABLE at 00:23

## 2024-08-24 RX ADMIN — MORPHINE SULFATE 4 MG: 4 INJECTION, SOLUTION INTRAMUSCULAR; INTRAVENOUS at 00:20

## 2024-08-24 RX ADMIN — ONDANSETRON 4 MG: 2 INJECTION, SOLUTION INTRAMUSCULAR; INTRAVENOUS at 00:20

## 2024-08-24 ASSESSMENT — ENCOUNTER SYMPTOMS
COUGH: 0
VOMITING: 0
BACK PAIN: 0
NAUSEA: 0
SHORTNESS OF BREATH: 0
COLOR CHANGE: 0
ABDOMINAL PAIN: 0
RHINORRHEA: 0

## 2024-08-24 ASSESSMENT — PAIN DESCRIPTION - DESCRIPTORS: DESCRIPTORS: ACHING

## 2024-08-24 ASSESSMENT — PAIN SCALES - GENERAL: PAINLEVEL_OUTOF10: 10

## 2024-08-24 ASSESSMENT — PAIN DESCRIPTION - LOCATION: LOCATION: BACK;CHEST

## 2024-08-24 NOTE — ED PROVIDER NOTES
[facility] EMERGENCY DEPT  eMERGENCY dEPARTMENT eNCOUnter      Pt Name: Aniya Alfaro  MRN: [id number]  Birthdate 1990  Date of evaluation: 8/23/2024  Provider: Tootie Perez MD    CHIEF COMPLAINT       Chief Complaint   Patient presents with    Chest Pain     Since this morning. Left sided.          HISTORY OF PRESENT ILLNESS   (Location/Symptom, Timing/Onset,Context/Setting, Quality, Duration, Modifying Factors, Severity)  Note limiting factors.   Aniya Alfaro is a 34 y.o. female who presents to the emergency department with chest pain.  Patient states that chest pain began earlier today.  Patient describes pain as \"pressure.\"  Patient points to left chest as source of pain.  Patient states that pain radiates to her back.  Patient states the pain is worse when she turns her body.  \"Laying on my back helps.\"  Severity is 10 out of 10.  Patient denies shortness of breath, nausea, vomiting, diaphoresis, palpitations, lower extremity pain or swelling.    HPI    NursingNotes were reviewed.    REVIEW OF SYSTEMS    (2-9 systems for level 4, 10 or more for level 5)     Review of Systems   Constitutional:  Negative for chills and fever.   HENT:  Negative for congestion and rhinorrhea.    Respiratory:  Negative for cough and shortness of breath.    Cardiovascular:  Positive for chest pain. Negative for palpitations.   Gastrointestinal:  Negative for abdominal pain, nausea and vomiting.   Genitourinary:  Negative for difficulty urinating and dysuria.   Musculoskeletal:  Negative for back pain and neck pain.   Skin:  Negative for color change and rash.   Neurological:  Negative for syncope and light-headedness.   Psychiatric/Behavioral:  Negative for agitation and confusion.    All other systems reviewed and are negative.           PAST MEDICALHISTORY     Past Medical History:   Diagnosis Date    Asthma     Obese     Ovarian cyst          SURGICAL HISTORY       Past Surgical History:   Procedure Laterality Date      DISPOSITION/PLAN   DISPOSITION Decision To Discharge 08/24/2024 02:24:22 AM  Condition at Disposition: Data Unavailable      PATIENT REFERRED TO:  [facility]  [address]  [facility] [address] [phone]  Schedule an appointment as soon as possible for a visit       [facility]  [address] [place] [address] Dr. Theresa June  [facility] [address]  Schedule an appointment as soon as possible for a visit         DISCHARGE MEDICATIONS:  Discharge Medication List as of 8/24/2024  2:21 AM             (Please note that portions of this note were completed with a voice recognition program.  Efforts were made to edit thedictations but occasionally words are mis-transcribed.)    Tootie Perez MD (electronically signed)  Attending Emergency Physician          Tootie Perez MD  08/24/24 1095

## 2024-08-24 NOTE — DISCHARGE INSTRUCTIONS
Return or seek medical attention with increasing or severe pain, difficulty breathing, persistent vomiting, or other concerns.

## 2024-08-26 LAB
EKG P AXIS: 39 DEGREES
EKG P-R INTERVAL: 146 MS
EKG Q-T INTERVAL: 388 MS
EKG QRS DURATION: 82 MS
EKG QTC CALCULATION (BAZETT): 389 MS
EKG T AXIS: 47 DEGREES

## 2024-08-26 PROCEDURE — 93010 ELECTROCARDIOGRAM REPORT: CPT | Performed by: INTERNAL MEDICINE

## 2024-09-09 NOTE — ED NOTES
Patient yelling and crying upon arrival.  I was unable to get her to answer questions clearly. Patient yelling, screaming, crying and I continued to encourage her to calm down and answer my questions. Once I told her the physician ordered pain medication, patient quit crying. Dr. Luh Dolan updated.        Jose Noel RN  08/16/21 0002 5

## 2024-09-22 ENCOUNTER — APPOINTMENT (OUTPATIENT)
Dept: GENERAL RADIOLOGY | Age: 34
End: 2024-09-22
Payer: MEDICAID

## 2024-09-22 ENCOUNTER — HOSPITAL ENCOUNTER (EMERGENCY)
Age: 34
Discharge: HOME | End: 2024-09-22
Payer: MEDICAID

## 2024-09-22 VITALS
HEART RATE: 84 BPM | OXYGEN SATURATION: 97 % | SYSTOLIC BLOOD PRESSURE: 101 MMHG | WEIGHT: 244 LBS | TEMPERATURE: 98.5 F | BODY MASS INDEX: 38.3 KG/M2 | RESPIRATION RATE: 16 BRPM | DIASTOLIC BLOOD PRESSURE: 84 MMHG | HEIGHT: 67 IN

## 2024-09-22 DIAGNOSIS — S99.912A LEFT ANKLE INJURY, INITIAL ENCOUNTER: Primary | ICD-10-CM

## 2024-09-22 PROCEDURE — 73610 X-RAY EXAM OF ANKLE: CPT

## 2024-09-22 PROCEDURE — 99284 EMERGENCY DEPT VISIT MOD MDM: CPT

## 2024-09-22 PROCEDURE — 96372 THER/PROPH/DIAG INJ SC/IM: CPT

## 2024-09-22 PROCEDURE — 90471 IMMUNIZATION ADMIN: CPT | Performed by: PHYSICIAN ASSISTANT

## 2024-09-22 PROCEDURE — 6360000002 HC RX W HCPCS: Performed by: PHYSICIAN ASSISTANT

## 2024-09-22 PROCEDURE — 90715 TDAP VACCINE 7 YRS/> IM: CPT | Performed by: PHYSICIAN ASSISTANT

## 2024-09-22 PROCEDURE — 73630 X-RAY EXAM OF FOOT: CPT

## 2024-09-22 PROCEDURE — 6370000000 HC RX 637 (ALT 250 FOR IP): Performed by: PHYSICIAN ASSISTANT

## 2024-09-22 RX ORDER — MUPIROCIN 20 MG/G
OINTMENT TOPICAL 2 TIMES DAILY
Status: DISCONTINUED | OUTPATIENT
Start: 2024-09-22 | End: 2024-09-22 | Stop reason: HOSPADM

## 2024-09-22 RX ORDER — KETOROLAC TROMETHAMINE 30 MG/ML
30 INJECTION, SOLUTION INTRAMUSCULAR; INTRAVENOUS ONCE
Status: COMPLETED | OUTPATIENT
Start: 2024-09-22 | End: 2024-09-22

## 2024-09-22 RX ORDER — SULFAMETHOXAZOLE/TRIMETHOPRIM 800-160 MG
1 TABLET ORAL 2 TIMES DAILY
Qty: 20 TABLET | Refills: 0 | Status: SHIPPED | OUTPATIENT
Start: 2024-09-22 | End: 2024-10-02

## 2024-09-22 RX ORDER — DEXAMETHASONE SODIUM PHOSPHATE 10 MG/ML
4 INJECTION, SOLUTION INTRAMUSCULAR; INTRAVENOUS ONCE
Status: COMPLETED | OUTPATIENT
Start: 2024-09-22 | End: 2024-09-22

## 2024-09-22 RX ORDER — PREDNISONE 10 MG/1
10 TABLET ORAL DAILY
Qty: 10 TABLET | Refills: 0 | Status: SHIPPED | OUTPATIENT
Start: 2024-09-22 | End: 2024-10-02

## 2024-09-22 RX ADMIN — DEXAMETHASONE SODIUM PHOSPHATE 4 MG: 10 INJECTION, SOLUTION INTRAMUSCULAR; INTRAVENOUS at 11:16

## 2024-09-22 RX ADMIN — KETOROLAC TROMETHAMINE 30 MG: 30 INJECTION, SOLUTION INTRAMUSCULAR at 11:16

## 2024-09-22 RX ADMIN — TETANUS TOXOID, REDUCED DIPHTHERIA TOXOID AND ACELLULAR PERTUSSIS VACCINE, ADSORBED 0.5 ML: 5; 2.5; 8; 8; 2.5 SUSPENSION INTRAMUSCULAR at 10:02

## 2024-09-22 RX ADMIN — MUPIROCIN: 20 OINTMENT TOPICAL at 12:14

## 2024-09-22 ASSESSMENT — PAIN - FUNCTIONAL ASSESSMENT: PAIN_FUNCTIONAL_ASSESSMENT: 0-10

## 2024-09-22 ASSESSMENT — PAIN DESCRIPTION - DESCRIPTORS: DESCRIPTORS: SHARP

## 2024-09-22 ASSESSMENT — PAIN SCALES - GENERAL: PAINLEVEL_OUTOF10: 9

## 2024-09-22 ASSESSMENT — PAIN DESCRIPTION - ORIENTATION: ORIENTATION: LEFT

## 2024-09-22 ASSESSMENT — PAIN DESCRIPTION - LOCATION: LOCATION: FOOT;ANKLE

## 2024-10-07 ENCOUNTER — OFFICE VISIT (OUTPATIENT)
Age: 34
End: 2024-10-07
Payer: MEDICAID

## 2024-10-07 VITALS — HEIGHT: 67 IN | BODY MASS INDEX: 38.3 KG/M2 | WEIGHT: 244 LBS

## 2024-10-07 DIAGNOSIS — M24.173 JOINT DERANGEMENT OF ANKLE OR FOOT: ICD-10-CM

## 2024-10-07 DIAGNOSIS — M24.176 JOINT DERANGEMENT OF ANKLE OR FOOT: ICD-10-CM

## 2024-10-07 DIAGNOSIS — S97.82XD CRUSHING INJURY OF LEFT FOOT, SUBSEQUENT ENCOUNTER: ICD-10-CM

## 2024-10-07 DIAGNOSIS — G90.522 COMPLEX REGIONAL PAIN SYNDROME I OF LEFT LOWER LIMB: Primary | ICD-10-CM

## 2024-10-07 PROCEDURE — 99213 OFFICE O/P EST LOW 20 MIN: CPT | Performed by: NURSE PRACTITIONER

## 2024-10-07 RX ORDER — HYDROCODONE BITARTRATE AND ACETAMINOPHEN 7.5; 325 MG/1; MG/1
1 TABLET ORAL EVERY 8 HOURS PRN
Qty: 15 TABLET | Refills: 0 | Status: SHIPPED | OUTPATIENT
Start: 2024-10-07 | End: 2024-10-12

## 2024-10-07 RX ORDER — GABAPENTIN 100 MG/1
100 CAPSULE ORAL 3 TIMES DAILY
Qty: 90 CAPSULE | Refills: 0 | Status: SHIPPED | OUTPATIENT
Start: 2024-10-07 | End: 2024-11-06

## 2024-10-07 RX ORDER — HYDROCODONE BITARTRATE AND ACETAMINOPHEN 5; 325 MG/1; MG/1
1 TABLET ORAL EVERY 12 HOURS PRN
COMMUNITY
Start: 2024-09-26

## 2024-10-10 ENCOUNTER — TELEPHONE (OUTPATIENT)
Age: 34
End: 2024-10-10

## 2024-10-14 ENCOUNTER — HOSPITAL ENCOUNTER (OUTPATIENT)
Dept: MRI IMAGING | Age: 34
Discharge: HOME | End: 2024-10-14
Payer: MEDICAID

## 2024-10-14 DIAGNOSIS — M24.173 JOINT DERANGEMENT OF ANKLE OR FOOT: ICD-10-CM

## 2024-10-14 DIAGNOSIS — M24.176 JOINT DERANGEMENT OF ANKLE OR FOOT: ICD-10-CM

## 2024-10-14 DIAGNOSIS — S97.82XD CRUSHING INJURY OF LEFT FOOT, SUBSEQUENT ENCOUNTER: ICD-10-CM

## 2024-10-14 DIAGNOSIS — G90.522 COMPLEX REGIONAL PAIN SYNDROME I OF LEFT LOWER LIMB: ICD-10-CM

## 2024-10-14 PROCEDURE — 73718 MRI LOWER EXTREMITY W/O DYE: CPT

## 2024-10-15 ENCOUNTER — TELEPHONE (OUTPATIENT)
Age: 34
End: 2024-10-15

## 2024-10-15 DIAGNOSIS — G90.513 COMPLEX REGIONAL PAIN SYNDROME AFFECTING BOTH UPPER ARMS: Primary | ICD-10-CM

## 2024-10-15 RX ORDER — HYDROCODONE BITARTRATE AND ACETAMINOPHEN 7.5; 325 MG/1; MG/1
1 TABLET ORAL EVERY 8 HOURS PRN
Qty: 16 TABLET | Refills: 0 | Status: SHIPPED | OUTPATIENT
Start: 2024-10-15 | End: 2024-10-20

## 2024-10-23 ENCOUNTER — TELEPHONE (OUTPATIENT)
Age: 34
End: 2024-10-23

## 2024-10-24 ENCOUNTER — TELEPHONE (OUTPATIENT)
Age: 34
End: 2024-10-24

## 2024-10-24 NOTE — TELEPHONE ENCOUNTER
Patient is calling regarding the following: Call has not been returned regarding pain meds/ [facility] [address]

## 2024-10-25 DIAGNOSIS — G90.522 COMPLEX REGIONAL PAIN SYNDROME I OF LEFT LOWER LIMB: Primary | ICD-10-CM

## 2024-10-25 RX ORDER — HYDROCODONE BITARTRATE AND ACETAMINOPHEN 7.5; 325 MG/1; MG/1
1 TABLET ORAL EVERY 8 HOURS PRN
Qty: 15 TABLET | Refills: 0 | Status: SHIPPED | OUTPATIENT
Start: 2024-10-25 | End: 2024-10-30

## 2024-10-25 NOTE — TELEPHONE ENCOUNTER
Spoke to the patient to let her know that Alyce is going to send in some meds for her and verified her pharmacy.  Patient will be in clinic Tuesday for MRI review.  Patient agreeable.

## 2024-10-29 ENCOUNTER — OFFICE VISIT (OUTPATIENT)
Age: 34
End: 2024-10-29
Payer: MEDICAID

## 2024-10-29 VITALS — HEIGHT: 67 IN | BODY MASS INDEX: 39.24 KG/M2 | WEIGHT: 250 LBS

## 2024-10-29 DIAGNOSIS — G90.522 COMPLEX REGIONAL PAIN SYNDROME I OF LEFT LOWER LIMB: ICD-10-CM

## 2024-10-29 DIAGNOSIS — S97.82XD CRUSHING INJURY OF LEFT FOOT, SUBSEQUENT ENCOUNTER: Primary | ICD-10-CM

## 2024-10-29 DIAGNOSIS — S81.802A NON-HEALING WOUND OF LEFT LOWER EXTREMITY: ICD-10-CM

## 2024-10-29 PROCEDURE — 99213 OFFICE O/P EST LOW 20 MIN: CPT | Performed by: NURSE PRACTITIONER

## 2024-10-29 RX ORDER — HYDROCODONE BITARTRATE AND ACETAMINOPHEN 5; 325 MG/1; MG/1
1 TABLET ORAL EVERY 8 HOURS PRN
Qty: 15 TABLET | Refills: 0 | Status: SHIPPED | OUTPATIENT
Start: 2024-10-29 | End: 2024-11-03

## 2024-10-29 ASSESSMENT — ENCOUNTER SYMPTOMS
SHORTNESS OF BREATH: 0
COLOR CHANGE: 0
COUGH: 0
BLOOD IN STOOL: 0
CONSTIPATION: 0
VOMITING: 0
DIARRHEA: 0
NAUSEA: 0
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
## 2024-11-05 ENCOUNTER — TELEPHONE (OUTPATIENT)
Age: 34
End: 2024-11-05

## 2024-11-05 ENCOUNTER — HOSPITAL ENCOUNTER (OUTPATIENT)
Dept: WOUND CARE | Age: 34
Discharge: HOME | End: 2024-11-05
Payer: MEDICAID

## 2024-11-05 VITALS
TEMPERATURE: 96 F | HEART RATE: 75 BPM | DIASTOLIC BLOOD PRESSURE: 92 MMHG | WEIGHT: 250 LBS | SYSTOLIC BLOOD PRESSURE: 145 MMHG | BODY MASS INDEX: 39.24 KG/M2 | RESPIRATION RATE: 16 BRPM | HEIGHT: 67 IN

## 2024-11-05 DIAGNOSIS — L97.522 SKIN ULCER OF LEFT FOOT WITH FAT LAYER EXPOSED (HCC): Primary | ICD-10-CM

## 2024-11-05 PROCEDURE — 99214 OFFICE O/P EST MOD 30 MIN: CPT | Performed by: NURSE PRACTITIONER

## 2024-11-05 PROCEDURE — 99214 OFFICE O/P EST MOD 30 MIN: CPT

## 2024-11-05 RX ORDER — TRIAMCINOLONE ACETONIDE 1 MG/G
OINTMENT TOPICAL ONCE
OUTPATIENT
Start: 2024-11-05 | End: 2024-11-05

## 2024-11-05 RX ORDER — LIDOCAINE HYDROCHLORIDE 40 MG/ML
SOLUTION TOPICAL ONCE
OUTPATIENT
Start: 2024-11-05 | End: 2024-11-05

## 2024-11-05 RX ORDER — MUPIROCIN 20 MG/G
OINTMENT TOPICAL ONCE
OUTPATIENT
Start: 2024-11-05 | End: 2024-11-05

## 2024-11-05 RX ORDER — BACITRACIN ZINC AND POLYMYXIN B SULFATE 500; 1000 [USP'U]/G; [USP'U]/G
OINTMENT TOPICAL ONCE
OUTPATIENT
Start: 2024-11-05 | End: 2024-11-05

## 2024-11-05 RX ORDER — GINSENG 100 MG
CAPSULE ORAL ONCE
OUTPATIENT
Start: 2024-11-05 | End: 2024-11-05

## 2024-11-05 RX ORDER — LIDOCAINE HYDROCHLORIDE 20 MG/ML
JELLY TOPICAL ONCE
OUTPATIENT
Start: 2024-11-05 | End: 2024-11-05

## 2024-11-05 RX ORDER — SILVER SULFADIAZINE 10 MG/G
CREAM TOPICAL ONCE
OUTPATIENT
Start: 2024-11-05 | End: 2024-11-05

## 2024-11-05 RX ORDER — CLOBETASOL PROPIONATE 0.5 MG/G
OINTMENT TOPICAL ONCE
OUTPATIENT
Start: 2024-11-05 | End: 2024-11-05

## 2024-11-05 RX ORDER — GENTAMICIN SULFATE 1 MG/G
OINTMENT TOPICAL ONCE
OUTPATIENT
Start: 2024-11-05 | End: 2024-11-05

## 2024-11-05 RX ORDER — LIDOCAINE 50 MG/G
OINTMENT TOPICAL ONCE
OUTPATIENT
Start: 2024-11-05 | End: 2024-11-05

## 2024-11-05 RX ORDER — NEOMYCIN/BACITRACIN/POLYMYXINB 3.5-400-5K
OINTMENT (GRAM) TOPICAL ONCE
OUTPATIENT
Start: 2024-11-05 | End: 2024-11-05

## 2024-11-05 RX ORDER — SODIUM CHLOR/HYPOCHLOROUS ACID 0.033 %
SOLUTION, IRRIGATION IRRIGATION ONCE
OUTPATIENT
Start: 2024-11-05 | End: 2024-11-05

## 2024-11-05 RX ORDER — LIDOCAINE 40 MG/G
CREAM TOPICAL ONCE
OUTPATIENT
Start: 2024-11-05 | End: 2024-11-05

## 2024-11-05 RX ORDER — BETAMETHASONE DIPROPIONATE 0.5 MG/G
CREAM TOPICAL ONCE
OUTPATIENT
Start: 2024-11-05 | End: 2024-11-05

## 2024-11-05 RX ORDER — LIDOCAINE HYDROCHLORIDE 20 MG/ML
JELLY TOPICAL ONCE
Status: DISCONTINUED | OUTPATIENT
Start: 2024-11-05 | End: 2024-11-07 | Stop reason: HOSPADM

## 2024-11-05 ASSESSMENT — PAIN DESCRIPTION - ORIENTATION: ORIENTATION: LEFT

## 2024-11-05 ASSESSMENT — VISUAL ACUITY: OU: 1

## 2024-11-05 ASSESSMENT — PAIN DESCRIPTION - DESCRIPTORS: DESCRIPTORS: ACHING

## 2024-11-05 ASSESSMENT — PAIN SCALES - GENERAL: PAINLEVEL_OUTOF10: 10

## 2024-11-05 ASSESSMENT — PAIN DESCRIPTION - LOCATION: LOCATION: FOOT

## 2024-11-05 NOTE — TELEPHONE ENCOUNTER
Patient is calling regarding the following:  she is needing to speak to clinic regarding her pain meds

## 2024-11-05 NOTE — PATIENT INSTRUCTIONS
[facility] Wound Care and Hyperbaric Oxygen Therapy   Physician Orders and Discharge Instructions  [address] [place] [address]  [address]  [place] [address]  Telephone: [phone]      FAX [phone]    NAME:  Aniya Alfaro  YOB: 1990  MEDICAL RECORD NUMBER:  [id number]  DATE:  11/5/2024    Discharge condition: Stable    Discharge to: Home    Left via:Private automobile    Accompanied by:  friend    ECF/HHA: [facility]    Dressing Orders:  Left foot wound: Soap and water wash.  Apply a double layer of Xeroform (the yellow sticky dressing) over the wound bed daily. Secure with dry gauze, rolled gauze and tape daily. Avoid pressure to the wound at all times    Treatment Orders:  Protein rich diet (unless restricted by your physician); Multivitamin daily; Elevate legs above the level of your heart when sitting 3-4 times daily for at least one hour each time, avoid standing for long periods of time.    [facility] follow up visit ___1 week with Diane__________________________  (Please note your next appointment above and if you are unable to keep, kindly give a 24 hour notice. Thank you.)          If you experience any of the following, please call the Wound Care Center during business hours:    * Increase in Pain  * Temperature over 101  * Increase in drainage from your wound  * Drainage with a foul odor  * Bleeding  * Increase in swelling  * Need for compression bandage changes due to slippage, breakthrough drainage.    If you need medical attention outside of the business hours of the Wound Care Centers please contact your PCP or go to the nearest emergency room.

## 2024-11-05 NOTE — HOME CARE
(25-50%) 11/05/24 0912   Drainage Description Serosanguinous 11/05/24 0912   Odor None 11/05/24 0912   Suzi-wound Assessment Intact 11/05/24 0912   Margins Defined edges 11/05/24 0912   Wound Thickness Description not for Pressure Injury Full thickness 11/05/24 0912   Number of days: 0     Incision 04/22/24 Abdomen (Active)   Number of days: 196       Supplies Requested :      WOUND #: 1   PRIMARY DRESSING:  Other: xeroform   Cover and Secure with: 4X4 gauze pad  Bulky roll gauze     FREQUENCY OF DRESSING CHANGES:  Daily         ADDITIONAL ITEMS:  [] Gloves Small  [x] Gloves Medium [] Gloves Large [] Gloves XLarge  [] Tape 1\" [] Tape 2\" [x] Tape 3\"  [x] Medipore Tape  [x] Saline  [] Vashe  [] Skin Prep   [] Adhesive Remover   [] Cotton Tip Applicators   [] Other:    Patient Wound(s) Debrided: [x] Yes if yes please add date 11/5/24 [] No    Debribement Type: Mechanical     Is the patient currently on an antibiotic for their Wound(s): [] Yes if yes please add name and dose    [x] No    Patient currently being seen by Home Health: [] Yes   [x] No    Duration for needed supplies:  [x]15  []30  []60  []90 Days    Electronically signed by ZACKERY Comer - CNP on 11/5/2024 at 2:36 PM     Provider Information:      PROVIDER'S NAME: Diane VIZCARRA    NPI: [id number]

## 2024-11-05 NOTE — DISCHARGE INSTRUCTIONS
[facility] Wound Care and Hyperbaric Oxygen Therapy   Physician Orders and Discharge Instructions  [address] [place] [address]  [address]  [place] [address]  Telephone: [phone]      FAX [phone]    NAME:  Aniya Alfaro  YOB: 1990  MEDICAL RECORD NUMBER:  [id number]  DATE:  11/5/2024    Discharge condition: Stable    Discharge to: Home    Left via:Private automobile    Accompanied by:  friend    ECF/H[facility]: [facility]    Dressing Orders:  Left foot wound: Soap and water wash.  Apply a double layer of Xeroform (the yellow sticky dressing) over the wound bed daily. Secure with dry gauze, rolled gauze and tape daily. Avoid pressure to the wound at all times    Treatment Orders:  Protein rich diet (unless restricted by your physician); Multivitamin daily; Elevate legs above the level of your heart when sitting 3-4 times daily for at least one hour each time, avoid standing for long periods of time.    [facility] follow up visit ___1 week with Diane__________________________  (Please note your next appointment above and if you are unable to keep, kindly give a 24 hour notice. Thank you.)          If you experience any of the following, please call the Wound Care Center during business hours:    * Increase in Pain  * Temperature over 101  * Increase in drainage from your wound  * Drainage with a foul odor  * Bleeding  * Increase in swelling  * Need for compression bandage changes due to slippage, breakthrough drainage.    If you need medical attention outside of the business hours of the Wound Care Centers please contact your PCP or go to the nearest emergency room.

## 2024-11-05 NOTE — PROGRESS NOTES
HYSTEROSCOPY, performed by Belinda Perez MD at [facility] OR    LAPAROSCOPY N/A 4/22/2024    DIAGNOSTIC ROBOTIC LAPAROSCOPY, LEFT SALPINGECTOMY/OOPHORECTOMY, CYST REMOVAL performed by Katie Boone MD at [facility] OR    UPPER GASTROINTESTINAL ENDOSCOPY N/A 06/07/2021    Dr KARELY Jeong-Gastritis     Family History   Problem Relation Age of Onset    Diabetes Mother     High Blood Pressure Mother     Diabetes Sister     Diabetes Maternal Grandmother     Colon Cancer Neg Hx     Colon Polyps Neg Hx     Esophageal Cancer Neg Hx     Liver Cancer Neg Hx     Rectal Cancer Neg Hx     Stomach Cancer Neg Hx      Social History     Tobacco Use    Smoking status: Every Day     Current packs/day: 0.50     Average packs/day: 1.2 packs/day for 19.0 years (23.0 ttl pk-yrs)     Types: Cigarettes     Passive exposure: Current    Smokeless tobacco: Never   Substance Use Topics    Alcohol use: Yes     Alcohol/week: 0.0 standard drinks of alcohol     Comment: occ         Review of Systems    Review of Systems   Cardiovascular:  Positive for leg swelling.   Skin:  Positive for wound.   All other systems reviewed and are negative.      All other review of systems are negative.    Physical Exam    BP (!) 145/92   Pulse 75   Temp (!) 96 °F (35.6 °C) (Temporal)   Resp 16   Ht 1.702 m (5' 7.01\")   Wt 113.4 kg (250 lb)   BMI 39.15 kg/m²     Physical Exam  Vitals reviewed. Exam conducted with a chaperone present.   Constitutional:       Appearance: Normal appearance. She is obese.   HENT:      Head: Normocephalic and atraumatic.      Right Ear: External ear normal.      Left Ear: External ear normal.   Eyes:      General: Lids are normal. Lids are everted, no foreign bodies appreciated. Vision grossly intact. Gaze aligned appropriately.   Cardiovascular:      Rate and Rhythm: Normal rate and regular rhythm.      Pulses: Normal pulses.      Heart sounds: Normal heart sounds.   Pulmonary:      Effort: Pulmonary effort is normal.      Breath

## 2024-11-06 NOTE — TELEPHONE ENCOUNTER
Medication  Received: Yesterday  [facility] Clinical Staff  [facility]    Reason for Message: Pt is requesting a return call in regards to medication.  Please give patient a call back.    Call Back Number: [phone]  Caller Relationship: Patient  Caller Name: Aniya Alfaro

## 2024-11-06 NOTE — TELEPHONE ENCOUNTER
Per Alyce, call this patient back and tell her that this clinic can't prescribe pain medicine anymore.  Patient will need to go to PCP or pain management.  Patient stated she did not have a PCP and that pain mgmt has not called her yet.  Told patient clinic would check on the referral.    Patient agreeable.

## 2024-11-14 ENCOUNTER — HOSPITAL ENCOUNTER (OUTPATIENT)
Dept: WOUND CARE | Age: 34
Discharge: HOME | End: 2024-11-14
Payer: MEDICAID

## 2024-11-14 VITALS
WEIGHT: 250 LBS | HEART RATE: 78 BPM | BODY MASS INDEX: 39.24 KG/M2 | SYSTOLIC BLOOD PRESSURE: 133 MMHG | RESPIRATION RATE: 18 BRPM | TEMPERATURE: 97 F | HEIGHT: 67 IN | DIASTOLIC BLOOD PRESSURE: 81 MMHG

## 2024-11-14 DIAGNOSIS — L97.522 SKIN ULCER OF LEFT FOOT WITH FAT LAYER EXPOSED (HCC): Primary | ICD-10-CM

## 2024-11-14 PROCEDURE — 99212 OFFICE O/P EST SF 10 MIN: CPT | Performed by: NURSE PRACTITIONER

## 2024-11-14 PROCEDURE — 99212 OFFICE O/P EST SF 10 MIN: CPT

## 2024-11-14 RX ORDER — TRIAMCINOLONE ACETONIDE 1 MG/G
OINTMENT TOPICAL ONCE
Status: CANCELLED | OUTPATIENT
Start: 2024-11-14 | End: 2024-11-14

## 2024-11-14 RX ORDER — BETAMETHASONE DIPROPIONATE 0.5 MG/G
CREAM TOPICAL ONCE
Status: CANCELLED | OUTPATIENT
Start: 2024-11-14 | End: 2024-11-14

## 2024-11-14 RX ORDER — SODIUM CHLOR/HYPOCHLOROUS ACID 0.033 %
SOLUTION, IRRIGATION IRRIGATION ONCE
Status: CANCELLED | OUTPATIENT
Start: 2024-11-14 | End: 2024-11-14

## 2024-11-14 RX ORDER — LIDOCAINE HYDROCHLORIDE 20 MG/ML
JELLY TOPICAL ONCE
Status: CANCELLED | OUTPATIENT
Start: 2024-11-14 | End: 2024-11-14

## 2024-11-14 RX ORDER — NEOMYCIN/BACITRACIN/POLYMYXINB 3.5-400-5K
OINTMENT (GRAM) TOPICAL ONCE
Status: CANCELLED | OUTPATIENT
Start: 2024-11-14 | End: 2024-11-14

## 2024-11-14 RX ORDER — LIDOCAINE 40 MG/G
CREAM TOPICAL ONCE
Status: CANCELLED | OUTPATIENT
Start: 2024-11-14 | End: 2024-11-14

## 2024-11-14 RX ORDER — GENTAMICIN SULFATE 1 MG/G
OINTMENT TOPICAL ONCE
Status: CANCELLED | OUTPATIENT
Start: 2024-11-14 | End: 2024-11-14

## 2024-11-14 RX ORDER — CLOBETASOL PROPIONATE 0.5 MG/G
OINTMENT TOPICAL ONCE
Status: CANCELLED | OUTPATIENT
Start: 2024-11-14 | End: 2024-11-14

## 2024-11-14 RX ORDER — LIDOCAINE 50 MG/G
OINTMENT TOPICAL ONCE
Status: CANCELLED | OUTPATIENT
Start: 2024-11-14 | End: 2024-11-14

## 2024-11-14 RX ORDER — GINSENG 100 MG
CAPSULE ORAL ONCE
Status: CANCELLED | OUTPATIENT
Start: 2024-11-14 | End: 2024-11-14

## 2024-11-14 RX ORDER — BACITRACIN ZINC AND POLYMYXIN B SULFATE 500; 1000 [USP'U]/G; [USP'U]/G
OINTMENT TOPICAL ONCE
Status: CANCELLED | OUTPATIENT
Start: 2024-11-14 | End: 2024-11-14

## 2024-11-14 RX ORDER — SILVER SULFADIAZINE 10 MG/G
CREAM TOPICAL ONCE
Status: CANCELLED | OUTPATIENT
Start: 2024-11-14 | End: 2024-11-14

## 2024-11-14 RX ORDER — LIDOCAINE HYDROCHLORIDE 20 MG/ML
JELLY TOPICAL ONCE
Status: COMPLETED | OUTPATIENT
Start: 2024-11-14 | End: 2024-11-14

## 2024-11-14 RX ORDER — LIDOCAINE HYDROCHLORIDE 40 MG/ML
SOLUTION TOPICAL ONCE
Status: CANCELLED | OUTPATIENT
Start: 2024-11-14 | End: 2024-11-14

## 2024-11-14 RX ORDER — MUPIROCIN 20 MG/G
OINTMENT TOPICAL ONCE
Status: CANCELLED | OUTPATIENT
Start: 2024-11-14 | End: 2024-11-14

## 2024-11-14 RX ADMIN — LIDOCAINE HYDROCHLORIDE: 20 JELLY TOPICAL at 11:40

## 2024-11-14 ASSESSMENT — PAIN DESCRIPTION - ORIENTATION: ORIENTATION: LEFT

## 2024-11-14 ASSESSMENT — PAIN DESCRIPTION - LOCATION: LOCATION: FOOT

## 2024-11-14 ASSESSMENT — PAIN DESCRIPTION - DESCRIPTORS: DESCRIPTORS: ACHING;SORE;TENDER

## 2024-11-14 ASSESSMENT — PAIN DESCRIPTION - FREQUENCY: FREQUENCY: INTERMITTENT

## 2024-11-14 ASSESSMENT — PAIN DESCRIPTION - ONSET: ONSET: ON-GOING

## 2024-11-14 ASSESSMENT — PAIN SCALES - GENERAL: PAINLEVEL_OUTOF10: 5

## 2024-11-14 ASSESSMENT — PAIN DESCRIPTION - PAIN TYPE: TYPE: ACUTE PAIN

## 2024-11-14 NOTE — PLAN OF CARE
Problem: Pain  Goal: Verbalizes/displays adequate comfort level or baseline comfort level  Outcome: Progressing     Problem: Wound:  Goal: Will show signs of wound healing; wound closure and no evidence of infection  Description: Will show signs of wound healing; wound closure and no evidence of infection  Outcome: Progressing     Problem: Smoking cessation:  Goal: Ability to formulate a plan to maintain a tobacco-free life will be supported  Description: Ability to formulate a plan to maintain a tobacco-free life will be supported  Outcome: Progressing     Problem: Weight control:  Goal: Ability to maintain an optimal weight for height and age will be supported  Description: Ability to maintain an optimal weight for height and age will be supported  Outcome: Progressing     Problem: Falls - Risk of:  Goal: Will remain free from falls  Description: Will remain free from falls  Outcome: Progressing

## 2024-11-14 NOTE — DISCHARGE INSTRUCTIONS
[facility] Wound Care and Hyperbaric Oxygen Therapy   Physician Orders and Discharge Instructions  [address] [place] [address]  [address]  [place] [address]  Telephone: [phone]      FAX [phone]    NAME:  Aniya Alfaro  YOB: 1990  MEDICAL RECORD NUMBER:  [id number]  DATE:  11/14/2024    Discharge condition: Stable    Discharge to: Home    Left via:Private automobile    Accompanied by:  self    ECF/HHA: [facility]     Dressing Orders:  Left foot wound: Soap and water wash.  Apply a double layer of Xeroform (the yellow sticky dressing) over the wound bed daily. Secure with dry gauze, rolled gauze and tape daily. Avoid pressure to the wound at all times     Treatment Orders:  Protein rich diet (unless restricted by your physician); Multivitamin daily; Elevate legs above the level of your heart when sitting 3-4 times daily for at least one hour each time, avoid standing for long periods of time.    [facility] follow up visit ___Alyce VIZCARRA in 2 weeks__________________________  (Please note your next appointment above and if you are unable to keep, kindly give a 24 hour notice. Thank you.)          If you experience any of the following, please call the Wound Care Center during business hours:    * Increase in Pain  * Temperature over 101  * Increase in drainage from your wound  * Drainage with a foul odor  * Bleeding  * Increase in swelling  * Need for compression bandage changes due to slippage, breakthrough drainage.    If you need medical attention outside of the business hours of the Wound Care Centers please contact your PCP or go to the nearest emergency room.

## 2024-11-14 NOTE — PROGRESS NOTES
[facility]   Progress Note and Procedure Note      Aniya Alfaro  MEDICAL RECORD NUMBER:  [id number]  AGE: 34 y.o.   GENDER: female  : 1990  EPISODE DATE:  2024    Subjective:     Chief Complaint   Patient presents with    Wound Check     Patient presents today for recheck left foot wound.         HISTORY of PRESENT ILLNESS HPI     Aniya Alfaro is a 34 y.o. female who presents today for wound/ulcer evaluation.   History of Wound Context: left foot wound follow up/eval and treat    Ulcer Identification:  Ulcer Type: traumatic  Contributing Factors: none    Wound: Contusion        PAST MEDICAL HISTORY        Diagnosis Date    Asthma     Obese     Ovarian cyst        PAST SURGICAL HISTORY    Past Surgical History:   Procedure Laterality Date    CERVIX SURGERY      CHOLECYSTECTOMY, LAPAROSCOPIC N/A 2021    CHOLECYSTECTOMY LAPAROSCOPIC performed by April Gillespie MD at [facility] OR    COLONOSCOPY N/A 2021    Dr KARELY Jeong-14 yr (age 45) recall    DILATION AND CURETTAGE OF UTERUS N/A 10/28/2022    DILATATION AND CURETTAGE HYSTEROSCOPY, performed by Belinda Perez MD at [facility] OR    LAPAROSCOPY N/A 2024    DIAGNOSTIC ROBOTIC LAPAROSCOPY, LEFT SALPINGECTOMY/OOPHORECTOMY, CYST REMOVAL performed by Katie Boone MD at [facility] OR    UPPER GASTROINTESTINAL ENDOSCOPY N/A 2021    Dr KARELY Jeong-Gastritis       FAMILY HISTORY    Family History   Problem Relation Age of Onset    Diabetes Mother     High Blood Pressure Mother     Diabetes Sister     Diabetes Maternal Grandmother     Colon Cancer Neg Hx     Colon Polyps Neg Hx     Esophageal Cancer Neg Hx     Liver Cancer Neg Hx     Rectal Cancer Neg Hx     Stomach Cancer Neg Hx        SOCIAL HISTORY    Social History     Tobacco Use    Smoking status: Every Day     Current packs/day: 0.50     Average packs/day: 1.2 packs/day for 19.0 years (23.0 ttl pk-yrs)     Types: Cigarettes     Passive exposure: Current

## 2024-11-20 ENCOUNTER — TELEPHONE (OUTPATIENT)
Dept: OBGYN CLINIC | Age: 34
End: 2024-11-20

## 2024-11-20 ENCOUNTER — TELEPHONE (OUTPATIENT)
Age: 34
End: 2024-11-20

## 2024-11-20 NOTE — TELEPHONE ENCOUNTER
----- Message from Laurita LEE sent at 11/20/2024 12:37 PM CST -----  [facility]    Reason for Message: Dr. Martinez patient-  Aniya is calling to request a return to work letter starting back on Friday 11/22/24.      Additional Information: Please contact patient to advise.    Call Back Number: [phone]  Caller Relationship: Patient  Caller Name: ANIYA DELGADO [[id number]]

## 2024-11-20 NOTE — TELEPHONE ENCOUNTER
Aniya called to schedule an appointment for:  patient was not sure what she needed to be seen for and refused to tell me why or what type of appt she needs . I offered to scheduled a well woman visit, she refused to wait until the next opening with available providers. [facility] was unable to accommodate in the time frame needed. Please be advised that the best time to call Anytime.    Thank you.

## 2024-11-26 ENCOUNTER — OFFICE VISIT (OUTPATIENT)
Age: 34
End: 2024-11-26
Payer: MEDICAID

## 2024-11-26 VITALS — BODY MASS INDEX: 37.67 KG/M2 | HEIGHT: 67 IN | WEIGHT: 240 LBS

## 2024-11-26 DIAGNOSIS — S97.82XD CRUSHING INJURY OF LEFT FOOT, SUBSEQUENT ENCOUNTER: Primary | ICD-10-CM

## 2024-11-26 PROCEDURE — 99213 OFFICE O/P EST LOW 20 MIN: CPT | Performed by: NURSE PRACTITIONER

## 2024-11-26 ASSESSMENT — ENCOUNTER SYMPTOMS
DIARRHEA: 0
COLOR CHANGE: 0
VOMITING: 0
SHORTNESS OF BREATH: 0
BLOOD IN STOOL: 0
CONSTIPATION: 0
NAUSEA: 0
COUGH: 0
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
## 2024-12-09 NOTE — PROGRESS NOTES
epitrochlear adenopathy.      Lower Body: No right inguinal adenopathy. No left inguinal adenopathy.   Skin:     General: Skin is warm and dry.      Capillary Refill: Capillary refill takes 2 to 3 seconds.      Findings: No rash.   Neurological:      Mental Status: She is alert and oriented to person, place, and time. She is not disoriented.      Sensory: No sensory deficit.      Coordination: Coordination normal.      Gait: Gait normal.      Deep Tendon Reflexes: Reflexes are normal and symmetric.   Psychiatric:         Speech: Speech normal.         Behavior: Behavior normal.         Thought Content: Thought content normal.         Judgment: Judgment normal.           Diagnosis Orders   1. Well woman exam with routine gynecological exam  CBC with Auto Differential    Comprehensive Metabolic Panel    Lipid Panel    Vitamin D 25 Hydroxy    TSH    T4, Free      2. Encounter for screening for cervical cancer  PAP SMEAR      3. Encounter for screening for human papillomavirus (HPV)  Human papillomavirus (HPV) DNA probe thin prep high risk      4. Obesity (BMI 30-39.9)  Vitamin D 25 Hydroxy    TSH    T4, Free      5. Screen for STD (sexually transmitted disease)  HIV Rapid 1&2    Hepatitis Panel, Acute    RPR Reflex to Titer and TPPA    C.trachomatis, N.gonorrhoeae, T.vaginalis Molecular, Thin Prep      6. Infertility management            MEDICATIONS:  No orders of the defined types were placed in this encounter.      ORDERS:  Orders Placed This Encounter   Procedures    C.trachomatis, N.gonorrhoeae, T.vaginalis Molecular, Thin Prep    Human papillomavirus (HPV) DNA probe thin prep high risk    PAP SMEAR    CBC with Auto Differential    Comprehensive Metabolic Panel    Lipid Panel    Vitamin D 25 Hydroxy    TSH    T4, Free    HIV Rapid 1&2    Hepatitis Panel, Acute    RPR Reflex to Titer and TPPA       PLAN:  Will check labs  Pap and sti collected  Consider repeat hsg pending labs for right fallopian tube

## 2024-12-10 ENCOUNTER — OFFICE VISIT (OUTPATIENT)
Dept: OBGYN CLINIC | Age: 34
End: 2024-12-10
Payer: MEDICAID

## 2024-12-10 VITALS
HEIGHT: 67 IN | DIASTOLIC BLOOD PRESSURE: 87 MMHG | WEIGHT: 245.5 LBS | SYSTOLIC BLOOD PRESSURE: 129 MMHG | BODY MASS INDEX: 38.53 KG/M2 | HEART RATE: 77 BPM

## 2024-12-10 DIAGNOSIS — Z11.51 ENCOUNTER FOR SCREENING FOR HUMAN PAPILLOMAVIRUS (HPV): ICD-10-CM

## 2024-12-10 DIAGNOSIS — Z12.4 ENCOUNTER FOR SCREENING FOR CERVICAL CANCER: ICD-10-CM

## 2024-12-10 DIAGNOSIS — Z31.9 INFERTILITY MANAGEMENT: ICD-10-CM

## 2024-12-10 DIAGNOSIS — Z01.419 WELL WOMAN EXAM WITH ROUTINE GYNECOLOGICAL EXAM: Primary | ICD-10-CM

## 2024-12-10 DIAGNOSIS — E66.9 OBESITY (BMI 30-39.9): ICD-10-CM

## 2024-12-10 DIAGNOSIS — Z11.3 SCREEN FOR STD (SEXUALLY TRANSMITTED DISEASE): ICD-10-CM

## 2024-12-10 PROCEDURE — 99213 OFFICE O/P EST LOW 20 MIN: CPT | Performed by: NURSE PRACTITIONER

## 2024-12-10 PROCEDURE — 99395 PREV VISIT EST AGE 18-39: CPT | Performed by: NURSE PRACTITIONER

## 2024-12-10 ASSESSMENT — ENCOUNTER SYMPTOMS
GASTROINTESTINAL NEGATIVE: 1
ALLERGIC/IMMUNOLOGIC NEGATIVE: 1
RESPIRATORY NEGATIVE: 1
EYES NEGATIVE: 1

## 2024-12-12 LAB
HPV HR 12 DNA SPEC QL NAA+PROBE: NOT DETECTED
HPV16 DNA SPEC QL NAA+PROBE: NOT DETECTED
HPV16+18+H RISK 12 DNA SPEC-IMP: NORMAL
HPV18 DNA SPEC QL NAA+PROBE: NOT DETECTED

## 2024-12-14 RX ORDER — METRONIDAZOLE 500 MG/1
500 TABLET ORAL 2 TIMES DAILY WITH MEALS
Qty: 14 TABLET | Refills: 0 | Status: SHIPPED | OUTPATIENT
Start: 2024-12-14 | End: 2024-12-21

## 2024-12-30 ENCOUNTER — TELEPHONE (OUTPATIENT)
Age: 34
End: 2024-12-30

## 2024-12-30 NOTE — TELEPHONE ENCOUNTER
Pt is calling in stating that no one ever called her about PT on her foot. She states she's in a lot of pain and had to put her boot back on today. Please call pt back with advice at [phone]

## 2024-12-31 NOTE — TELEPHONE ENCOUNTER
Spoke with Alyce who stated patient just needs to begin physical therapy. Spoke with physical therapy and they will reach out to patient to schedule.

## 2025-01-20 ENCOUNTER — TELEPHONE (OUTPATIENT)
Dept: OBGYN CLINIC | Age: 35
End: 2025-01-20

## 2025-02-13 ENCOUNTER — APPOINTMENT (OUTPATIENT)
Dept: CT IMAGING | Age: 35
End: 2025-02-13
Payer: MEDICAID

## 2025-02-13 ENCOUNTER — HOSPITAL ENCOUNTER (EMERGENCY)
Age: 35
Discharge: HOME | End: 2025-02-13
Payer: MEDICAID

## 2025-02-13 ENCOUNTER — TELEPHONE (OUTPATIENT)
Age: 35
End: 2025-02-13

## 2025-02-13 VITALS
RESPIRATION RATE: 16 BRPM | TEMPERATURE: 98.4 F | WEIGHT: 240 LBS | OXYGEN SATURATION: 98 % | DIASTOLIC BLOOD PRESSURE: 95 MMHG | BODY MASS INDEX: 37.59 KG/M2 | HEART RATE: 70 BPM | SYSTOLIC BLOOD PRESSURE: 142 MMHG

## 2025-02-13 DIAGNOSIS — M79.672 LEFT FOOT PAIN: Primary | ICD-10-CM

## 2025-02-13 PROCEDURE — 99284 EMERGENCY DEPT VISIT MOD MDM: CPT

## 2025-02-13 PROCEDURE — 73700 CT LOWER EXTREMITY W/O DYE: CPT

## 2025-02-13 PROCEDURE — 96372 THER/PROPH/DIAG INJ SC/IM: CPT

## 2025-02-13 PROCEDURE — 6360000002 HC RX W HCPCS: Performed by: PHYSICIAN ASSISTANT

## 2025-02-13 RX ORDER — ORPHENADRINE CITRATE 100 MG/1
100 TABLET ORAL 2 TIMES DAILY
Qty: 20 TABLET | Refills: 0 | Status: SHIPPED | OUTPATIENT
Start: 2025-02-13 | End: 2025-02-23

## 2025-02-13 RX ORDER — DEXAMETHASONE SODIUM PHOSPHATE 4 MG/ML
4 INJECTION, SOLUTION INTRA-ARTICULAR; INTRALESIONAL; INTRAMUSCULAR; INTRAVENOUS; SOFT TISSUE ONCE
Status: COMPLETED | OUTPATIENT
Start: 2025-02-13 | End: 2025-02-13

## 2025-02-13 RX ORDER — MORPHINE SULFATE 4 MG/ML
2 INJECTION, SOLUTION INTRAMUSCULAR; INTRAVENOUS ONCE
Status: COMPLETED | OUTPATIENT
Start: 2025-02-13 | End: 2025-02-13

## 2025-02-13 RX ORDER — PREDNISONE 10 MG/1
10 TABLET ORAL DAILY
Qty: 10 TABLET | Refills: 0 | Status: SHIPPED | OUTPATIENT
Start: 2025-02-13 | End: 2025-02-23

## 2025-02-13 RX ADMIN — MORPHINE SULFATE 2 MG: 4 INJECTION, SOLUTION INTRAMUSCULAR; INTRAVENOUS at 14:07

## 2025-02-13 RX ADMIN — DEXAMETHASONE SODIUM PHOSPHATE 4 MG: 4 INJECTION INTRA-ARTICULAR; INTRALESIONAL; INTRAMUSCULAR; INTRAVENOUS; SOFT TISSUE at 12:31

## 2025-02-13 ASSESSMENT — ENCOUNTER SYMPTOMS
EYE DISCHARGE: 0
NAUSEA: 0
COUGH: 0
ABDOMINAL PAIN: 0
SHORTNESS OF BREATH: 0
ABDOMINAL DISTENTION: 0
PHOTOPHOBIA: 0
EYE PAIN: 0
BACK PAIN: 0
APNEA: 0
RHINORRHEA: 0
SORE THROAT: 0
COLOR CHANGE: 0

## 2025-02-13 ASSESSMENT — PAIN SCALES - GENERAL: PAINLEVEL_OUTOF10: 10

## 2025-02-13 NOTE — TELEPHONE ENCOUNTER
Pt is calling stating she is at [facility]. She states [facility] told her to call and leave a message for Alyce for pain meds   [facility] Jayla cuadra

## 2025-02-13 NOTE — TELEPHONE ENCOUNTER
Returned call to patient and informed her that we are unable to give her anything for pain due to patient has not been seen since October 2024. Patient does have appointment on Monday 2/17/25. Encouraged patient to elevate and keep ice on injury . Patient encouraged to call PCP if anything develops before being seen on Monday 2/17/25 patient verbalized understanding.

## 2025-02-13 NOTE — ED NOTES
Walking boot applied, csm intact. Pt with recent walking boot and crutches, verbalizes understanding on use

## 2025-02-13 NOTE — ED PROVIDER NOTES
reviewed.   Constitutional:       General: She is not in acute distress.     Appearance: She is well-developed. She is not diaphoretic.   HENT:      Head: Normocephalic and atraumatic.      Right Ear: External ear normal.      Left Ear: External ear normal.      Mouth/Throat:      Pharynx: No oropharyngeal exudate.   Eyes:      General:         Right eye: No discharge.         Left eye: No discharge.      Pupils: Pupils are equal, round, and reactive to light.   Neck:      Thyroid: No thyromegaly.   Cardiovascular:      Rate and Rhythm: Normal rate and regular rhythm.      Heart sounds: Normal heart sounds. No murmur heard.     No friction rub.   Pulmonary:      Effort: Pulmonary effort is normal. No respiratory distress.      Breath sounds: Normal breath sounds. No stridor. No wheezing.   Abdominal:      General: Bowel sounds are normal. There is no distension.      Palpations: Abdomen is soft.      Tenderness: There is no abdominal tenderness.   Musculoskeletal:         General: Swelling and tenderness present. No signs of injury.      Cervical back: Normal range of motion and neck supple.   Skin:     General: Skin is warm and dry.      Capillary Refill: Capillary refill takes less than 2 seconds.      Findings: No rash.   Neurological:      Mental Status: She is alert and oriented to person, place, and time.      Cranial Nerves: No cranial nerve deficit.      Sensory: No sensory deficit.      Coordination: Coordination normal.   Psychiatric:         Behavior: Behavior normal.         Thought Content: Thought content normal.           DIAGNOSTIC RESULTS     RADIOLOGY:   Non-plain film images such as CT, Ultrasound and MRI are read by the radiologist. Plain radiographic images are visualized and preliminarilyinterpreted by No att. providers found with the below findings:        Interpretation per the Radiologist below, if available at the time of this note:    CT FOOT LEFT WO CONTRAST   Final Result   No fracture

## 2025-02-13 NOTE — DISCHARGE INSTRUCTIONS
Plan on WB as tolerated  Crutches if needed  Call ortho podiatry for follow up for possible PT MRI and better pain control IF needed

## 2025-02-17 ENCOUNTER — OFFICE VISIT (OUTPATIENT)
Age: 35
End: 2025-02-17
Payer: MEDICAID

## 2025-02-17 ENCOUNTER — TELEPHONE (OUTPATIENT)
Age: 35
End: 2025-02-17

## 2025-02-17 VITALS — BODY MASS INDEX: 37.67 KG/M2 | WEIGHT: 240 LBS | HEIGHT: 67 IN

## 2025-02-17 DIAGNOSIS — R20.0 NUMBNESS OF LEFT FOOT: ICD-10-CM

## 2025-02-17 DIAGNOSIS — G90.522 COMPLEX REGIONAL PAIN SYNDROME I OF LEFT LOWER LIMB: ICD-10-CM

## 2025-02-17 DIAGNOSIS — S97.82XD CRUSHING INJURY OF LEFT FOOT, SUBSEQUENT ENCOUNTER: Primary | ICD-10-CM

## 2025-02-17 DIAGNOSIS — R29.898 WEAKNESS OF LEFT FOOT: ICD-10-CM

## 2025-02-17 PROCEDURE — 99214 OFFICE O/P EST MOD 30 MIN: CPT | Performed by: NURSE PRACTITIONER

## 2025-02-17 RX ORDER — HYDROCODONE BITARTRATE AND ACETAMINOPHEN 5; 325 MG/1; MG/1
1 TABLET ORAL EVERY 12 HOURS PRN
Qty: 10 TABLET | Refills: 0 | Status: SHIPPED | OUTPATIENT
Start: 2025-02-17 | End: 2025-02-22

## 2025-02-17 ASSESSMENT — ENCOUNTER SYMPTOMS
COLOR CHANGE: 0
NAUSEA: 0
COUGH: 0
BLOOD IN STOOL: 0
SHORTNESS OF BREATH: 0
CONSTIPATION: 0
DIARRHEA: 0
VOMITING: 0

## 2025-02-17 NOTE — TELEPHONE ENCOUNTER
Spoke to this patient regarding her pain medicine. The patient's chart states that she is highly allergic to acetaminophen and ibuprofen.  The patient states that is incorrect; they merely make her itch.  Alyce is sending in some pain medicine to her pharmacy.

## 2025-02-17 NOTE — PROGRESS NOTES
Chief Complaint    Chief Complaint   Patient presents with    Foot Pain     Left foot        Body Part: Foot left    When did the symptoms begin/Date of Onset? Last week; past few days has been bad.  ER gave her muscle relaxers and anti-inflammatory medicine.  Patient states she never got her therapy order.    Where did the injury happen? Other: No new injury    How did the injury happen? NA    If over 55, have you pham an Osteoporosis Screening in the last 2 years? NA    Injury Details    Previous similar problems or complaints? Yes    Severity of Pain: 9    Character of Pain: Sharp and Stabbing    What makes your symptoms worse? Standing    How long does the pain last? Constant    Associated Symptoms: Swelling and Numbness    What makes your symptoms better? Other: Elevation    Previous Treatment for the Problem: Bracing    Any special diagnostic tests or studies done? X-Rays and CT; Where? [facility]    Similar complaints on opposite side? No    Review of Systems    Review of Systems   Constitutional:  Negative for appetite change, chills, fatigue and fever.   Respiratory:  Negative for cough and shortness of breath.    Cardiovascular:  Negative for chest pain, palpitations and leg swelling.   Gastrointestinal:  Negative for blood in stool, constipation, diarrhea, nausea and vomiting.   Musculoskeletal:  Negative for arthralgias, gait problem and joint swelling.   Skin:  Negative for color change and wound.   Neurological:  Negative for weakness.          
HYDROcodone-acetaminophen (NORCO) 5-325 MG per tablet Take 1 tablet by mouth every 12 hours as needed for Pain for up to 5 days. Max Daily Amount: 2 tablets 10 tablet 0    predniSONE (DELTASONE) 10 MG tablet Take 1 tablet by mouth daily for 10 days 10 tablet 0    orphenadrine (NORFLEX) 100 MG extended release tablet Take 1 tablet by mouth 2 times daily for 10 days 20 tablet 0     No current facility-administered medications for this visit.        Allergies  Allergies   Allergen Reactions    Ibuprofen-Acetaminophen Anaphylaxis and Hives    Onion Anaphylaxis    Other Anaphylaxis     onions    Metoclopramide Other (See Comments)    Carafate [Sucralfate] Nausea And Vomiting    Tramadol Rash        Review of Systems  System  Neg/Pos  Details  Constitutional  Negative  Chills, Fatigue, Fever and Night Sweats  Respiratory  Negative  Chest Pain, Cough and Dyspnea  Cardio   Negative  Leg Swelling  GI   Negative  Abdominal Pain, Constipation, Nausea and Vomiting     Negative  Urinary Incontinence   Endocrine  Negative  Weight Gain and Weight Loss  MS   Negative  Except as noted in HPI and Chief Complaint    Ht 1.702 m (5' 7\")   Wt 108.9 kg (240 lb)   BMI 37.59 kg/m²      Physical Exam   Physical Examination:  General: The patient is a Well Nourished 35 y.o. female who is alert and oriented x3 in no distress. The patient is cooperative during the exam.  Psychological: Is a pleasant female, good mood and affect, answers questions appropriately.  Head and Neck: Normocephalic, Atraumatic.   Respiratory: Rate and effort within normal limits.   Vascular: Negative Homans signs. Dorsalis pedis pulse, posterior pulses 2+, left.  Dermatological: Skin warm, dry, and intact. No open lesions. No skin breakdown. No cuts, scrapes a, or abrasions. No skin discoloration. No increasing warmth to the lower extremities. No erythema. No ecchymoses.   Musculoskeletal: Decreased dorsiflexion and plantarflexion strength.  Decreased inversion

## 2025-02-20 ENCOUNTER — TELEPHONE (OUTPATIENT)
Dept: OBGYN CLINIC | Age: 35
End: 2025-02-20

## 2025-02-24 ENCOUNTER — HOSPITAL ENCOUNTER (EMERGENCY)
Age: 35
Discharge: HOME | End: 2025-02-25
Payer: MEDICAID

## 2025-02-24 VITALS
OXYGEN SATURATION: 96 % | DIASTOLIC BLOOD PRESSURE: 81 MMHG | SYSTOLIC BLOOD PRESSURE: 142 MMHG | RESPIRATION RATE: 20 BRPM | TEMPERATURE: 97.8 F | HEART RATE: 73 BPM

## 2025-02-24 DIAGNOSIS — M79.672 LEFT FOOT PAIN: Primary | ICD-10-CM

## 2025-02-24 PROCEDURE — 6370000000 HC RX 637 (ALT 250 FOR IP): Performed by: NURSE PRACTITIONER

## 2025-02-24 PROCEDURE — 99283 EMERGENCY DEPT VISIT LOW MDM: CPT

## 2025-02-24 RX ORDER — HYDROCODONE BITARTRATE AND ACETAMINOPHEN 7.5; 325 MG/1; MG/1
1 TABLET ORAL ONCE
Status: COMPLETED | OUTPATIENT
Start: 2025-02-24 | End: 2025-02-24

## 2025-02-24 RX ADMIN — HYDROCODONE BITARTRATE AND ACETAMINOPHEN 1 TABLET: 7.5; 325 TABLET ORAL at 23:59

## 2025-02-24 ASSESSMENT — PAIN - FUNCTIONAL ASSESSMENT: PAIN_FUNCTIONAL_ASSESSMENT: 0-10

## 2025-02-24 ASSESSMENT — PAIN SCALES - GENERAL: PAINLEVEL_OUTOF10: 7

## 2025-02-24 ASSESSMENT — PAIN DESCRIPTION - ORIENTATION: ORIENTATION: LEFT

## 2025-02-24 ASSESSMENT — PAIN DESCRIPTION - LOCATION: LOCATION: FOOT
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
## 2025-04-03 ENCOUNTER — TELEPHONE (OUTPATIENT)
Dept: OBGYN CLINIC | Age: 35
End: 2025-04-03

## 2025-04-16 ENCOUNTER — TELEPHONE (OUTPATIENT)
Dept: OBGYN CLINIC | Age: 35
End: 2025-04-16

## 2025-05-02 ENCOUNTER — TELEPHONE (OUTPATIENT)
Dept: OBGYN CLINIC | Age: 35
End: 2025-05-02

## 2025-06-12 ENCOUNTER — TELEPHONE (OUTPATIENT)
Dept: OBGYN CLINIC | Age: 35
End: 2025-06-12

## 2025-07-28 ENCOUNTER — TELEPHONE (OUTPATIENT)
Dept: OBGYN CLINIC | Age: 35
End: 2025-07-28

## 2025-07-28 NOTE — TELEPHONE ENCOUNTER
Aniya called to schedule an appointment for beast pain.  Per pt having pain in both breasts. Would like to be sooner that first available on 8/18. [facility] was unable to accommodate in the time frame needed. Please be advised that the best time to call Anytime.    Thank you.

## 2025-08-05 ENCOUNTER — OFFICE VISIT (OUTPATIENT)
Dept: OBGYN CLINIC | Age: 35
End: 2025-08-05
Payer: MEDICAID

## 2025-08-05 VITALS
DIASTOLIC BLOOD PRESSURE: 89 MMHG | BODY MASS INDEX: 37.98 KG/M2 | SYSTOLIC BLOOD PRESSURE: 132 MMHG | WEIGHT: 242 LBS | HEART RATE: 52 BPM | HEIGHT: 67 IN

## 2025-08-05 DIAGNOSIS — Z12.31 ENCOUNTER FOR SCREENING MAMMOGRAM FOR BREAST CANCER: ICD-10-CM

## 2025-08-05 DIAGNOSIS — N64.4 BREAST PAIN: Primary | ICD-10-CM

## 2025-08-05 PROCEDURE — 99213 OFFICE O/P EST LOW 20 MIN: CPT | Performed by: NURSE PRACTITIONER

## 2025-08-05 ASSESSMENT — ENCOUNTER SYMPTOMS
GASTROINTESTINAL NEGATIVE: 1
EYES NEGATIVE: 1
RESPIRATORY NEGATIVE: 1
ALLERGIC/IMMUNOLOGIC NEGATIVE: 1

## (undated) DEVICE — INTENDED FOR TISSUE SEPARATION, AND OTHER PROCEDURES THAT REQUIRE A SHARP SURGICAL BLADE TO PUNCTURE OR CUT.: Brand: BARD-PARKER ® CARBON RIB-BACK BLADES

## (undated) DEVICE — KIT,ANTI FOG,W/SPONGE & FLUID,SOFT PACK: Brand: MEDLINE

## (undated) DEVICE — ENDO KIT,LOURDES HOSPITAL: Brand: MEDLINE INDUSTRIES, INC.

## (undated) DEVICE — AIRSEAL 8 MM ACCESS PORT AND LOW PROFILE OBTURATOR WITH BLADELESS OPTICAL TIP, 120 MM LENGTH: Brand: AIRSEAL

## (undated) DEVICE — ADHESIVE SKIN CLSR 0.7ML TOP DERMBND ADV

## (undated) DEVICE — 40595 XL TRENDELENBURG POSITIONING KIT: Brand: 40595 XL TRENDELENBURG POSITIONING KIT

## (undated) DEVICE — FORCEPS BX 240CM 2.4MM L NDL RAD JAW 4 M00513334

## (undated) DEVICE — SOLUTION IRRIG 3000ML 0.9% SOD CHL USP UROMATIC PLAS CONT

## (undated) DEVICE — SOLUTION IV IRRIG POUR BRL 0.9% SODIUM CHL 2F7124

## (undated) DEVICE — SEALANT TISS 10 ML FIBRIN VISTASEAL

## (undated) DEVICE — SEAL

## (undated) DEVICE — ARM DRAPE

## (undated) DEVICE — MASTISOL ADHESIVE LIQ 2/3ML

## (undated) DEVICE — COLUMN DRAPE

## (undated) DEVICE — SOLUTION IRRIG 1000ML 0.9% SOD CHL USP POUR PLAS BTL

## (undated) DEVICE — INSUFFLATION TUBING,LAPAROSCOPIC: Brand: DEROYAL

## (undated) DEVICE — SOUND/DILATOR (ACCESSORY FOR CLEARVIEW UTERINE MANIPULATOR): Brand: CLEARVIEW

## (undated) DEVICE — APPLIER CLP M/L SHFT DIA5MM 15 LIG LIGAMAX 5

## (undated) DEVICE — DAVINCI: Brand: MEDLINE INDUSTRIES, INC.

## (undated) DEVICE — SUCTION IRRIGATOR: Brand: ENDOWRIST

## (undated) DEVICE — GARMENT,MEDLINE,DVT,INT,CALF,MED, GEN2: Brand: MEDLINE

## (undated) DEVICE — HYPODERMIC SAFETY NEEDLE: Brand: MAGELLAN

## (undated) DEVICE — SOLUTION ANTIFOG VIS SYS CLEARIFY LAPSCP

## (undated) DEVICE — SUTURE SZ 0 27IN 5/8 CIR UR-6  TAPER PT VIOLET ABSRB VICRYL J603H

## (undated) DEVICE — GLOVE SURG SZ 65 CRM LTX FREE POLYISOPRENE POLYMER BEAD ANTI

## (undated) DEVICE — AGENT HEMSTAT W2XL3IN OXIDIZED REGENERATED CELOS ABSRB

## (undated) DEVICE — GLOVE SURG SZ 75 CRM LTX FREE POLYISOPRENE POLYMER BEAD ANTI

## (undated) DEVICE — TROCAR ENDOSCP BLADED 5X100 MM

## (undated) DEVICE — DRAPE,LAP,CHOLE,W/TROUGHS,STERILE: Brand: MEDLINE

## (undated) DEVICE — INSUFFLATION NEEDLE TO ESTABLISH PNEUMOPERITONEUM.: Brand: INSUFFLATION NEEDLE

## (undated) DEVICE — SET ENDOSCP SEAL HYSTEROSCOPE RIG OUTFLO CHN DISP MYOSURE

## (undated) DEVICE — SUTURE ABSORBABLE MONOFILAMENT 0 CT-2 12 IN STRATAFIX SPRL SXPP1B405

## (undated) DEVICE — PASSER SUT TRANSOSSEOUS MALL SHFT DURABLE NIT CAPTURE LOOP

## (undated) DEVICE — AMBU AURA-I U SIZE 4, DISPOSABLE LARYNGEAL MASK: Brand: AURA-I

## (undated) DEVICE — Y-TYPE TUR/BLADDER IRRIGATION SET, REGULATING CLAMP

## (undated) DEVICE — TIP COVER ACCESSORY

## (undated) DEVICE — SENSOR PLSE OXMTR AD CBL L3FT ADH TRANSMISSIVE

## (undated) DEVICE — SUTURE VICRYL SZ 0 L36IN ABSRB UD L36MM CT-1 1/2 CIR J946H

## (undated) DEVICE — FLUID MGMT SYS FLUENT KIT 6/PK

## (undated) DEVICE — SYRINGE,CONTROL,LL,FINGER,GRIP: Brand: MEDLINE INDUSTRIES, INC.

## (undated) DEVICE — VESSEL SEALER EXTEND: Brand: ENDOWRIST

## (undated) DEVICE — ASTOUND STANDARD SURGICAL GOWN, XL: Brand: CONVERTORS

## (undated) DEVICE — 9165 UNIVERSAL PATIENT PLATE: Brand: 3M™

## (undated) DEVICE — TRI-LUMEN FILTERED TUBE SET WITH ACTIVATED CHARCOAL FILTER: Brand: AIRSEAL

## (undated) DEVICE — TROCAR: Brand: KII SHIELDED BLADED ACCESS SYSTEM

## (undated) DEVICE — BLADELESS OBTURATOR: Brand: WECK VISTA

## (undated) DEVICE — SUTURE MCRYL SZ 4-0 L18IN ABSRB UD L19MM PS-2 3/8 CIR PRIM Y496G

## (undated) DEVICE — CONMED GOLDLINE ELECTROSURGICAL HANDPIECE, HAND CONTROLLED WITH BLADE ELECTRODE, BUTTON SWITCH, SAFETY HOLSTER AND 10 FT (3 M) CABLE: Brand: CONMED GOLDLINE

## (undated) DEVICE — COVER LT HNDL BLU PLAS

## (undated) DEVICE — DRAPE,UTILITY,XL,4/PK,STERILE: Brand: MEDLINE

## (undated) DEVICE — TRAY,FOLEY,100% SIL,16FR 10ML: Brand: MEDLINE

## (undated) DEVICE — STRIP SKIN CLSR W0.25XL4IN WHT SPUNBOUND FBR NYL HI ADH

## (undated) DEVICE — SYRINGE MED 10ML TRNSLUC BRL PLUNG BLK MRK POLYPR CTRL

## (undated) DEVICE — AIRWAY CIRCUIT: Brand: DEROYAL

## (undated) DEVICE — GLOVE SURG SZ 7 CRM LTX FREE POLYISOPRENE POLYMER BEAD ANTI

## (undated) DEVICE — SURGICAL PROCEDURE PACK GYNECOLOGIC MIN LOURDES HOSP

## (undated) DEVICE — LEGGINGS, PAIR, CLEAR, STERILE: Brand: MEDLINE

## (undated) DEVICE — NEPTUNE E-SEP SMOKE EVACUATION PENCIL, COATED, 70MM BLADE, ROCKER SWITCH: Brand: NEPTUNE E-SEP

## (undated) DEVICE — SUTURE MONOCRYL SZ 4-0 L18IN ABSRB UD L19MM PS-2 3/8 CIR PRIM Y496G

## (undated) DEVICE — MASK ANES AD M SZ 5 PREM TAIL VLV INFL PRT UNSCENTED HK RNG

## (undated) DEVICE — APPLICATOR LAP 35 CM 2 RIGID VISTASEAL

## (undated) DEVICE — VCARE LARGE UTERINE MANIPULATOR: Brand: VCARE LARGE

## (undated) DEVICE — CLEARVIEW UTERINE MANIPULATOR, 7CM: Brand: CLEARVIEW UTERINE MANIPULATOR

## (undated) DEVICE — BAG SPEC REM 224ML W4XL6IN DIA10MM 1 HND GYN DISP ENDOPCH

## (undated) DEVICE — GOWN,PREVENTION PLUS,XLN/XL,ST,24/CS: Brand: MEDLINE

## (undated) DEVICE — BARRIER ADH W3XL4IN UTER PELV ABSRB GYNECARE INTCEED

## (undated) DEVICE — SOLUTION IV 1000ML 0.9% SOD CHL FOR IRRIG PLAS CONT

## (undated) DEVICE — POUCH SPEC RETRV ENDO

## (undated) DEVICE — MAJOR BSIN SETUP PK